# Patient Record
Sex: FEMALE | Race: WHITE | NOT HISPANIC OR LATINO | Employment: OTHER | URBAN - METROPOLITAN AREA
[De-identification: names, ages, dates, MRNs, and addresses within clinical notes are randomized per-mention and may not be internally consistent; named-entity substitution may affect disease eponyms.]

---

## 2017-01-13 ENCOUNTER — ALLSCRIPTS OFFICE VISIT (OUTPATIENT)
Dept: OTHER | Facility: OTHER | Age: 82
End: 2017-01-13

## 2017-02-02 ENCOUNTER — ALLSCRIPTS OFFICE VISIT (OUTPATIENT)
Dept: RADIOLOGY | Facility: CLINIC | Age: 82
End: 2017-02-02
Payer: COMMERCIAL

## 2017-02-08 ENCOUNTER — GENERIC CONVERSION - ENCOUNTER (OUTPATIENT)
Dept: OTHER | Facility: OTHER | Age: 82
End: 2017-02-08

## 2017-04-07 ENCOUNTER — ALLSCRIPTS OFFICE VISIT (OUTPATIENT)
Dept: OTHER | Facility: OTHER | Age: 82
End: 2017-04-07

## 2017-04-07 DIAGNOSIS — E55.9 VITAMIN D DEFICIENCY: ICD-10-CM

## 2017-04-07 DIAGNOSIS — E78.5 HYPERLIPIDEMIA: ICD-10-CM

## 2017-04-07 DIAGNOSIS — E03.9 HYPOTHYROIDISM: ICD-10-CM

## 2017-04-07 DIAGNOSIS — Z13.1 ENCOUNTER FOR SCREENING FOR DIABETES MELLITUS: ICD-10-CM

## 2017-04-10 ENCOUNTER — TRANSCRIBE ORDERS (OUTPATIENT)
Dept: LAB | Facility: CLINIC | Age: 82
End: 2017-04-10

## 2017-04-10 ENCOUNTER — APPOINTMENT (OUTPATIENT)
Dept: LAB | Facility: CLINIC | Age: 82
End: 2017-04-10
Payer: COMMERCIAL

## 2017-04-10 DIAGNOSIS — E55.9 VITAMIN D DEFICIENCY: ICD-10-CM

## 2017-04-10 DIAGNOSIS — Z13.1 ENCOUNTER FOR SCREENING FOR DIABETES MELLITUS: ICD-10-CM

## 2017-04-10 DIAGNOSIS — E03.9 HYPOTHYROIDISM: ICD-10-CM

## 2017-04-10 DIAGNOSIS — E78.5 HYPERLIPIDEMIA: ICD-10-CM

## 2017-04-10 LAB
25(OH)D3 SERPL-MCNC: 38.4 NG/ML (ref 30–100)
ALBUMIN SERPL BCP-MCNC: 3.7 G/DL (ref 3.5–5)
ALP SERPL-CCNC: 36 U/L (ref 46–116)
ALT SERPL W P-5'-P-CCNC: 37 U/L (ref 12–78)
ANION GAP SERPL CALCULATED.3IONS-SCNC: 8 MMOL/L (ref 4–13)
AST SERPL W P-5'-P-CCNC: 21 U/L (ref 5–45)
BILIRUB SERPL-MCNC: 0.5 MG/DL (ref 0.2–1)
BUN SERPL-MCNC: 22 MG/DL (ref 5–25)
CALCIUM SERPL-MCNC: 9.9 MG/DL (ref 8.3–10.1)
CHLORIDE SERPL-SCNC: 106 MMOL/L (ref 100–108)
CHOLEST SERPL-MCNC: 181 MG/DL (ref 50–200)
CO2 SERPL-SCNC: 26 MMOL/L (ref 21–32)
CREAT SERPL-MCNC: 0.91 MG/DL (ref 0.6–1.3)
GFR SERPL CREATININE-BSD FRML MDRD: 59.3 ML/MIN/1.73SQ M
GLUCOSE P FAST SERPL-MCNC: 86 MG/DL (ref 65–99)
HDLC SERPL-MCNC: 52 MG/DL (ref 40–60)
LDLC SERPL CALC-MCNC: 89 MG/DL (ref 0–100)
POTASSIUM SERPL-SCNC: 4.3 MMOL/L (ref 3.5–5.3)
PROT SERPL-MCNC: 6.9 G/DL (ref 6.4–8.2)
SODIUM SERPL-SCNC: 140 MMOL/L (ref 136–145)
TRIGL SERPL-MCNC: 200 MG/DL
TSH SERPL DL<=0.05 MIU/L-ACNC: 0.76 UIU/ML (ref 0.36–3.74)

## 2017-04-10 PROCEDURE — 36415 COLL VENOUS BLD VENIPUNCTURE: CPT

## 2017-04-10 PROCEDURE — 82306 VITAMIN D 25 HYDROXY: CPT

## 2017-04-10 PROCEDURE — 80053 COMPREHEN METABOLIC PANEL: CPT

## 2017-04-10 PROCEDURE — 84443 ASSAY THYROID STIM HORMONE: CPT

## 2017-04-10 PROCEDURE — 80061 LIPID PANEL: CPT

## 2017-05-24 ENCOUNTER — ALLSCRIPTS OFFICE VISIT (OUTPATIENT)
Dept: OTHER | Facility: OTHER | Age: 82
End: 2017-05-24

## 2017-05-31 ENCOUNTER — OFFICE VISIT (OUTPATIENT)
Dept: URGENT CARE | Facility: CLINIC | Age: 82
End: 2017-05-31
Payer: COMMERCIAL

## 2017-05-31 ENCOUNTER — HOSPITAL ENCOUNTER (OUTPATIENT)
Dept: RADIOLOGY | Facility: CLINIC | Age: 82
Discharge: HOME | End: 2017-05-31
Admitting: EMERGENCY MEDICINE
Payer: COMMERCIAL

## 2017-05-31 DIAGNOSIS — R52 PAIN: ICD-10-CM

## 2017-05-31 PROCEDURE — 73590 X-RAY EXAM OF LOWER LEG: CPT

## 2017-05-31 PROCEDURE — 99213 OFFICE O/P EST LOW 20 MIN: CPT

## 2017-05-31 PROCEDURE — S9088 SERVICES PROVIDED IN URGENT: HCPCS

## 2017-07-31 ENCOUNTER — ALLSCRIPTS OFFICE VISIT (OUTPATIENT)
Dept: OTHER | Facility: OTHER | Age: 82
End: 2017-07-31

## 2017-07-31 ENCOUNTER — APPOINTMENT (OUTPATIENT)
Dept: RADIOLOGY | Facility: CLINIC | Age: 82
End: 2017-07-31
Payer: COMMERCIAL

## 2017-07-31 ENCOUNTER — TRANSCRIBE ORDERS (OUTPATIENT)
Dept: URGENT CARE | Facility: CLINIC | Age: 82
End: 2017-07-31

## 2017-07-31 DIAGNOSIS — M15.9 GENERALIZED OSTEOARTHROSIS, UNSPECIFIED SITE: Primary | ICD-10-CM

## 2017-07-31 DIAGNOSIS — M15.9 GENERALIZED OSTEOARTHROSIS, UNSPECIFIED SITE: ICD-10-CM

## 2017-07-31 PROCEDURE — 73590 X-RAY EXAM OF LOWER LEG: CPT

## 2017-08-01 ENCOUNTER — TRANSCRIBE ORDERS (OUTPATIENT)
Dept: ADMINISTRATIVE | Facility: HOSPITAL | Age: 82
End: 2017-08-01

## 2017-08-01 DIAGNOSIS — Z13.820 SCREENING FOR OSTEOPOROSIS: Primary | ICD-10-CM

## 2017-08-16 ENCOUNTER — TRANSCRIBE ORDERS (OUTPATIENT)
Dept: ADMINISTRATIVE | Facility: HOSPITAL | Age: 82
End: 2017-08-16

## 2017-08-16 DIAGNOSIS — M79.662 PAIN IN LEFT SHIN: Primary | ICD-10-CM

## 2017-08-24 ENCOUNTER — HOSPITAL ENCOUNTER (OUTPATIENT)
Dept: NUCLEAR MEDICINE | Facility: HOSPITAL | Age: 82
Discharge: HOME | End: 2017-08-24
Payer: COMMERCIAL

## 2017-08-24 DIAGNOSIS — M79.662 PAIN IN LEFT SHIN: ICD-10-CM

## 2017-08-24 PROCEDURE — 78315 BONE IMAGING 3 PHASE: CPT

## 2017-08-24 PROCEDURE — A9503 TC99M MEDRONATE: HCPCS

## 2017-10-11 ENCOUNTER — ALLSCRIPTS OFFICE VISIT (OUTPATIENT)
Dept: OTHER | Facility: OTHER | Age: 82
End: 2017-10-11

## 2017-10-11 DIAGNOSIS — M79.605 PAIN OF LEFT LEG: ICD-10-CM

## 2017-10-11 DIAGNOSIS — E55.9 VITAMIN D DEFICIENCY: ICD-10-CM

## 2017-10-13 NOTE — PROGRESS NOTES
Assessment  1  Rheumatoid factor positive (795 79) (R76 8)   2  Right hand pain (729 5) (M79 641)   3  Leg pain, left (729 5) (M79 605)    Plan  Leg pain, left    · Gabapentin 100 MG Oral Capsule; take 1 capsule at bedtime   · US EXTREMITY SOFT TISSUE; Status:Hold For - Scheduling; Requested for:11Oct2017;   Pain in both hands, Rheumatoid factor positive    · 1 - Princess Reid DO ( Rheumatology) Co-Management  *  Status: Active   Requested for: 53GNB0846  Care Summary provided  : Yes    Discussion/Summary    Arthritis of right hand/Positive rheumatoid factor- I have explained to her that her symptoms seem suspicious for Rheumatoid arthritis altough she was not found to have Rheumatid aortitis by Rheumatologist she could have a false positive result  Given patine request I have send a referral to rheumatologist since she would like a second opinion  leg pain and nodule- send for Ultrasound of left leg and also will start trial of gabapentin at bedtime, she was counseled in detail in regards to side effects of medications  up in 2-3 weeks  Possible side effects of new medications were reviewed with the patient/guardian today  The treatment plan was reviewed with the patient/guardian  The patient/guardian understands and agrees with the treatment plan      Chief Complaint  Patient is here today with complaints of discomfort in fingers      History of Present Illness  Patient is here because she has trouble bending her hand, she says that her right hand tingles at night  She also has pain associated with her fingers and she has nodules on her fingers as well  She had a positive rheumatoid factor test and also saw Dr Yanet Mon early in the year and 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  says that she has been having pain on her left leg, she gets pain on her left and says that the pain is pounding on her left leg and that the pain does not let her sleep        Review of Systems    Constitutional: No fever, no chills, feels well, no tiredness, no recent weight gain or weight loss  Eyes: No complaints of eye pain, no red eyes, no eyesight problems, no discharge, no dry eyes, no itching of eyes  Cardiovascular: No complaints of slow heart rate, no fast heart rate, no chest pain, no palpitations, no leg claudication, no lower extremity edema  Respiratory: No complaints of shortness of breath, no wheezing, no cough, no SOB on exertion, no orthopnea, no PND  Musculoskeletal: as noted in HPI  Integumentary: No complaints of skin rash or lesions, no itching, no skin wounds, no breast pain or lump  Neurological: No complaints of headache, no confusion, no convulsions, no numbness, no dizziness or fainting, no tingling, no limb weakness, no difficulty walking  Endocrine: No complaints of proptosis, no hot flashes, no muscle weakness, no deepening of the voice, no feelings of weakness  ROS reviewed  Active Problems  1  Acute pain (338 19) (R52)   2  CMC arthritis (716 94) (M19 049)   3  Diabetes mellitus screening (V77 1) (Z13 1)   4  Elevated rheumatoid factor (795 79) (R76 8)   5  Generalized osteoarthritis (715 00) (M15 9)   6  Hyperlipidemia (272 4) (E78 5)   7  Hyperparathyroidism (252 00) (E21 3)   8  Hypertension (401 9) (I10)   9  Hypothyroidism, unspecified (244 9) (E03 9)   10  Left groin pain (789 09) (R10 32)   11  Left wrist pain (719 43) (M25 532)   12  Leg pain, left (729 5) (M79 605)   13  Need for pneumococcal vaccination (V03 82) (Z23)   14  Needs flu shot (V04 81) (Z23)   15  Overactive bladder (596 51) (N32 81)   16  Pain in both hands (729 5) (M79 641,M79 642)   17  Pain in both wrists (719 43) (M25 531,M25 532)   18  Post-menopausal (V49 81) (Z78 0)   19  Sacroiliitis (720 2) (M46 1)   20  Sciatica of left side (724 3) (M54 32)   21  Screening for depression (V79 0) (Z13 89)   22  Screening for osteoporosis (V82 81) (Z13 820)   23  Visit for screening (V82 9) (Z13 9)   24   Visit for screening mammogram (V76 12) (Z12 31)   25  Vitamin D deficiency (268 9) (E55 9)    Past Medical History  1  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)   2  History of Former smoker (V15 82) (K33 308)   3  History of basal cell carcinoma (V10 83) (O72 390)    Surgical History  1  History of Cataract Surgery   2  History of Excision Of Lesion Face Malignant   3  History of Knee Replacement   4  History of Parathyroid Surgery   5  History of Rotator Cuff Repair   6  History of Total Abdominal Hysterectomy With Removal Of Both Ovaries    Family History  Mother    1  Family history of cardiac disorder (V17 49) (Z82 49)   2  Family history of malignant neoplasm (V16 9) (Z80 9)  Father    3  Family history of lung cancer (V16 1) (Z80 1)   4  Family history of malignant neoplasm (V16 9) (Z80 9)  Child    5  Family history of spina bifida (V19 5) (Z82 79)    Social History   · Daily alcohol use   · Former smoker (V15 82) (R40 000)   · Lives with spouse   · Denied: History of Marijuana   ·    · Retired   · Three children    Current Meds   1  Aspirin 81 MG TABS; Therapy: (Recorded:07Apr2017) to Recorded   2  Atorvastatin Calcium 20 MG Oral Tablet; Take 1 tablet daily; Therapy: 84SHZ8413 to (Elkin Frank)  Requested for: 07Apr2017; Last   Rx:07Apr2017 Ordered   3  Levothyroxine Sodium 75 MCG Oral Tablet; TAKE 1 TABLET DAILY; Therapy: 94Msc0698 to ([id number])  Requested for: 92MQW6367; Last   Rx:95Sox3685 Ordered   4  Lisinopril 10 MG Oral Tablet; TAKE 1 TABLET DAILY; Therapy: 85IZS9076 to (Evaluate:52Mcm4078)  Requested for: 49RJM9804; Last   WM:80JUX5846 Ordered   5  Omega-3-acid Ethyl Esters 1 GM Oral Capsule; TAKE 2 CAPSULES TWICE A DAY; Therapy: 66JDL5363 to (Zach Brooks)  Requested for: 71PAU2074; Last   Rx:09Ixs2586 Ordered   6  Vitamin B12 1000 MCG Oral Tablet Extended Release; Therapy: (Recorded:07Apr2017) to Recorded    Allergies  1   Codeine Derivatives    Vitals  Vital Signs Recorded: 80KNV6556 04:04PM   Temperature 97 6 F   Heart Rate 67   Systolic 186   Diastolic 78   Height 5 ft 7 in   Weight 175 lb 4 00 oz   BMI Calculated 27 45   BSA Calculated 1 91   O2 Saturation 96     Physical Exam    Constitutional   General appearance: No acute distress, well appearing and well nourished  Eyes   Conjunctiva and lids: No swelling, erythema or discharge  Pulmonary   Respiratory effort: No increased work of breathing or signs of respiratory distress  Auscultation of lungs: Clear to auscultation  Cardiovascular   Auscultation of heart: Normal rate and rhythm, normal S1 and S2, without murmurs  Musculoskeletal   Gait and station: Normal  -nodules noted on right hand DIP, PIP and MCP joints  Skin a less than 1 cm nodule palpate over the anterior aspect of tibia, mobile and tender     Psychiatric   Orientation to person, place, and time: Normal          Signatures   Electronically signed by : Ernesto Schneider MD; Oct 11 2017  5:15PM EST                       (Author)

## 2017-10-17 ENCOUNTER — HOSPITAL ENCOUNTER (OUTPATIENT)
Dept: ULTRASOUND IMAGING | Facility: HOSPITAL | Age: 82
Discharge: HOME | End: 2017-10-17
Attending: FAMILY MEDICINE
Payer: COMMERCIAL

## 2017-10-17 DIAGNOSIS — M79.605 PAIN OF LEFT LEG: ICD-10-CM

## 2017-10-17 PROCEDURE — 76882 US LMTD JT/FCL EVL NVASC XTR: CPT

## 2017-11-21 ENCOUNTER — APPOINTMENT (OUTPATIENT)
Dept: LAB | Facility: HOSPITAL | Age: 82
End: 2017-11-21
Payer: COMMERCIAL

## 2017-11-21 ENCOUNTER — HOSPITAL ENCOUNTER (OUTPATIENT)
Dept: RADIOLOGY | Facility: HOSPITAL | Age: 82
Discharge: HOME | End: 2017-11-21
Payer: COMMERCIAL

## 2017-11-21 ENCOUNTER — TRANSCRIBE ORDERS (OUTPATIENT)
Dept: ADMINISTRATIVE | Facility: HOSPITAL | Age: 82
End: 2017-11-21

## 2017-11-21 DIAGNOSIS — G56.01 CARPAL TUNNEL SYNDROME ON RIGHT: ICD-10-CM

## 2017-11-21 DIAGNOSIS — E09.311 DRUG/CHEM DIABETES W UNSP DIABETIC RTNOP W MACULAR EDEMA (HCC): ICD-10-CM

## 2017-11-21 DIAGNOSIS — M65.311 TRIGGER FINGER OF RIGHT THUMB: ICD-10-CM

## 2017-11-21 DIAGNOSIS — E55.9 AVITAMINOSIS D: ICD-10-CM

## 2017-11-21 DIAGNOSIS — M15.0 PRIMARY GENERALIZED HYPERTROPHIC OSTEOARTHROSIS: ICD-10-CM

## 2017-11-21 DIAGNOSIS — M15.2 BOUCHARD'S NODES: ICD-10-CM

## 2017-11-21 DIAGNOSIS — M15.0 PRIMARY GENERALIZED HYPERTROPHIC OSTEOARTHROSIS: Primary | ICD-10-CM

## 2017-11-21 LAB
25(OH)D3 SERPL-MCNC: 36.8 NG/ML (ref 30–100)
ALBUMIN SERPL BCP-MCNC: 3.9 G/DL (ref 3.5–5)
ALP SERPL-CCNC: 45 U/L (ref 46–116)
ALT SERPL W P-5'-P-CCNC: 45 U/L (ref 12–78)
ANION GAP SERPL CALCULATED.3IONS-SCNC: 11 MMOL/L (ref 4–13)
AST SERPL W P-5'-P-CCNC: 21 U/L (ref 5–45)
BASOPHILS # BLD AUTO: 0.06 THOUSANDS/ΜL (ref 0–0.1)
BASOPHILS NFR BLD AUTO: 1 % (ref 0–1)
BILIRUB SERPL-MCNC: 0.6 MG/DL (ref 0.2–1)
BUN SERPL-MCNC: 23 MG/DL (ref 5–25)
CALCIUM SERPL-MCNC: 9.6 MG/DL (ref 8.3–10.1)
CHLORIDE SERPL-SCNC: 103 MMOL/L (ref 100–108)
CK SERPL-CCNC: 80 U/L (ref 26–192)
CO2 SERPL-SCNC: 25 MMOL/L (ref 21–32)
CREAT SERPL-MCNC: 1.01 MG/DL (ref 0.6–1.3)
CRP SERPL QL: <3 MG/L
EOSINOPHIL # BLD AUTO: 0.13 THOUSAND/ΜL (ref 0–0.61)
EOSINOPHIL NFR BLD AUTO: 1 % (ref 0–6)
ERYTHROCYTE [DISTWIDTH] IN BLOOD BY AUTOMATED COUNT: 12.8 % (ref 11.6–15.1)
ERYTHROCYTE [SEDIMENTATION RATE] IN BLOOD: 1 MM/HOUR (ref 0–20)
GFR SERPL CREATININE-BSD FRML MDRD: 52 ML/MIN/1.73SQ M
GLUCOSE SERPL-MCNC: 108 MG/DL (ref 65–140)
HCT VFR BLD AUTO: 48.1 % (ref 34.8–46.1)
HGB BLD-MCNC: 16.4 G/DL (ref 11.5–15.4)
LYMPHOCYTES # BLD AUTO: 4.09 THOUSANDS/ΜL (ref 0.6–4.47)
LYMPHOCYTES NFR BLD AUTO: 41 % (ref 14–44)
MCH RBC QN AUTO: 31.7 PG (ref 26.8–34.3)
MCHC RBC AUTO-ENTMCNC: 34.1 G/DL (ref 31.4–37.4)
MCV RBC AUTO: 93 FL (ref 82–98)
MONOCYTES # BLD AUTO: 0.9 THOUSAND/ΜL (ref 0.17–1.22)
MONOCYTES NFR BLD AUTO: 9 % (ref 4–12)
NEUTROPHILS # BLD AUTO: 4.77 THOUSANDS/ΜL (ref 1.85–7.62)
NEUTS SEG NFR BLD AUTO: 48 % (ref 43–75)
NRBC BLD AUTO-RTO: 0 /100 WBCS
PLATELET # BLD AUTO: 303 THOUSANDS/UL (ref 149–390)
PMV BLD AUTO: 9.3 FL (ref 8.9–12.7)
POTASSIUM SERPL-SCNC: 4.1 MMOL/L (ref 3.5–5.3)
PROT SERPL-MCNC: 7.7 G/DL (ref 6.4–8.2)
PTH-INTACT SERPL-MCNC: 11.6 PG/ML (ref 14–72)
RBC # BLD AUTO: 5.18 MILLION/UL (ref 3.81–5.12)
SODIUM SERPL-SCNC: 139 MMOL/L (ref 136–145)
TSH SERPL DL<=0.05 MIU/L-ACNC: 1.62 UIU/ML (ref 0.36–3.74)
URATE SERPL-MCNC: 6.8 MG/DL (ref 2–6.8)
VIT B12 SERPL-MCNC: 1365 PG/ML (ref 100–900)
WBC # BLD AUTO: 10 THOUSAND/UL (ref 4.31–10.16)

## 2017-11-21 PROCEDURE — 84550 ASSAY OF BLOOD/URIC ACID: CPT

## 2017-11-21 PROCEDURE — 82306 VITAMIN D 25 HYDROXY: CPT

## 2017-11-21 PROCEDURE — 86430 RHEUMATOID FACTOR TEST QUAL: CPT

## 2017-11-21 PROCEDURE — 86200 CCP ANTIBODY: CPT

## 2017-11-21 PROCEDURE — 86431 RHEUMATOID FACTOR QUANT: CPT

## 2017-11-21 PROCEDURE — 86618 LYME DISEASE ANTIBODY: CPT

## 2017-11-21 PROCEDURE — 83970 ASSAY OF PARATHORMONE: CPT

## 2017-11-21 PROCEDURE — 82607 VITAMIN B-12: CPT

## 2017-11-21 PROCEDURE — 36415 COLL VENOUS BLD VENIPUNCTURE: CPT

## 2017-11-21 PROCEDURE — 85652 RBC SED RATE AUTOMATED: CPT

## 2017-11-21 PROCEDURE — 85025 COMPLETE CBC W/AUTO DIFF WBC: CPT

## 2017-11-21 PROCEDURE — 82550 ASSAY OF CK (CPK): CPT

## 2017-11-21 PROCEDURE — 86038 ANTINUCLEAR ANTIBODIES: CPT

## 2017-11-21 PROCEDURE — 84443 ASSAY THYROID STIM HORMONE: CPT

## 2017-11-21 PROCEDURE — 86140 C-REACTIVE PROTEIN: CPT

## 2017-11-21 PROCEDURE — 73120 X-RAY EXAM OF HAND: CPT

## 2017-11-21 PROCEDURE — 80053 COMPREHEN METABOLIC PANEL: CPT

## 2017-11-22 LAB
B BURGDOR IGG SER IA-ACNC: 0.07
B BURGDOR IGM SER IA-ACNC: 0.09
CRYOGLOB RF SER-ACNC: ABNORMAL [IU]/ML
RHEUMATOID FACT SER QL LA: POSITIVE
RYE IGE QN: NEGATIVE

## 2017-11-25 LAB — CCP IGA+IGG SERPL IA-ACNC: 3 UNITS (ref 0–19)

## 2018-01-04 ENCOUNTER — HOSPITAL ENCOUNTER (OUTPATIENT)
Dept: BONE DENSITY | Facility: CLINIC | Age: 83
Discharge: HOME | End: 2018-01-04
Payer: COMMERCIAL

## 2018-01-04 ENCOUNTER — GENERIC CONVERSION - ENCOUNTER (OUTPATIENT)
Dept: FAMILY MEDICINE CLINIC | Facility: CLINIC | Age: 83
End: 2018-01-04

## 2018-01-04 DIAGNOSIS — Z13.820 SCREENING FOR OSTEOPOROSIS: ICD-10-CM

## 2018-01-04 PROCEDURE — 77080 DXA BONE DENSITY AXIAL: CPT

## 2018-01-10 NOTE — RESULT NOTES
Message   Recorded as Task   Date: 02/08/2017 09:00 AM, Created By: Lis Bee   Task Name: Follow Up   Assigned To: Lis Bee   Regarding Patient: Leah Nolan, Status: Active   Comment:    Svetlana Olivier - 08 Feb 2017 9:00 AM     TASK CREATED  F/u procedure-L SI Joint Injection on 2/2/17  Reports a 95% improvement in back pain since the injection  Has no f/u appoint, but will call if she develops any problems     Christy Castrejon - 08 Feb 2017 9:34 AM     TASK REPLIED TO: Previously Assigned To Yamileth Hinson MD AWARE        Signatures   Electronically signed by : Igor Kraft, ; Feb 8 2017 10:58AM EST                       (Author)

## 2018-01-11 NOTE — RESULT NOTES
Verified Results  * XR HAND 3+ VIEW LEFT 03Oct2016 10:04AM [facility] [employer] Order Number: [id number]     Test Name Result Flag Reference   XR HAND 3+ VW LEFT (Report)     LEFT HAND     INDICATION: Joint pain in fingers getting worse over years  COMPARISON: None     VIEWS: 3; 3 images     For the purposes of institution wide universal language the following terms will apply: (thumb=1st digit/finger, index finger=2nd digit/finger, long finger=3rd digit/finger, ring=4th digit/finger and small finger=5th digit/finger)     FINDINGS:     Degenerative narrowing at the DIP and PIP joints diffusely most significantly at the 2nd and 3rd DIP joints is compatible with osteoarthritis  No erosions or fractures seen  No degenerative changes  No lytic or blastic lesions are seen  Soft tissues are unremarkable  IMPRESSION:     Osteoarthritis primarily affecting the DIP and PIP joints as described  Workstation performed: [id number]     Signed by:   Kamille Ledezma DO   10/4/16     * XR HAND 3+ VIEW RIGHT 92PCP6040 10:04AM [facility] Order Number: [id number]     Test Name Result Flag Reference   XR HAND 3+ VW RIGHT (Report)     RIGHT HAND     INDICATION: Joint pain in fingers  COMPARISON: None     VIEWS: 3; 3 images     For the purposes of institution wide universal language the following terms will apply: (thumb=1st digit/finger, index finger=2nd digit/finger, long finger=3rd digit/finger, ring=4th digit/finger and small finger=5th digit/finger)     FINDINGS:     There is no acute fracture or dislocation  Severe degenerative narrowing with osteophyte formation the DIP joints diffusely with mild loss of height at L3 and L4 PIP joints compatible with osteoarthritis  No lytic or blastic lesions are seen  Soft tissues are unremarkable  IMPRESSION:     Osteoarthritis  No acute fracture         Workstation performed: [id number]     Signed by:   Kamille Ledezma    10/4/16     * XR SACROILIAC JOINTS < 3 VIEWS 03Oct2016 10:04AM [employer] Order Number: [id number]     Test Name Result Flag Reference   XR SACROILIAC JOINTS < 3 VIEWS (Report)     SACROILIAC JOINTS     INDICATION: Bilateral buttock pain worsening over the years left greater than right  COMPARISON: January 18, 2016     VIEWS: 2; 2 images     FINDINGS:     The SI joints appear symmetric without evidence of focal erosions or joint space widening  Sacral arcuate lines appear intact  No fracture or pathologic bone lesions seen  Included portions of the pelvis and lumbar spine are unremarkable  IMPRESSION:     Unremarkable SI joints         Workstation performed: [id number]     Signed by:   Davis Cruz DO   10/4/16

## 2018-01-12 VITALS
BODY MASS INDEX: 26.88 KG/M2 | TEMPERATURE: 97.1 F | WEIGHT: 171.25 LBS | HEIGHT: 67 IN | HEART RATE: 76 BPM | DIASTOLIC BLOOD PRESSURE: 78 MMHG | OXYGEN SATURATION: 95 % | SYSTOLIC BLOOD PRESSURE: 122 MMHG

## 2018-01-12 VITALS — WEIGHT: 172.38 LBS | SYSTOLIC BLOOD PRESSURE: 97 MMHG | DIASTOLIC BLOOD PRESSURE: 68 MMHG | HEART RATE: 78 BPM

## 2018-01-12 NOTE — RESULT NOTES
Verified Results  * XR HIP 2+ VIEW LEFT 01GJP4899 04:34PM [facility] Order Number: [id number]     Test Name Result Flag Reference   XR HIP 2+ VW LEFT (Report)     LEFT HIP     INDICATION: Left hip pain for 2 months  COMPARISON: None     VIEWS: AP pelvis and 2 coned down views; 3 images     FINDINGS:     There is no fracture or dislocation  There are symmetric degenerative changes in the hips  No lytic or blastic lesions are seen  Soft tissues are unremarkable  IMPRESSION:     Degenerative disease in both hips  Otherwise unremarkable study         Signed by:   Lucas Baird MD   1/18/16

## 2018-01-13 VITALS
WEIGHT: 175.5 LBS | DIASTOLIC BLOOD PRESSURE: 78 MMHG | OXYGEN SATURATION: 97 % | HEIGHT: 67 IN | BODY MASS INDEX: 27.55 KG/M2 | HEART RATE: 72 BPM | SYSTOLIC BLOOD PRESSURE: 124 MMHG | TEMPERATURE: 98.2 F

## 2018-01-13 VITALS
HEIGHT: 67 IN | WEIGHT: 175.38 LBS | BODY MASS INDEX: 27.53 KG/M2 | HEART RATE: 72 BPM | SYSTOLIC BLOOD PRESSURE: 138 MMHG | DIASTOLIC BLOOD PRESSURE: 86 MMHG | RESPIRATION RATE: 14 BRPM | TEMPERATURE: 97.4 F

## 2018-01-14 VITALS
WEIGHT: 175.25 LBS | SYSTOLIC BLOOD PRESSURE: 130 MMHG | HEART RATE: 67 BPM | DIASTOLIC BLOOD PRESSURE: 78 MMHG | TEMPERATURE: 97.6 F | OXYGEN SATURATION: 96 % | BODY MASS INDEX: 27.51 KG/M2 | HEIGHT: 67 IN

## 2018-01-14 NOTE — RESULT NOTES
Verified Results  (1) TSH 10TOI7427 09:02AM [facility] Order Number: [id number]  [facility] Order Number: [id number] Order Number: [id number] Order Number: [id number]     Test Name Result Flag Reference   TSH 1 708 uIU/mL  0 358-3 740   The recommended reference ranges for TSH during pregnancy are as follows:  First trimester 0 1 to 2 5 uIU/mL  Second trimester  0 2 to 3 0 uIU/mL  Third trimester 0 3 to 3 0 uIU/m     (1) COMPREHENSIVE METABOLIC PANEL 28KTK3720 19:57GR [facility] Order Number: [id number]  TW Order Number: [id number] Order Number: [id number] Order Number: [id number]     Test Name Result Flag Reference   GLUCOSE,RANDM 100 mg/dL     If the patient is fasting, the ADA then defines impaired fasting glucose as > 100 mg/dL and diabetes as > or equal to 123 mg/dL  SODIUM 140 mmol/L  136-145   POTASSIUM 4 6 mmol/L  3 5-5 3   CHLORIDE 103 mmol/L  100-108   CARBON DIOXIDE 26 mmol/L  21-32   ANION GAP (CALC) 11 mmol/L  4-13   BLOOD UREA NITROGEN 16 mg/dL  5-25   CREATININE 0 90 mg/dL  0 60-1 30   Standardized to IDMS reference method   CALCIUM 9 7 mg/dL  8 3-10 1   BILI, TOTAL 0 50 mg/dL  0 20-1 00   ALK PHOSPHATAS 41 U/L L    ALT (SGPT) 40 U/L  12-78   AST(SGOT) 22 U/L  5-45   ALBUMIN 4 0 g/dL  3 5-5 0   TOTAL PROTEIN 7 0 g/dL  6 4-8 2   eGFR Non-African American      >60 0 ml/min/1 73sq m   [facility] Disease Education Program recommendations are as follows:  GFR calculation is accurate only with a steady state creatinine  Chronic Kidney disease less than 60 ml/min/1 73 sq  meters  Kidney failure less than 15 ml/min/1 73 sq  meters       (1) MICROALBUMIN CREATININE RATIO, RANDOM URINE 02Jun2016 09:02AM [facility] [employer] Order Number: [id number]  TW Order Number: [id number]     Test Name Result Flag Reference   MICROALBUMIN/ CREAT R <22 mg/g creatinine  0-30 Deniz Bee <5 0 mg/L  0 0-20 0   CREATININE URINE 22 6 mg/dL       (1) LIPID PANEL FASTING W DIRECT LDL REFLEX 01DJD3996 09:02AM [place] [facility] Order Number: [id number]  TW Order Number: [id number] Order Number: [id number] Order Number: [id number]     Test Name Result Flag Reference   CHOLESTEROL 202 mg/dL H    LDL CHOLESTEROL CALCULATED 89 mg/dL  0-100   Triglyceride:         Normal              <150 mg/dl       Borderline High    150-199 mg/dl       High               200-499 mg/dl       Very High          >499 mg/dl  Cholesterol:         Desirable        <200 mg/dl      Borderline High  200-239 mg/dl      High             >239 mg/dl  HDL Cholesterol:        High    >59 mg/dL      Low     <41 mg/dL  LDL Cholesterol:        Optimal          <100 mg/dl        Near Optimal     100-129 mg/dl        Above Optimal          Borderline High   130-159 mg/dl          High              160-189 mg/dl          Very High        >189 mg/dl  LDL CALCULATED:    This screening LDL is a calculated result  It does not have the accuracy of the Direct Measured LDL in the monitoring of patients with hyperlipidemia and/or statin therapy  Direct Measure LDL (QSA285) must be ordered separately in these patients  TRIGLYCERIDES 257 mg/dL H <=150   Specimen collection should occur prior to N-Acetylcysteine or Metamizole administration due to the potential for falsely depressed results  HDL,DIRECT 62 mg/dL H 40-60   Specimen collection should occur prior to Metamizole administration due to the potential for falsely depressed results

## 2018-01-14 NOTE — RESULT NOTES
Verified Results  (1) PTH N-TERMINAL (INTACT) 20CSD2469 09:47PM [facility] Order Number: [id number]     Order Number: [id number]     Test Name Result Flag Reference   PARATHYROID HORMONE INTACT 22 9 pg/mL  14 0-72 0     (1) VITAMIN D 25-HYDROXY 78EVF9995 09:47PM [employer]     Test Name Result Flag Reference   VIT D 25-HYDROX 13 5 ng/mL L 30 0-100 0     (1) TSH 09JAV9223 06:06PM [employer]   Patients undergoing fluorescein dye angiography may retain small amounts of fluorescein in the body for 48-72 hours post procedure  Samples containing fluorescein can produce falsely depressed TSH values  If the patient had this procedure,a specimen should be resubmitted post fluorescein clearance  The recommended reference ranges for TSH during pregnancy are as follows:  First trimester 0 1 to 2 5 uIU/mL  Second trimester  0 2 to 3 0 uIU/mL  Third trimester 0 3 to 3 0 uIU/m     Test Name Result Flag Reference   TSH 0 532 uIU/mL  0 358-3 740     (1) COMPREHENSIVE METABOLIC PANEL 26ZKJ2039 49:55CQ [facility] Kidney Disease Education Program recommendations are as follows:  GFR calculation is accurate only with a steady state creatinine  Chronic Kidney disease less than 60 ml/min/1 73 sq  meters  Kidney failure less than 15 ml/min/1 73 sq  meters  Test Name Result Flag Reference   GLUCOSE,RANDM 82 mg/dL     If the patient is fasting, the ADA then defines impaired fasting glucose as > 100 mg/dL and diabetes as > or equal to 123 mg/dL     SODIUM 140 mmol/L  136-145   POTASSIUM 4 9 mmol/L  3 5-5 3   CHLORIDE 102 mmol/L  100-108   CARBON DIOXIDE 26 mmol/L  21-32   ANION GAP (CALC) 12 mmol/L  4-13   BLOOD UREA NITROGEN 19 mg/dL  5-25   CREATININE 0 86 mg/dL  0 60-1 30   Standardized to IDMS reference method   CALCIUM 9 9 mg/dL  8 3-10 1   BILI, TOTAL 0 70 mg/dL  0 20-1 00   ALK PHOSPHATAS 34 U/L L    ALT (SGPT) 36 U/L  12-78   AST(SGOT) 18 U/L  5-45   ALBUMIN 3 9 g/dL  3 5-5 0 TOTAL PROTEIN 7 4 g/dL  6 4-8 2   eGFR Non-African American      >60 0 ml/min/1 73sq m     (1) CBC/PLT/DIFF 63YHF6014 05:58PM Ryan Bullock     Test Name Result Flag Reference   WBC COUNT 13 73 Thousand/uL H 4 31-10 16   RBC COUNT 5 22 Million/uL H 3 81-5 12   HEMOGLOBIN 16 6 g/dL H 11 5-15 4   HEMATOCRIT 49 3 % H 34 8-46  1   MCV 94 4 fL  82 0-98 0   MCH 31 8 pg  26 8-34 3   MCHC 33 7 g/dL  31 4-37 4   RDW 13 1 %  11 6-15 1   MPV 10 4 fL  8 9-12 7   PLATELET COUNT 825 Thousands/uL  149-390   NEUTROPHILS RELATIVE PERCENT 52 %  43-75   LYMPHOCYTES RELATIVE PERCENT 36 %  14-44   MONOCYTES RELATIVE PERCENT 12 %  4-12   EOSINOPHILS RELATIVE PERCENT 0 %  0-6   BASOPHILS RELATIVE PERCENT 0 %  0-1   NEUTROPHILS ABSOLUTE COUNT 7 05 Thousands/µL  1 85-7 62   LYMPHOCYTES ABSOLUTE COUNT 4 93 Thousands/µL H 0 60-4 47   MONOCYTES ABSOLUTE COUNT 1 70 Thousand/µL H 0 17-1 22   EOSINOPHILS ABSOLUTE COUNT 0 01 Thousand/µL  0 00-0 61   BASOPHILS ABSOLUTE COUNT 0 04 Thousands/µL  0 00-0 10

## 2018-01-15 NOTE — MISCELLANEOUS
Signatures   Electronically signed by : Loretta Martinez DO; Dec 27 2016 11:53AM EST                       (Author)

## 2018-01-15 NOTE — PROGRESS NOTES
Assessment   1  Overactive bladder (596 51) (N32 81)  2  Generalized osteoarthritis (715 00) (M15 9)  3  Sciatica of left side (724 3) (M54 32)  4  Left groin pain (789 09) (R10 30)  5  Vitamin D deficiency (268 9) (E55 9)    Plan  Hypertension    · Renew: Atorvastatin Calcium 20 MG Oral Tablet; Take 1 tablet daily   · Renew: Lisinopril 10 MG Oral Tablet; TAKE 1 TABLET DAILY  Left groin pain, Sciatica of left side    · Stop: PredniSONE 10 MG Oral Tablet  Overactive bladder    · Start: VESIcare 5 MG Oral Tablet; Take 1 tablet daily  Post-menopausal    · Renew: Estradiol 0 5 MG Oral Tablet; take 1 tablet daily prn  Sciatica of left side    · Stop: Naproxen 500 MG Oral Tablet  Unlinked    · Stop: Synthroid TABS (Levothyroxine Sodium)    Discussion/Summary  Discussion Summary:   Declines PT eval  Finish meds  Discussed starting calcium and vit d q day  Discussed doing Kegel's exercises  Will start low dose vesicare  RTC four months  Chief Complaint  Chief Complaint Free Text Note Form: Pt is here for a f/u back pain  Pt stated the pain is getting a little better  History of Present Illness  HPI: WF RTC for f/u sciatica and labs after starting steroids  Doing a lot better  Pain just about gone  Labs ok except for low vit d  Doesn't take Calcium or vit d  New c/o several months of having urinary frequency and urgency during the day  Some incontinence and occurs with coughing/laughing  Nothing when sleeping  No burning  Active Problems   1  Generalized osteoarthritis (715 00) (M15 9)  2  Hyperparathyroidism (252 00) (E21 3)  3  Hypertension (401 9) (I10)  4  Hypothyroid (244 9) (E03 9)  5  Left groin pain (789 09) (R10 30)  6  Post-menopausal (V49 81) (Z78 0)  7  Sciatica of left side (724 3) (M54 32)  8  Screening for depression (V79 0) (Z13 89)  9  Visit for screening (V82 9) (Z13 9)    Past Medical History   1  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)  2   History of Former smoker (V15 82) (O71 975)  3  History of basal cell carcinoma (V10 83) (G68 234)  Active Problems And Past Medical History Reviewed: The active problems and past medical history were reviewed and updated today  Surgical History   1  History of Cataract Surgery  2  History of Excision Of Lesion Face Malignant  3  History of Knee Replacement  4  History of Parathyroid Surgery  5  History of Rotator Cuff Repair  6  History of Total Abdominal Hysterectomy With Removal Of Both Ovaries  Surgical History Reviewed: The surgical history was reviewed and updated today  Family History   1  Family history of cardiac disorder (V17 49) (Z82 49)  2  Family history of malignant neoplasm (V16 9) (Z80 9)   3  Family history of lung cancer (V16 1) (Z80 1)  4  Family history of malignant neoplasm (V16 9) (Z80 9)   5  Family history of spina bifida (V19 5) (Z82 79)  Family History Reviewed: The family history was reviewed and updated today  Social History    · Former smoker (H24 73) (K69 295)   ·    · No alcohol use   · Retired   · Three children  Social History Reviewed: The social history was reviewed and updated today  The social history was reviewed and is unchanged  Current Meds  1  Atorvastatin Calcium 20 MG Oral Tablet; Take 1 tablet daily; Therapy: 81MCI2506 to Recorded  2  Estradiol 0 5 MG Oral Tablet; take 1 tablet daily prn; Therapy: 17OXT0250 to (Evaluate:13Feb2016) Recorded  3  Lisinopril 10 MG Oral Tablet; TAKE 1 TABLET DAILY; Therapy: 42MCN9627 to (Evaluate:13Feb2016) Recorded  4  Naproxen 500 MG Oral Tablet; TAKE 1 TABLET EVERY 12 HOURS WITH FOOD; Therapy: 68LRJ5284 to (Evaluate:11Jan2016)  Requested for: 98SQV8771; Last Rx:04Jan2016   Ordered  5  PredniSONE 10 MG Oral Tablet; 40mg po q day x 5, 30mg po q day x5, 20mg po q day x5, 10mg   po q day x 5 then d/c;   Therapy: 10ZDX3350 to (Evaluate:03Feb2016)  Requested for: 47VXY7881; Last Rx:14Jan2016   Ordered  6  Synthroid TABS;    Therapy: (JVXHGPLR:41QTC7890) to Recorded  Medication List Reviewed: The medication list was reviewed and updated today  Allergies   1  Codeine Derivatives    Vitals  Vital Signs [Data Includes: Current Encounter]    Recorded: 98PXF8359 12:55PM   Temperature 96 9 F   Heart Rate 65   Respiration 16   Systolic 860   Diastolic 60   Height 5 ft 7 in   Weight 168 lb    BMI Calculated 26 31   BSA Calculated 1 88     Physical Exam    Constitutional   General appearance: No acute distress, well appearing and well nourished  Eyes   Conjunctiva and lids: No swelling, erythema or discharge  Pupils and irises: Equal, round and reactive to light  Ears, Nose, Mouth, and Throat   Oropharynx: Normal with no erythema, edema, exudate or lesions  Pulmonary   Respiratory effort: No increased work of breathing or signs of respiratory distress  Auscultation of lungs: Clear to auscultation  Cardiovascular   Auscultation of heart: Normal rate and rhythm, normal S1 and S2, without murmurs  Abdomen   Abdomen: Non-tender, no masses  Additional Exam:  LS spine w/o gross deformity, increase temp, or erythema  ROM wnl  NO tenderness  LE wnl          Signatures   Electronically signed by : SINDHU Henry ; Jan 28 2016  1:17PM EST                       (Author)    Electronically signed by : SINDHU Henry ; Jan 28 2016  1:18PM EST                       (Author)

## 2018-01-16 NOTE — RESULT NOTES
Verified Results  * XR SPINE LUMBAR MINIMUM 4 VIEWS NON INJURY 03Oct2016 10:04AM [facility] Order Number: [id number]     Test Name Result Flag Reference   XR SPINE LUMBAR MINIMUM 4 VIEWS (Report)     LUMBAR SPINE     INDICATION: BI LATERAL BUTTOCK PAIN GETTING WORSE OVER THE YEARS- LEFT WORSE THAN RT  - SOME LOW BACK PAIN     COMPARISON: None     VIEWS: AP, lateral, bilateral oblique and coned down projections; 5 images     FINDINGS:     Alignment is unremarkable  There is no radiographic evidence of acute fracture or destructive osseous lesion  Mild/moderate diffuse endplate degenerative changes with mild disc space height loss at L2-3  Mild-to-moderate diffuse facet arthropathy most prominent L5-S1  Nonspecific left upper quadrant calcifications  Diffuse atherosclerotic calcifications of the aorta  IMPRESSION:     Diffuse degenerative changes most prominent through the lower lumbar spine            Workstation performed: [id number]     Signed by:   Soco Claudio MD   10/4/16

## 2018-01-17 NOTE — RESULT NOTES
Verified Results  (1) COMPREHENSIVE METABOLIC PANEL [id number] 50:66VT Oswaldo Sessions Order Number: [id number]     Test Name Result Flag Reference   GLUCOSE,RANDM 94 mg/dL     If the patient is fasting, the ADA then defines impaired fasting glucose as > 100 mg/dL and diabetes as > or equal to 123 mg/dL  SODIUM 137 mmol/L  136-145   POTASSIUM 4 0 mmol/L  3 5-5 3   CHLORIDE 103 mmol/L  100-108   CARBON DIOXIDE 24 mmol/L  21-32   ANION GAP (CALC) 10 mmol/L  4-13   BLOOD UREA NITROGEN 17 mg/dL  5-25   CREATININE 0 93 mg/dL  0 60-1 30   Standardized to IDMS reference method   CALCIUM 9 4 mg/dL  8 3-10 1   BILI, TOTAL 0 65 mg/dL  0 20-1 00   ALK PHOSPHATAS 39 U/L L    ALT (SGPT) 49 U/L  12-78   AST(SGOT) 32 U/L  5-45   ALBUMIN 4 0 g/dL  3 5-5 0   TOTAL PROTEIN 7 6 g/dL  6 4-8 2   eGFR Non-African American 57 9 ml/min/1 73sq m     - Patient Instructions: This is a fasting blood test  Water,black tea or black  coffee only after 9:00pm the night before test Drink 2 glasses of water the morning of test - Patient Instructions: This bloodwork is non-fasting  Please drink two glasses of   water morning of bloodwork  National Kidney Disease Education Program recommendations are as follows:  GFR calculation is accurate only with a steady state creatinine  Chronic Kidney disease less than 60 ml/min/1 73 sq  meters  Kidney failure less than 15 ml/min/1 73 sq  meters  (1) TSH [id number] 10:24AM Oswaldo Sessions Order Number: [id number]     Test Name Result Flag Reference   TSH 1 440 uIU/mL  0 358-3 740   - Patient Instructions: This bloodwork is non-fasting  Please drink two glasses of water morning of bloodwork  - Patient Instructions: This is a fasting blood test  Water,black tea or black  coffee only after 9:00pm the night before test Drink 2 glasses of water the morning of test - Patient Instructions: This bloodwork is non-fasting  Please drink two glasses of   water morning of bloodwork  Patients undergoing fluorescein dye angiography may retain small amounts of fluorescein in the body for 48-72 hours post procedure  Samples containing fluorescein can produce falsely depressed TSH values  If the patient had this procedure,a specimen should be resubmitted post fluorescein clearance  The recommended reference ranges for TSH during pregnancy are as follows:  First trimester 0 1 to 2 5 uIU/mL  Second trimester  0 2 to 3 0 uIU/mL  Third trimester 0 3 to 3 0 uIU/m     (1) TRIGLYCERIDE 28Nov2016 10:24AM Lonny Rodríguez Order Number: [id number]     Test Name Result Flag Reference   TRIGLYCERIDES 267 mg/dL H <=150   Specimen collection should occur prior to N-Acetylcysteine or Metamizole administration due to the potential for falsely depressed results  - Patient Instructions: This is a fasting blood test  Water,black tea or black  coffee only after 9:00pm the night before test Drink 2 glasses of water the morning of test - Patient Instructions: This bloodwork is non-fasting  Please drink two glasses of   water morning of bloodwork  Triglyceride:         Normal              <150 mg/dl       Borderline High    150-199 mg/dl       High               200-499 mg/dl       Very High          >499 mg/dl     (1) LDL,DIRECT 34AWL2460 10:24AM Lonny Rodríguez Order Number: [id number]     Test Name Result Flag Reference   LDL, DIRECT 100 mg/dl  0-100   - Patient Instructions: This is a fasting blood test  Water,black tea or black  coffee only after 9:00pm the night before test   Drink 2 glasses of water the morning of test     - Patient Instructions: This is a fasting blood test  Water,black tea or black  coffee only after 9:00pm the night before test Drink 2 glasses of water the morning of test - Patient Instructions: This bloodwork is non-fasting  Please drink two glasses of   water morning of bloodwork    LDL Cholesterol:        Optimal          <100 mg/dl        Near Optimal     100-129 mg/dl        Above Optimal          Borderline High   130-159 mg/dl          High              160-189 mg/dl          Very High        >189 mg/dl

## 2018-01-17 NOTE — RESULT NOTES
Verified Results  (1) EULALIO SCREEN W/REFLEX TO TITER/PATTERN [id number] 10:42AM [facility]to[facility] [place] Order Number: [id number]     Test Name Result Flag Reference   EULALIO SCREEN   Negative  Negative

## 2018-01-17 NOTE — PROGRESS NOTES
Assessment    1  Leg pain, left (729 5) (M79 605)   2  Generalized osteoarthritis (715 00) (M15 9)    Plan  Generalized osteoarthritis    · * XR TIBIA FIBULA 2 VIEW LEFT; Status:Active; Requested for:06Atu3698; Health Maintenance    · *VB - Fall Risk Assessment  (Dx Z13 89 Screen for Neurologic Disorder);  Status:Complete;   Done: 48QLA9509 01:44PM   · *VB - Urinary Incontinence Screen (Dx Z13 89 Screen for UI); Status:Complete;   Done:  30LCD0334 01:44PM    Discussion/Summary    Pain of left leg- previous xray done in May 2017 reviewed left knee  Pain is in shin  Obtain xray of Tib/Fib  COntinue Aleve for pain  I will call you with result of xray  If xray does not explain etiology of pain I will send you to the orthopedic doctor  Obtain Dexa scan-as previously ordered  Chief Complaint  Patient seen in office today c/o having pain in her left shin  She stated that it has been going on for awhile now and have had Xray's in the past which showed nothing but she continues to have this pain and not able to sleep at night due to pain  Denies any recent trauma, and no urinary incontinence  History of Present Illness  HPI: Pt reports 6-7 weeks of pain in her left shin bone  Pain is getting worse  She had xrays done at end of May which did not show any etiology for the pain  She has been taking Aleve at night but this does not relieve her pain  She denies any swelling or tenderness or erythema  Pain gets worse when she is walking  Review of Systems    Constitutional: No fever, no chills, feels well, no tiredness, no recent weight gain or loss  Musculoskeletal: pain of left shin bone, but as noted in HPI  Integumentary: no complaints of skin rash or lesion, no itching or dry skin, no skin wounds  Neurological: no complaints of headache, no confusion, no numbness or tingling, no dizziness or fainting  Preventive Quality 65 and Older: Falls Risk: The patient fell 0 times in the past 12 months  Urinary Incontinence Symptoms includes: no urinary incontinence       Active Problems    1  Acute pain (338 19) (R52)   2  CMC arthritis (716 94) (M19 049)   3  Diabetes mellitus screening (V77 1) (Z13 1)   4  Elevated rheumatoid factor (795 79) (R76 8)   5  Generalized osteoarthritis (715 00) (M15 9)   6  Hyperlipidemia (272 4) (E78 5)   7  Hyperparathyroidism (252 00) (E21 3)   8  Hypertension (401 9) (I10)   9  Hypothyroidism, unspecified (244 9) (E03 9)   10  Left groin pain (789 09) (R10 32)   11  Left wrist pain (719 43) (M25 532)   12  Leg pain, left (729 5) (M79 605)   13  Need for pneumococcal vaccination (V03 82) (Z23)   14  Needs flu shot (V04 81) (Z23)   15  Overactive bladder (596 51) (N32 81)   16  Pain in both hands (729 5) (M79 641,M79 642)   17  Pain in both wrists (719 43) (M25 531,M25 532)   18  Post-menopausal (V49 81) (Z78 0)   19  Sacroiliitis (720 2) (M46 1)   20  Sciatica of left side (724 3) (M54 32)   21  Screening for depression (V79 0) (Z13 89)   22  Screening for osteoporosis (V82 81) (Z13 820)   23  Visit for screening (V82 9) (Z13 9)   24  Visit for screening mammogram (V76 12) (Z12 31)   25  Vitamin D deficiency (268 9) (E55 9)    Past Medical History    1  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)   2  History of Former smoker (V15 82) (I37 704)   3  History of basal cell carcinoma (V10 83) (O49 700)  Active Problems And Past Medical History Reviewed: The active problems and past medical history were reviewed and updated today  Family History  Mother    1  Family history of cardiac disorder (V17 49) (Z82 49)   2  Family history of malignant neoplasm (V16 9) (Z80 9)  Father    3  Family history of lung cancer (V16 1) (Z80 1)   4  Family history of malignant neoplasm (V16 9) (Z80 9)  Child    5  Family history of spina bifida (V19 5) (Z82 79)  Family History Reviewed: The family history was reviewed and updated today         Social History    · Daily alcohol use   · Former smoker (V15 82) (Q02 281)   · Lives with spouse   · Denied: History of Marijuana   ·    · Retired   · Three children  The social history was reviewed and updated today  The social history was reviewed and is unchanged  Surgical History    1  History of Cataract Surgery   2  History of Excision Of Lesion Face Malignant   3  History of Knee Replacement   4  History of Parathyroid Surgery   5  History of Rotator Cuff Repair   6  History of Total Abdominal Hysterectomy With Removal Of Both Ovaries  Surgical History Reviewed: The surgical history was reviewed and updated today  Current Meds   1  Aspirin 81 MG TABS; Therapy: (Recorded:07Apr2017) to Recorded   2  Atorvastatin Calcium 20 MG Oral Tablet; Take 1 tablet daily; Therapy: 50GPX7402 to (Diandra Craven)  Requested for: 07Apr2017; Last   Rx:07Apr2017 Ordered   3  Ibuprofen 600 MG Oral Tablet; TAKE 1 TABLET 4 TIMES DAILY WITH MEALS AS   NEEDED; Therapy: 07Apr2017 to (Heydi Sales)  Requested for: 39SKA6740; Last   KS:[id number] Ordered   4  Levothyroxine Sodium 75 MCG Oral Tablet; TAKE 1 TABLET DAILY; Therapy: 13Duc3454 to ([id number])  Requested for: 81LXA1266; Last   Rx:18Dkw3346 Ordered   5  Lisinopril 10 MG Oral Tablet; TAKE 1 TABLET DAILY; Therapy: 07NDS1127 to (Evaluate:87Tya8851)  Requested for: 65LTE3042; Last   PE:07WSN4430 Ordered   6  MethylPREDNISolone 4 MG Oral Tablet Therapy Pack; take as directed; Therapy: 60USP8469 to (Last Rx:36Cdz0109)  Requested for: 45TUF1042 Ordered   7  Omega-3-acid Ethyl Esters 1 GM Oral Capsule; TAKE 2 CAPSULES TWICE A DAY; Therapy: 86ZVX6452 to (Janell Montgomery)  Requested for: 66VBV2737; Last   Rx:54Jkd0422 Ordered   8  Vitamin B12 1000 MCG Oral Tablet Extended Release; Therapy: (Recorded:07Apr2017) to Recorded    The medication list was reviewed and updated today  Allergies    1   Codeine Derivatives    Vitals   Recorded: 38CNN3503 01:40PM   Temperature 98 2 F   Heart Rate 72   Systolic 847   Diastolic 78   Height 5 ft 7 in   Weight 175 lb 8 0 oz   BMI Calculated 27 49   BSA Calculated 1 91   O2 Saturation 97     Physical Exam    Constitutional   General appearance: No acute distress, well appearing and well nourished  Eyes   Conjunctiva and lids: No swelling, erythema or discharge  Pupils and irises: Equal, round and reactive to light  Pulmonary   Respiratory effort: No increased work of breathing or signs of respiratory distress  Auscultation of lungs: Clear to auscultation  Cardiovascular   Auscultation of heart: Normal rate and rhythm, normal S1 and S2, without murmurs  Musculoskeletal   Gait and station: Normal     Digits and nails: Normal without clubbing or cyanosis  Inspection/palpation of joints, bones, and muscles: Abnormal   (tenderness upon palpation of proximal shin  Appears unremarkable )   Skin   Skin and subcutaneous tissue: Normal without rashes or lesions      Psychiatric   Orientation to person, place, and time: Normal     Mood and affect: Normal          Results/Data  *VB - Fall Risk Assessment  (Dx Z13 89 Screen for Neurologic Disorder) 61SJC4286 01:44PM [facility]     Test Name Result Flag Reference   Falls Risk      No falls in the past year     *VB - Urinary Incontinence Screen (Dx Z13 89 Screen for UI) 12OGX8578 01:44PM [facility]     Test Name Result Flag Reference   Urinary Incontinence Assessment 28NXX9298         Signatures   Electronically signed by : Alissa Schaffer NP; Aug  1 2017  2:44PM EST                       (Author)    Electronically signed by : Dallas Whitley MD; Aug  1 2017  2:51PM EST                       (Co-author)

## 2018-03-19 ENCOUNTER — OFFICE VISIT (OUTPATIENT)
Dept: FAMILY MEDICINE CLINIC | Facility: CLINIC | Age: 83
End: 2018-03-19
Payer: COMMERCIAL

## 2018-03-19 ENCOUNTER — APPOINTMENT (OUTPATIENT)
Dept: LAB | Facility: CLINIC | Age: 83
End: 2018-03-19
Payer: COMMERCIAL

## 2018-03-19 VITALS
TEMPERATURE: 97.1 F | DIASTOLIC BLOOD PRESSURE: 68 MMHG | OXYGEN SATURATION: 97 % | SYSTOLIC BLOOD PRESSURE: 130 MMHG | HEIGHT: 67 IN | RESPIRATION RATE: 18 BRPM | HEART RATE: 78 BPM | BODY MASS INDEX: 27.72 KG/M2 | WEIGHT: 176.6 LBS

## 2018-03-19 DIAGNOSIS — Z13.220 LIPID SCREENING: ICD-10-CM

## 2018-03-19 DIAGNOSIS — I10 ESSENTIAL HYPERTENSION: Primary | ICD-10-CM

## 2018-03-19 DIAGNOSIS — Z13.1 DIABETES MELLITUS SCREENING: ICD-10-CM

## 2018-03-19 DIAGNOSIS — R32 URINARY INCONTINENCE, UNSPECIFIED TYPE: Primary | ICD-10-CM

## 2018-03-19 DIAGNOSIS — M25.552 LEFT HIP PAIN: ICD-10-CM

## 2018-03-19 DIAGNOSIS — G47.09 OTHER INSOMNIA: ICD-10-CM

## 2018-03-19 LAB
ALBUMIN SERPL BCP-MCNC: 3.9 G/DL (ref 3.5–5)
ALP SERPL-CCNC: 39 U/L (ref 46–116)
ALT SERPL W P-5'-P-CCNC: 55 U/L (ref 12–78)
ANION GAP SERPL CALCULATED.3IONS-SCNC: 7 MMOL/L (ref 4–13)
AST SERPL W P-5'-P-CCNC: 29 U/L (ref 5–45)
BILIRUB SERPL-MCNC: 0.61 MG/DL (ref 0.2–1)
BUN SERPL-MCNC: 18 MG/DL (ref 5–25)
CALCIUM SERPL-MCNC: 9.6 MG/DL (ref 8.3–10.1)
CHLORIDE SERPL-SCNC: 108 MMOL/L (ref 100–108)
CHOLEST SERPL-MCNC: 176 MG/DL (ref 50–200)
CO2 SERPL-SCNC: 26 MMOL/L (ref 21–32)
CREAT SERPL-MCNC: 0.95 MG/DL (ref 0.6–1.3)
GFR SERPL CREATININE-BSD FRML MDRD: 56 ML/MIN/1.73SQ M
GLUCOSE P FAST SERPL-MCNC: 91 MG/DL (ref 65–99)
HDLC SERPL-MCNC: 59 MG/DL (ref 40–60)
LDLC SERPL CALC-MCNC: 82 MG/DL (ref 0–100)
POTASSIUM SERPL-SCNC: 4.6 MMOL/L (ref 3.5–5.3)
PROT SERPL-MCNC: 7.5 G/DL (ref 6.4–8.2)
SODIUM SERPL-SCNC: 141 MMOL/L (ref 136–145)
TRIGL SERPL-MCNC: 174 MG/DL

## 2018-03-19 PROCEDURE — 1101F PT FALLS ASSESS-DOCD LE1/YR: CPT | Performed by: FAMILY MEDICINE

## 2018-03-19 PROCEDURE — 3725F SCREEN DEPRESSION PERFORMED: CPT | Performed by: FAMILY MEDICINE

## 2018-03-19 PROCEDURE — 80061 LIPID PANEL: CPT

## 2018-03-19 PROCEDURE — 36415 COLL VENOUS BLD VENIPUNCTURE: CPT

## 2018-03-19 PROCEDURE — 80053 COMPREHEN METABOLIC PANEL: CPT

## 2018-03-19 PROCEDURE — 99214 OFFICE O/P EST MOD 30 MIN: CPT | Performed by: FAMILY MEDICINE

## 2018-03-19 RX ORDER — LANOLIN ALCOHOL/MO/W.PET/CERES
CREAM (GRAM) TOPICAL
COMMUNITY

## 2018-03-19 RX ORDER — TRAZODONE HYDROCHLORIDE 100 MG/1
50 TABLET ORAL
Qty: 30 TABLET | Refills: 1 | Status: SHIPPED | OUTPATIENT
Start: 2018-03-19 | End: 2018-09-07

## 2018-03-19 RX ORDER — OMEGA-3-ACID ETHYL ESTERS 1 G/1
2 CAPSULE, LIQUID FILLED ORAL DAILY
COMMUNITY
Start: 2016-06-03

## 2018-03-19 RX ORDER — LEVOTHYROXINE SODIUM 0.07 MG/1
75 TABLET ORAL DAILY
Refills: 2 | COMMUNITY
Start: 2018-01-19 | End: 2018-08-16 | Stop reason: SDUPTHER

## 2018-03-19 RX ORDER — OXYBUTYNIN CHLORIDE 5 MG/1
5 TABLET ORAL 2 TIMES DAILY
Qty: 60 TABLET | Refills: 1 | Status: SHIPPED | OUTPATIENT
Start: 2018-03-19 | End: 2018-05-28 | Stop reason: SDUPTHER

## 2018-03-19 RX ORDER — ATORVASTATIN CALCIUM 20 MG/1
20 TABLET, FILM COATED ORAL DAILY
Refills: 3 | COMMUNITY
Start: 2018-01-19 | End: 2018-05-18 | Stop reason: SDUPTHER

## 2018-03-19 RX ORDER — LISINOPRIL 10 MG/1
TABLET ORAL
Qty: 90 TABLET | Refills: 3 | Status: SHIPPED | OUTPATIENT
Start: 2018-03-19 | End: 2019-05-17 | Stop reason: SDUPTHER

## 2018-03-19 RX ORDER — MULTIVITAMIN WITH IRON
100 TABLET ORAL DAILY
COMMUNITY

## 2018-03-19 RX ORDER — LISINOPRIL 10 MG/1
10 TABLET ORAL DAILY
Refills: 3 | COMMUNITY
Start: 2018-01-19 | End: 2018-03-19 | Stop reason: SDUPTHER

## 2018-03-19 NOTE — PROGRESS NOTES
Assessment/Plan:    No problem-specific Assessment & Plan notes found for this encounter  Diagnoses and all orders for this visit:    Urinary incontinence, unspecified type  -     Ambulatory referral to Urology; Future  after discussing risks and benefits of medication including side effects will start oxybutynin at this time  Also referred to urology  Left hip pain  -     Ambulatory referral to Orthopedic Surgery; Future  -     XR hip/pelv 2-3 vws left if performed; Future    Other insomnia  -     traZODone (DESYREL) 100 mg tablet; Take 0 5 tablets (50 mg total) by mouth daily at bedtime as needed for sleep  After disuscsssing risks and benefits of medication along with side effects will start trazodone at this time  Diabetes mellitus screening  -     Comprehensive metabolic panel; Future    Lipid screening  -     Lipid panel; Future    Other orders  -     aspirin 81 MG tablet; Take by mouth  -     atorvastatin (LIPITOR) 20 mg tablet; Take 20 mg by mouth daily  -     levothyroxine 75 mcg tablet; Take 75 mcg by mouth daily  -     lisinopril (ZESTRIL) 10 mg tablet; Take 10 mg by mouth daily  -     omega-3-acid ethyl esters (LOVAZA) 1 g capsule; Take 2 capsules by mouth 2 (two) times a day  -     cyanocobalamin (VITAMIN B-12) 1,000 mcg tablet; Take by mouth  -     pyridoxine (VITAMIN B6) 100 mg tablet; Take 100 mg by mouth daily      Follow up in 1 month    Subjective:      Patient ID: Fara Odell is a 80 y o  female  Hip Pain  Patient complains of left hip pain  Onset of the symptoms was several months ago  Inciting event: none  The patient reports the hip pain is worse with weight bearing  Associated symptoms: none  Aggravating symptoms include: any weight bearing and going up and down stairs  Patient has had no prior hip problems  Previous visits for this problem: yes, last seen a few months ago by pain specialist  Evaluation to date: plain films, which were abnormal  Showed arthritis   Treatment to date: OTC analgesics, which have been not very effective  Urinary Incontinence  Patient complains of urinary incontinence  This has been present for several weeks  She leaks urine with bending, coughing, standing, walking, laughing  Patient describes the symptoms as a blunted sense of urge to urinate  Factors associated with symptoms include none known  Evaluation to date includes none  Treatment to date includes none  Sandi Bang is a 80 y o  female who complains of insomnia  Onset was several weeks ago  Patient describes symptoms as frequent night time awakening  Patient has found no relief with over the counter diphenhydramine  Associated symptoms include: anxiety and frequent nighttime urination  Patient denies leg cramps  Symptoms have gradually worsened  The following portions of the patient's history were reviewed and updated as appropriate:   She  has no past medical history on file  She   Patient Active Problem List    Diagnosis Date Noted    Urinary incontinence 03/19/2018    Left hip pain 03/19/2018    Other insomnia 03/19/2018    Diabetes mellitus screening 03/19/2018    Lipid screening 03/19/2018     She  has no past surgical history on file  Her family history includes Cancer in her father; Heart attack in her mother; Mental illness in her father and mother; Substance Abuse in her father and mother  She  reports that she has quit smoking  She has never used smokeless tobacco  She reports that she does not drink alcohol or use drugs    Current Outpatient Prescriptions   Medication Sig Dispense Refill    aspirin 81 MG tablet Take by mouth      atorvastatin (LIPITOR) 20 mg tablet Take 20 mg by mouth daily  3    cyanocobalamin (VITAMIN B-12) 1,000 mcg tablet Take by mouth      levothyroxine 75 mcg tablet Take 75 mcg by mouth daily  2    lisinopril (ZESTRIL) 10 mg tablet Take 10 mg by mouth daily  3    omega-3-acid ethyl esters (LOVAZA) 1 g capsule Take 2 capsules by mouth 2 (two) times a day      pyridoxine (VITAMIN B6) 100 mg tablet Take 100 mg by mouth daily      traZODone (DESYREL) 100 mg tablet Take 0 5 tablets (50 mg total) by mouth daily at bedtime as needed for sleep 30 tablet 1     No current facility-administered medications for this visit  No current outpatient prescriptions on file prior to visit  No current facility-administered medications on file prior to visit  She is allergic to codeine sulfate       Review of Systems   Constitutional: Negative for activity change, appetite change, fatigue and fever  HENT: Negative for congestion and ear discharge  Respiratory: Negative for cough and shortness of breath  Cardiovascular: Negative for chest pain and palpitations  Gastrointestinal: Negative for diarrhea and nausea  Genitourinary: Positive for frequency  Musculoskeletal: Positive for arthralgias  Negative for back pain  Skin: Negative for color change and rash  Neurological: Negative for dizziness and headaches  Psychiatric/Behavioral: Negative for agitation and behavioral problems  Objective:      /68 (BP Location: Left arm, Patient Position: Sitting, Cuff Size: Standard)   Pulse 78   Temp (!) 97 1 °F (36 2 °C) (Tympanic)   Resp 18   Ht 5' 7" (1 702 m)   Wt 80 1 kg (176 lb 9 6 oz)   SpO2 97%   BMI 27 66 kg/m²          Physical Exam   Constitutional: She is oriented to person, place, and time  She appears well-developed and well-nourished  No distress  HENT:   Head: Normocephalic and atraumatic  Nose: Nose normal    Mouth/Throat: Oropharynx is clear and moist    Eyes: Conjunctivae are normal  Pupils are equal, round, and reactive to light  Cardiovascular: Normal rate, regular rhythm and normal heart sounds  No murmur heard  Pulmonary/Chest: Effort normal and breath sounds normal  No respiratory distress  She has no wheezes  Abdominal: Soft  Bowel sounds are normal  She exhibits no distension   There is no tenderness  Neurological: She is alert and oriented to person, place, and time  Skin: Skin is warm and dry  No rash noted  She is not diaphoretic  No erythema  Psychiatric: She has a normal mood and affect

## 2018-05-18 DIAGNOSIS — E78.00 PURE HYPERCHOLESTEROLEMIA: Primary | ICD-10-CM

## 2018-05-18 RX ORDER — ATORVASTATIN CALCIUM 20 MG/1
TABLET, FILM COATED ORAL
Qty: 90 TABLET | Refills: 3 | Status: SHIPPED | OUTPATIENT
Start: 2018-05-18 | End: 2019-05-17 | Stop reason: SDUPTHER

## 2018-05-28 DIAGNOSIS — R32 URINARY INCONTINENCE, UNSPECIFIED TYPE: ICD-10-CM

## 2018-05-29 RX ORDER — OXYBUTYNIN CHLORIDE 5 MG/1
5 TABLET ORAL 2 TIMES DAILY
Qty: 60 TABLET | Refills: 1 | Status: SHIPPED | OUTPATIENT
Start: 2018-05-29 | End: 2018-07-07 | Stop reason: SDUPTHER

## 2018-07-07 DIAGNOSIS — R32 URINARY INCONTINENCE, UNSPECIFIED TYPE: ICD-10-CM

## 2018-07-09 RX ORDER — OXYBUTYNIN CHLORIDE 5 MG/1
5 TABLET ORAL 2 TIMES DAILY
Qty: 60 TABLET | Refills: 1 | Status: SHIPPED | OUTPATIENT
Start: 2018-07-09 | End: 2018-07-30 | Stop reason: SDUPTHER

## 2018-07-12 ENCOUNTER — OFFICE VISIT (OUTPATIENT)
Dept: FAMILY MEDICINE CLINIC | Facility: CLINIC | Age: 83
End: 2018-07-12
Payer: COMMERCIAL

## 2018-07-12 VITALS
HEIGHT: 67 IN | BODY MASS INDEX: 27.33 KG/M2 | WEIGHT: 174.13 LBS | OXYGEN SATURATION: 98 % | DIASTOLIC BLOOD PRESSURE: 76 MMHG | HEART RATE: 88 BPM | RESPIRATION RATE: 18 BRPM | TEMPERATURE: 97 F | SYSTOLIC BLOOD PRESSURE: 118 MMHG

## 2018-07-12 DIAGNOSIS — R10.30 LOWER ABDOMINAL PAIN: Primary | ICD-10-CM

## 2018-07-12 PROCEDURE — 99214 OFFICE O/P EST MOD 30 MIN: CPT | Performed by: FAMILY MEDICINE

## 2018-07-12 RX ORDER — CEPHALEXIN 500 MG/1
500 CAPSULE ORAL EVERY 8 HOURS SCHEDULED
Qty: 15 CAPSULE | Refills: 0 | Status: SHIPPED | OUTPATIENT
Start: 2018-07-12 | End: 2018-07-17

## 2018-07-12 NOTE — PROGRESS NOTES
Assessment/Plan:    No problem-specific Assessment & Plan notes found for this encounter  Diagnoses and all orders for this visit:    Lower abdominal pain  Unclear etiology  US, CBC and Urine culture ordered  After discussing risks and benefits along with side effects will start a trial of cephalexin at this time  -     US abdomen complete; Future  -     CBC and differential; Future  -     Urine culture; Future  -     cephalexin (KEFLEX) 500 mg capsule; Take 1 capsule (500 mg total) by mouth every 8 (eight) hours for 5 days      Follow up in 1 wee or as needed    Subjective:      Patient ID: Varun Chanrda is a 80 y o  female  Abdominal Pain  Patient complains of abdominal pain  The pain is described as aching, cramping and dull, and is 2/10 in intensity  The patient is experiencing suprapubic pain without radiation  Onset was several weeks ago  Symptoms have been unchanged  Aggravating factors: none  Alleviating factors: none  Associated symptoms: none  The patient denies anorexia, arthralagias, constipation, diarrhea, frequency, hematochezia, hematuria and melena  The following portions of the patient's history were reviewed and updated as appropriate:   She  has a past medical history of BCC (basal cell carcinoma) and Benign uterine neoplasm  She   Patient Active Problem List    Diagnosis Date Noted    Lower abdominal pain 07/12/2018    Urinary incontinence 03/19/2018    Left hip pain 03/19/2018    Other insomnia 03/19/2018    Diabetes mellitus screening 03/19/2018    Lipid screening 03/19/2018     She  has a past surgical history that includes Cataract extraction; Malignant skin lesion excision; Replacement total knee; Parathyroidectomy; Rotator cuff repair; and Total abdominal hysterectomy  Her family history includes Cancer in her father and mother; Heart attack in her mother; Lung cancer in her father; Mental illness in her father and mother;  Other in her mother; Spina bifida in her child; Substance Abuse in her father and mother  She  reports that she has quit smoking  She has never used smokeless tobacco  She reports that she does not drink alcohol or use drugs  Current Outpatient Prescriptions   Medication Sig Dispense Refill    aspirin 81 MG tablet Take by mouth      atorvastatin (LIPITOR) 20 mg tablet TAKE 1 TABLET DAILY 90 tablet 3    cyanocobalamin (VITAMIN B-12) 1,000 mcg tablet Take by mouth      levothyroxine 75 mcg tablet Take 75 mcg by mouth daily  2    lisinopril (ZESTRIL) 10 mg tablet TAKE 1 TABLET DAILY  90 tablet 3    omega-3-acid ethyl esters (LOVAZA) 1 g capsule Take 2 capsules by mouth 2 (two) times a day      oxybutynin (DITROPAN) 5 mg tablet TAKE 1 TABLET (5 MG TOTAL) BY MOUTH 2 (TWO) TIMES A DAY 60 tablet 1    pyridoxine (VITAMIN B6) 100 mg tablet Take 100 mg by mouth daily      cephalexin (KEFLEX) 500 mg capsule Take 1 capsule (500 mg total) by mouth every 8 (eight) hours for 5 days 15 capsule 0    traZODone (DESYREL) 100 mg tablet Take 0 5 tablets (50 mg total) by mouth daily at bedtime as needed for sleep 30 tablet 1     No current facility-administered medications for this visit  Current Outpatient Prescriptions on File Prior to Visit   Medication Sig    aspirin 81 MG tablet Take by mouth    atorvastatin (LIPITOR) 20 mg tablet TAKE 1 TABLET DAILY    cyanocobalamin (VITAMIN B-12) 1,000 mcg tablet Take by mouth    levothyroxine 75 mcg tablet Take 75 mcg by mouth daily    lisinopril (ZESTRIL) 10 mg tablet TAKE 1 TABLET DAILY      omega-3-acid ethyl esters (LOVAZA) 1 g capsule Take 2 capsules by mouth 2 (two) times a day    oxybutynin (DITROPAN) 5 mg tablet TAKE 1 TABLET (5 MG TOTAL) BY MOUTH 2 (TWO) TIMES A DAY    pyridoxine (VITAMIN B6) 100 mg tablet Take 100 mg by mouth daily    traZODone (DESYREL) 100 mg tablet Take 0 5 tablets (50 mg total) by mouth daily at bedtime as needed for sleep     No current facility-administered medications on file prior to visit  She is allergic to codeine sulfate       Review of Systems   Constitutional: Negative for activity change, appetite change, fatigue and fever  HENT: Negative for congestion and ear discharge  Respiratory: Negative for cough and shortness of breath  Cardiovascular: Negative for chest pain and palpitations  Gastrointestinal: Positive for abdominal pain  Negative for diarrhea and nausea  Musculoskeletal: Negative for arthralgias and back pain  Skin: Negative for color change and rash  Neurological: Negative for dizziness and headaches  Psychiatric/Behavioral: Negative for agitation and behavioral problems  Objective:      /76 (BP Location: Left arm, Patient Position: Sitting)   Pulse 88   Temp (!) 97 °F (36 1 °C)   Resp 18   Ht 5' 7" (1 702 m)   Wt 79 kg (174 lb 2 oz)   SpO2 98%   BMI 27 27 kg/m²          Physical Exam   Constitutional: She is oriented to person, place, and time  She appears well-developed and well-nourished  No distress  HENT:   Head: Normocephalic and atraumatic  Nose: Nose normal    Mouth/Throat: Oropharynx is clear and moist    Eyes: Conjunctivae are normal  Pupils are equal, round, and reactive to light  Cardiovascular: Normal rate, regular rhythm and normal heart sounds  No murmur heard  Pulmonary/Chest: Effort normal and breath sounds normal  No respiratory distress  She has no wheezes  Abdominal: Soft  Bowel sounds are normal  She exhibits no distension  There is no tenderness  Neurological: She is alert and oriented to person, place, and time  Skin: Skin is warm and dry  No rash noted  She is not diaphoretic  No erythema  Psychiatric: She has a normal mood and affect

## 2018-07-13 ENCOUNTER — HOSPITAL ENCOUNTER (OUTPATIENT)
Dept: ULTRASOUND IMAGING | Facility: HOSPITAL | Age: 83
Discharge: HOME | End: 2018-07-13
Attending: FAMILY MEDICINE
Payer: COMMERCIAL

## 2018-07-13 ENCOUNTER — APPOINTMENT (OUTPATIENT)
Dept: LAB | Facility: HOSPITAL | Age: 83
End: 2018-07-13
Attending: FAMILY MEDICINE
Payer: COMMERCIAL

## 2018-07-13 DIAGNOSIS — R10.30 LOWER ABDOMINAL PAIN: ICD-10-CM

## 2018-07-13 LAB
BASOPHILS # BLD AUTO: 0.04 THOUSANDS/ΜL (ref 0–0.1)
BASOPHILS NFR BLD AUTO: 0 % (ref 0–1)
EOSINOPHIL # BLD AUTO: 0.2 THOUSAND/ΜL (ref 0–0.61)
EOSINOPHIL NFR BLD AUTO: 2 % (ref 0–6)
ERYTHROCYTE [DISTWIDTH] IN BLOOD BY AUTOMATED COUNT: 13.2 % (ref 11.6–15.1)
HCT VFR BLD AUTO: 45 % (ref 34.8–46.1)
HGB BLD-MCNC: 15 G/DL (ref 11.5–15.4)
IMM GRANULOCYTES # BLD AUTO: 0.04 THOUSAND/UL (ref 0–0.2)
IMM GRANULOCYTES NFR BLD AUTO: 0 % (ref 0–2)
LYMPHOCYTES # BLD AUTO: 3.69 THOUSANDS/ΜL (ref 0.6–4.47)
LYMPHOCYTES NFR BLD AUTO: 38 % (ref 14–44)
MCH RBC QN AUTO: 31.5 PG (ref 26.8–34.3)
MCHC RBC AUTO-ENTMCNC: 33.3 G/DL (ref 31.4–37.4)
MCV RBC AUTO: 95 FL (ref 82–98)
MONOCYTES # BLD AUTO: 0.86 THOUSAND/ΜL (ref 0.17–1.22)
MONOCYTES NFR BLD AUTO: 9 % (ref 4–12)
NEUTROPHILS # BLD AUTO: 4.88 THOUSANDS/ΜL (ref 1.85–7.62)
NEUTS SEG NFR BLD AUTO: 51 % (ref 43–75)
NRBC BLD AUTO-RTO: 0 /100 WBCS
PLATELET # BLD AUTO: 247 THOUSANDS/UL (ref 149–390)
PMV BLD AUTO: 8.9 FL (ref 8.9–12.7)
RBC # BLD AUTO: 4.76 MILLION/UL (ref 3.81–5.12)
WBC # BLD AUTO: 9.71 THOUSAND/UL (ref 4.31–10.16)

## 2018-07-13 PROCEDURE — 36415 COLL VENOUS BLD VENIPUNCTURE: CPT

## 2018-07-13 PROCEDURE — 85025 COMPLETE CBC W/AUTO DIFF WBC: CPT

## 2018-07-13 PROCEDURE — 87086 URINE CULTURE/COLONY COUNT: CPT

## 2018-07-13 PROCEDURE — 76700 US EXAM ABDOM COMPLETE: CPT

## 2018-07-14 LAB — BACTERIA UR CULT: NORMAL

## 2018-07-17 ENCOUNTER — TELEPHONE (OUTPATIENT)
Dept: FAMILY MEDICINE CLINIC | Facility: CLINIC | Age: 83
End: 2018-07-17

## 2018-07-17 NOTE — TELEPHONE ENCOUNTER
She has gallstones but there is no signs of inflamed gallbladder  Nothing needs to be done about this unless she is having right upper quadrant abdominal pain  She also has mild fatty liver  I would recommend that she come in to review everything if still having symptoms

## 2018-07-20 ENCOUNTER — OFFICE VISIT (OUTPATIENT)
Dept: FAMILY MEDICINE CLINIC | Facility: CLINIC | Age: 83
End: 2018-07-20
Payer: COMMERCIAL

## 2018-07-20 VITALS
OXYGEN SATURATION: 97 % | HEIGHT: 67 IN | SYSTOLIC BLOOD PRESSURE: 120 MMHG | WEIGHT: 173.6 LBS | TEMPERATURE: 98.1 F | DIASTOLIC BLOOD PRESSURE: 74 MMHG | HEART RATE: 72 BPM | BODY MASS INDEX: 27.25 KG/M2

## 2018-07-20 DIAGNOSIS — R10.30 LOWER ABDOMINAL PAIN: Primary | ICD-10-CM

## 2018-07-20 PROBLEM — Z13.220 LIPID SCREENING: Status: RESOLVED | Noted: 2018-03-19 | Resolved: 2018-07-20

## 2018-07-20 PROBLEM — Z13.1 DIABETES MELLITUS SCREENING: Status: RESOLVED | Noted: 2018-03-19 | Resolved: 2018-07-20

## 2018-07-20 PROCEDURE — 99213 OFFICE O/P EST LOW 20 MIN: CPT | Performed by: NURSE PRACTITIONER

## 2018-07-20 NOTE — PROGRESS NOTES
Assessment/Plan:    No problem-specific Assessment & Plan notes found for this encounter  Diagnoses and all orders for this visit:    Lower abdominal pain  Comments:  resolved symptoms reviewed US and labs with patient will call for any new or worsneing symptoms           Subjective:      Patient ID: Az Salguero is a 80 y o  female  Patient here to review her labs and her US of the abdomen she had done recently  Patient is feeling healthy and well and reports that she is feeling fine  Patient had a history of diarrhea x 2 days and then her stomach lower abdomen was bothering her and now is not bothering her  Patient reviewed her labs and US of the abdomen and was normal          The following portions of the patient's history were reviewed and updated as appropriate: She  has a past medical history of BCC (basal cell carcinoma) and Benign uterine neoplasm  She   Patient Active Problem List    Diagnosis Date Noted    Lower abdominal pain 07/12/2018    Urinary incontinence 03/19/2018    Left hip pain 03/19/2018    Other insomnia 03/19/2018     She  has a past surgical history that includes Cataract extraction; Malignant skin lesion excision; Replacement total knee; Parathyroidectomy; Rotator cuff repair; and Total abdominal hysterectomy  Her family history includes Cancer in her father and mother; Heart attack in her mother; Lung cancer in her father; Mental illness in her father and mother; Other in her mother; Spina bifida in her child; Substance Abuse in her father and mother  She  reports that she has quit smoking  She has never used smokeless tobacco  She reports that she does not drink alcohol or use drugs  She is allergic to codeine sulfate       Review of Systems   Constitutional: Negative for activity change, appetite change, chills, diaphoresis, fatigue, fever and unexpected weight change     HENT: Negative for congestion, ear pain, hearing loss, postnasal drip, sinus pain, sinus pressure, sneezing and sore throat  Eyes: Negative for pain, redness and visual disturbance  Respiratory: Negative for cough and shortness of breath  Cardiovascular: Negative for chest pain and leg swelling  Gastrointestinal: Negative for abdominal pain, diarrhea, nausea and vomiting  Musculoskeletal: Negative for arthralgias  Neurological: Negative for dizziness and light-headedness  Psychiatric/Behavioral: Negative for behavioral problems and dysphoric mood  Objective:      /74 (Cuff Size: Standard)   Pulse 72   Temp 98 1 °F (36 7 °C) (Tympanic)   Ht 5' 7" (1 702 m)   Wt 78 7 kg (173 lb 9 6 oz)   SpO2 97%   BMI 27 19 kg/m²          Physical Exam   Constitutional: She is oriented to person, place, and time  Vital signs are normal  She appears well-developed and well-nourished  No distress  HENT:   Head: Normocephalic and atraumatic  Eyes: Pupils are equal, round, and reactive to light  Neck: Normal range of motion  No thyromegaly present  Cardiovascular: Normal rate, regular rhythm, normal heart sounds and intact distal pulses  No murmur heard  Pulmonary/Chest: Effort normal and breath sounds normal  No respiratory distress  She has no wheezes  Abdominal: Soft  Bowel sounds are normal    Musculoskeletal: Normal range of motion  Neurological: She is alert and oriented to person, place, and time  Skin: Skin is warm and dry  Psychiatric: She has a normal mood and affect  Nursing note and vitals reviewed

## 2018-07-30 DIAGNOSIS — R32 URINARY INCONTINENCE, UNSPECIFIED TYPE: ICD-10-CM

## 2018-07-30 RX ORDER — OXYBUTYNIN CHLORIDE 5 MG/1
5 TABLET ORAL 2 TIMES DAILY
Qty: 180 TABLET | Refills: 2 | Status: SHIPPED | OUTPATIENT
Start: 2018-07-30 | End: 2019-08-23 | Stop reason: SDUPTHER

## 2018-08-16 DIAGNOSIS — E03.9 HYPOTHYROIDISM, UNSPECIFIED TYPE: Primary | ICD-10-CM

## 2018-08-16 RX ORDER — LEVOTHYROXINE SODIUM 0.07 MG/1
75 TABLET ORAL DAILY
Qty: 90 TABLET | Refills: 2 | Status: SHIPPED | OUTPATIENT
Start: 2018-08-16 | End: 2019-05-17 | Stop reason: SDUPTHER

## 2018-08-22 ENCOUNTER — HOSPITAL ENCOUNTER (INPATIENT)
Facility: HOSPITAL | Age: 83
LOS: 2 days | Discharge: HOME | DRG: 872 | End: 2018-08-25
Attending: EMERGENCY MEDICINE | Admitting: INTERNAL MEDICINE
Payer: COMMERCIAL

## 2018-08-22 ENCOUNTER — APPOINTMENT (EMERGENCY)
Dept: CT IMAGING | Facility: HOSPITAL | Age: 83
DRG: 872 | End: 2018-08-22
Payer: COMMERCIAL

## 2018-08-22 DIAGNOSIS — R11.10 VOMITING AND DIARRHEA: ICD-10-CM

## 2018-08-22 DIAGNOSIS — R10.9 ABDOMINAL PAIN: ICD-10-CM

## 2018-08-22 DIAGNOSIS — R10.30 LOWER ABDOMINAL PAIN: Primary | ICD-10-CM

## 2018-08-22 DIAGNOSIS — R19.7 VOMITING AND DIARRHEA: ICD-10-CM

## 2018-08-22 DIAGNOSIS — N17.9 AKI (ACUTE KIDNEY INJURY) (HCC): ICD-10-CM

## 2018-08-22 DIAGNOSIS — E87.2 LACTIC ACIDOSIS: ICD-10-CM

## 2018-08-22 LAB
ALBUMIN SERPL BCP-MCNC: 4.1 G/DL (ref 3.5–5)
ALP SERPL-CCNC: 43 U/L (ref 46–116)
ALT SERPL W P-5'-P-CCNC: 54 U/L (ref 12–78)
ANION GAP SERPL CALCULATED.3IONS-SCNC: 12 MMOL/L (ref 4–13)
AST SERPL W P-5'-P-CCNC: 32 U/L (ref 5–45)
BASOPHILS # BLD AUTO: 0.02 THOUSANDS/ΜL (ref 0–0.1)
BASOPHILS NFR BLD AUTO: 0 % (ref 0–1)
BILIRUB SERPL-MCNC: 0.5 MG/DL (ref 0.2–1)
BUN SERPL-MCNC: 23 MG/DL (ref 5–25)
CALCIUM SERPL-MCNC: 10.7 MG/DL (ref 8.3–10.1)
CHLORIDE SERPL-SCNC: 102 MMOL/L (ref 100–108)
CO2 SERPL-SCNC: 24 MMOL/L (ref 21–32)
CREAT SERPL-MCNC: 1.41 MG/DL (ref 0.6–1.3)
EOSINOPHIL # BLD AUTO: 0.02 THOUSAND/ΜL (ref 0–0.61)
EOSINOPHIL NFR BLD AUTO: 0 % (ref 0–6)
ERYTHROCYTE [DISTWIDTH] IN BLOOD BY AUTOMATED COUNT: 14 % (ref 11.6–15.1)
GFR SERPL CREATININE-BSD FRML MDRD: 35 ML/MIN/1.73SQ M
GLUCOSE SERPL-MCNC: 151 MG/DL (ref 65–140)
HCT VFR BLD AUTO: 45.5 % (ref 34.8–46.1)
HGB BLD-MCNC: 15.4 G/DL (ref 11.5–15.4)
LACTATE SERPL-SCNC: 2.8 MMOL/L (ref 0.5–2)
LIPASE SERPL-CCNC: 134 U/L (ref 73–393)
LYMPHOCYTES # BLD AUTO: 2.18 THOUSANDS/ΜL (ref 0.6–4.47)
LYMPHOCYTES NFR BLD AUTO: 12 % (ref 14–44)
MCH RBC QN AUTO: 31.8 PG (ref 26.8–34.3)
MCHC RBC AUTO-ENTMCNC: 33.8 G/DL (ref 31.4–37.4)
MCV RBC AUTO: 94 FL (ref 82–98)
MONOCYTES # BLD AUTO: 1.67 THOUSAND/ΜL (ref 0.17–1.22)
MONOCYTES NFR BLD AUTO: 9 % (ref 4–12)
NEUTROPHILS # BLD AUTO: 15.03 THOUSANDS/ΜL (ref 1.85–7.62)
NEUTS SEG NFR BLD AUTO: 79 % (ref 43–75)
PLATELET # BLD AUTO: 243 THOUSANDS/UL (ref 149–390)
PMV BLD AUTO: 9.7 FL (ref 8.9–12.7)
POTASSIUM SERPL-SCNC: 4 MMOL/L (ref 3.5–5.3)
PROT SERPL-MCNC: 7.2 G/DL (ref 6.4–8.2)
RBC # BLD AUTO: 4.85 MILLION/UL (ref 3.81–5.12)
SODIUM SERPL-SCNC: 138 MMOL/L (ref 136–145)
TROPONIN I SERPL-MCNC: <0.02 NG/ML
WBC # BLD AUTO: 18.92 THOUSAND/UL (ref 4.31–10.16)

## 2018-08-22 PROCEDURE — 36415 COLL VENOUS BLD VENIPUNCTURE: CPT | Performed by: EMERGENCY MEDICINE

## 2018-08-22 PROCEDURE — 93005 ELECTROCARDIOGRAM TRACING: CPT

## 2018-08-22 PROCEDURE — 74176 CT ABD & PELVIS W/O CONTRAST: CPT

## 2018-08-22 PROCEDURE — 84484 ASSAY OF TROPONIN QUANT: CPT | Performed by: EMERGENCY MEDICINE

## 2018-08-22 PROCEDURE — 85025 COMPLETE CBC W/AUTO DIFF WBC: CPT | Performed by: EMERGENCY MEDICINE

## 2018-08-22 PROCEDURE — 83605 ASSAY OF LACTIC ACID: CPT | Performed by: EMERGENCY MEDICINE

## 2018-08-22 PROCEDURE — 83690 ASSAY OF LIPASE: CPT | Performed by: EMERGENCY MEDICINE

## 2018-08-22 PROCEDURE — 80053 COMPREHEN METABOLIC PANEL: CPT | Performed by: EMERGENCY MEDICINE

## 2018-08-22 PROCEDURE — 96361 HYDRATE IV INFUSION ADD-ON: CPT

## 2018-08-22 PROCEDURE — 96375 TX/PRO/DX INJ NEW DRUG ADDON: CPT

## 2018-08-22 RX ORDER — SODIUM CHLORIDE, SODIUM GLUCONATE, SODIUM ACETATE, POTASSIUM CHLORIDE, MAGNESIUM CHLORIDE, SODIUM PHOSPHATE, DIBASIC, AND POTASSIUM PHOSPHATE .53; .5; .37; .037; .03; .012; .00082 G/100ML; G/100ML; G/100ML; G/100ML; G/100ML; G/100ML; G/100ML
1000 INJECTION, SOLUTION INTRAVENOUS ONCE
Status: COMPLETED | OUTPATIENT
Start: 2018-08-22 | End: 2018-08-23

## 2018-08-22 RX ORDER — FENTANYL CITRATE 50 UG/ML
25 INJECTION, SOLUTION INTRAMUSCULAR; INTRAVENOUS ONCE
Status: COMPLETED | OUTPATIENT
Start: 2018-08-22 | End: 2018-08-22

## 2018-08-22 RX ORDER — ONDANSETRON 2 MG/ML
4 INJECTION INTRAMUSCULAR; INTRAVENOUS ONCE
Status: COMPLETED | OUTPATIENT
Start: 2018-08-22 | End: 2018-08-22

## 2018-08-22 RX ORDER — DICYCLOMINE HCL 20 MG
20 TABLET ORAL ONCE
Status: COMPLETED | OUTPATIENT
Start: 2018-08-22 | End: 2018-08-22

## 2018-08-22 RX ADMIN — FENTANYL CITRATE 25 MCG: 50 INJECTION INTRAMUSCULAR; INTRAVENOUS at 22:41

## 2018-08-22 RX ADMIN — ONDANSETRON 4 MG: 2 INJECTION INTRAMUSCULAR; INTRAVENOUS at 22:41

## 2018-08-22 RX ADMIN — SODIUM CHLORIDE 1000 ML: 0.9 INJECTION, SOLUTION INTRAVENOUS at 22:40

## 2018-08-22 RX ADMIN — DICYCLOMINE HYDROCHLORIDE 20 MG: 20 TABLET ORAL at 22:47

## 2018-08-23 PROBLEM — D72.829 LEUKOCYTOSIS: Status: ACTIVE | Noted: 2018-08-23

## 2018-08-23 PROBLEM — A41.9 SEPSIS (HCC): Status: ACTIVE | Noted: 2018-08-23

## 2018-08-23 PROBLEM — N17.9 AKI (ACUTE KIDNEY INJURY) (HCC): Status: ACTIVE | Noted: 2018-08-23

## 2018-08-23 PROBLEM — R10.9 ABDOMINAL PAIN: Status: ACTIVE | Noted: 2018-08-23

## 2018-08-23 LAB
ALBUMIN SERPL BCP-MCNC: 3.2 G/DL (ref 3.5–5)
ALP SERPL-CCNC: 33 U/L (ref 46–116)
ALT SERPL W P-5'-P-CCNC: 44 U/L (ref 12–78)
ANION GAP SERPL CALCULATED.3IONS-SCNC: 10 MMOL/L (ref 4–13)
AST SERPL W P-5'-P-CCNC: 28 U/L (ref 5–45)
ATRIAL RATE: 62 BPM
BACTERIA UR QL AUTO: ABNORMAL /HPF
BASOPHILS # BLD AUTO: 0.04 THOUSANDS/ΜL (ref 0–0.1)
BASOPHILS NFR BLD AUTO: 0 % (ref 0–1)
BILIRUB DIRECT SERPL-MCNC: 0.09 MG/DL (ref 0–0.2)
BILIRUB SERPL-MCNC: 0.6 MG/DL (ref 0.2–1)
BILIRUB UR QL STRIP: NEGATIVE
BUN SERPL-MCNC: 20 MG/DL (ref 5–25)
C DIFF TOX GENS STL QL NAA+PROBE: ABNORMAL
CALCIUM SERPL-MCNC: 9.5 MG/DL (ref 8.3–10.1)
CAMPYLOBACTER DNA SPEC NAA+PROBE: NORMAL
CHLORIDE SERPL-SCNC: 108 MMOL/L (ref 100–108)
CLARITY UR: CLEAR
CO2 SERPL-SCNC: 23 MMOL/L (ref 21–32)
COLOR UR: ABNORMAL
CREAT SERPL-MCNC: 1.13 MG/DL (ref 0.6–1.3)
EOSINOPHIL # BLD AUTO: 0.02 THOUSAND/ΜL (ref 0–0.61)
EOSINOPHIL NFR BLD AUTO: 0 % (ref 0–6)
ERYTHROCYTE [DISTWIDTH] IN BLOOD BY AUTOMATED COUNT: 13.5 % (ref 11.6–15.1)
GFR SERPL CREATININE-BSD FRML MDRD: 45 ML/MIN/1.73SQ M
GLUCOSE SERPL-MCNC: 112 MG/DL (ref 65–140)
GLUCOSE UR STRIP-MCNC: NEGATIVE MG/DL
HCT VFR BLD AUTO: 39.8 % (ref 34.8–46.1)
HGB BLD-MCNC: 13.4 G/DL (ref 11.5–15.4)
HGB UR QL STRIP.AUTO: NEGATIVE
IMM GRANULOCYTES # BLD AUTO: 0.1 THOUSAND/UL (ref 0–0.2)
IMM GRANULOCYTES NFR BLD AUTO: 1 % (ref 0–2)
KETONES UR STRIP-MCNC: NEGATIVE MG/DL
LACTATE SERPL-SCNC: 2.4 MMOL/L (ref 0.5–2)
LEUKOCYTE ESTERASE UR QL STRIP: ABNORMAL
LIPASE SERPL-CCNC: 108 U/L (ref 73–393)
LYMPHOCYTES # BLD AUTO: 3.32 THOUSANDS/ΜL (ref 0.6–4.47)
LYMPHOCYTES NFR BLD AUTO: 21 % (ref 14–44)
MAGNESIUM SERPL-MCNC: 1.7 MG/DL (ref 1.6–2.6)
MCH RBC QN AUTO: 31.8 PG (ref 26.8–34.3)
MCHC RBC AUTO-ENTMCNC: 33.7 G/DL (ref 31.4–37.4)
MCV RBC AUTO: 94 FL (ref 82–98)
MONOCYTES # BLD AUTO: 1.25 THOUSAND/ΜL (ref 0.17–1.22)
MONOCYTES NFR BLD AUTO: 8 % (ref 4–12)
NEUTROPHILS # BLD AUTO: 11.14 THOUSANDS/ΜL (ref 1.85–7.62)
NEUTS SEG NFR BLD AUTO: 70 % (ref 43–75)
NITRITE UR QL STRIP: NEGATIVE
NON-SQ EPI CELLS URNS QL MICRO: ABNORMAL /HPF
NRBC BLD AUTO-RTO: 0 /100 WBCS
P AXIS: 68 DEGREES
PH UR STRIP.AUTO: 5.5 [PH] (ref 4.5–8)
PHOSPHATE SERPL-MCNC: 3.8 MG/DL (ref 2.3–4.1)
PLATELET # BLD AUTO: 214 THOUSANDS/UL (ref 149–390)
PMV BLD AUTO: 9.5 FL (ref 8.9–12.7)
POTASSIUM SERPL-SCNC: 4.2 MMOL/L (ref 3.5–5.3)
PR INTERVAL: 158 MS
PROT SERPL-MCNC: 5.9 G/DL (ref 6.4–8.2)
PROT UR STRIP-MCNC: NEGATIVE MG/DL
QRS AXIS: -19 DEGREES
QRSD INTERVAL: 72 MS
QT INTERVAL: 428 MS
QTC INTERVAL: 434 MS
RBC # BLD AUTO: 4.22 MILLION/UL (ref 3.81–5.12)
RBC #/AREA URNS AUTO: ABNORMAL /HPF
SALMONELLA DNA SPEC QL NAA+PROBE: NORMAL
SHIGA TOXIN STX GENE SPEC NAA+PROBE: NORMAL
SHIGELLA DNA SPEC QL NAA+PROBE: NORMAL
SODIUM SERPL-SCNC: 141 MMOL/L (ref 136–145)
SP GR UR STRIP.AUTO: <=1.005 (ref 1–1.03)
T WAVE AXIS: 68 DEGREES
UROBILINOGEN UR QL STRIP.AUTO: 0.2 E.U./DL
VENTRICULAR RATE: 62 BPM
WBC # BLD AUTO: 15.87 THOUSAND/UL (ref 4.31–10.16)
WBC #/AREA URNS AUTO: ABNORMAL /HPF

## 2018-08-23 PROCEDURE — 99222 1ST HOSP IP/OBS MODERATE 55: CPT | Performed by: INTERNAL MEDICINE

## 2018-08-23 PROCEDURE — 93010 ELECTROCARDIOGRAM REPORT: CPT | Performed by: INTERNAL MEDICINE

## 2018-08-23 PROCEDURE — 83690 ASSAY OF LIPASE: CPT | Performed by: NURSE PRACTITIONER

## 2018-08-23 PROCEDURE — G8979 MOBILITY GOAL STATUS: HCPCS

## 2018-08-23 PROCEDURE — 97163 PT EVAL HIGH COMPLEX 45 MIN: CPT

## 2018-08-23 PROCEDURE — 87493 C DIFF AMPLIFIED PROBE: CPT | Performed by: NURSE PRACTITIONER

## 2018-08-23 PROCEDURE — 36415 COLL VENOUS BLD VENIPUNCTURE: CPT | Performed by: NURSE PRACTITIONER

## 2018-08-23 PROCEDURE — 99285 EMERGENCY DEPT VISIT HI MDM: CPT

## 2018-08-23 PROCEDURE — 83605 ASSAY OF LACTIC ACID: CPT | Performed by: NURSE PRACTITIONER

## 2018-08-23 PROCEDURE — 81001 URINALYSIS AUTO W/SCOPE: CPT | Performed by: NURSE PRACTITIONER

## 2018-08-23 PROCEDURE — 80076 HEPATIC FUNCTION PANEL: CPT | Performed by: PHYSICIAN ASSISTANT

## 2018-08-23 PROCEDURE — 96365 THER/PROPH/DIAG IV INF INIT: CPT

## 2018-08-23 PROCEDURE — G8978 MOBILITY CURRENT STATUS: HCPCS

## 2018-08-23 PROCEDURE — 85025 COMPLETE CBC W/AUTO DIFF WBC: CPT | Performed by: NURSE PRACTITIONER

## 2018-08-23 PROCEDURE — 99223 1ST HOSP IP/OBS HIGH 75: CPT | Performed by: NURSE PRACTITIONER

## 2018-08-23 PROCEDURE — 80048 BASIC METABOLIC PNL TOTAL CA: CPT | Performed by: NURSE PRACTITIONER

## 2018-08-23 PROCEDURE — 99252 IP/OBS CONSLTJ NEW/EST SF 35: CPT | Performed by: INTERNAL MEDICINE

## 2018-08-23 PROCEDURE — 83735 ASSAY OF MAGNESIUM: CPT | Performed by: NURSE PRACTITIONER

## 2018-08-23 PROCEDURE — 87505 NFCT AGENT DETECTION GI: CPT | Performed by: EMERGENCY MEDICINE

## 2018-08-23 PROCEDURE — 84100 ASSAY OF PHOSPHORUS: CPT | Performed by: NURSE PRACTITIONER

## 2018-08-23 RX ORDER — ATORVASTATIN CALCIUM 20 MG/1
20 TABLET, FILM COATED ORAL DAILY
Status: DISCONTINUED | OUTPATIENT
Start: 2018-08-23 | End: 2018-08-25 | Stop reason: HOSPADM

## 2018-08-23 RX ORDER — SACCHAROMYCES BOULARDII 250 MG
250 CAPSULE ORAL 2 TIMES DAILY
Status: DISCONTINUED | OUTPATIENT
Start: 2018-08-23 | End: 2018-08-25 | Stop reason: HOSPADM

## 2018-08-23 RX ORDER — LEVOTHYROXINE SODIUM 0.07 MG/1
75 TABLET ORAL
Status: DISCONTINUED | OUTPATIENT
Start: 2018-08-23 | End: 2018-08-25 | Stop reason: HOSPADM

## 2018-08-23 RX ORDER — PYRIDOXINE HCL (VITAMIN B6) 50 MG
100 TABLET ORAL DAILY
Status: DISCONTINUED | OUTPATIENT
Start: 2018-08-23 | End: 2018-08-25 | Stop reason: HOSPADM

## 2018-08-23 RX ORDER — TRAZODONE HYDROCHLORIDE 50 MG/1
50 TABLET ORAL
Status: DISCONTINUED | OUTPATIENT
Start: 2018-08-23 | End: 2018-08-25 | Stop reason: HOSPADM

## 2018-08-23 RX ORDER — CHOLECALCIFEROL (VITAMIN D3) 125 MCG
1000 CAPSULE ORAL DAILY
Status: DISCONTINUED | OUTPATIENT
Start: 2018-08-23 | End: 2018-08-25 | Stop reason: HOSPADM

## 2018-08-23 RX ORDER — OXYBUTYNIN CHLORIDE 5 MG/1
5 TABLET ORAL 2 TIMES DAILY
Status: DISCONTINUED | OUTPATIENT
Start: 2018-08-23 | End: 2018-08-25 | Stop reason: HOSPADM

## 2018-08-23 RX ORDER — ASPIRIN 81 MG/1
81 TABLET, CHEWABLE ORAL DAILY
Status: DISCONTINUED | OUTPATIENT
Start: 2018-08-23 | End: 2018-08-25 | Stop reason: HOSPADM

## 2018-08-23 RX ORDER — CIPROFLOXACIN 2 MG/ML
400 INJECTION, SOLUTION INTRAVENOUS EVERY 12 HOURS
Status: DISCONTINUED | OUTPATIENT
Start: 2018-08-23 | End: 2018-08-23

## 2018-08-23 RX ORDER — HEPARIN SODIUM 5000 [USP'U]/ML
5000 INJECTION, SOLUTION INTRAVENOUS; SUBCUTANEOUS EVERY 8 HOURS SCHEDULED
Status: DISCONTINUED | OUTPATIENT
Start: 2018-08-23 | End: 2018-08-24

## 2018-08-23 RX ORDER — METRONIDAZOLE 500 MG/1
500 TABLET ORAL EVERY 8 HOURS SCHEDULED
Status: DISCONTINUED | OUTPATIENT
Start: 2018-08-23 | End: 2018-08-23

## 2018-08-23 RX ORDER — SODIUM CHLORIDE 9 MG/ML
75 INJECTION, SOLUTION INTRAVENOUS CONTINUOUS
Status: DISCONTINUED | OUTPATIENT
Start: 2018-08-23 | End: 2018-08-24

## 2018-08-23 RX ORDER — ONDANSETRON 2 MG/ML
4 INJECTION INTRAMUSCULAR; INTRAVENOUS EVERY 6 HOURS PRN
Status: DISCONTINUED | OUTPATIENT
Start: 2018-08-23 | End: 2018-08-25 | Stop reason: HOSPADM

## 2018-08-23 RX ORDER — CHLORAL HYDRATE 500 MG
1000 CAPSULE ORAL DAILY
Status: DISCONTINUED | OUTPATIENT
Start: 2018-08-23 | End: 2018-08-25 | Stop reason: HOSPADM

## 2018-08-23 RX ORDER — ACETAMINOPHEN 325 MG/1
650 TABLET ORAL EVERY 6 HOURS PRN
Status: DISCONTINUED | OUTPATIENT
Start: 2018-08-23 | End: 2018-08-25 | Stop reason: HOSPADM

## 2018-08-23 RX ADMIN — METRONIDAZOLE 500 MG: 500 INJECTION, SOLUTION INTRAVENOUS at 18:50

## 2018-08-23 RX ADMIN — HEPARIN SODIUM 5000 UNITS: 5000 INJECTION, SOLUTION INTRAVENOUS; SUBCUTANEOUS at 23:09

## 2018-08-23 RX ADMIN — VANCOMYCIN HYDROCHLORIDE 125 MG: 5 INJECTION, POWDER, LYOPHILIZED, FOR SOLUTION INTRAVENOUS at 12:58

## 2018-08-23 RX ADMIN — OXYBUTYNIN CHLORIDE 5 MG: 5 TABLET ORAL at 09:55

## 2018-08-23 RX ADMIN — VANCOMYCIN HYDROCHLORIDE 125 MG: 5 INJECTION, POWDER, LYOPHILIZED, FOR SOLUTION INTRAVENOUS at 19:03

## 2018-08-23 RX ADMIN — OXYBUTYNIN CHLORIDE 5 MG: 5 TABLET ORAL at 18:50

## 2018-08-23 RX ADMIN — SODIUM CHLORIDE, SODIUM GLUCONATE, SODIUM ACETATE, POTASSIUM CHLORIDE, MAGNESIUM CHLORIDE, SODIUM PHOSPHATE, DIBASIC, AND POTASSIUM PHOSPHATE 1000 ML: .53; .5; .37; .037; .03; .012; .00082 INJECTION, SOLUTION INTRAVENOUS at 00:15

## 2018-08-23 RX ADMIN — HEPARIN SODIUM 5000 UNITS: 5000 INJECTION, SOLUTION INTRAVENOUS; SUBCUTANEOUS at 13:00

## 2018-08-23 RX ADMIN — Medication 100 MG: at 09:55

## 2018-08-23 RX ADMIN — LEVOTHYROXINE SODIUM 75 MCG: 75 TABLET ORAL at 06:00

## 2018-08-23 RX ADMIN — METRONIDAZOLE 500 MG: 500 INJECTION, SOLUTION INTRAVENOUS at 09:55

## 2018-08-23 RX ADMIN — CIPROFLOXACIN 400 MG: 2 INJECTION, SOLUTION INTRAVENOUS at 10:28

## 2018-08-23 RX ADMIN — SODIUM CHLORIDE 75 ML/HR: 0.9 INJECTION, SOLUTION INTRAVENOUS at 18:50

## 2018-08-23 RX ADMIN — Medication 1000 MG: at 09:55

## 2018-08-23 RX ADMIN — CYANOCOBALAMIN TAB 500 MCG 1000 MCG: 500 TAB at 09:55

## 2018-08-23 RX ADMIN — ATORVASTATIN CALCIUM 20 MG: 20 TABLET, FILM COATED ORAL at 09:55

## 2018-08-23 RX ADMIN — SODIUM CHLORIDE 75 ML/HR: 0.9 INJECTION, SOLUTION INTRAVENOUS at 03:09

## 2018-08-23 RX ADMIN — VANCOMYCIN HYDROCHLORIDE 125 MG: 5 INJECTION, POWDER, LYOPHILIZED, FOR SOLUTION INTRAVENOUS at 23:09

## 2018-08-23 RX ADMIN — ASPIRIN 81 MG CHEWABLE TABLET 81 MG: 81 TABLET CHEWABLE at 09:55

## 2018-08-23 RX ADMIN — Medication 250 MG: at 18:50

## 2018-08-23 NOTE — ED PROVIDER NOTES
History  Chief Complaint   Patient presents with    Abdominal Pain     pt states that this afternoon she started with sharp pains in her lower abdoman, also states she has nausea and vomiting and cortez , states she may have seen blood in her stool     80year-old female presents with 6 hours of diffuse mid abdominal pain  Patient describes severe cramping that came on gradually, worsening and continues in the ER  Patient states nothing aggravates the pain and nothing alleviates it  ROS: Patient associates nausea with 6 episodes of nonbloody, nonbilious emesis with numerous diarrhea; denies fever/chills, dyspnea, anorexia, constipation, diaphoresis, chest pain, groin pain, dysuria, hematuria, melena, or back/neck pain  All other systems reviewed and negative  Patient denies any recent illnesses  Patient denies any recent use of antibiotics, international travel, or trauma  Patient notes history of hysterectomy  Patient notes history of no PMH  Objective:   Vital signs reviewed  Constitutional: mild acute distress  Eyes: No scleral icterus  HENT: Head normocephalic  Pharynx moist    CV: Regular rate and rhythm  Respiratory: Lungs clear to auscultation bilaterally without adventitious sounds  Abdomen: Inspection of an obese abdomen with previous abdominal surgical incisions noted without erythema, rashes or ecchymosis noted  No abdominal pulsations noted  Normal bowel sounds with no bruit auscultated  Soft abdomen  Palpitation noted tenderness periumbilical and suprapubic with no tenderness in the remainder; tenderness not over McBurney's point  No masses or pulsatile aorta noted on examination  No rebound or guarding noted on examination, non-peritoneal exam  Negative psoas, obturator, and Rovsing's signs  Negative Paiz's sign  Back: Normal inspection with no rash or signs of herpes zoster  Costovertebral tenderness not present     Skin: No ecchymosis of the umbilicus (negative [place]'s sign) or flank (negative [place]'s sign)  Warm and dry  Extremities: Non-tender lower extremities without asymmetry  Neuro: Alert  Answers questions appropriately  Psych: Normal mood and affect  Medical Decision Making   Abdominal pain with a broad differential  Will establish IV access and make patient NPO considering possibility of surgical intervention required  Will administer fentanyl for pain control  Will initiate fluids at this point in the assessment  Differential includes significant likelihood of intra-abdominal pathology and based on patient's exam findings and history  Will obtain EKG and troponin to evaluate for potential referred pain related to ACS  Will obtain CBC to evaluate for anemia and leukocytosis  Will obtain CMP to evaluate electrolytes, renal and hepatic function  Will obtain lipase to evaluate for potential pancreatitis  Will obtain lactate to evaluate for mesenteric ischemia and sepsis  Will obtain urinalysis to evaluate for possible urinary infectious sources and ketones to evaluate potential hydration state  Based on patient's history, physical exam and presenting complaint, will obtain contrast enhanced CT imaging of patient's abdomen and pelvis to further evaluate for possible intraabdominal pathology including appendicitis, diverticulitis, or obstruction  Reassessment:  Patient's symptoms did improve with fluids and antiemetics  Based on information obtained thus far, most consistent with likely enteritis however considering patient's age, significant leukocytosis, acute kidney injury, and lactic acidosis, will admit patient for continued fluid management and monitoring  Patient has repeat benign abdominal exam, less likely ischemic though this cannot be ruled out on patient's CT imaging completed today and considering acute kidney injury I do not wish to use contrast for further evaluation of this    As such, continued observation and monitoring as appropriate in this setting  I do not believe patient's lactic acidosis secondary to sepsis, more likely due to severe dehydration secondary to patient's vomiting and diarrhea  Withholding antibiotics and monitoring patient with continued fluids and antiemetics  Abdominal Pain       Prior to Admission Medications   Prescriptions Last Dose Informant Patient Reported? Taking?   aspirin 81 MG tablet   Yes Yes   Sig: Take by mouth   atorvastatin (LIPITOR) 20 mg tablet   No Yes   Sig: TAKE 1 TABLET DAILY   cyanocobalamin (VITAMIN B-12) 1,000 mcg tablet   Yes Yes   Sig: Take by mouth   levothyroxine 75 mcg tablet   No Yes   Sig: Take 1 tablet (75 mcg total) by mouth daily   lisinopril (ZESTRIL) 10 mg tablet   No Yes   Sig: TAKE 1 TABLET DAILY     omega-3-acid ethyl esters (LOVAZA) 1 g capsule   Yes Yes   Sig: Take 2 capsules by mouth 2 (two) times a day   oxybutynin (DITROPAN) 5 mg tablet   No Yes   Sig: Take 1 tablet (5 mg total) by mouth 2 (two) times a day   pyridoxine (VITAMIN B6) 100 mg tablet   Yes Yes   Sig: Take 100 mg by mouth daily   traZODone (DESYREL) 100 mg tablet   No Yes   Sig: Take 0 5 tablets (50 mg total) by mouth daily at bedtime as needed for sleep      Facility-Administered Medications: None       Past Medical History:   Diagnosis Date    BCC (basal cell carcinoma)     Benign uterine neoplasm        Past Surgical History:   Procedure Laterality Date    CATARACT EXTRACTION      Last assessed - 1/14/16    MALIGNANT SKIN LESION EXCISION      Face    PARATHYROIDECTOMY      REPLACEMENT TOTAL KNEE      ROTATOR CUFF REPAIR      Last assessed - 1/14/16    TOTAL ABDOMINAL HYSTERECTOMY      with derrell oopherectomy, Last assessed - 1/14/16       Family History   Problem Relation Age of Onset    Heart attack Mother     Substance Abuse Mother         denied    Mental illness Mother         denied    Other Mother         Cardiac Disorder     Cancer Mother         Malignant Neoplasm     Cancer Father         Malignant Neoplasm     Substance Abuse Father         denied    Mental illness Father         denied    Lung cancer Father     Spina bifida Child      I have reviewed and agree with the history as documented  Social History   Substance Use Topics    Smoking status: Former Smoker    Smokeless tobacco: Never Used    Alcohol use No      Comment: Daily alcohol use per [employer]         Review of Systems   Gastrointestinal: Positive for abdominal pain  All other systems reviewed and are negative        Physical Exam  Physical Exam    Vital Signs  ED Triage Vitals [08/22/18 2135]   Temperature Pulse Respirations Blood Pressure SpO2   97 7 °F (36 5 °C) 74 18 120/58 95 %      Temp Source Heart Rate Source Patient Position - Orthostatic VS BP Location FiO2 (%)   Oral Monitor Sitting Left arm --      Pain Score       8           Vitals:    08/23/18 0015 08/23/18 0128 08/23/18 0205 08/23/18 0234   BP: 143/95 128/61 114/55 136/66   Pulse: 95 60 68 62   Patient Position - Orthostatic VS: Lying Lying Lying        Visual Acuity      ED Medications  Medications   aspirin chewable tablet 81 mg (not administered)   atorvastatin (LIPITOR) tablet 20 mg (not administered)   cyanocobalamin (VITAMIN B-12) tablet 1,000 mcg (not administered)   levothyroxine tablet 75 mcg (not administered)   fish oil capsule 1,000 mg (not administered)   oxybutynin (DITROPAN) tablet 5 mg (not administered)   pyridoxine (VITAMIN B6) tablet 100 mg (not administered)   traZODone (DESYREL) tablet 50 mg (not administered)   sodium chloride 0 9 % infusion (75 mL/hr Intravenous New Bag 8/23/18 0309)   ondansetron (ZOFRAN) injection 4 mg (not administered)   heparin (porcine) subcutaneous injection 5,000 Units (not administered)   sodium chloride 0 9 % bolus 1,000 mL (0 mL Intravenous Stopped 8/23/18 0015)   ondansetron (ZOFRAN) injection 4 mg (4 mg Intravenous Given 8/22/18 2241)   fentanyl citrate (PF) 100 MCG/2ML 25 mcg (25 mcg Intravenous Given 8/22/18 2241)   dicyclomine (BENTYL) tablet 20 mg (20 mg Oral Given 8/22/18 2247)   multi-electrolyte (ISOLYTE-S PH 7 4) bolus 1,000 mL (1,000 mL Intravenous New Bag 8/23/18 0015)       Diagnostic Studies  Results Reviewed     Procedure Component Value Units Date/Time    Clostridium difficile toxin by PCR [[id number]] Collected:  08/23/18 0342    Lab Status: In process Specimen:  Stool from Per Rectum Updated:  08/23/18 0346    Lactic acid, plasma [[id number]]  (Abnormal) Collected:  08/23/18 0128    Lab Status:  Final result Specimen:  Blood from Arm, Right Updated:  08/23/18 0238     LACTIC ACID 2 4 (HH) mmol/L     Narrative:         Result may be elevated if tourniquet was used during collection  Urine Microscopic [[id number]]  (Abnormal) Collected:  08/23/18 0128    Lab Status:  Final result Specimen:  Urine from Urine, Clean Catch Updated:  08/23/18 0143     RBC, UA None Seen /hpf      WBC, UA 2-4 (A) /hpf      Epithelial Cells None Seen /hpf      Bacteria, UA Occasional /hpf     UA w Reflex to Microscopic w Reflex to Culture [[id number]]  (Abnormal) Collected:  08/23/18 0128    Lab Status:  Final result Specimen:  Urine from Urine, Clean Catch Updated:  08/23/18 0136     Color, UA Nellie     Clarity, UA Clear     Specific Gravity, UA <=1 005     pH, UA 5 5     Leukocytes, UA Small (A)     Nitrite, UA Negative     Protein, UA Negative mg/dl      Glucose, UA Negative mg/dl      Ketones, UA Negative mg/dl      Urobilinogen, UA 0 2 E U /dl      Bilirubin, UA Negative     Blood, UA Negative    Lactic acid, plasma [[id number]]  (Abnormal) Collected:  08/22/18 2244    Lab Status:  Final result Specimen:  Blood from Arm, Right Updated:  08/22/18 2342     LACTIC ACID 2 8 (HH) mmol/L     Narrative:         Result may be elevated if tourniquet was used during collection      Troponin I [[id number]]  (Normal) Collected:  08/22/18 2244    Lab Status:  Final result Specimen:  Blood from Arm, Right Updated:  08/22/18 2312     Troponin I <0 02 ng/mL     CMP [[id number]]  (Abnormal) Collected:  08/22/18 2231    Lab Status:  Final result Specimen:  Blood from Arm, Right Updated:  08/22/18 2307     Sodium 138 mmol/L      Potassium 4 0 mmol/L      Chloride 102 mmol/L      CO2 24 mmol/L      Anion Gap 12 mmol/L      BUN 23 mg/dL      Creatinine 1 41 (H) mg/dL      Glucose 151 (H) mg/dL      Calcium 10 7 (H) mg/dL      AST 32 U/L      ALT 54 U/L      Alkaline Phosphatase 43 (L) U/L      Total Protein 7 2 g/dL      Albumin 4 1 g/dL      Total Bilirubin 0 50 mg/dL      eGFR 35 ml/min/1 73sq m     Narrative:         National Kidney Disease Education Program recommendations are as follows:  GFR calculation is accurate only with a steady state creatinine  Chronic Kidney disease less than 60 ml/min/1 73 sq  meters  Kidney failure less than 15 ml/min/1 73 sq  meters      Lipase [[id number]]  (Normal) Collected:  08/22/18 2231    Lab Status:  Final result Specimen:  Blood from Arm, Right Updated:  08/22/18 2307     Lipase 134 u/L     CBC and differential [[id number]]  (Abnormal) Collected:  08/22/18 2244    Lab Status:  Final result Specimen:  Blood from Arm, Right Updated:  08/22/18 2250     WBC 18 92 (H) Thousand/uL      RBC 4 85 Million/uL      Hemoglobin 15 4 g/dL      Hematocrit 45 5 %      MCV 94 fL      MCH 31 8 pg      MCHC 33 8 g/dL      RDW 14 0 %      MPV 9 7 fL      Platelets 664 Thousands/uL      Neutrophils Relative 79 (H) %      Lymphocytes Relative 12 (L) %      Monocytes Relative 9 %      Eosinophils Relative 0 %      Basophils Relative 0 %      Neutrophils Absolute 15 03 (H) Thousands/µL      Lymphocytes Absolute 2 18 Thousands/µL      Monocytes Absolute 1 67 (H) Thousand/µL      Eosinophils Absolute 0 02 Thousand/µL      Basophils Absolute 0 02 Thousands/µL     Stool Enteric Bacterial Panel by PCR [[id number]]     Lab Status:  No result Specimen:  Stool                  CT abdomen pelvis wo contrast   Final Result by Lucy Valdes MD Mode (08/23 0010)   1  No evidence of bowel obstruction, colitis or diverticulitis  2   No nephrolithiasis or obstructive uropathy  3   Tubular nodular 1 cm density in the right middle lobe, partially visualized, suggestive of mucus plugging or pulmonary nodule  Recommend nonemergent chest CT for further evaluation  Workstation performed: [id number]                    Procedures  Procedures       Phone Contacts  ED Phone Contact    ED Course  ED Course as of Aug 23 0518   Wed Aug 22, 2018   2259 EKG demonstrates normal sinus rhythm with no acute ST segment changes  Identification of Seniors at Risk      Most Recent Value   (ISAR) Identification of Seniors at Risk   Before the illness or injury that brought you to the Emergency, did you need someone to help you on a regular basis? 0 Filed at: 08/22/2018 2138   In the last 24 hours, have you needed more help than usual?  0 Filed at: 08/22/2018 2138   Have you been hospitalized for one or more nights during the past 6 months? 0 Filed at: 08/22/2018 2138   In general, do you see well? 1 Filed at: 08/22/2018 2138   In general, do you have serious problems with your memory? 0 Filed at: 08/22/2018 2138   Do you take more than three different medications every day?   1 Filed at: 08/22/2018 2138   ISAR Score  2 Filed at: 08/22/2018 2138                          MDM  CritCare Time    Disposition  Final diagnoses:   Abdominal pain   Lactic acidosis   Vomiting and diarrhea   RANJAN (acute kidney injury) ([facility] [place] [address] )     Time reflects when diagnosis was documented in both MDM as applicable and the Disposition within this note     Time User Action Codes Description Comment    8/23/2018  1:18 AM Luz Arriaga Add [R10 30] Lower abdominal pain     8/23/2018  1:18 AM Luz Chanel Modify [R10 30] Lower abdominal pain     8/23/2018  1:18 AM Luz Chanel Modify [R10 30] Lower abdominal pain     8/23/2018  5:17 AM Pinky Morrison Add [R10 9] Abdominal pain     8/23/2018  5:17 AM Maria Del Carmen Bill Add [E87 2] Lactic acidosis     8/23/2018  5:18 AM Maria Del Carmen Bill Add [R11 10,  R19 7] Vomiting and diarrhea     8/23/2018  5:18 AM Maria Del Carmen Bill Add [N17 9] RANJAN (acute kidney injury) [facility])       ED Disposition     ED Disposition Condition Comment    Admit  Case was discussed with SAURABH and the patient's admission status was agreed to be Admission Status: observation status to the service of Dr Orly Romero          Follow-up Information    None         Current Discharge Medication List      CONTINUE these medications which have NOT CHANGED    Details   aspirin 81 MG tablet Take by mouth      atorvastatin (LIPITOR) 20 mg tablet TAKE 1 TABLET DAILY  Qty: 90 tablet, Refills: 3    Associated Diagnoses: Pure hypercholesterolemia      cyanocobalamin (VITAMIN B-12) 1,000 mcg tablet Take by mouth      levothyroxine 75 mcg tablet Take 1 tablet (75 mcg total) by mouth daily  Qty: 90 tablet, Refills: 2    Associated Diagnoses: Hypothyroidism, unspecified type      lisinopril (ZESTRIL) 10 mg tablet TAKE 1 TABLET DAILY  Qty: 90 tablet, Refills: 3    Associated Diagnoses: Essential hypertension      omega-3-acid ethyl esters (LOVAZA) 1 g capsule Take 2 capsules by mouth 2 (two) times a day      oxybutynin (DITROPAN) 5 mg tablet Take 1 tablet (5 mg total) by mouth 2 (two) times a day  Qty: 180 tablet, Refills: 2    Comments: PATIENT WANTS 3 MONTH SUPPLY  Associated Diagnoses: Urinary incontinence, unspecified type      pyridoxine (VITAMIN B6) 100 mg tablet Take 100 mg by mouth daily      traZODone (DESYREL) 100 mg tablet Take 0 5 tablets (50 mg total) by mouth daily at bedtime as needed for sleep  Qty: 30 tablet, Refills: 1    Associated Diagnoses: Other insomnia           No discharge procedures on file      ED Provider  Electronically Signed by           Thania Ha MD  08/23/18 8703

## 2018-08-23 NOTE — CASE MANAGEMENT
Initial Clinical Review    Admission: Date/Time/Statement: 8/23/18 @ 0115     Orders Placed This Encounter   Procedures    Inpatient Admission     Standing Status:   Standing     Number of Occurrences:   1     Order Specific Question:   Admitting Physician     Answer:   Rahat Meredith [[id number]]     Order Specific Question:   Level of Care     Answer:   Med Surg [16]     Order Specific Question:   Estimated length of stay     Answer:   More than 2 Midnights     Order Specific Question:   Certification     Answer:   I certify that inpatient services are medically necessary for this patient for a duration of greater than two midnights  See H&P and MD Progress Notes for additional information about the patient's course of treatment  ED: Date/Time/Mode of Arrival:   ED Arrival Information     Expected Arrival Acuity Means of Arrival Escorted By Service Admission Type    - 8/22/2018 21:16 Urgent Walk-In Spouse General Medicine Urgent    Arrival Complaint    ABDOMINAL PAIN           Chief Complaint:   Chief Complaint   Patient presents with    Abdominal Pain     pt states that this afternoon she started with sharp pains in her lower abdoman, also states she has nausea and vomiting and cortez , states she may have seen blood in her stool       History of Illness: Agusto Guthrie is a 80 y o  female who presents with chief complaint generalized abdominal pain associated with nausea vomiting diarrhea  Onset this afternoon with streaks of blood in her stool    Patient state has not been able to hold anything down        PE :   Generalized abd tenderness, pale, dry mouth , pallor skin     ED Vital Signs:   ED Triage Vitals [08/22/18 2135]   Temperature Pulse Respirations Blood Pressure SpO2   97 7 °F (36 5 °C) 74 18 120/58 95 %      Temp Source Heart Rate Source Patient Position - Orthostatic VS BP Location FiO2 (%)   Oral Monitor Sitting Left arm --      Pain Score       8        Wt Readings from Last 1 Encounters: 08/22/18 79 4 kg (175 lb)       Vital Signs (abnormal): wnl     Abnormal Labs/Diagnostic Test Results: lactic acid  2 8, BUN creat   23  1 41, gluc  151, ness  10 7, alk phos  43, wbc  18 92  CT abd-    1   No evidence of bowel obstruction, colitis or diverticulitis  2   No nephrolithiasis or obstructive uropathy  3   Tubular nodular 1 cm density in the right middle lobe, partially visualized, suggestive of mucus plugging or pulmonary nodule             ED Treatment:   Medication Administration from 08/22/2018 2116 to 08/23/2018 7109       Date/Time Order Dose Route Action Action by Comments     08/23/2018 0015 sodium chloride 0 9 % bolus 1,000 mL 0 mL Intravenous Stopped Lee Arenas RN      08/22/2018 2240 sodium chloride 0 9 % bolus 1,000 mL 1,000 mL Intravenous [address] Selwyn Ornelas, [address] [place] [address]      08/22/2018 2241 ondansetron [facility] injection 4 mg 4 mg Intravenous Given Selwyn Ornelas RN      08/22/2018 2241 fentanyl citrate (PF) 100 MCG/2ML 25 mcg 25 mcg Intravenous Given Selwyn Ornelas RN      08/22/2018 2247 dicyclomine (BENTYL) tablet 20 mg 20 mg Oral Given Selwyn Ornelas RN      08/23/2018 0015 multi-electrolyte (ISOLYTE-S PH 7 4) bolus 1,000 mL 1,000 mL Intravenous New Bag Lee Arenas RN           Past Medical/Surgical History: Active Ambulatory Problems     Diagnosis Date Noted    Urinary incontinence 03/19/2018    Left hip pain 03/19/2018    Other insomnia 03/19/2018    Lower abdominal pain 07/12/2018     Resolved Ambulatory Problems     Diagnosis Date Noted    Diabetes mellitus screening 03/19/2018    Lipid screening 03/19/2018     Past Medical History:   Diagnosis Date    BCC (basal cell carcinoma)     Benign uterine neoplasm        Admitting Diagnosis: Abdominal pain [R10 9]  Lower abdominal pain [R10 30]    Age/Sex: 80 y o  female    Assessment/Plan:   * Abdominal pain   Assessment & Plan     Associated with nausea vomiting diarrhea    Likely secondary to acute gastroenteritis  Monitor UA  That is pending  CT of the bed been reviewed no acute findings  Comfort measures, pain management, IV fluids, antiemetic  Stool for cultures, C diff  GI consult for further management  NPO clear liquids              Leukocytosis   Assessment & Plan     WBC on admission greater than 15 87  In the setting of sepsis and acute gastroenteritis  Will monitor WBC, temp  Blood cultures, UA pending  This is likely viral in nature, will hold antibiotics monitor at this time              RANJAN (acute kidney injury) [facility])   Assessment & Plan     Present on admission, creatinine 1 41 likely secondary to acute dehydration secondary to nausea, vomiting diarrhea  Slow hydration  Monitor creatinine in the a m  [facility] Comfort measures           Sepsis ([facility] [address] )   Assessment & Plan     Present on admission, evident by elevated lactate 2 8, WBC greater than 18 95  Likely secondary to acute gastroenteritis  Patient afebrile  Blood cultures, UA pending  Hold antibiotic at this time continue to monitor              VTE Prophylaxis: Heparin  / sequential compression device   Code Status:  Full code  POLST: POLST form is not discussed and not completed at this time  Discussion with family:  No family at bedside   Anticipated Length of Stay:  Patient will be admitted on an Inpatient basis with an anticipated length of stay of  greater than 2 midnights     Justification for Hospital Stay:  Abdominal pain, nausea vomiting diarrhea, acute gastroenteritis  GI consult     Admission Orders:  Scheduled Meds:   Current Facility-Administered Medications:  acetaminophen 650 mg Oral Q6H PRN Ramez Craig PA-C    aspirin 81 mg Oral Daily PRIYA Buenrostro    atorvastatin 20 mg Oral Daily PRIYA Buenrostro    cyanocobalamin 1,000 mcg Oral Daily PRIYA Buenrostro    fish oil 1,000 mg Oral Daily PRIYA Buenrostro    heparin (porcine) 5,000 Units Subcutaneous Q8H [address] PRIYA Cisneros levothyroxine 75 mcg Oral Early Morning PRIYA Lane    metroNIDAZOLE 500 mg Intravenous Q8H Paloma Marks PA-C    ondansetron 4 mg Intravenous Q6H PRN PRIYA Buenrostro    oxybutynin 5 mg Oral BID PRIYA Lane    pyridoxine 100 mg Oral Daily PRIYA Lane    sodium chloride 75 mL/hr Intravenous Continuous PRIYA Buenrostro Last Rate: 75 mL/hr (08/23/18 0309)   traZODone 50 mg Oral HS PRN PRIYA Buenrostro    vancomycin 125 mg Oral Q6H [facility] Paloma Marks PA-C      Continuous Infusions:   sodium chloride 75 mL/hr Last Rate: 75 mL/hr (08/23/18 0309)     PRN Meds:   acetaminophen    ondansetron    traZODone    Clear liq diet   SCD  OT PT eval   Up w/ assist   GI consult   8/23 stool enteric bacterial panel , hepatic function panel , cbc, phos, mg , bmp, lipase ,c-diff , plt ct , lactic acid     c-diff + , wbc  15 87, alk phos   33, total prot  5 9, alb  3 2    GI consult  8/23  Abdominal Pain  N/V/D  - CT A/P on admission did not show any colitis or diverticulitis; suspect she has ischemic or infectious colitis due to her symptoms and this was not seen on CT due to being a noncontrasted study  - Due to her age, significant leukocytosis of 18 92 on admission, and symptoms, will start IV cipro/flagyl  - Agree with checking Cdiff and stool culture though she doesn't have any obvious risk factors for Cdiff  - Serial abdominal exams  - Supportive care with IVF, antiemetics, tylenol PRN  - Lactic acid mildly elevated on admission at 2 8 and then 2 4 early this AM, repeat today to make sure this has cleared  - Trial clear liquids   Hematochezia  - This may be 2/2 ischemic colitis v infectious colitis v hemorrhoids  - Hb has stable, 15 4 on admission and down to 13 4 today which may be partly dilutional  - Recommend serial Hbs q8-q12 hours   - If she continues to have rectal bleeding or has a downtrend in Hb, we will plan for colonoscopy     NOte 8/23  Cdiff PCR positive- Due to age, leukocytosis on admission, and severe bloody diarrhea yesterday, will start vanco 125 mg q6h PO in addition to flagyl 500 mg IV q8h  Serial abdominal exams

## 2018-08-23 NOTE — H&P
H&P- Ella Santana 1935, 80 y o  female MRN: [id number]    Unit/Bed#: -01 Encounter: [id number]    Primary Care Provider: Maria D Cervantes MD   Date and time admitted to hospital: 8/22/2018  9:52 PM        * Abdominal pain   Assessment & Plan    Associated with nausea vomiting diarrhea  Likely secondary to acute gastroenteritis  Monitor UA  That is pending  CT of the bed been reviewed no acute findings  Comfort measures, pain management, IV fluids, antiemetic  Stool for cultures, C diff  GI consult for further management  NPO clear liquids  Leukocytosis   Assessment & Plan    WBC on admission greater than 15 87  In the setting of sepsis and acute gastroenteritis  Will monitor WBC, temp  Blood cultures, UA pending  This is likely viral in nature, will hold antibiotics monitor at this time  RANJAN (acute kidney injury) [facility])   Assessment & Plan    Present on admission, creatinine 1 41 likely secondary to acute dehydration secondary to nausea, vomiting diarrhea  Slow hydration  Monitor creatinine in the a m  [facility]osal[facility] Lightning Comfort measures  Sepsis [facility])   Assessment & Plan    Present on admission, evident by elevated lactate 2 8, WBC greater than 18 95  Likely secondary to acute gastroenteritis  Patient afebrile  Blood cultures, UA pending  Hold antibiotic at this time continue to monitor  VTE Prophylaxis: Heparin  / sequential compression device   Code Status:  Full code  POLST: POLST form is not discussed and not completed at this time  Discussion with family:  No family at bedside    Anticipated Length of Stay:  Patient will be admitted on an Inpatient basis with an anticipated length of stay of  greater than 2 midnights  Justification for Hospital Stay:  Abdominal pain, nausea vomiting diarrhea, acute gastroenteritis  GI consult    Total Time for Visit, including Counseling / Coordination of Care: 1 hour    Greater than 50% of this total time spent on direct patient counseling and coordination of care  Chief Complaint:   Abdominal pain    History of Present Illness:    Ekaterina Roberst is a 80 y o  female who presents with chief complaint generalized abdominal pain associated with nausea vomiting diarrhea  Onset this afternoon with streaks of blood in her stool  Patient state has not been able to hold anything down  Denies fever her reports chills  Review of Systems:    Review of Systems   Constitutional: Positive for chills  Gastrointestinal: Positive for abdominal pain, diarrhea, nausea and vomiting  Skin: Positive for pallor  All other systems reviewed and are negative  Past Medical and Surgical History:     Past Medical History:   Diagnosis Date    BCC (basal cell carcinoma)     Benign uterine neoplasm        Past Surgical History:   Procedure Laterality Date    CATARACT EXTRACTION      Last assessed - 1/14/16    MALIGNANT SKIN LESION EXCISION      Face    PARATHYROIDECTOMY      REPLACEMENT TOTAL KNEE      ROTATOR CUFF REPAIR      Last assessed - 1/14/16    TOTAL ABDOMINAL HYSTERECTOMY      with derrell oopherectomy, Last assessed - 1/14/16       Meds/Allergies:    Prior to Admission medications    Medication Sig Start Date End Date Taking? Authorizing Provider   aspirin 81 MG tablet Take by mouth   Yes Historical Provider, MD   atorvastatin (LIPITOR) 20 mg tablet TAKE 1 TABLET DAILY 5/18/18  Yes Dianne Espinoza MD   cyanocobalamin (VITAMIN B-12) 1,000 mcg tablet Take by mouth   Yes Historical Provider, MD   levothyroxine 75 mcg tablet Take 1 tablet (75 mcg total) by mouth daily 8/16/18  Yes Dianne Espinoza MD   lisinopril (ZESTRIL) 10 mg tablet TAKE 1 TABLET DAILY   3/19/18  Yes Dianne Espinoza MD   swkbw-1-dedw ethyl esters (LOVAZA) 1 g capsule Take 2 capsules by mouth 2 (two) times a day 6/3/16  Yes Historical Provider, MD   oxybutynin (DITROPAN) 5 mg tablet Take 1 tablet (5 mg total) by mouth 2 (two) times a day 7/30/18  Yes PRIYA Medina   pyridoxine (VITAMIN B6) 100 mg tablet Take 100 mg by mouth daily   Yes Historical Provider, MD   traZODone (DESYREL) 100 mg tablet Take 0 5 tablets (50 mg total) by mouth daily at bedtime as needed for sleep 3/19/18  Yes Renold Goodpasture, MD     I have reviewed home medications with patient personally  Allergies: Allergies   Allergen Reactions    Codeine Sulfate        Social History:     Marital Status: /Civil Union   Occupation:  Retired  Patient Pre-hospital Living Situation:  Home  Patient Pre-hospital Level of Mobility:  Walker  Patient Pre-hospital Diet Restrictions:  None  Substance Use History:   History   Alcohol Use No     Comment: Daily alcohol use per Allscripts      History   Smoking Status    Former Smoker   Smokeless Tobacco    Never Used     History   Drug Use No       Family History:    non-contributory    Physical Exam:     Vitals:   Blood Pressure: 136/66 (08/23/18 0234)  Pulse: 62 (08/23/18 0234)  Temperature: 97 9 °F (36 6 °C) (08/23/18 0234)  Temp Source: Oral (08/23/18 0234)  Respirations: 18 (08/23/18 0234)  Height: 5' 7" (170 2 cm) (08/23/18 0234)  Weight - Scale: 79 4 kg (175 lb) (08/22/18 2135)  SpO2: 94 % (08/23/18 0234)    Physical Exam   Constitutional: She is oriented to person, place, and time  She appears well-developed and well-nourished  HENT:   Head: Normocephalic and atraumatic  Mouth/Throat: Mucous membranes are pale and dry  Neck: Normal range of motion  Neck supple  Cardiovascular: Normal rate, regular rhythm and normal heart sounds  Pulmonary/Chest: Effort normal and breath sounds normal  No accessory muscle usage  No respiratory distress  Abdominal: Soft  Bowel sounds are normal  She exhibits no distension  There is generalized tenderness (With nausea)  Musculoskeletal: Normal range of motion  She exhibits no tenderness  Neurological: She is alert and oriented to person, place, and time  Skin: Skin is warm and dry  There is pallor     Psychiatric: She has a normal mood and affect  Her behavior is normal    Nursing note and vitals reviewed  Additional Data:     Lab Results: I have personally reviewed pertinent reports  Results from last 7 days  Lab Units 08/23/18  0510   WBC Thousand/uL 15 87*   HEMOGLOBIN g/dL 13 4   HEMATOCRIT % 39 8   PLATELETS Thousands/uL 214   NEUTROS PCT % 70   LYMPHS PCT % 21   MONOS PCT % 8   EOS PCT % 0       Results from last 7 days  Lab Units 08/23/18  0510 08/22/18  2231   SODIUM mmol/L 141 138   POTASSIUM mmol/L 4 2 4 0   CHLORIDE mmol/L 108 102   CO2 mmol/L 23 24   BUN mg/dL 20 23   CREATININE mg/dL 1 13 1 41*   CALCIUM mg/dL 9 5 10 7*   TOTAL PROTEIN g/dL  --  7 2   BILIRUBIN TOTAL mg/dL  --  0 50   ALK PHOS U/L  --  43*   ALT U/L  --  54   AST U/L  --  32   GLUCOSE RANDOM mg/dL 112 151*                   Imaging: I have personally reviewed pertinent reports  CT abdomen pelvis wo contrast   Final Result by Tommy De Leon MD (08/23 0010)   1  No evidence of bowel obstruction, colitis or diverticulitis  2   No nephrolithiasis or obstructive uropathy  3   Tubular nodular 1 cm density in the right middle lobe, partially visualized, suggestive of mucus plugging or pulmonary nodule  Recommend nonemergent chest CT for further evaluation  Workstation performed: [id number]             EKG, Pathology, and Other Studies Reviewed on Admission:   · EKG:     Allscripts / Epic Records Reviewed: Yes     ** Please Note: This note has been constructed using a voice recognition system   **

## 2018-08-23 NOTE — ASSESSMENT & PLAN NOTE
Present on admission, evident by elevated lactate 2 8, WBC greater than 18 95  Likely secondary to acute gastroenteritis  Patient afebrile  Blood cultures, UA pending  Hold antibiotic at this time continue to monitor

## 2018-08-23 NOTE — PHYSICAL THERAPY NOTE
Physical Therapy Evaluation     Patient's Name: Giacomo Garcia    Admitting Diagnosis  Abdominal pain [R10 9]  Lower abdominal pain [R10 30]    Problem List  Patient Active Problem List   Diagnosis    Urinary incontinence    Left hip pain    Other insomnia    Lower abdominal pain    Abdominal pain    [facility]eps[facility] [address] )    RANJAN (acute kidney injury) ([facility] [address] )    Leukocytosis       Past Medical History  Past Medical History:   Diagnosis Date    BCC (basal cell carcinoma)     Benign uterine neoplasm        Past Surgical History  Past Surgical History:   Procedure Laterality Date    CATARACT EXTRACTION      Last assessed - 1/14/16    MALIGNANT SKIN LESION EXCISION      Face    PARATHYROIDECTOMY      REPLACEMENT TOTAL KNEE      ROTATOR CUFF REPAIR      Last assessed - 1/14/16    TOTAL ABDOMINAL HYSTERECTOMY      with derrell oopherectomy, Last assessed - 1/14/16 08/23/18 1322   Note Type   Note type Eval only   Pain Assessment   Pain Assessment No/denies pain   Pain Score No Pain   Home Living   Type of [address] [place] [address] One level;Stairs to enter with rails; Able to live on main level with bedroom/bathroom; Performs ADLs on one level  (2 ALYCE)   Bathroom Shower/Tub Tub/shower unit   [employer] Raised   Bathroom Equipment Grab bars in shower; Shower chair   [address] (no AD needed at baseline)   Prior Function   Level of [place] Independent with ADLs and functional mobility   Lives With Spouse   Receives Help From Family   ADL Assistance Independent   IADLs Independent   Falls in the last 6 months 0   Vocational Retired   Comments takes care of  at baseline, (+) driving   Restrictions/Precautions   Weight Bearing Precautions Per Order No   Braces or Orthoses (none per pt)   Other Precautions Contact/isolation;Multiple lines   General   Family/Caregiver Present Yes   Cognition   Overall Cognitive Status [facility]   Arousal/Participation Alert   Orientation Level Oriented X4   Memory Within functional limits   Following Commands Follows all commands and directions without difficulty   Comments pt agreeable to PT IE   RUE Assessment   RUE Assessment WFL   LUE Assessment   LUE Assessment WFL   RLE Assessment   RLE Assessment WFL   LLE Assessment   LLE Assessment WFL   Coordination   Movements are Fluid and Coordinated 1   Sensation WFL   Light Touch   RLE Light Touch Grossly intact   LLE Light Touch Grossly intact   Bed Mobility   Supine to Sit 5  Supervision   Additional items HOB elevated; Increased time required   Sit to Supine 5  Supervision   Additional items HOB elevated; Increased time required   Transfers   Sit to Stand 5  Supervision   Additional items Verbal cues   Stand to Sit 5  Supervision   Additional items Verbal cues   Toilet transfer 5  Supervision   Additional items Verbal cues;Standard toilet   Ambulation/Elevation   Gait pattern Inconsistent presley   Gait Assistance 5  Supervision   Additional items Assist x 1;Verbal cues   Assistive Device None   Distance 20' x2   Balance   Static Sitting Normal   Dynamic Sitting Good   Static Standing Good   Dynamic Standing Fair +   Ambulatory Fair +   Endurance Deficit   Endurance Deficit Yes   Activity Tolerance   Activity Tolerance Patient tolerated treatment well   Medical Staff Made Aware yes CM Elizabeth   Nurse Made Aware DIANNE Nolan verbalized pt appropriate to see, made aware of session outcome/recs   Assessment   Prognosis Excellent   Problem List Decreased strength;Decreased endurance; Impaired balance;Decreased mobility   Assessment Pt is 80 y o  female seen for PT evaluation on 8/23/2018 s/p admit to [facility] on 8/22/2018 w/ Abdominal pain  Pt presents with c/c generalized abdominal pain associated with N/V/D  PT consulted to assess pt's functional mobility and d/c needs  Order placed for PT eval and tx, w/ up w/ A order  Performed at least 2 patient identifiers during session: Name and wristband  Comorbidities affecting pt's physical performance at time of assessment include: BCC, benign uterine neoplasm, cataract extraction, parathyroidectomy, TKR, rotator cuff repair  PTA, pt was independent w/ all functional mobility w/ no AD/DME, ambulates unrestricted distances and all terrain, has 2 ALYCE, lives w/  in 1 level home and retired  Personal factors affecting pt at time of IE include: stairs to enter home  Please find objective findings from PT assessment regarding body systems outlined above with impairments and limitations including weakness, impaired balance and decreased endurance, as well as mobility assessment (need for SBA assist w/ all phases of mobility when usually ambulating independently)  The following objective measures performed on IE also reveal limitations: Barthel Index: 70/100 and Modified Lane: 2 (slight disability)  Pt's clinical presentation is currently unstable/unpredictable seen in pt's presentation of need for input for task focus and mobility technique, ongoing medical assessment and r/o C diff  Pt to benefit from continued PT tx to address deficits as defined above and maximize level of functional independent mobility and consistency  From PT/mobility standpoint, recommendation at time of d/c would be home with family support pending progress in order to facilitate return to PLOF     Barriers to Discharge None   Goals   Patient Goals to return home   STG Expiration Date 09/02/18   Short Term Goal #1 In 7-10 days: Increase bilateral LE strength 1/2 grade to facilitate independent mobility, Perform all bed mobility tasks independently to decrease caregiver burden, Perform all transfers independently to improve independence, Ambulate > 300 ft  with least restrictive assistive device independently w/o LOB and w/ normalized gait pattern 100% of the time, Navigate 2 stairs independently with unilateral handrail to facilitate return to previous living environment, Increase all balance 1/2 grade to decrease risk for falls, Tolerate 4 hr OOB to faciliate upright tolerance and Improve Barthel Index score to 85 or greater to facilitate independence   Treatment Day 0   Plan   Treatment/Interventions Functional transfer training;LE strengthening/ROM; Elevations; Therapeutic exercise; Endurance training;Patient/family training;Gait training;Spoke to nursing;Spoke to case management; Family   PT Frequency 2-3x/wk   Recommendation   Recommendation Home with family support   Equipment Recommended (anticipate none)   PT - OK to Discharge No   Additional Comments pt to clear stairs prior to safe d/c disposition home   Modified Sumit Scale   Modified Caruthers Scale 2   Barthel Index   Feeding 10   Bathing 5   Grooming Score 5   Dressing Score 10   Bladder Score 10   Bowels Score 10   Toilet Use Score 10   Transfers (Bed/Chair) Score 10   Mobility (Level Surface) Score 0   Stairs Score 0   Barthel Index Score 70           Marti Patiño, PT, DPT

## 2018-08-23 NOTE — ASSESSMENT & PLAN NOTE
Present on admission, creatinine 1 41 likely secondary to acute dehydration secondary to nausea, vomiting diarrhea  Slow hydration  Monitor creatinine in the a ernesto Gonsalves Goldmann Comfort measures

## 2018-08-23 NOTE — PLAN OF CARE
Problem: PHYSICAL THERAPY ADULT  Goal: Performs mobility at highest level of function for planned discharge setting  See evaluation for individualized goals  Treatment/Interventions: Functional transfer training, LE strengthening/ROM, Elevations, Therapeutic exercise, Endurance training, Patient/family training, Gait training, Spoke to nursing, Spoke to case management, Family  Equipment Recommended:  (anticipate none)       See flowsheet documentation for full assessment, interventions and recommendations  Prognosis: Excellent  Problem List: Decreased strength, Decreased endurance, Impaired balance, Decreased mobility  Assessment: Pt is 80 y o  female seen for PT evaluation on 8/23/2018 s/p admit to [facility] on 8/22/2018 w/ Abdominal pain  Pt presents with c/c generalized abdominal pain associated with N/V/D  PT consulted to assess pt's functional mobility and d/c needs  Order placed for PT eval and tx, w/ up w/ A order  Performed at least 2 patient identifiers during session: Name and wristband  Comorbidities affecting pt's physical performance at time of assessment include: BCC, benign uterine neoplasm, cataract extraction, parathyroidectomy, TKR, rotator cuff repair  PTA, pt was independent w/ all functional mobility w/ no AD/DME, ambulates unrestricted distances and all terrain, has 2 ALYCE, lives w/  in 1 level home and retired  Personal factors affecting pt at time of IE include: stairs to enter home  Please find objective findings from PT assessment regarding body systems outlined above with impairments and limitations including weakness, impaired balance and decreased endurance, as well as mobility assessment (need for SBA assist w/ all phases of mobility when usually ambulating independently)  The following objective measures performed on IE also reveal limitations: Barthel Index: 70/100 and Modified Sumit: 2 (slight disability)   Pt's clinical presentation is currently unstable/unpredictable seen in pt's presentation of need for input for task focus and mobility technique, ongoing medical assessment and r/o C diff  Pt to benefit from continued PT tx to address deficits as defined above and maximize level of functional independent mobility and consistency  From PT/mobility standpoint, recommendation at time of d/c would be home with family support pending progress in order to facilitate return to PLOF  Barriers to Discharge: None     Recommendation: Home with family support     PT - OK to Discharge: No    See flowsheet documentation for full assessment

## 2018-08-23 NOTE — CONSULTS
Consultation - [facility]  Sammi John 80 y o  female MRN: [id number]  Unit/Bed#: -01 Encounter: [id number]        Consults    Reason for Consult / Principal Problem: Abdominal Pain, N/V/D    HPI: Ms Jordan Herman is a 81 yo F with a PMH of HLD, hypothyroidism, HTN, presenting with acute onset of lower abdominal pain, nausea, vomiting, and bloody/watery diarrhea at 3PM yesterday  She reports she was at a local fair and had french fries and funnel cake prior to the onset of her symptoms  She denies any other recent sick contacts, hospitalizations, antibiotic use, or travel within the past 6 months  She denies feeling lightheaded or dizzy prior to the onset of her symptoms and did not have a fever  She has never experienced rectal bleeding before but believes she has hemorrhoids  She had a colonoscopy about 10-12 years ago which she reports was normal  She denies any unintentional weight loss over the past 6 months  She does take aspirin 81 mg but otherwise no other NSAIDs, antiplatelets, or anticoagulation  Currently her symptoms have mildly improved  Her abdomen is sore  She had another bloody bowel movement this morning  Her nausea and vomiting have resolved since yesterday      REVIEW OF SYSTEMS: Negative except for as stated above    Historical Information   Past Medical History:   Diagnosis Date    BCC (basal cell carcinoma)     Benign uterine neoplasm      Past Surgical History:   Procedure Laterality Date    CATARACT EXTRACTION      Last assessed - 1/14/16    MALIGNANT SKIN LESION EXCISION      Face    PARATHYROIDECTOMY      REPLACEMENT TOTAL KNEE      ROTATOR CUFF REPAIR      Last assessed - 1/14/16    TOTAL ABDOMINAL HYSTERECTOMY      with derrell oopherectomy, Last assessed - 1/14/16     Social History   History   Alcohol Use No     Comment: Daily alcohol use per [employer]      History   Drug Use No     History   Smoking Status    Former Smoker   Smokeless Tobacco    Never Used Family History   Problem Relation Age of Onset    Heart attack Mother     Substance Abuse Mother         denied    Mental illness Mother         denied    Other Mother         Cardiac Disorder     Cancer Mother         Malignant Neoplasm     Cancer Father         Malignant Neoplasm     Substance Abuse Father         denied    Mental illness Father         denied    Lung cancer Father     Spina bifida Child        Meds/Allergies     Prescriptions Prior to Admission   Medication    aspirin 81 MG tablet    atorvastatin (LIPITOR) 20 mg tablet    cyanocobalamin (VITAMIN B-12) 1,000 mcg tablet    levothyroxine 75 mcg tablet    lisinopril (ZESTRIL) 10 mg tablet    omega-3-acid ethyl esters (LOVAZA) 1 g capsule    oxybutynin (DITROPAN) 5 mg tablet    pyridoxine (VITAMIN B6) 100 mg tablet    traZODone (DESYREL) 100 mg tablet     Current Facility-Administered Medications   Medication Dose Route Frequency    acetaminophen (TYLENOL) tablet 650 mg  650 mg Oral Q6H PRN    aspirin chewable tablet 81 mg  81 mg Oral Daily    atorvastatin (LIPITOR) tablet 20 mg  20 mg Oral Daily    ciprofloxacin (CIPRO) IVPB (premix) 400 mg  400 mg Intravenous Q12H    cyanocobalamin (VITAMIN B-12) tablet 1,000 mcg  1,000 mcg Oral Daily    fish oil capsule 1,000 mg  1,000 mg Oral Daily    heparin (porcine) subcutaneous injection 5,000 Units  5,000 Units Subcutaneous Q8H [address]    levothyroxine tablet 75 mcg  75 mcg Oral Early Morning    metroNIDAZOLE (FLAGYL) IVPB (premix) 500 mg  500 mg Intravenous Q8H    ondansetron (ZOFRAN) injection 4 mg  4 mg Intravenous Q6H PRN    oxybutynin (DITROPAN) tablet 5 mg  5 mg Oral BID    pyridoxine (VITAMIN B6) tablet 100 mg  100 mg Oral Daily    sodium chloride 0 9 % infusion  75 mL/hr Intravenous Continuous    traZODone (DESYREL) tablet 50 mg  50 mg Oral HS PRN       Allergies   Allergen Reactions    Codeine Sulfate            Objective     Blood pressure 121/69, pulse 68, temperature 98 °F (36 7 °C), temperature source Oral, resp  rate 18, height 5' 7" (1 702 m), weight 79 4 kg (175 lb), SpO2 94 %  Intake/Output Summary (Last 24 hours) at 08/23/18 0946  Last data filed at 08/23/18 0015   Gross per 24 hour   Intake             1000 ml   Output                0 ml   Net             1000 ml         PHYSICAL EXAM:      General Appearance:   Alert, oriented x3, cooperative, no distress   HENT[de-identified]  Eyes:   Normocephalic, atraumatic  Anicteric   Neck:  Supple, symmetrical, trachea midline   Lungs:   Clear to auscultation bilaterally, no respiratory distress   Heart[de-identified]   RRR, no murmur   Abdomen:   Soft, non-tender, non-distended; normal bowel sounds; no masses, no organomegaly    Rectal:   Deferred    Extremities:  No cyanosis or LE edema    Pulses:  2+ and symmetric all extremities    Skin:  No jaundice or pallor     Lab Results:     Results from last 7 days  Lab Units 08/23/18  0510   WBC Thousand/uL 15 87*   HEMOGLOBIN g/dL 13 4   HEMATOCRIT % 39 8   PLATELETS Thousands/uL 214   NEUTROS PCT % 70   LYMPHS PCT % 21   MONOS PCT % 8   EOS PCT % 0       Results from last 7 days  Lab Units 08/23/18  0510 08/22/18  2231   SODIUM mmol/L 141 138   POTASSIUM mmol/L 4 2 4 0   CHLORIDE mmol/L 108 102   CO2 mmol/L 23 24   BUN mg/dL 20 23   CREATININE mg/dL 1 13 1 41*   CALCIUM mg/dL 9 5 10 7*   TOTAL PROTEIN g/dL  --  7 2   BILIRUBIN TOTAL mg/dL  --  0 50   ALK PHOS U/L  --  43*   ALT U/L  --  54   AST U/L  --  32   GLUCOSE RANDOM mg/dL 112 151*           Results from last 7 days  Lab Units 08/23/18  0510   LIPASE u/L 108       Imaging Studies: I have personally reviewed pertinent imaging studies  Ct Abdomen Pelvis Wo Contrast  Result Date: 8/23/2018  Impression: 1  No evidence of bowel obstruction, colitis or diverticulitis  2   No nephrolithiasis or obstructive uropathy   3   Tubular nodular 1 cm density in the right middle lobe, partially visualized, suggestive of mucus plugging or pulmonary nodule  Recommend nonemergent chest CT for further evaluation  ASSESSMENT and PLAN:      Abdominal Pain  N/V/D  - CT A/P on admission did not show any colitis or diverticulitis; suspect she has ischemic or infectious colitis due to her symptoms and this was not seen on CT due to being a noncontrasted study  - Due to her age, significant leukocytosis of 18 92 on admission, and symptoms, will start IV cipro/flagyl  - Agree with checking Cdiff and stool culture though she doesn't have any obvious risk factors for Cdiff  - Serial abdominal exams  - Supportive care with IVF, antiemetics, tylenol PRN  - Lactic acid mildly elevated on admission at 2 8 and then 2 4 early this AM, repeat today to make sure this has cleared  - Trial clear liquids      Hematochezia  - This may be 2/2 ischemic colitis v infectious colitis v hemorrhoids  - Hb has stable, 15 4 on admission and down to 13 4 today which may be partly dilutional  - Recommend serial Hbs q8-q12 hours   - If she continues to have rectal bleeding or has a downtrend in Hb, we will plan for colonoscopy        Patient will be seen and examined by Dr Gypsy Lacey  All ibanez medical decisions were made by Dr Gypsy Lacey  Thank you for allowing us to participate in the care of this patient  We will follow with you closely

## 2018-08-23 NOTE — ASSESSMENT & PLAN NOTE
Associated with nausea vomiting diarrhea  Likely secondary to acute gastroenteritis  Monitor UA  That is pending  CT of the bed been reviewed no acute findings  Comfort measures, pain management, IV fluids, antiemetic  Stool for cultures, C diff  GI consult for further management  NPO clear liquids

## 2018-08-23 NOTE — ASSESSMENT & PLAN NOTE
WBC on admission greater than 15 87  In the setting of sepsis and acute gastroenteritis  Will monitor WBC, temp  Blood cultures, UA pending  This is likely viral in nature, will hold antibiotics monitor at this time

## 2018-08-23 NOTE — PROGRESS NOTES
Cdiff PCR positive- Due to age, leukocytosis on admission, and severe bloody diarrhea yesterday, will start vanco 125 mg q6h PO in addition to flagyl 500 mg IV q8h  Serial abdominal exams

## 2018-08-24 LAB
ALBUMIN SERPL BCP-MCNC: 2.8 G/DL (ref 3.5–5)
ALP SERPL-CCNC: 32 U/L (ref 46–116)
ALT SERPL W P-5'-P-CCNC: 33 U/L (ref 12–78)
ANION GAP SERPL CALCULATED.3IONS-SCNC: 6 MMOL/L (ref 4–13)
AST SERPL W P-5'-P-CCNC: 20 U/L (ref 5–45)
BILIRUB SERPL-MCNC: 0.9 MG/DL (ref 0.2–1)
BUN SERPL-MCNC: 9 MG/DL (ref 5–25)
CALCIUM SERPL-MCNC: 9.1 MG/DL (ref 8.3–10.1)
CHLORIDE SERPL-SCNC: 107 MMOL/L (ref 100–108)
CO2 SERPL-SCNC: 25 MMOL/L (ref 21–32)
CREAT SERPL-MCNC: 0.95 MG/DL (ref 0.6–1.3)
ERYTHROCYTE [DISTWIDTH] IN BLOOD BY AUTOMATED COUNT: 13.7 % (ref 11.6–15.1)
GFR SERPL CREATININE-BSD FRML MDRD: 56 ML/MIN/1.73SQ M
GLUCOSE SERPL-MCNC: 102 MG/DL (ref 65–140)
HCT VFR BLD AUTO: 40.3 % (ref 34.8–46.1)
HGB BLD-MCNC: 13.5 G/DL (ref 11.5–15.4)
LACTATE SERPL-SCNC: 0.8 MMOL/L (ref 0.5–2)
MCH RBC QN AUTO: 32.1 PG (ref 26.8–34.3)
MCHC RBC AUTO-ENTMCNC: 33.5 G/DL (ref 31.4–37.4)
MCV RBC AUTO: 96 FL (ref 82–98)
PLATELET # BLD AUTO: 205 THOUSANDS/UL (ref 149–390)
PMV BLD AUTO: 9.9 FL (ref 8.9–12.7)
POTASSIUM SERPL-SCNC: 3.7 MMOL/L (ref 3.5–5.3)
PROT SERPL-MCNC: 5.7 G/DL (ref 6.4–8.2)
RBC # BLD AUTO: 4.21 MILLION/UL (ref 3.81–5.12)
SODIUM SERPL-SCNC: 138 MMOL/L (ref 136–145)
WBC # BLD AUTO: 13.94 THOUSAND/UL (ref 4.31–10.16)

## 2018-08-24 PROCEDURE — 83605 ASSAY OF LACTIC ACID: CPT | Performed by: FAMILY MEDICINE

## 2018-08-24 PROCEDURE — 85027 COMPLETE CBC AUTOMATED: CPT | Performed by: FAMILY MEDICINE

## 2018-08-24 PROCEDURE — 99233 SBSQ HOSP IP/OBS HIGH 50: CPT | Performed by: INTERNAL MEDICINE

## 2018-08-24 PROCEDURE — 80053 COMPREHEN METABOLIC PANEL: CPT | Performed by: FAMILY MEDICINE

## 2018-08-24 PROCEDURE — 99232 SBSQ HOSP IP/OBS MODERATE 35: CPT | Performed by: FAMILY MEDICINE

## 2018-08-24 RX ORDER — SIMETHICONE 80 MG
80 TABLET,CHEWABLE ORAL EVERY 6 HOURS PRN
Status: DISCONTINUED | OUTPATIENT
Start: 2018-08-24 | End: 2018-08-25 | Stop reason: HOSPADM

## 2018-08-24 RX ADMIN — Medication 100 MG: at 09:35

## 2018-08-24 RX ADMIN — VANCOMYCIN HYDROCHLORIDE 125 MG: 5 INJECTION, POWDER, LYOPHILIZED, FOR SOLUTION INTRAVENOUS at 17:42

## 2018-08-24 RX ADMIN — CYANOCOBALAMIN TAB 500 MCG 1000 MCG: 500 TAB at 09:35

## 2018-08-24 RX ADMIN — ACETAMINOPHEN 650 MG: 325 TABLET, FILM COATED ORAL at 09:35

## 2018-08-24 RX ADMIN — ENOXAPARIN SODIUM 40 MG: 40 INJECTION SUBCUTANEOUS at 13:23

## 2018-08-24 RX ADMIN — LEVOTHYROXINE SODIUM 75 MCG: 75 TABLET ORAL at 05:47

## 2018-08-24 RX ADMIN — OXYBUTYNIN CHLORIDE 5 MG: 5 TABLET ORAL at 09:36

## 2018-08-24 RX ADMIN — OXYBUTYNIN CHLORIDE 5 MG: 5 TABLET ORAL at 17:42

## 2018-08-24 RX ADMIN — SODIUM CHLORIDE 75 ML/HR: 0.9 INJECTION, SOLUTION INTRAVENOUS at 09:37

## 2018-08-24 RX ADMIN — HEPARIN SODIUM 5000 UNITS: 5000 INJECTION, SOLUTION INTRAVENOUS; SUBCUTANEOUS at 05:47

## 2018-08-24 RX ADMIN — VANCOMYCIN HYDROCHLORIDE 125 MG: 5 INJECTION, POWDER, LYOPHILIZED, FOR SOLUTION INTRAVENOUS at 05:47

## 2018-08-24 RX ADMIN — METRONIDAZOLE 500 MG: 500 INJECTION, SOLUTION INTRAVENOUS at 17:42

## 2018-08-24 RX ADMIN — Medication 250 MG: at 17:42

## 2018-08-24 RX ADMIN — VANCOMYCIN HYDROCHLORIDE 125 MG: 5 INJECTION, POWDER, LYOPHILIZED, FOR SOLUTION INTRAVENOUS at 23:47

## 2018-08-24 RX ADMIN — VANCOMYCIN HYDROCHLORIDE 125 MG: 5 INJECTION, POWDER, LYOPHILIZED, FOR SOLUTION INTRAVENOUS at 11:12

## 2018-08-24 RX ADMIN — METRONIDAZOLE 500 MG: 500 INJECTION, SOLUTION INTRAVENOUS at 01:26

## 2018-08-24 RX ADMIN — Medication 250 MG: at 09:36

## 2018-08-24 RX ADMIN — Medication 1000 MG: at 09:36

## 2018-08-24 RX ADMIN — SIMETHICONE CHEW TAB 80 MG 80 MG: 80 TABLET ORAL at 09:36

## 2018-08-24 RX ADMIN — ATORVASTATIN CALCIUM 20 MG: 20 TABLET, FILM COATED ORAL at 09:35

## 2018-08-24 RX ADMIN — METRONIDAZOLE 500 MG: 500 INJECTION, SOLUTION INTRAVENOUS at 09:53

## 2018-08-24 RX ADMIN — ASPIRIN 81 MG CHEWABLE TABLET 81 MG: 81 TABLET CHEWABLE at 09:36

## 2018-08-24 NOTE — ASSESSMENT & PLAN NOTE
WBC on admission greater than 15 87  In the setting of sepsis and acute gastroenteritis  Will monitor WBC, temp  Blood cultures, UA pending  Leukocytosis is improving  This secondary to the C diff colitis    Will need 14 days of p o  vancomycin

## 2018-08-24 NOTE — PLAN OF CARE
Problem: DISCHARGE PLANNING - CARE MANAGEMENT  Goal: Discharge to post-acute care or home with appropriate resources  INTERVENTIONS:  - Conduct assessment to determine patient/family and health care team treatment goals, and need for post-acute services based on payer coverage, community resources, and patient preferences, and barriers to discharge  - Address psychosocial, clinical, and financial barriers to discharge as identified in assessment in conjunction with the patient/family and health care team  - Arrange appropriate level of post-acute services according to patients   needs and preference and payer coverage in collaboration with the physician and health care team  - Communicate with and update the patient/family, physician, and health care team regarding progress on the discharge plan  - Arrange appropriate transportation to post-acute venues  Outcome: Progressing  LOS 1  LACE 54  Patient is not a 30 day readmisison  CM met with pt at bedside to discuss DCP  Pt lives in [facility] with her  in a 1 story home with 3 ALYCE  She uses no DME and has no hx of STR or VNA  She has a LW/AD, copy requested  Her preferred pharmacy is [facility] in [place] and denies difficulty affording meds  She reports she is retired and drives herself  CM discussed d/c needs - pt denies any needs at this time including VNA  CM cost checked Vancomycin script with pt's preferred pharmacy - [facility] [place] reports cost will be $20 copay  Discussed with pt and family who are agreeable  Deny further needs at this time  CM reviewed discharge planning process including the following: identifying caregivers at home, preference for d/c planning needs, availability of treatment team to discuss questions or concerns patient and/or family may have regarding diagnosis, plan of care, old or new medications and discharge planning  CM will continue to follow for care coordination and update assessment as necessary

## 2018-08-24 NOTE — ASSESSMENT & PLAN NOTE
Present on admission, evident by elevated lactate 2 8, WBC greater than 18 95  Likely secondary to acute gastroenteritis  Patient afebrile  Blood cultures, UA pending  Improved    Continue with Vanco mycin

## 2018-08-24 NOTE — PROGRESS NOTES
Progress Note - Latricia Parham 1935, 80 y o  female MRN: [id number]    Unit/Bed#: -01 Encounter: [id number]    Primary Care Provider: Evi Rosenberg MD   Date and time admitted to hospital: 8/22/2018  9:52 PM        Leukocytosis   Assessment & Plan    WBC on admission greater than 15 87  In the setting of sepsis and acute gastroenteritis  Will monitor WBC, temp  Blood cultures, UA pending  Leukocytosis is improving  This secondary to the C diff colitis  Will need 14 days of p o  vancomycin          RANJAN (acute kidney injury) [facility])   Assessment & Plan    Present on admission, creatinine 1 41 likely secondary to acute dehydration secondary to nausea, vomiting diarrhea  Patient's creatinine improved  No reason for IV fluids at this time  Sepsis [facility])   Assessment & Plan    Present on admission, evident by elevated lactate 2 8, WBC greater than 18 95  Likely secondary to acute gastroenteritis  Patient afebrile  Blood cultures, UA pending  Improved  Continue with Vanco mycin        * Abdominal pain   Assessment & Plan    Associated with nausea vomiting diarrhea  Likely secondary to acute gastroenteritis  Monitor UA  That is pending  CT of the bed been reviewed no acute findings  Comfort measures, pain management, IV fluids, antiemetic  This is secondary to C diff colitis  Hemoglobin has remained stable  Patient was started on vancomycin  Will advance diet today see how she tolerates and likely discharge tomorrow  VTE Pharmacologic Prophylaxis:   Pharmacologic: Enoxaparin (Lovenox)  Mechanical VTE Prophylaxis in Place: Yes    Patient Centered Rounds: I have performed bedside rounds with nursing staff today  Discussions with Specialists or Other Care Team Provider:   Brian Sneed Team Provider:    Education and Discussions with Family / Patient:  Family at the bedside    Time Spent for Care: 20 minutes    More than 50% of total time spent on counseling and coordination of care as described above  Current Length of Stay: 1 day(s)    Current Patient Status: Inpatient   Certification Statement: The patient will continue to require additional inpatient hospital stay due to Needing to see how she tolerates a diet and monitor her white count    Discharge Plan:  Anticipate discharge tomorrow    Code Status: Level 1 - Full Code      Subjective:   Patient seen examined  States she is feeling better  Feels very hungry this morning  No more episodes of diarrhea  Objective:     Vitals:   Temp (24hrs), Av 2 °F (36 8 °C), Min:97 6 °F (36 4 °C), Max:98 6 °F (37 °C)    HR:  [69-77] 69  Resp:  [17-18] 18  BP: (110-128)/(51-58) 116/56  SpO2:  [92 %-95 %] 94 %  Body mass index is 27 41 kg/m²  Input and Output Summary (last 24 hours):        Intake/Output Summary (Last 24 hours) at 18 1154  Last data filed at 18 0847   Gross per 24 hour   Intake             3333 ml   Output             1300 ml   Net             2033 ml       Physical Exam:     Physical Exam  (   General Appearance:    Alert, cooperative, no distress, appears stated age                               Lungs:     Clear to auscultation bilaterally, respirations unlabored       Heart:    Regular rate and rhythm, S1 and S2 normal, no murmur, rub    or gallop   Abdomen:     Soft, non-tender, bowel sounds active all four quadrants,     no masses, no organomegaly           Extremities:   Extremities normal, atraumatic, no cyanosis or edema       Additional Data:     Labs:      Results from last 7 days  Lab Units 18  0517 18  0510   WBC Thousand/uL 13 94* 15 87*   HEMOGLOBIN g/dL 13 5 13 4   HEMATOCRIT % 40 3 39 8   PLATELETS Thousands/uL 205 214   NEUTROS PCT %  --  70   LYMPHS PCT %  --  21   MONOS PCT %  --  8   EOS PCT %  --  0       Results from last 7 days  Lab Units 18  0518   SODIUM mmol/L 138   POTASSIUM mmol/L 3 7   CHLORIDE mmol/L 107   CO2 mmol/L 25   BUN mg/dL 9 CREATININE mg/dL 0 95   CALCIUM mg/dL 9 1   TOTAL PROTEIN g/dL 5 7*   BILIRUBIN TOTAL mg/dL 0 90   ALK PHOS U/L 32*   ALT U/L 33   AST U/L 20   GLUCOSE RANDOM mg/dL 102                     * I Have Reviewed All Lab Data Listed Above  * Additional Pertinent Lab Tests Reviewed: [facility]        Recent Cultures (last 7 days):       Results from last 7 days  Lab Units 08/23/18  0342   C DIFF TOXIN B  POSITIVE for C difficle toxin by PCR  *       Last 24 Hours Medication List:     Current Facility-Administered Medications:  acetaminophen 650 mg Oral Q6H PRN Colleen Tillman PA-C    aspirin 81 mg Oral Daily bentley Chanel, WILIANNP    atorvastatin 20 mg Oral Daily Herbentley Chanel, WILIANNP    cyanocobalamin 1,000 mcg Oral Daily bentley Chanel, CRNP    fish oil 1,000 mg Oral Daily Herbentley Chanel, PRIYA    heparin (porcine) 5,000 Units Subcutaneous Q8H [facility] PRIYA Buenrostro    levothyroxine 75 mcg Oral Early Morning PRIYA Buenrostro    metroNIDAZOLE 500 mg Intravenous Q8H Colleen Tillman PA-C Last Rate: 500 mg (08/24/18 0953)   ondansetron 4 mg Intravenous Q6H PRN HerPRIYA Lane    oxybutynin 5 mg Oral BID PRIYA Lane    pyridoxine 100 mg Oral Daily [facility], PRI[facility]    saccharomyces boulardii 250 mg Oral BID Gail Mckeon III, MD    simethicone 80 mg Oral Q6H PRN Rhonda Grande PA-C    sodium chloride 75 mL/hr Intravenous Continuous PRIYA Buenrostro Last Rate: 75 mL/hr (08/24/18 0937)   traZODone 50 mg Oral HS PRN PRIYA Buenrostro    vancomycin 125 mg Oral Q6H [facility] Denzel Mendez MD         Today, Patient Was Seen By: Jonnathan Garcia MD    ** Please Note: Dictation voice to text software may have been used in the creation of this document   **

## 2018-08-24 NOTE — PLAN OF CARE
Problem: PAIN - ADULT  Goal: Verbalizes/displays adequate comfort level or baseline comfort level  Interventions:  - Encourage patient to monitor pain and request assistance  - Assess pain using appropriate pain scale  - Administer analgesics based on type and severity of pain and evaluate response  - Implement non-pharmacological measures as appropriate and evaluate response  - Consider cultural and social influences on pain and pain management  - Notify physician/advanced practitioner if interventions unsuccessful or patient reports new pain   Outcome: Progressing      Problem: INFECTION - ADULT  Goal: Absence or prevention of progression during hospitalization  INTERVENTIONS:  - Assess and monitor for signs and symptoms of infection  - Monitor lab/diagnostic results  - Monitor all insertion sites, i e  indwelling lines, tubes, and drains  - Monitor endotracheal (as able) and nasal secretions for changes in amount and color  - [place] appropriate cooling/warming therapies per order  - Administer medications as ordered  - Instruct and encourage patient and family to use good hand hygiene technique  - Identify and instruct in appropriate isolation precautions for identified infection/condition   Outcome: Progressing    Goal: Absence of fever/infection during neutropenic period  INTERVENTIONS:  - Monitor WBC  - Implement neutropenic guidelines   Outcome: Progressing      Problem: SAFETY ADULT  Goal: Patient will remain free of falls  INTERVENTIONS:  - Assess patient frequently for physical needs  -  Identify cognitive and physical deficits and behaviors that affect risk of falls    -  [place] fall precautions as indicated by assessment   - Educate patient/family on patient safety including physical limitations  - Instruct patient to call for assistance with activity based on assessment  - Modify environment to reduce risk of injury  - Consider OT/PT consult to assist with strengthening/mobility   Outcome: Progressing    Goal: Maintain or return to baseline ADL function  INTERVENTIONS:  -  Assess patient's ability to carry out ADLs; assess patient's baseline for ADL function and identify physical deficits which impact ability to perform ADLs (bathing, care of mouth/teeth, toileting, grooming, dressing, etc )  - Assess/evaluate cause of self-care deficits   - Assess range of motion  - Assess patient's mobility; develop plan if impaired  - Assess patient's need for assistive devices and provide as appropriate  - Encourage maximum independence but intervene and supervise when necessary  ¯ Involve family in performance of ADLs  ¯ Assess for home care needs following discharge   ¯ Request OT consult to assist with ADL evaluation and planning for discharge  ¯ Provide patient education as appropriate   Outcome: Progressing    Goal: Maintain or return mobility status to optimal level  INTERVENTIONS:  - Assess patient's baseline mobility status (ambulation, transfers, stairs, etc )    - Identify cognitive and physical deficits and behaviors that affect mobility  - Identify mobility aids required to assist with transfers and/or ambulation (gait belt, sit-to-stand, lift, walker, cane, etc )  - [place] fall precautions as indicated by assessment  - Record patient progress and toleration of activity level on Mobility SBAR; progress patient to next Phase/Stage  - Instruct patient to call for assistance with activity based on assessment  - Request Rehabilitation consult to assist with strengthening/weightbearing, etc    Outcome: Progressing      Problem: DISCHARGE PLANNING  Goal: Discharge to home or other facility with appropriate resources  INTERVENTIONS:  - Identify barriers to discharge w/patient and caregiver  - Arrange for needed discharge resources and transportation as appropriate  - Identify discharge learning needs (meds, wound care, etc )  - Arrange for interpretive services to assist at discharge as needed  - Refer to Case Management Department for coordinating discharge planning if the patient needs post-hospital services based on physician/advanced practitioner order or complex needs related to functional status, cognitive ability, or social support system   Outcome: Progressing      Problem: Knowledge Deficit  Goal: Patient/family/caregiver demonstrates understanding of disease process, treatment plan, medications, and discharge instructions  Complete learning assessment and assess knowledge base    Interventions:  - Provide teaching at level of understanding  - Provide teaching via preferred learning methods   Outcome: Progressing

## 2018-08-24 NOTE — PROGRESS NOTES
Progress Note - Marissa Subramanian 80 y o  female MRN: [id number]    Unit/Bed#: -01 Encounter: [id number]        Subjective:     Per nursing, patient passed 20 mL of bright red blood per rectum early this morning  Patient does not recall this  She reports that she has not had any bowel movement since midnight  She reports abdominal bloating, but no abdominal pain  She denies nausea or vomiting  Vitals are stable  Patient is eager to advance diet  ROS: As noted in the HPI, otherwise all others negative  Objective:     Vitals: Blood pressure 116/56, pulse 69, temperature 97 6 °F (36 4 °C), temperature source Oral, resp  rate 18, height 5' 7" (1 702 m), weight 79 4 kg (175 lb), SpO2 94 %  ,Body mass index is 27 41 kg/m²  Intake/Output Summary (Last 24 hours) at 08/24/18 0930  Last data filed at 08/24/18 0847   Gross per 24 hour   Intake             3333 ml   Output             1300 ml   Net             2033 ml       Physical Exam:     General Appearance: Alert and oriented x 3   In no respiratory distress  Lungs: Clear to auscultation bilaterally, no rales or rhonchi  Heart: Regular rate and rhythm  Abdomen: Soft, non-tender, mildly distended; bowel sounds normal; no masses or no organomegaly  Extremities: No cyanosis, edema    Invasive Devices     Peripheral Intravenous Line            Peripheral IV 08/22/18 Right Antecubital 1 day                Lab Results:    Results from last 7 days  Lab Units 08/24/18  0517 08/23/18  0510   WBC Thousand/uL 13 94* 15 87*   HEMOGLOBIN g/dL 13 5 13 4   HEMATOCRIT % 40 3 39 8   PLATELETS Thousands/uL 205 214   NEUTROS PCT %  --  70   LYMPHS PCT %  --  21   MONOS PCT %  --  8   EOS PCT %  --  0       Results from last 7 days  Lab Units 08/24/18  0518   SODIUM mmol/L 138   POTASSIUM mmol/L 3 7   CHLORIDE mmol/L 107   CO2 mmol/L 25   BUN mg/dL 9   CREATININE mg/dL 0 95   CALCIUM mg/dL 9 1   TOTAL PROTEIN g/dL 5 7*   BILIRUBIN TOTAL mg/dL 0 90   ALK PHOS U/L 32*   ALT U/L 33 AST U/L 20   GLUCOSE RANDOM mg/dL 102           Results from last 7 days  Lab Units 08/23/18  0510   LIPASE u/L 108       Imaging Studies: I have personally reviewed pertinent imaging studies  Ct Abdomen Pelvis Wo Contrast    Result Date: 8/23/2018  Impression: 1  No evidence of bowel obstruction, colitis or diverticulitis  2   No nephrolithiasis or obstructive uropathy  3   Tubular nodular 1 cm density in the right middle lobe, partially visualized, suggestive of mucus plugging or pulmonary nodule  Recommend nonemergent chest CT for further evaluation  Workstation performed: [id number]         Assessment and Plan:     1) C diff colitis - Patient initially presented with symptoms of abdominal pain, nausea, vomiting and watery diarrhea with occasional bright red blood per rectum  PCR positive as an inpatient  She was started on IV Flagyl every 8 hours and PO vancomycin every 6 hours  Fortunately, since admission her leukocytosis continues to trend downward  Today at 13 94k    - Continue IV Flagyl every 8 hours while inpatient   - Continue PO vancomycin every 6 hours, to be discharged with this for a total treatment course of 14 days given bleeding and leukocytosis   - Continue probiotic   - Simethicone as needed for abdominal bloating and gas   - Continue to monitor for abdominal pain, leukocytosis and fever     2) Bright red bleeding per rectum - Patient reports that she has had episodes of passing bright red blood without a bowel movement  CT on admission did not show any thickening of the colon  Her bleeding may be secondary to mucosal irritation from C diff vs hemorrhoids  Her hemoglobin continues to be stable at 13 5 today    - Continue to monitor hemoglobin closely and transfuse as necessary with a goal hemoglobin above 7  - As patient's hemoglobin appears to be stable, advance to low fiber/ low residue diet   - Hold off on inpatient colonoscopy, can be addressed as an outpatient in 6-8 weeks

## 2018-08-24 NOTE — ASSESSMENT & PLAN NOTE
Associated with nausea vomiting diarrhea  Likely secondary to acute gastroenteritis  Monitor UA  That is pending  CT of the bed been reviewed no acute findings  Comfort measures, pain management, IV fluids, antiemetic  This is secondary to C diff colitis  Hemoglobin has remained stable  Patient was started on vancomycin  Will advance diet today see how she tolerates and likely discharge tomorrow

## 2018-08-24 NOTE — ASSESSMENT & PLAN NOTE
Present on admission, creatinine 1 41 likely secondary to acute dehydration secondary to nausea, vomiting diarrhea  Patient's creatinine improved  No reason for IV fluids at this time

## 2018-08-24 NOTE — SOCIAL WORK
LOS 1  LACE 54  Patient is not a 30 day readmisison  CM met with pt at bedside to discuss DCP  Pt lives in [facility] [place] her  in a 1 story home with 3 ALYCE  She uses no DME and has no hx of STR or VNA  She has a LW/AD, copy requested  Her preferred pharmacy is [facility] in [place] and denies difficulty affording meds  She reports she is retired and drives herself  CM discussed d/c needs - pt denies any needs at this time including VNA  CM cost checked Vancomycin script with pt's preferred pharmacy - [facility] in [place] reports cost will be $20 copay  Discussed with pt and family who are agreeable  Deny further needs at this time  CM reviewed discharge planning process including the following: identifying caregivers at home, preference for d/c planning needs, availability of treatment team to discuss questions or concerns patient and/or family may have regarding diagnosis, plan of care, old or new medications and discharge planning  CM will continue to follow for care coordination and update assessment as necessary

## 2018-08-25 VITALS
BODY MASS INDEX: 27.47 KG/M2 | TEMPERATURE: 98.2 F | HEART RATE: 71 BPM | DIASTOLIC BLOOD PRESSURE: 66 MMHG | RESPIRATION RATE: 18 BRPM | SYSTOLIC BLOOD PRESSURE: 135 MMHG | HEIGHT: 67 IN | OXYGEN SATURATION: 93 % | WEIGHT: 175 LBS

## 2018-08-25 LAB
ANION GAP SERPL CALCULATED.3IONS-SCNC: 9 MMOL/L (ref 4–13)
BUN SERPL-MCNC: 10 MG/DL (ref 5–25)
CALCIUM SERPL-MCNC: 9.1 MG/DL (ref 8.3–10.1)
CHLORIDE SERPL-SCNC: 107 MMOL/L (ref 100–108)
CO2 SERPL-SCNC: 24 MMOL/L (ref 21–32)
CREAT SERPL-MCNC: 0.92 MG/DL (ref 0.6–1.3)
ERYTHROCYTE [DISTWIDTH] IN BLOOD BY AUTOMATED COUNT: 13.3 % (ref 11.6–15.1)
GFR SERPL CREATININE-BSD FRML MDRD: 58 ML/MIN/1.73SQ M
GLUCOSE SERPL-MCNC: 108 MG/DL (ref 65–140)
HCT VFR BLD AUTO: 38.5 % (ref 34.8–46.1)
HGB BLD-MCNC: 12.9 G/DL (ref 11.5–15.4)
MCH RBC QN AUTO: 31.9 PG (ref 26.8–34.3)
MCHC RBC AUTO-ENTMCNC: 33.5 G/DL (ref 31.4–37.4)
MCV RBC AUTO: 95 FL (ref 82–98)
PLATELET # BLD AUTO: 191 THOUSANDS/UL (ref 149–390)
PMV BLD AUTO: 9.5 FL (ref 8.9–12.7)
POTASSIUM SERPL-SCNC: 3.8 MMOL/L (ref 3.5–5.3)
RBC # BLD AUTO: 4.05 MILLION/UL (ref 3.81–5.12)
SODIUM SERPL-SCNC: 140 MMOL/L (ref 136–145)
WBC # BLD AUTO: 11.9 THOUSAND/UL (ref 4.31–10.16)

## 2018-08-25 PROCEDURE — 85027 COMPLETE CBC AUTOMATED: CPT | Performed by: FAMILY MEDICINE

## 2018-08-25 PROCEDURE — 99239 HOSP IP/OBS DSCHRG MGMT >30: CPT | Performed by: FAMILY MEDICINE

## 2018-08-25 PROCEDURE — 80048 BASIC METABOLIC PNL TOTAL CA: CPT | Performed by: FAMILY MEDICINE

## 2018-08-25 RX ORDER — SACCHAROMYCES BOULARDII 250 MG
250 CAPSULE ORAL 2 TIMES DAILY
Refills: 0 | COMMUNITY
Start: 2018-08-25 | End: 2019-03-18 | Stop reason: ALTCHOICE

## 2018-08-25 RX ADMIN — Medication 100 MG: at 08:33

## 2018-08-25 RX ADMIN — ATORVASTATIN CALCIUM 20 MG: 20 TABLET, FILM COATED ORAL at 08:33

## 2018-08-25 RX ADMIN — OXYBUTYNIN CHLORIDE 5 MG: 5 TABLET ORAL at 08:33

## 2018-08-25 RX ADMIN — METRONIDAZOLE 500 MG: 500 INJECTION, SOLUTION INTRAVENOUS at 10:14

## 2018-08-25 RX ADMIN — CYANOCOBALAMIN TAB 500 MCG 1000 MCG: 500 TAB at 08:33

## 2018-08-25 RX ADMIN — VANCOMYCIN HYDROCHLORIDE 125 MG: 5 INJECTION, POWDER, LYOPHILIZED, FOR SOLUTION INTRAVENOUS at 07:29

## 2018-08-25 RX ADMIN — Medication 1000 MG: at 08:33

## 2018-08-25 RX ADMIN — METRONIDAZOLE 500 MG: 500 INJECTION, SOLUTION INTRAVENOUS at 02:25

## 2018-08-25 RX ADMIN — ENOXAPARIN SODIUM 40 MG: 40 INJECTION SUBCUTANEOUS at 08:33

## 2018-08-25 RX ADMIN — LEVOTHYROXINE SODIUM 75 MCG: 75 TABLET ORAL at 07:29

## 2018-08-25 RX ADMIN — ASPIRIN 81 MG CHEWABLE TABLET 81 MG: 81 TABLET CHEWABLE at 08:33

## 2018-08-25 RX ADMIN — Medication 250 MG: at 08:33

## 2018-08-25 NOTE — ASSESSMENT & PLAN NOTE
Associated with nausea vomiting diarrhea  Likely secondary to acute gastroenteritis  Monitor UA  That is pending  CT of the bed been reviewed no acute findings  Comfort measures, pain management, IV fluids, antiemetic  This is secondary to C diff colitis  Hemoglobin has remained stable  Patient was started on vancomycin  Patient is tolerating a diet right now  Continue monitor

## 2018-08-25 NOTE — DISCHARGE SUMMARY
Discharge- Latricia Parham 1935, 80 y o  female MRN: [id number]    Unit/Bed#: -01 Encounter: [id number]    Primary Care Provider: Evi Rosenberg MD   Date and time admitted to hospital: 8/22/2018  9:52 PM        Leukocytosis   Assessment & Plan    WBC on admission greater than 15 87  In the setting of sepsis and acute gastroenteritis  Will monitor WBC, temp  Blood cultures, UA pending  Leukocytosis is improving  This secondary to the C diff colitis  Will need 14 days of p o  vancomycin          RANJAN (acute kidney injury) [facility])   Assessment & Plan    Present on admission, creatinine 1 41 likely secondary to acute dehydration secondary to nausea, vomiting diarrhea  Patient's creatinine improved  No reason for IV fluids at this time  Sepsis [facility])   Assessment & Plan    Present on admission, evident by elevated lactate 2 8, WBC greater than 18 95  Likely secondary to acute gastroenteritis  Patient afebrile  Improved  Continue with Vanco mycin        * Abdominal pain   Assessment & Plan    Associated with nausea vomiting diarrhea  Likely secondary to acute gastroenteritis  Monitor UA  That is pending  CT of the bed been reviewed no acute findings  Comfort measures, pain management, IV fluids, antiemetic  This is secondary to C diff colitis  Hemoglobin has remained stable  Patient was started on vancomycin  Patient is tolerating a diet right now  Continue monitor  Discharging Physician / Practitioner: Keyanna Moser MD  PCP: Evi Rosenberg MD  Admission Date:   Admission Orders     Ordered        08/23/18 0117  Inpatient Admission  Once             Discharge Date: 08/25/18    Resolved Problems  Date Reviewed: 8/25/2018    None          Consultations During Hospital Stay:  · Gastroenterology    Procedures Performed:     · CT scan:  1  No evidence of bowel obstruction, colitis or diverticulitis  2   No nephrolithiasis or obstructive uropathy    3   Tubular nodular 1 cm density in the right middle lobe, partially visualized, suggestive of mucus plugging or pulmonary nodule  Recommend nonemergent chest CT for further evaluation  Significant Findings / Test Results:     · C diff colitis    Incidental Findings:   · See above     Test Results Pending at Discharge (will require follow up): · None     Outpatient Tests Requested:  · Outpatient colonoscopy in 6-8 weeks  Patient will also need a CT scan of the chest in 1 month    Complications:  None    Reason for Admission:  Abdominal pain and diarrhea    Hospital Course:     Lamberto Louie is a 80 y o  female patient who originally presented to the hospital on 8/22/2018 due to abdominal pain and diarrhea  History of presenting Martha Jolley is a 80 y o  female who presents with chief complaint generalized abdominal pain associated with nausea vomiting diarrhea  Onset this afternoon with streaks of blood in her stool  Patient state has not been able to hold anything down  Denies fever her reports chills    Hospital course: The patient was admitted for the above reasons  Patient was thought to have a viral illness  She did after the 1st day of seeing her admit to taking antibiotics a few weeks ago  She did come back positive for C diff  She was seen in consultation by Gastroenterology  Patient will need 14 days of vancomycin which has been set up for the patient as an outpatient  She is otherwise doing okay today and able to tolerate a diet  She will need a colonoscopy in 6-8 weeks  The rest of vital signs remained stable  The patient did have acute renal insufficiency which resolved with some IV fluids  Her leukocytosis is resolving as well  Patient should just get a CT scan of the chest for the above findings which I do not feel is concerning at this point will need non urgent CT scan  Patient to follow up with family doctor in 1 week      Please see above list of diagnoses and related plan for additional information  Condition at Discharge: good     Discharge Day Visit / Exam:     Subjective:  Patient seen examined  She feels okay today  Had 1 episode of diarrhea this morning after eating  Vitals: Blood Pressure: 135/66 (08/25/18 0734)  Pulse: 71 (08/25/18 0734)  Temperature: 98 2 °F (36 8 °C) (08/25/18 0734)  Temp Source: Oral (08/25/18 0734)  Respirations: 18 (08/25/18 0734)  Height: 5' 7" (170 2 cm) (08/23/18 0234)  Weight - Scale: 79 4 kg (175 lb) (08/22/18 2135)  SpO2: 93 % (08/25/18 0734)  Exam:   Physical Exam  (   General Appearance:    Alert, cooperative, no distress, appears stated age                               Lungs:     Clear to auscultation bilaterally, respirations unlabored       Heart:    Regular rate and rhythm, S1 and S2 normal, no murmur, rub    or gallop   Abdomen:     Soft, non-tender, bowel sounds active all four quadrants,     no masses, no organomegaly           Extremities:   Extremities normal, atraumatic, no cyanosis or edema     Discussion with Family:   Patien with family at the bedside t    Discharge instructions/Information to patient and family:   See after visit summary for information provided to patient and family  Provisions for Follow-Up Care:  See after visit summary for information related to follow-up care and any pertinent home health orders  Disposition:     Home    For Discharges to [facility] SNF:   · Not Applicable to this Patient - Not Applicable to this Patient    Planned Readmission:   None anticipatedPlanned Readmission:     Discharge Statement:  I spent 35 minutes discharging the patient  This time was spent on the day of discharge  I had direct contact with the patient on the day of discharge  Greater than 50% of the total time was spent examining patient, answering all patient questions, arranging and discussing plan of care with patient as well as directly providing post-discharge instructions    Additional time then spent on discharge activities  Time spent in discharging the patient, doing the discharge paperwork as well as discharge process and coordinating the care    Discharge Medications:  See after visit summary for reconciled discharge medications provided to patient and family        ** Please Note: This note has been constructed using a voice recognition system **

## 2018-08-25 NOTE — ASSESSMENT & PLAN NOTE
Present on admission, evident by elevated lactate 2 8, WBC greater than 18 95  Likely secondary to acute gastroenteritis  Patient afebrile  Improved    Continue with Vanco mycin

## 2018-08-27 ENCOUNTER — TRANSITIONAL CARE MANAGEMENT (OUTPATIENT)
Dept: FAMILY MEDICINE CLINIC | Facility: CLINIC | Age: 83
End: 2018-08-27

## 2018-09-04 ENCOUNTER — TELEPHONE (OUTPATIENT)
Dept: GASTROENTEROLOGY | Facility: CLINIC | Age: 83
End: 2018-09-04

## 2018-09-04 PROBLEM — Z12.11 SCREENING FOR COLON CANCER: Status: ACTIVE | Noted: 2018-09-04

## 2018-09-04 NOTE — TELEPHONE ENCOUNTER
Do you need any further stool tests done prior to colon in 6-8 weeks, I dont have a message from you on her   thanks

## 2018-09-04 NOTE — TELEPHONE ENCOUNTER
Dr Coni Rowley advised patient to call and schedule appt for colonoscopy for 6-8 weeks after release for C-diff on 08/25/2018  Please call  Thank you

## 2018-09-07 ENCOUNTER — OFFICE VISIT (OUTPATIENT)
Dept: FAMILY MEDICINE CLINIC | Facility: CLINIC | Age: 83
End: 2018-09-07
Payer: COMMERCIAL

## 2018-09-07 VITALS
OXYGEN SATURATION: 96 % | BODY MASS INDEX: 26.34 KG/M2 | WEIGHT: 167.8 LBS | HEIGHT: 67 IN | SYSTOLIC BLOOD PRESSURE: 124 MMHG | HEART RATE: 94 BPM | TEMPERATURE: 97.4 F | DIASTOLIC BLOOD PRESSURE: 68 MMHG

## 2018-09-07 DIAGNOSIS — A04.72 CLOSTRIDIUM DIFFICILE COLITIS: Primary | ICD-10-CM

## 2018-09-07 DIAGNOSIS — Z23 FLU VACCINE NEED: ICD-10-CM

## 2018-09-07 DIAGNOSIS — R91.1 LUNG NODULE < 6CM ON CT: ICD-10-CM

## 2018-09-07 PROBLEM — R10.30 LOWER ABDOMINAL PAIN: Status: RESOLVED | Noted: 2018-07-12 | Resolved: 2018-09-07

## 2018-09-07 PROBLEM — D72.829 LEUKOCYTOSIS: Status: RESOLVED | Noted: 2018-08-23 | Resolved: 2018-09-07

## 2018-09-07 PROBLEM — N17.9 AKI (ACUTE KIDNEY INJURY) (HCC): Status: RESOLVED | Noted: 2018-08-23 | Resolved: 2018-09-07

## 2018-09-07 PROBLEM — IMO0001 LUNG NODULE < 6CM ON CT: Status: ACTIVE | Noted: 2018-09-07

## 2018-09-07 PROBLEM — A41.9 SEPSIS (HCC): Status: RESOLVED | Noted: 2018-08-23 | Resolved: 2018-09-07

## 2018-09-07 PROCEDURE — 99495 TRANSJ CARE MGMT MOD F2F 14D: CPT | Performed by: NURSE PRACTITIONER

## 2018-09-07 PROCEDURE — 90662 IIV NO PRSV INCREASED AG IM: CPT | Performed by: NURSE PRACTITIONER

## 2018-09-07 PROCEDURE — 90471 IMMUNIZATION ADMIN: CPT | Performed by: NURSE PRACTITIONER

## 2018-09-07 NOTE — PROGRESS NOTES
Assessment/Plan:     No problem-specific Assessment & Plan notes found for this encounter  Diagnoses and all orders for this visit:    Clostridium difficile colitis  Comments:  asymptomatic at this point     Flu vaccine need  -     influenza vaccine, [id number], high-dose, PF 0 5 mL, for patients 65 yr+ (FLUZONE HIGH-DOSE)    Lung nodule < 6cm on CT  Comments:  ordered CT of the lung to follow up imaging in the hospital of right lung nodule  Orders:  -     CT lung nodule follow-up; Future         Subjective:     Patient ID: Az Salguero is a 80 y o  female  Az Salguero is a 80 y o  female patient who originally presented to the hospital on 8/22/2018 due to abdominal pain and diarrhea      History of presenting Thresea Brothers a 80 y  o  female who presents with chief complaint generalized abdominal pain associated with nausea vomiting diarrhea   Onset this afternoon with streaks of blood in her stool   Patient state has not been able to hold anything down   Denies fever her reports chills     Hospital course: The patient was admitted for the above reasons  Patient was thought to have a viral illness  She did after the 1st day of seeing her admit to taking antibiotics a few weeks ago  She did come back positive for C diff  She was seen in consultation by Gastroenterology  Patient will need 14 days of vancomycin which has been set up for the patient as an outpatient  She is otherwise doing okay today and able to tolerate a diet  She will need a colonoscopy in 6-8 weeks  The rest of vital signs remained stable  The patient did have acute renal insufficiency which resolved with some IV fluids  Her leukocytosis is resolving as well  Patient should just get a CT scan of the chest for the above findings which I do not feel is concerning at this point will need non urgent CT scan  Patient to follow up with family doctor in 1 week   Patient here for follow up and reports that she is not having diarrhea soft and not having any blood or pain in the abdomen  Patient is taking the Vancomycin and has been taking for the past 10 days patient is also taking the Florastor morning and night and has about a 10 day supply left  Review of Systems   Constitutional: Negative for activity change, appetite change, chills, diaphoresis, fatigue, fever and unexpected weight change  HENT: Negative for congestion, ear pain, hearing loss, postnasal drip, sinus pain, sinus pressure, sneezing and sore throat  Eyes: Negative for pain, redness and visual disturbance  Respiratory: Negative for cough and shortness of breath  Cardiovascular: Negative for chest pain and leg swelling  Gastrointestinal: Negative for abdominal pain, diarrhea, nausea and vomiting  Musculoskeletal: Negative for arthralgias  Neurological: Negative for dizziness and light-headedness  Psychiatric/Behavioral: Negative for behavioral problems and dysphoric mood  Objective:     Physical Exam   Constitutional: She is oriented to person, place, and time  Vital signs are normal  She appears well-developed and well-nourished  No distress  HENT:   Head: Normocephalic and atraumatic  Eyes: Pupils are equal, round, and reactive to light  Neck: Normal range of motion  No thyromegaly present  Cardiovascular: Normal rate, regular rhythm, normal heart sounds and intact distal pulses  No murmur heard  Pulmonary/Chest: [place] normal and breath sounds normal  No respiratory distress  She has no wheezes  Abdominal: Soft  Bowel sounds are normal    Musculoskeletal: Normal range of motion  Neurological: She is alert and oriented to person, place, and time  Skin: Skin is warm and dry  Psychiatric: She has a normal mood and affect  Nursing note and vitals reviewed          Vitals:    09/07/18 1133   BP: 124/68   Cuff Size: Standard   Pulse: 94   Temp: (!) 97 4 °F (36 3 °C)   TempSrc: Tympanic   SpO2: 96%   Weight: 76 1 kg (167 lb 12 8 oz)   Height: 5' 7" (1 702 m)       Transitional Care Management Review:  Kristina Hidalgo is a 80 y o  female here for TCM follow up  During the TCM phone call patient stated:    Date and time hospital follow up call was made:  8/27/2018 11:47 AM  Hospital care reviewed:  Records reviewed  Patient was hopsitalized at:  [facility]  Date of admission:  8/22/18  Date of discharge:  8/25/18  Diagnosis:  ABDOMINAL PAIN;SEPSIS  Disposition:  Home  Were the patients medicaitons reviewed and updated:  Yes  Current symptoms:  None  Post hospital issues:  None  Should patient be enrolled in anticoag monitoring?:  No  Scheduled for follow up?:  Yes  Did you obtain your prescribed medications:  Yes  Do you need help managing your perscriptions or medications:  No  Is transportation to your appointments needed:  No  I have advised the patient to call PCP with any new or worsening symptoms (please type in name along with any credentials):   Arlin W Jelani Diaz, PRIYA

## 2018-09-07 NOTE — PATIENT INSTRUCTIONS
Clostridium Difficile Infection   WHAT YOU NEED TO KNOW:   Clostridium difficile infection, or C  difficile, is an infection in your colon caused by bacteria  Different types of bacteria live inside the colon, creating a healthy balance between good and bad bacteria  If the C  difficile bacteria grow rapidly, this can disrupt the healthy balance of the colon  This can cause the lining of the colon to swell, which leads to an infection  DISCHARGE INSTRUCTIONS:   Medicines:   · Antibiotics: This medicine is given to keep the C  difficile bacteria from growing and allow the normal bacteria in your intestines to grow  Always take your antibiotics exactly as ordered by your healthcare provider  Do not stop taking your medicine unless directed by your healthcare provider  Never save antibiotics or take leftover antibiotics that were given to you for another illness  · Take your medicine as directed  Contact your healthcare provider if you think your medicine is not helping or if you have side effects  Tell him or her if you are allergic to any medicine  Keep a list of the medicines, vitamins, and herbs you take  Include the amounts, and when and why you take them  Bring the list or the pill bottles to follow-up visits  Carry your medicine list with you in case of an emergency  Follow up with your healthcare provider within 1 to 2 days:  Write down your questions so you remember to ask them during your visits  Self care:   · Drink liquids to prevent dehydration:  Ask how much liquid you should drink to prevent dehydration caused by diarrhea  Most adults should drink between 9 and 13 eight-ounce cups of liquid every day  For most people, good liquids to drink are water, juice, and broth  · Wash your hands:  Wash your hands often with germ-killing soap and warm, running water  Alcohol-based hand rubs do not kill C  difficile bacteria   Always wash your hands well after you use the toilet, diaper a child, and before you prepare or serve food  Tell anyone who touches you to wear gloves and wash their hands  · Clean surfaces with bleach:  Clean tabletops, desks, and other surfaces before anyone else touches or uses them  Clean with chlorine-based disinfectants, such as household bleach  · Avoid the spread of C  difficile:  Do not share any items with other people  Use as many disposable items, such as paper plates, as you can  Do this until your diarrhea has gone away  Contact your healthcare provider if:   · You have a fever  · Your signs and symptoms do not go away, or they come back, even after treatment  · You have questions or concerns about your condition or care  Return to the emergency department if:   · Your diarrhea and stomach cramps get worse  · Your abdomen is hard or feels swollen  · You have black or bright red stools  · You vomit blood  · You cannot eat or drink  · You are short of breath, or feel like you are going to faint  · You have 1 or more of the following signs of dehydration:     ¨ Dizziness or weakness, or extreme sleepiness    ¨ Dry mouth, cracked lips, or you feel very thirsty    ¨ Fast heartbeat or rapid breathing    ¨ More sleepy than usual    ¨ Very little urine or no urine    ¨ Sunken eyes     ¨ A child may be more irritable or fussy than usual  The soft spot on a baby's head may look sunken in   © 2017 [address] is for End User's use only and may not be sold, redistributed or otherwise used for commercial purposes  All illustrations and images included in CareNotes® are the copyrighted property of [employer]  or [employer]  The above information is an  only  It is not intended as medical advice for individual conditions or treatments  Talk to your doctor, nurse or pharmacist before following any medical regimen to see if it is safe and effective for you    Clostridium Difficile Infection   WHAT YOU NEED TO KNOW:   Clostridium difficile infection, or C  difficile, is an infection in your colon caused by bacteria  Different types of bacteria live inside the colon, creating a healthy balance between good and bad bacteria  If the C  difficile bacteria grow rapidly, this can disrupt the healthy balance of the colon  This can cause the lining of the colon to swell, which leads to an infection  DISCHARGE INSTRUCTIONS:   Medicines:   · Antibiotics: This medicine is given to keep the C  difficile bacteria from growing and allow the normal bacteria in your intestines to grow  Always take your antibiotics exactly as ordered by your healthcare provider  Do not stop taking your medicine unless directed by your healthcare provider  Never save antibiotics or take leftover antibiotics that were given to you for another illness  · Take your medicine as directed  Contact your healthcare provider if you think your medicine is not helping or if you have side effects  Tell him or her if you are allergic to any medicine  Keep a list of the medicines, vitamins, and herbs you take  Include the amounts, and when and why you take them  Bring the list or the pill bottles to follow-up visits  Carry your medicine list with you in case of an emergency  Follow up with your healthcare provider within 1 to 2 days:  Write down your questions so you remember to ask them during your visits  Self care:   · Drink liquids to prevent dehydration:  Ask how much liquid you should drink to prevent dehydration caused by diarrhea  Most adults should drink between 9 and 13 eight-ounce cups of liquid every day  For most people, good liquids to drink are water, juice, and broth  · Wash your hands:  Wash your hands often with germ-killing soap and warm, running water  Alcohol-based hand rubs do not kill C  difficile bacteria  Always wash your hands well after you use the toilet, diaper a child, and before you prepare or serve food   Tell anyone who touches you to wear gloves and wash their hands  · Clean surfaces with bleach:  Clean tabletops, desks, and other surfaces before anyone else touches or uses them  Clean with chlorine-based disinfectants, such as household bleach  · Avoid the spread of C  difficile:  Do not share any items with other people  Use as many disposable items, such as paper plates, as you can  Do this until your diarrhea has gone away  Contact your healthcare provider if:   · You have a fever  · Your signs and symptoms do not go away, or they come back, even after treatment  · You have questions or concerns about your condition or care  Seek care immediately or call 911 if:   · Your diarrhea and stomach cramps get worse  · Your abdomen is hard or feels swollen  · You have black or bright red stools  · You vomit blood  · You cannot eat or drink  · You are short of breath, or feel like you are going to faint  · You have 1 or more of the following signs of dehydration:     ¨ Dizziness or weakness, or extreme sleepiness    ¨ Dry mouth, cracked lips, or you feel very thirsty    ¨ Fast heartbeat or rapid breathing    ¨ More sleepy than usual    ¨ Very little urine or no urine    ¨ Sunken eyes     ¨ A child may be more irritable or fussy than usual  The soft spot on a baby's head may look sunken in   © 2017 [address] is for End User's use only and may not be sold, redistributed or otherwise used for commercial purposes  All illustrations and images included in CareNotes® are the copyrighted property of A [employer] M [employer]  or [employer] N[employer]  The above information is an  only  It is not intended as medical advice for individual conditions or treatments  Talk to your doctor, nurse or pharmacist before following any medical regimen to see if it is safe and effective for you

## 2018-09-12 ENCOUNTER — PATIENT OUTREACH (OUTPATIENT)
Dept: FAMILY MEDICINE CLINIC | Facility: CLINIC | Age: 83
End: 2018-09-12

## 2018-09-14 ENCOUNTER — HOSPITAL ENCOUNTER (OUTPATIENT)
Dept: CT IMAGING | Facility: HOSPITAL | Age: 83
Discharge: HOME | End: 2018-09-14
Payer: COMMERCIAL

## 2018-09-14 DIAGNOSIS — R91.1 LUNG NODULE < 6CM ON CT: ICD-10-CM

## 2018-09-14 PROCEDURE — 71250 CT THORAX DX C-: CPT

## 2018-09-18 ENCOUNTER — OFFICE VISIT (OUTPATIENT)
Dept: GASTROENTEROLOGY | Facility: CLINIC | Age: 83
End: 2018-09-18
Payer: COMMERCIAL

## 2018-09-18 VITALS
DIASTOLIC BLOOD PRESSURE: 72 MMHG | HEART RATE: 73 BPM | WEIGHT: 168 LBS | BODY MASS INDEX: 26.31 KG/M2 | SYSTOLIC BLOOD PRESSURE: 118 MMHG

## 2018-09-18 DIAGNOSIS — Z12.11 SCREENING FOR COLON CANCER: Primary | ICD-10-CM

## 2018-09-18 PROCEDURE — 99214 OFFICE O/P EST MOD 30 MIN: CPT | Performed by: INTERNAL MEDICINE

## 2018-09-18 PROCEDURE — 4040F PNEUMOC VAC/ADMIN/RCVD: CPT | Performed by: INTERNAL MEDICINE

## 2018-09-18 NOTE — LETTER
September 18, 2018     Maria D Cervantes, [address]  [address] [place] [address]  [address]    Patient: Ella Santana   YOB: 1935   Date of Visit: 9/18/2018       Dear Dr Lucas Odonnell Recipients: Thank you for referring Ella Santana to me for evaluation  Below are my notes for this consultation  If you have questions, please do not hesitate to call me  I look forward to following your patient along with you  Sincerely,        Trixie Ayala MD        CC: No Recipients  Trixie Ayala MD  9/18/2018  1:55 PM  Sign at close encounter  Follow-up Note - [facility]  Ella Santana 1935 80 y o  female     ASSESSMENT @ PLAN:   She is an 26-year-old female with Clostridium difficile colitis who is better after vancomycin treatment  Present she does not want to do the follow-up colonoscopy  1 I told her to stay on the probiotic for another month    2 I told her to do a fiber supplement    3 I told her to do a colonoscopy and she will think about    Reason:   C diff    HPI:   She is an 26-year-old female with community-acquired C diff likely secondary to a antibiotics a month prior who is much better  She was in the hospital about 3 weeks ago and was discharged on a vancomycin course  She reports no abdominal pain no diarrhea  She reports soft stools that are not well formed they are fragmented  There is no blood there is no bleeding there brown stools  The months prior to being in the hospital she had antibiotics from primary care doctor  She had a colonoscopy 12 years ago and really does not want to do this at present  She has no fevers chills nausea vomiting she is eating very well she is taking the probiotic    REVIEW OF SYSTEMS:     CONSTITUTIONAL: Denies any fever, chills, or rigors  Good appetite, and no recent weight loss  HEENT: No earache or tinnitus  Denies hearing loss or visual disturbances  CARDIOVASCULAR: No chest pain or palpitations     RESPIRATORY: Denies any cough, hemoptysis, shortness of breath or dyspnea on exertion  GASTROINTESTINAL: As noted in the History of Present Illness  GENITOURINARY: No problems with urination  Denies any hematuria or dysuria  NEUROLOGIC: No dizziness or vertigo, denies headaches  MUSCULOSKELETAL: Denies any muscle or joint pain  SKIN: Denies skin rashes or itching  ENDOCRINE: Denies excessive thirst  Denies intolerance to heat or cold  PSYCHOSOCIAL: Denies depression or anxiety  Denies any recent memory loss       Past Medical History:   Diagnosis Date    BCC (basal cell carcinoma)     Benign uterine neoplasm     C  difficile colitis       Past Surgical History:   Procedure Laterality Date    CATARACT EXTRACTION      Last assessed - 1/14/16    MALIGNANT SKIN LESION EXCISION      Face    PARATHYROIDECTOMY      REPLACEMENT TOTAL KNEE      ROTATOR CUFF REPAIR      Last assessed - 1/14/16    TOTAL ABDOMINAL HYSTERECTOMY      with derrell oopherectomy, Last assessed - 1/14/16     Social History     Social History    Marital status: /Civil Union     Spouse name: N/A    Number of children: 3    Years of education: N/A     Occupational History          Retired      Social History Main Topics    Smoking status: Former Smoker    Smokeless tobacco: Never Used    Alcohol use No    Drug use: No    Sexual activity: Not on file     Other Topics Concern    Not on file     Social History Narrative    Lives with spouse         Family History   Problem Relation Age of Onset    Heart attack Mother     Substance Abuse Mother         denied    Mental illness Mother         denied    Other Mother         Cardiac Disorder     Cancer Mother         Malignant Neoplasm     Cancer Father         Malignant Neoplasm     Substance Abuse Father         denied    Mental illness Father         denied    Lung cancer Father     Spina bifida Child      Codeine sulfate  Current Outpatient Prescriptions   Medication Sig Dispense Refill    aspirin 81 MG tablet Take by mouth      atorvastatin (LIPITOR) 20 mg tablet TAKE 1 TABLET DAILY 90 tablet 3    cyanocobalamin (VITAMIN B-12) 1,000 mcg tablet Take by mouth      levothyroxine 75 mcg tablet Take 1 tablet (75 mcg total) by mouth daily 90 tablet 2    lisinopril (ZESTRIL) 10 mg tablet TAKE 1 TABLET DAILY  90 tablet 3    omega-3-acid ethyl esters (LOVAZA) 1 g capsule Take 2 capsules by mouth 2 (two) times a day      oxybutynin (DITROPAN) 5 mg tablet Take 1 tablet (5 mg total) by mouth 2 (two) times a day 180 tablet 2    pyridoxine (VITAMIN B6) 100 mg tablet Take 100 mg by mouth daily      saccharomyces boulardii (FLORASTOR) 250 mg capsule Take 1 capsule (250 mg total) by mouth 2 (two) times a day  0     No current facility-administered medications for this visit  Blood pressure 118/72, pulse 73, weight 76 2 kg (168 lb)  PHYSICAL EXAM:     General Appearance:   Alert, cooperative, no distress, appears stated age    HEENT:   Normocephalic, atraumatic, anicteric      Neck:  Supple, symmetrical, trachea midline, no adenopathy;    thyroid: no enlargement/tenderness/nodules; no carotid  bruit or JVD    Lungs:   Clear to auscultation bilaterally; no rales, rhonchi or wheezing; respirations unlabored    Heart[de-identified]   S1 and S2 normal; regular rate and rhythm; no murmur, rub, or gallop     Abdomen:   Soft, non-tender, non-distended; normal bowel sounds; no masses, no organomegaly    Genitalia:   Deferred    Rectal:   Deferred    Extremities:  No cyanosis, clubbing or edema    Pulses:  2+ and symmetric all extremities    Skin:  Skin color, texture, turgor normal, no rashes or lesions    Lymph nodes:  No palpable cervical, axillary or inguinal lymphadenopathy        Lab Results   Component Value Date    WBC 11 90 (H) 08/25/2018    HGB 12 9 08/25/2018    HCT 38 5 08/25/2018    MCV 95 08/25/2018     08/25/2018     Lab Results   Component Value Date    CALCIUM 9 1 08/25/2018  08/25/2018    K 3 8 08/25/2018    CO2 24 08/25/2018     08/25/2018    BUN 10 08/25/2018    CREATININE 0 92 08/25/2018     Lab Results   Component Value Date    ALT 33 08/24/2018    AST 20 08/24/2018    ALKPHOS 32 (L) 08/24/2018     No results found for: INR, PROTIME

## 2018-09-18 NOTE — PROGRESS NOTES
Follow-up Note -  [facility]  Ekaterina Roberts 1935 80 y o  female     ASSESSMENT @ PLAN:   She is an 80-year-old female with Clostridium difficile colitis who is better after vancomycin treatment  Present she does not want to do the follow-up colonoscopy  1 I told her to stay on the probiotic for another month    2 I told her to do a fiber supplement    3 I told her to do a colonoscopy and she will think about    Reason:   C diff    HPI:   She is an 80-year-old female with community-acquired C diff likely secondary to a antibiotics a month prior who is much better  She was in the hospital about 3 weeks ago and was discharged on a vancomycin course  She reports no abdominal pain no diarrhea  She reports soft stools that are not well formed they are fragmented  There is no blood there is no bleeding there brown stools  The months prior to being in the hospital she had antibiotics from primary care doctor  She had a colonoscopy 12 years ago and really does not want to do this at present  She has no fevers chills nausea vomiting she is eating very well she is taking the probiotic    REVIEW OF SYSTEMS:     CONSTITUTIONAL: Denies any fever, chills, or rigors  Good appetite, and no recent weight loss  HEENT: No earache or tinnitus  Denies hearing loss or visual disturbances  CARDIOVASCULAR: No chest pain or palpitations  RESPIRATORY: Denies any cough, hemoptysis, shortness of breath or dyspnea on exertion  GASTROINTESTINAL: As noted in the History of Present Illness  GENITOURINARY: No problems with urination  Denies any hematuria or dysuria  NEUROLOGIC: No dizziness or vertigo, denies headaches  MUSCULOSKELETAL: Denies any muscle or joint pain  SKIN: Denies skin rashes or itching  ENDOCRINE: Denies excessive thirst  Denies intolerance to heat or cold  PSYCHOSOCIAL: Denies depression or anxiety  Denies any recent memory loss       Past Medical History:   Diagnosis Date    [facility] (basal cell carcinoma)     Benign uterine neoplasm     C  difficile colitis       Past Surgical History:   Procedure Laterality Date    CATARACT EXTRACTION      Last assessed - 1/14/16    MALIGNANT SKIN LESION EXCISION      Face    PARATHYROIDECTOMY      REPLACEMENT TOTAL KNEE      ROTATOR CUFF REPAIR      Last assessed - 1/14/16    TOTAL ABDOMINAL HYSTERECTOMY      with derrell oopherectomy, Last assessed - 1/14/16     Social History     Social History    Marital status: /Civil Union     Spouse name: N/A    Number of children: 3    Years of education: N/A     Occupational History          Retired      Social History Main Topics    Smoking status: Former Smoker    Smokeless tobacco: Never Used    Alcohol use No    Drug use: No    Sexual activity: Not on file     Other Topics Concern    Not on file     Social History Narrative    Lives with spouse         Family History   Problem Relation Age of Onset    Heart attack Mother     Substance Abuse Mother         denied    Mental illness Mother         denied    Other Mother         Cardiac Disorder     Cancer Mother         Malignant Neoplasm     Cancer Father         Malignant Neoplasm     Substance Abuse Father         denied    Mental illness Father         denied    Lung cancer Father     Spina bifida Child      Codeine sulfate  Current Outpatient Prescriptions   Medication Sig Dispense Refill    aspirin 81 MG tablet Take by mouth      atorvastatin (LIPITOR) 20 mg tablet TAKE 1 TABLET DAILY 90 tablet 3    cyanocobalamin (VITAMIN B-12) 1,000 mcg tablet Take by mouth      levothyroxine 75 mcg tablet Take 1 tablet (75 mcg total) by mouth daily 90 tablet 2    lisinopril (ZESTRIL) 10 mg tablet TAKE 1 TABLET DAILY   90 tablet 3    omega-3-acid ethyl esters (LOVAZA) 1 g capsule Take 2 capsules by mouth 2 (two) times a day      oxybutynin (DITROPAN) 5 mg tablet Take 1 tablet (5 mg total) by mouth 2 (two) times a day 180 tablet 2    pyridoxine (VITAMIN B6) 100 mg tablet Take 100 mg by mouth daily      saccharomyces boulardii (FLORASTOR) 250 mg capsule Take 1 capsule (250 mg total) by mouth 2 (two) times a day  0     No current facility-administered medications for this visit  Blood pressure 118/72, pulse 73, weight 76 2 kg (168 lb)  PHYSICAL EXAM:     General Appearance:   Alert, cooperative, no distress, appears stated age    HEENT:   Normocephalic, atraumatic, anicteric      Neck:  Supple, symmetrical, trachea midline, no adenopathy;    thyroid: no enlargement/tenderness/nodules; no carotid  bruit or JVD    Lungs:   Clear to auscultation bilaterally; no rales, rhonchi or wheezing; respirations unlabored    Heart[de-identified]   S1 and S2 normal; regular rate and rhythm; no murmur, rub, or gallop     Abdomen:   Soft, non-tender, non-distended; normal bowel sounds; no masses, no organomegaly    Genitalia:   Deferred    Rectal:   Deferred    Extremities:  No cyanosis, clubbing or edema    Pulses:  2+ and symmetric all extremities    Skin:  Skin color, texture, turgor normal, no rashes or lesions    Lymph nodes:  No palpable cervical, axillary or inguinal lymphadenopathy        Lab Results   Component Value Date    WBC 11 90 (H) 08/25/2018    HGB 12 9 08/25/2018    HCT 38 5 08/25/2018    MCV 95 08/25/2018     08/25/2018     Lab Results   Component Value Date    CALCIUM 9 1 08/25/2018     08/25/2018    K 3 8 08/25/2018    CO2 24 08/25/2018     08/25/2018    BUN 10 08/25/2018    CREATININE 0 92 08/25/2018     Lab Results   Component Value Date    ALT 33 08/24/2018    AST 20 08/24/2018    ALKPHOS 32 (L) 08/24/2018     No results found for: INR, PROTIME

## 2018-09-19 ENCOUNTER — TELEPHONE (OUTPATIENT)
Dept: FAMILY MEDICINE CLINIC | Facility: CLINIC | Age: 83
End: 2018-09-19

## 2018-09-19 NOTE — TELEPHONE ENCOUNTER
Contacted Chanelle Bowie to see if she can have the radiologist read patients CT scan from 9/14/2018  She stated that she will get the report read        FYI - Patient has been calling for results

## 2018-09-21 ENCOUNTER — PATIENT OUTREACH (OUTPATIENT)
Dept: FAMILY MEDICINE CLINIC | Facility: CLINIC | Age: 83
End: 2018-09-21

## 2018-09-25 ENCOUNTER — TELEPHONE (OUTPATIENT)
Dept: GASTROENTEROLOGY | Facility: CLINIC | Age: 83
End: 2018-09-25

## 2018-10-02 ENCOUNTER — PATIENT OUTREACH (OUTPATIENT)
Dept: FAMILY MEDICINE CLINIC | Facility: CLINIC | Age: 83
End: 2018-10-02

## 2018-10-02 NOTE — PROGRESS NOTES
Patient part of [facility] program  Multiple attempts made to contact patient as well as emergency contact  Voicemail left x3 with no return call  Care coordination letter mailed to patient address  Will close from care coordination at this time

## 2019-03-18 ENCOUNTER — APPOINTMENT (OUTPATIENT)
Dept: LAB | Facility: CLINIC | Age: 84
End: 2019-03-18
Payer: COMMERCIAL

## 2019-03-18 ENCOUNTER — OFFICE VISIT (OUTPATIENT)
Dept: FAMILY MEDICINE CLINIC | Facility: CLINIC | Age: 84
End: 2019-03-18
Payer: COMMERCIAL

## 2019-03-18 VITALS
OXYGEN SATURATION: 97 % | BODY MASS INDEX: 25.15 KG/M2 | TEMPERATURE: 97.2 F | WEIGHT: 160.2 LBS | HEIGHT: 67 IN | RESPIRATION RATE: 18 BRPM | DIASTOLIC BLOOD PRESSURE: 72 MMHG | SYSTOLIC BLOOD PRESSURE: 128 MMHG | HEART RATE: 80 BPM

## 2019-03-18 DIAGNOSIS — E03.9 HYPOTHYROIDISM, UNSPECIFIED TYPE: ICD-10-CM

## 2019-03-18 DIAGNOSIS — S39.012A STRAIN OF LUMBAR REGION, INITIAL ENCOUNTER: ICD-10-CM

## 2019-03-18 DIAGNOSIS — G47.09 OTHER INSOMNIA: ICD-10-CM

## 2019-03-18 DIAGNOSIS — M15.9 GENERALIZED OSTEOARTHRITIS: ICD-10-CM

## 2019-03-18 DIAGNOSIS — R91.1 LUNG NODULE < 6CM ON CT: ICD-10-CM

## 2019-03-18 DIAGNOSIS — I10 ESSENTIAL HYPERTENSION: ICD-10-CM

## 2019-03-18 DIAGNOSIS — E03.9 HYPOTHYROIDISM, UNSPECIFIED TYPE: Primary | ICD-10-CM

## 2019-03-18 DIAGNOSIS — E78.01 FAMILIAL HYPERCHOLESTEROLEMIA: ICD-10-CM

## 2019-03-18 DIAGNOSIS — N32.81 OVERACTIVE BLADDER: ICD-10-CM

## 2019-03-18 DIAGNOSIS — E21.3 HYPERPARATHYROIDISM (HCC): ICD-10-CM

## 2019-03-18 PROBLEM — A04.72 CLOSTRIDIUM DIFFICILE COLITIS: Status: RESOLVED | Noted: 2018-09-07 | Resolved: 2019-03-18

## 2019-03-18 PROBLEM — M25.552 LEFT HIP PAIN: Status: RESOLVED | Noted: 2018-03-19 | Resolved: 2019-03-18

## 2019-03-18 PROBLEM — Z23 FLU VACCINE NEED: Status: RESOLVED | Noted: 2018-09-07 | Resolved: 2019-03-18

## 2019-03-18 PROBLEM — Z12.11 SCREENING FOR COLON CANCER: Status: RESOLVED | Noted: 2018-09-04 | Resolved: 2019-03-18

## 2019-03-18 PROBLEM — R10.9 ABDOMINAL PAIN: Status: RESOLVED | Noted: 2018-08-23 | Resolved: 2019-03-18

## 2019-03-18 LAB
25(OH)D3 SERPL-MCNC: 68.7 NG/ML (ref 30–100)
ALBUMIN SERPL BCP-MCNC: 4 G/DL (ref 3.5–5)
ALP SERPL-CCNC: 44 U/L (ref 46–116)
ALT SERPL W P-5'-P-CCNC: 29 U/L (ref 12–78)
ANION GAP SERPL CALCULATED.3IONS-SCNC: 6 MMOL/L (ref 4–13)
AST SERPL W P-5'-P-CCNC: 20 U/L (ref 5–45)
BASOPHILS # BLD AUTO: 0.06 THOUSANDS/ΜL (ref 0–0.1)
BASOPHILS NFR BLD AUTO: 1 % (ref 0–1)
BILIRUB SERPL-MCNC: 0.46 MG/DL (ref 0.2–1)
BUN SERPL-MCNC: 16 MG/DL (ref 5–25)
CALCIUM SERPL-MCNC: 10 MG/DL (ref 8.3–10.1)
CHLORIDE SERPL-SCNC: 107 MMOL/L (ref 100–108)
CHOLEST SERPL-MCNC: 194 MG/DL (ref 50–200)
CO2 SERPL-SCNC: 26 MMOL/L (ref 21–32)
CREAT SERPL-MCNC: 0.86 MG/DL (ref 0.6–1.3)
EOSINOPHIL # BLD AUTO: 0.16 THOUSAND/ΜL (ref 0–0.61)
EOSINOPHIL NFR BLD AUTO: 2 % (ref 0–6)
ERYTHROCYTE [DISTWIDTH] IN BLOOD BY AUTOMATED COUNT: 13.6 % (ref 11.6–15.1)
GFR SERPL CREATININE-BSD FRML MDRD: 63 ML/MIN/1.73SQ M
GLUCOSE P FAST SERPL-MCNC: 90 MG/DL (ref 65–99)
HCT VFR BLD AUTO: 47.5 % (ref 34.8–46.1)
HDLC SERPL-MCNC: 46 MG/DL (ref 40–60)
HGB BLD-MCNC: 15.5 G/DL (ref 11.5–15.4)
IMM GRANULOCYTES # BLD AUTO: 0.02 THOUSAND/UL (ref 0–0.2)
IMM GRANULOCYTES NFR BLD AUTO: 0 % (ref 0–2)
LDLC SERPL CALC-MCNC: 98 MG/DL (ref 0–100)
LYMPHOCYTES # BLD AUTO: 4.2 THOUSANDS/ΜL (ref 0.6–4.47)
LYMPHOCYTES NFR BLD AUTO: 46 % (ref 14–44)
MCH RBC QN AUTO: 30.9 PG (ref 26.8–34.3)
MCHC RBC AUTO-ENTMCNC: 32.6 G/DL (ref 31.4–37.4)
MCV RBC AUTO: 95 FL (ref 82–98)
MONOCYTES # BLD AUTO: 0.75 THOUSAND/ΜL (ref 0.17–1.22)
MONOCYTES NFR BLD AUTO: 8 % (ref 4–12)
NEUTROPHILS # BLD AUTO: 3.93 THOUSANDS/ΜL (ref 1.85–7.62)
NEUTS SEG NFR BLD AUTO: 43 % (ref 43–75)
NRBC BLD AUTO-RTO: 0 /100 WBCS
PLATELET # BLD AUTO: 272 THOUSANDS/UL (ref 149–390)
PMV BLD AUTO: 10.3 FL (ref 8.9–12.7)
POTASSIUM SERPL-SCNC: 3.9 MMOL/L (ref 3.5–5.3)
PROT SERPL-MCNC: 7.3 G/DL (ref 6.4–8.2)
PTH-INTACT SERPL-MCNC: 16.4 PG/ML (ref 18.4–80.1)
RBC # BLD AUTO: 5.02 MILLION/UL (ref 3.81–5.12)
SODIUM SERPL-SCNC: 139 MMOL/L (ref 136–145)
TRIGL SERPL-MCNC: 252 MG/DL
TSH SERPL DL<=0.05 MIU/L-ACNC: 0.52 UIU/ML (ref 0.36–3.74)
WBC # BLD AUTO: 9.12 THOUSAND/UL (ref 4.31–10.16)

## 2019-03-18 PROCEDURE — 85025 COMPLETE CBC W/AUTO DIFF WBC: CPT

## 2019-03-18 PROCEDURE — 80053 COMPREHEN METABOLIC PANEL: CPT

## 2019-03-18 PROCEDURE — 80061 LIPID PANEL: CPT

## 2019-03-18 PROCEDURE — 36415 COLL VENOUS BLD VENIPUNCTURE: CPT

## 2019-03-18 PROCEDURE — 3074F SYST BP LT 130 MM HG: CPT | Performed by: NURSE PRACTITIONER

## 2019-03-18 PROCEDURE — 1036F TOBACCO NON-USER: CPT | Performed by: NURSE PRACTITIONER

## 2019-03-18 PROCEDURE — 83970 ASSAY OF PARATHORMONE: CPT

## 2019-03-18 PROCEDURE — 82306 VITAMIN D 25 HYDROXY: CPT

## 2019-03-18 PROCEDURE — 84443 ASSAY THYROID STIM HORMONE: CPT

## 2019-03-18 PROCEDURE — 3078F DIAST BP <80 MM HG: CPT | Performed by: NURSE PRACTITIONER

## 2019-03-18 PROCEDURE — 99214 OFFICE O/P EST MOD 30 MIN: CPT | Performed by: NURSE PRACTITIONER

## 2019-03-18 PROCEDURE — 1160F RVW MEDS BY RX/DR IN RCRD: CPT | Performed by: NURSE PRACTITIONER

## 2019-03-18 NOTE — PROGRESS NOTES
Assessment/Plan:    No problem-specific Assessment & Plan notes found for this encounter  Diagnoses and all orders for this visit:    Hypothyroidism, unspecified type  Comments:  will update the labs   Orders:  -     TSH, 3rd generation with Free T4 reflex; Future    Lung nodule < 6cm on CT  Comments:  will update CT scan of the lung   Orders:  -     CT lung nodule follow-up; Future    Hyperparathyroidism ([facility] [address] )  -     Vitamin D 25 hydroxy; Future  -     PTH, intact; Future    Essential hypertension  Comments:  well controlled   Orders:  -     Comprehensive metabolic panel; Future  -     Lipid Panel with Direct LDL reflex; Future  -     CBC and differential; Future    Generalized osteoarthritis    Overactive bladder  Comments:  helping with her symptoms     Other insomnia    Familial hypercholesterolemia  Comments:  Lipid panel ordered   Orders:  -     Comprehensive metabolic panel; Future  -     Lipid Panel with Direct LDL reflex; Future  -     CBC and differential; Future    Strain of lumbar region, initial encounter    Other orders  -     Probiotic Product (PROBIOTIC-10 PO); Take by mouth daily          Subjective:      Patient ID: Preeti Giles is a 80 y o  female  Patient here today and reports that she was supposed to have a CT scan done of her lungs was told that when she was in the hospital with C-diff that she was supposed to have a check on her CT of the lung  Patient is due for recheck on the CT scan of the lung nodule right lung middle lobe  Patient has been taking care of her  and has to help him with everything as he is a partial amputation of his leg and has been helping with his care  She has strained the back pulling muscles in her back taking tylenol for her back  Patient having troubles with falling asleep and her  is getting up and down constantly having to go to the bathroom         The following portions of the patient's history were reviewed and updated as appropriate:   She has a past medical history of BCC (basal cell carcinoma), Benign uterine neoplasm, and C  difficile colitis  She   Patient Active Problem List    Diagnosis Date Noted    Strain of lumbar region 03/18/2019    Lung nodule < 6cm on CT 09/07/2018    Urinary incontinence 03/19/2018    Other insomnia 03/19/2018    Elevated rheumatoid factor 10/10/2016    Hyperlipidemia 06/01/2016    Overactive bladder 01/28/2016    Generalized osteoarthritis 01/14/2016    Hyperparathyroidism ([address] ) 01/14/2016    Hypothyroidism 01/14/2016    Hypertension 01/04/2016     She  has a past surgical history that includes Cataract extraction; Malignant skin lesion excision; Replacement total knee; Parathyroidectomy; Rotator cuff repair; and Total abdominal hysterectomy  Her family history includes Cancer in her father and mother; Heart attack in her mother; Lung cancer in her father; Mental illness in her father and mother; Other in her mother; Spina bifida in her child; Substance Abuse in her father and mother  She  reports that she has quit smoking  She has never used smokeless tobacco  She reports that she does not drink alcohol or use drugs  She is allergic to codeine sulfate       Review of Systems   Constitutional: Negative for activity change, appetite change, chills, diaphoresis, fatigue, fever and unexpected weight change  HENT: Negative for congestion, ear pain, hearing loss, postnasal drip, sinus pressure, sinus pain, sneezing and sore throat  Eyes: Negative for pain, redness and visual disturbance  Respiratory: Negative for cough and shortness of breath  Cardiovascular: Negative for chest pain and leg swelling  Gastrointestinal: Negative for abdominal pain, diarrhea, nausea and vomiting  Musculoskeletal: Positive for back pain  Negative for arthralgias  Neurological: Negative for dizziness and light-headedness  Psychiatric/Behavioral: Positive for sleep disturbance   Negative for behavioral problems and dysphoric mood  Objective:      /72 (BP Location: Left arm, Patient Position: Sitting, Cuff Size: Adult)   Pulse 80   Temp (!) 97 2 °F (36 2 °C) (Tympanic)   Resp 18   Ht 5' 7" (1 702 m)   Wt 72 7 kg (160 lb 3 2 oz)   SpO2 97%   BMI 25 09 kg/m²          Physical Exam   Constitutional: She is oriented to person, place, and time  Vital signs are normal  She appears well-developed and well-nourished  No distress  HENT:   Head: Normocephalic and atraumatic  Eyes: Pupils are equal, round, and reactive to light  Neck: Normal range of motion  No thyromegaly present  Cardiovascular: Normal rate, regular rhythm, normal heart sounds and intact distal pulses  No murmur heard  Pulmonary/Chest: Effort normal and breath sounds normal  No respiratory distress  She has no wheezes  Abdominal: Soft  Bowel sounds are normal    Musculoskeletal: Normal range of motion  Neurological: She is alert and oriented to person, place, and time  Skin: Skin is warm and dry  Psychiatric: She has a normal mood and affect  Nursing note and vitals reviewed

## 2019-05-17 DIAGNOSIS — E78.00 PURE HYPERCHOLESTEROLEMIA: ICD-10-CM

## 2019-05-17 DIAGNOSIS — E03.9 HYPOTHYROIDISM, UNSPECIFIED TYPE: ICD-10-CM

## 2019-05-17 DIAGNOSIS — I10 ESSENTIAL HYPERTENSION: ICD-10-CM

## 2019-05-17 RX ORDER — ATORVASTATIN CALCIUM 20 MG/1
TABLET, FILM COATED ORAL
Qty: 90 TABLET | Refills: 0 | Status: SHIPPED | OUTPATIENT
Start: 2019-05-17 | End: 2019-08-23 | Stop reason: SDUPTHER

## 2019-05-17 RX ORDER — LISINOPRIL 10 MG/1
TABLET ORAL
Qty: 90 TABLET | Refills: 0 | Status: SHIPPED | OUTPATIENT
Start: 2019-05-17 | End: 2019-08-23 | Stop reason: SDUPTHER

## 2019-05-17 RX ORDER — LEVOTHYROXINE SODIUM 0.07 MG/1
TABLET ORAL
Qty: 90 TABLET | Refills: 0 | Status: SHIPPED | OUTPATIENT
Start: 2019-05-17 | End: 2019-08-23 | Stop reason: SDUPTHER

## 2019-08-23 DIAGNOSIS — E03.9 HYPOTHYROIDISM, UNSPECIFIED TYPE: ICD-10-CM

## 2019-08-23 DIAGNOSIS — I10 ESSENTIAL HYPERTENSION: ICD-10-CM

## 2019-08-23 DIAGNOSIS — R32 URINARY INCONTINENCE, UNSPECIFIED TYPE: ICD-10-CM

## 2019-08-23 DIAGNOSIS — E78.00 PURE HYPERCHOLESTEROLEMIA: ICD-10-CM

## 2019-08-23 RX ORDER — LISINOPRIL 10 MG/1
10 TABLET ORAL DAILY
Qty: 90 TABLET | Refills: 1 | Status: SHIPPED | OUTPATIENT
Start: 2019-08-23 | End: 2020-02-21

## 2019-08-23 RX ORDER — ATORVASTATIN CALCIUM 20 MG/1
20 TABLET, FILM COATED ORAL DAILY
Qty: 90 TABLET | Refills: 1 | Status: SHIPPED | OUTPATIENT
Start: 2019-08-23 | End: 2020-02-21

## 2019-08-23 RX ORDER — OXYBUTYNIN CHLORIDE 5 MG/1
TABLET ORAL
Qty: 180 TABLET | Refills: 1 | Status: SHIPPED | OUTPATIENT
Start: 2019-08-23 | End: 2019-12-04

## 2019-08-23 RX ORDER — LEVOTHYROXINE SODIUM 0.07 MG/1
75 TABLET ORAL DAILY
Qty: 90 TABLET | Refills: 1 | Status: SHIPPED | OUTPATIENT
Start: 2019-08-23 | End: 2020-02-21

## 2019-09-07 ENCOUNTER — APPOINTMENT (OUTPATIENT)
Dept: RADIOLOGY | Facility: CLINIC | Age: 84
End: 2019-09-07
Payer: COMMERCIAL

## 2019-09-07 ENCOUNTER — OFFICE VISIT (OUTPATIENT)
Dept: URGENT CARE | Facility: CLINIC | Age: 84
End: 2019-09-07
Payer: COMMERCIAL

## 2019-09-07 VITALS
OXYGEN SATURATION: 95 % | WEIGHT: 160 LBS | SYSTOLIC BLOOD PRESSURE: 108 MMHG | TEMPERATURE: 97.8 F | RESPIRATION RATE: 18 BRPM | DIASTOLIC BLOOD PRESSURE: 74 MMHG | BODY MASS INDEX: 25.11 KG/M2 | HEIGHT: 67 IN | HEART RATE: 69 BPM

## 2019-09-07 DIAGNOSIS — M70.61 GREATER TROCHANTERIC BURSITIS OF RIGHT HIP: ICD-10-CM

## 2019-09-07 DIAGNOSIS — M70.61 GREATER TROCHANTERIC BURSITIS OF RIGHT HIP: Primary | ICD-10-CM

## 2019-09-07 PROCEDURE — 99203 OFFICE O/P NEW LOW 30 MIN: CPT | Performed by: PHYSICIAN ASSISTANT

## 2019-09-07 PROCEDURE — 73502 X-RAY EXAM HIP UNI 2-3 VIEWS: CPT

## 2019-09-07 PROCEDURE — S9088 SERVICES PROVIDED IN URGENT: HCPCS | Performed by: PHYSICIAN ASSISTANT

## 2019-09-07 RX ORDER — MELOXICAM 7.5 MG/1
7.5 TABLET ORAL DAILY
Qty: 30 TABLET | Refills: 0 | Status: SHIPPED | OUTPATIENT
Start: 2019-09-07 | End: 2019-09-19 | Stop reason: SDUPTHER

## 2019-09-07 RX ORDER — MULTIVITAMIN
1 TABLET ORAL DAILY
COMMUNITY

## 2019-09-07 NOTE — PROGRESS NOTES
[facility]        NAME: Mirtha Mota is a 80 y o  female  : 1935    MRN: [id number]  DATE: 2019  TIME: 10:38 AM    Assessment and Plan   Greater trochanteric bursitis of right hip [M70 61]  1  Greater trochanteric bursitis of right hip  XR hip/pelv 2-3 vws right if performed    Ambulatory referral to Physical Therapy    meloxicam (MOBIC) 7 5 mg tablet         Patient Instructions     Patient Instructions     X-ray shows some enthesophytes on the right greater trochanter  Will start with anti-inflammatories, physical therapy  May benefit from injection  Follow up with Orthopedics as needed  Follow up with PCP in 3-5 days  Proceed to  ER if symptoms worsen  Chief Complaint     Chief Complaint   Patient presents with    Hip Pain     r hip pain  unknown injury  woke up yesterday morning with the pain  History of Present Illness         12-year-old female presents to the clinic complaining of right hip pain for 3 days  No fall or trauma  She has pain when she walks sits and stands  Some pain radiating down her right leg  No numbness or tingling  No back pain  She has a history of right trauma several years ago but no surgery on the hip  Review of Systems   Review of Systems   Constitutional: Negative  Respiratory: Negative  Cardiovascular: Negative            Current Medications       Current Outpatient Medications:     aspirin 81 MG tablet, Take by mouth, Disp: , Rfl:     atorvastatin (LIPITOR) 20 mg tablet, Take 1 tablet (20 mg total) by mouth daily, Disp: 90 tablet, Rfl: 1    cyanocobalamin (VITAMIN B-12) 1,000 mcg tablet, Take by mouth, Disp: , Rfl:     levothyroxine 75 mcg tablet, Take 1 tablet (75 mcg total) by mouth daily, Disp: 90 tablet, Rfl: 1    lisinopril (ZESTRIL) 10 mg tablet, Take 1 tablet (10 mg total) by mouth daily, Disp: 90 tablet, Rfl: 1    Multiple Vitamin (MULTIVITAMIN) tablet, Take 1 tablet by mouth daily, Disp: , Rfl:   omega-3-acid ethyl esters (LOVAZA) 1 g capsule, Take 2 capsules by mouth 2 (two) times a day, Disp: , Rfl:     oxybutynin (DITROPAN) 5 mg tablet, take 1 tablet by mouth twice a day, Disp: 180 tablet, Rfl: 1    Probiotic Product (PROBIOTIC-10 PO), Take by mouth daily, Disp: , Rfl:     pyridoxine (VITAMIN B6) 100 mg tablet, Take 100 mg by mouth daily, Disp: , Rfl:     meloxicam (MOBIC) 7 5 mg tablet, Take 1 tablet (7 5 mg total) by mouth daily, Disp: 30 tablet, Rfl: 0    Current Allergies     Allergies as of 09/07/2019 - Reviewed 09/07/2019   Allergen Reaction Noted    Codeine sulfate  01/04/2016            The following portions of the patient's history were reviewed and updated as appropriate: allergies, current medications, past family history, past medical history, past social history, past surgical history and problem list      Past Medical History:   Diagnosis Date    BCC (basal cell carcinoma)     Benign uterine neoplasm     C  difficile colitis        Past Surgical History:   Procedure Laterality Date    CATARACT EXTRACTION      Last assessed - 1/14/16    MALIGNANT SKIN LESION EXCISION      Face    PARATHYROIDECTOMY      REPLACEMENT TOTAL KNEE      ROTATOR CUFF REPAIR      Last assessed - 1/14/16    TOTAL ABDOMINAL HYSTERECTOMY      with derrell oopherectomy, Last assessed - 1/14/16       Family History   Problem Relation Age of Onset    Heart attack Mother     Substance Abuse Mother         denied    Mental illness Mother         denied    Other Mother         Cardiac Disorder     Cancer Mother         Malignant Neoplasm     Cancer Father         Malignant Neoplasm     Substance Abuse Father         denied    Mental illness Father         denied    Lung cancer Father     Spina bifida Child          Medications have been verified          Objective   /74 (BP Location: Left arm, Patient Position: Sitting)   Pulse 69   Temp 97 8 °F (36 6 °C) (Temporal)   Resp 18   Ht 5' 7" (1 702 m)   Wt 72 6 kg (160 lb)   SpO2 95%   BMI 25 06 kg/m²        Physical Exam     Physical Exam   Constitutional: She is oriented to person, place, and time  She appears well-developed and well-nourished  No distress  Pulmonary/Chest: Effort normal    Musculoskeletal:     Right hip tender over the greater trochanter and posterior pelvis  She is nontender over the spine  Full hip range of motion with painful external rotation and flexion  She has some pain with cross leg adduction  No pain in the groin with rotation   Neurological: She is alert and oriented to person, place, and time  No cranial nerve deficit

## 2019-09-07 NOTE — PATIENT INSTRUCTIONS
X-ray shows some enthesophytes on the right greater trochanter  Will start with anti-inflammatories, physical therapy  May benefit from injection  Follow up with Orthopedics as needed

## 2019-09-09 ENCOUNTER — OFFICE VISIT (OUTPATIENT)
Dept: FAMILY MEDICINE CLINIC | Facility: CLINIC | Age: 84
End: 2019-09-09
Payer: COMMERCIAL

## 2019-09-09 VITALS
DIASTOLIC BLOOD PRESSURE: 62 MMHG | TEMPERATURE: 97.2 F | BODY MASS INDEX: 25.83 KG/M2 | RESPIRATION RATE: 16 BRPM | HEART RATE: 66 BPM | OXYGEN SATURATION: 96 % | HEIGHT: 67 IN | SYSTOLIC BLOOD PRESSURE: 120 MMHG | WEIGHT: 164.6 LBS

## 2019-09-09 DIAGNOSIS — M54.31 BILATERAL SCIATICA: Primary | ICD-10-CM

## 2019-09-09 DIAGNOSIS — M54.32 BILATERAL SCIATICA: Primary | ICD-10-CM

## 2019-09-09 PROCEDURE — 1101F PT FALLS ASSESS-DOCD LE1/YR: CPT | Performed by: FAMILY MEDICINE

## 2019-09-09 PROCEDURE — 96372 THER/PROPH/DIAG INJ SC/IM: CPT | Performed by: FAMILY MEDICINE

## 2019-09-09 PROCEDURE — 99214 OFFICE O/P EST MOD 30 MIN: CPT | Performed by: FAMILY MEDICINE

## 2019-09-09 RX ORDER — KETOROLAC TROMETHAMINE 30 MG/ML
30 INJECTION, SOLUTION INTRAMUSCULAR; INTRAVENOUS ONCE
Status: COMPLETED | OUTPATIENT
Start: 2019-09-09 | End: 2019-09-09

## 2019-09-09 RX ORDER — KETOROLAC TROMETHAMINE 30 MG/ML
30 INJECTION, SOLUTION INTRAMUSCULAR; INTRAVENOUS ONCE
Status: DISCONTINUED | OUTPATIENT
Start: 2019-09-09 | End: 2019-09-09

## 2019-09-09 RX ORDER — METHYLPREDNISOLONE 4 MG/1
TABLET ORAL
Qty: 21 EACH | Refills: 0 | Status: SHIPPED | OUTPATIENT
Start: 2019-09-09 | End: 2019-10-04

## 2019-09-09 RX ORDER — CYCLOBENZAPRINE HCL 5 MG
5 TABLET ORAL
Qty: 20 TABLET | Refills: 0 | Status: SHIPPED | OUTPATIENT
Start: 2019-09-09 | End: 2019-09-19 | Stop reason: ALTCHOICE

## 2019-09-09 RX ADMIN — KETOROLAC TROMETHAMINE 30 MG: 30 INJECTION, SOLUTION INTRAMUSCULAR; INTRAVENOUS at 10:17

## 2019-09-09 NOTE — PROGRESS NOTES
Latricia Parham 1935 female MRN: [id number]    Acute Visit        ASSESSMENT/PLAN  Diagnoses and all orders for this visit:    Bilateral sciatica  -     methylPREDNISolone 4 MG tablet therapy pack; Use as directed on package  -     Discontinue: ketorolac (TORADOL) injection 30 mg  -     cyclobenzaprine (FLEXERIL) 5 mg tablet; Take 1 tablet (5 mg total) by mouth daily at bedtime as needed for muscle spasms  -     ketorolac (TORADOL) 60 mg/2 mL IM injection 30 mg              No future appointments  SUBJECTIVE  CC: Pain (Sciatic Pain shoots down her leg seen in urgent care saturday )       She is here for pain starting in her buttocks traveling down her leg  R > L side  She states she was the urgent care and was prescribed Mobic  She had Xrays which showed mild arthritis  She tried Mobic which didn't help  She says the pain started 3-4 days ago  She is having trouble sleeping  Could not get comfortable last night  Pain was traveling down her leg to her calf  Has been lifting her  more  He recent had surgery, amputation of the leg  Latricia Parham is a 80 y o  female who presented for an acute visit complaining of  Review of Systems   Constitutional: Negative for activity change  Respiratory: Negative for chest tightness and shortness of breath  Cardiovascular: Negative for chest pain and leg swelling  Musculoskeletal: Positive for back pain  Neurological: Negative for numbness and headaches  Psychiatric/Behavioral: Negative for dysphoric mood  The patient is not nervous/anxious          Historical Information   The patient history was reviewed as follows:  Past Medical History:   Diagnosis Date    BCC (basal cell carcinoma)     Benign uterine neoplasm     C  difficile colitis      Past Surgical History:   Procedure Laterality Date    CATARACT EXTRACTION      Last assessed - 1/14/16    MALIGNANT SKIN LESION EXCISION      Face    PARATHYROIDECTOMY      REPLACEMENT TOTAL KNEE      ROTATOR CUFF REPAIR      Last assessed - 1/14/16    TOTAL ABDOMINAL HYSTERECTOMY      with derrell oopherectomy, Last assessed - 1/14/16     Family History   Problem Relation Age of Onset    Heart attack Mother     Substance Abuse Mother         denied    Mental illness Mother         denied    Other Mother         Cardiac Disorder     Cancer Mother         Malignant Neoplasm     Cancer Father         Malignant Neoplasm     Substance Abuse Father         denied    Mental illness Father         denied    Lung cancer Father     Spina bifida Child       Social History   Social History     Substance and Sexual Activity   Alcohol Use No     Social History     Substance and Sexual Activity   Drug Use No     Social History     Tobacco Use   Smoking Status Former Smoker   Smokeless Tobacco Never Used       Medications:   Meds/Allergies   Current Outpatient Medications   Medication Sig Dispense Refill    aspirin 81 MG tablet Take by mouth      atorvastatin (LIPITOR) 20 mg tablet Take 1 tablet (20 mg total) by mouth daily 90 tablet 1    cyanocobalamin (VITAMIN B-12) 1,000 mcg tablet Take by mouth      levothyroxine 75 mcg tablet Take 1 tablet (75 mcg total) by mouth daily 90 tablet 1    lisinopril (ZESTRIL) 10 mg tablet Take 1 tablet (10 mg total) by mouth daily 90 tablet 1    meloxicam (MOBIC) 7 5 mg tablet Take 1 tablet (7 5 mg total) by mouth daily 30 tablet 0    Multiple Vitamin (MULTIVITAMIN) tablet Take 1 tablet by mouth daily      omega-3-acid ethyl esters (LOVAZA) 1 g capsule Take 2 capsules by mouth 2 (two) times a day      oxybutynin (DITROPAN) 5 mg tablet take 1 tablet by mouth twice a day 180 tablet 1    Probiotic Product (PROBIOTIC-10 PO) Take by mouth daily      pyridoxine (VITAMIN B6) 100 mg tablet Take 100 mg by mouth daily      cyclobenzaprine (FLEXERIL) 5 mg tablet Take 1 tablet (5 mg total) by mouth daily at bedtime as needed for muscle spasms 20 tablet 0    methylPREDNISolone 4 MG tablet therapy pack Use as directed on package 21 each 0     No current facility-administered medications for this visit  Allergies   Allergen Reactions    Codeine Sulfate        OBJECTIVE  Vitals:   Vitals:    09/09/19 0954   BP: 120/62   Pulse: 66   Resp: 16   Temp: (!) 97 2 °F (36 2 °C)   SpO2: 96%   Weight: 74 7 kg (164 lb 9 6 oz)   Height: 5' 7" (1 702 m)       Invasive Devices     None                 Physical Exam   Constitutional: She is oriented to person, place, and time  She appears well-developed and well-nourished  No distress  HENT:   Head: Normocephalic and atraumatic  Right Ear: External ear normal    Left Ear: External ear normal    Pulmonary/Chest: Effort normal  No stridor  No respiratory distress  She has no wheezes  Musculoskeletal: She exhibits no edema  Right hip: She exhibits no tenderness  Lumbar back: She exhibits decreased range of motion and pain  She exhibits no tenderness, no bony tenderness, no swelling and no deformity  Neurological: She is alert and oriented to person, place, and time  Skin: She is not diaphoretic  Psychiatric: She has a normal mood and affect  Her behavior is normal  Judgment and thought content normal    Nursing note and vitals reviewed  Lab:  I have personally reviewed all pertinent results

## 2019-09-09 NOTE — PATIENT INSTRUCTIONS
Lower Back Exercises   AMBULATORY CARE:   Lower back exercises  help heal and strengthen your back muscles to prevent another injury  Ask your healthcare provider if you need to see a physical therapist for more advanced exercises  Seek care immediately if:   · You have severe pain that prevents you from moving  Contact your healthcare provider if:   · Your pain becomes worse  · You have new pain  · You have questions or concerns about your condition or care  Do lower back exercises safely:   · Do the exercises on a mat or firm surface  (not on a bed) to support your spine and prevent low back pain  · Move slowly and smoothly  Avoid fast or jerky motions  · Breathe normally  Do not hold your breath  · Stop if you feel pain  It is normal to feel some discomfort at first  Regular exercise will help decrease your discomfort over time  Lower back exercises: Your healthcare provider may recommend that you do back exercises 10 to 30 minutes each day  He may also recommend that you do exercises 1 to 3 times each day  Ask your healthcare provider which exercises are best for you and how often to do them  · Ankle pumps:  Lie on your back  Move your foot up (with your toes pointing toward your head)  Then, move your foot down (with your toes pointing away from you)  Repeat this exercise 10 times on each side  · Heel slides:  Lie on your back  Slowly bend one leg and then straighten it  Next, bend the other leg and then straighten it  Repeat 10 times on each side  · Pelvic tilt:  Lie on your back with your knees bent and feet flat on the floor  Place your arms in a relaxed position beside your body  Tighten the muscles of your abdomen and flatten your back against the floor  Hold for 5 seconds  Repeat 5 times  · Back stretch:  Lie on your back with your hands behind your head  Bend your knees and turn the lower half of your body to one side   Hold this position for 10 seconds  Repeat 3 times on each side  · Straight leg raises:  Lie on your back with one leg straight  Bend the other knee  Tighten your abdomen and then slowly lift the straight leg up about 6 to 12 inches off the floor  Hold for 1 to 5 seconds  Lower your leg slowly  Repeat 10 times on each leg  · Knee-to-chest:  Lie on your back with your knees bent and feet flat on the floor  Pull one of your knees toward your chest and hold it there for 5 seconds  Return your leg to the starting position  Lift the other knee toward your chest and hold for 5 seconds  Do this 5 times on each side  · Cat and camel:  Place your hands and knees on the floor  Arch your back upward toward the ceiling and lower your head  Round out your spine as much as you can  Hold for 5 seconds  Lift your head upward and push your chest downward toward the floor  Hold for 5 seconds  Do 3 sets or as directed  · Wall squats:  Stand with your back against a wall  Tighten the muscles of your abdomen  Slowly lower your body until your knees are bent at a 45 degree angle  Hold this position for 5 seconds  Slowly move back up to a standing position  Repeat 10 times  · Curl up:  Lie on your back with your knees bent and feet flat on the floor  Place your hands, palms down, underneath the curve in your lower back  Next, with your elbows on the floor, lift your shoulders and chest 2 to 3 inches  Keep your head in line with your shoulders  Hold this position for 5 seconds  When you can do this exercise without pain for 10 to 15 seconds, you may add a rotation  While your shoulders and chest are lifted off the ground, turn slightly to the left and hold  Repeat on the other side  · Bird dog:  Place your hands and knees on the floor  Keep your wrists directly below your shoulders and your knees directly below your hips  Pull your belly button in toward your spine  Do not flatten or arch your back   Tighten your abdominal muscles  Raise one arm straight out so that it is aligned with your head  Next, raise the leg opposite your arm  Hold this position for 15 seconds  Lower your arm and leg slowly and change sides  Do 5 sets  © 2017 [facility] Information is for End User's use only and may not be sold, redistributed or otherwise used for commercial purposes  All illustrations and images included in CareNotes® are the copyrighted property of [employer] M [employer]  or [employer] Neri  The above information is an  only  It is not intended as medical advice fSciatica   WHAT YOU NEED TO KNOW:   What is sciatica? Sciatica is a condition that causes pain along your sciatic nerve  The sciatic nerve runs from your spine through both sides of your buttocks  It then runs down the back of your thigh, into your lower leg and foot  Any place along your sciatic nerve may be compressed, inflamed, irritated, or stretched and cause symptoms  What causes sciatica? Sciatica may be related to certain activities, poor posture, and physical or psychological stress  Any of the following may cause or increase your risk of sciatica:  · Disc problems:  A slipped disc (soft cushion in between the bones of the spine) is the most common cause of sciatica  The disc may press on the sciatic nerve  One bone in your spine may slip over another, or you may have narrowing of the spinal column  · Muscle injury: This may happen after you twist or lift a heavy object  Swelling from sprained or irritated muscles in the buttocks, thighs, or legs press on the sciatic nerve  · Obesity or pregnancy:  Extra weight increases pressure on your back and legs  · Trauma:  Direct blows on the buttocks, thighs, or legs, car accidents, or falls may injure the sciatic nerve  · Diseases of the spine:  Arthritis, osteoporosis, cancer, or infection of the spine may also affect the sciatic nerve    What are the signs and symptoms of sciatica? The symptoms of sciatic may be short-term or long-term:  · Pain that goes from the lower back into your buttocks and down the back of your thigh    · Numbness or tingling in your buttocks and legs    · Muscle weakness, difficulty moving or controlling your leg or foot    · Leg pain that increases with standing, sitting, or squatting  How is sciatica diagnosed? Your healthcare provider will ask about other health conditions you may have  He may ask you about your job, history of back pain, diseases, or surgeries you have had  He will examine you and move your legs to see what increases pain  You may also need any of the following:  · X-rays: This is a picture of the bones and tissues in your back, hip, thigh, or leg  This test may show other problems, such as fractures (broken bones)  · CT scan: This test is also called a CAT scan  An x-ray machine uses a computer to take pictures of your hips, thighs, and legs  The pictures may show your sciatic nerve, muscles, and blood vessels  You may be given a dye before the pictures are taken to help healthcare providers see the pictures better  Tell the healthcare provider if you have ever had an allergic reaction to contrast dye  · MRI:  This scan uses powerful magnets and a computer to take pictures of your hips, thighs, and legs  An MRI may show damaged nerves, muscles, bones, and blood vessels  You may be given dye to help the pictures show up better  Tell the healthcare provider if you have ever had an allergic reaction to contrast dye  Do not enter the MRI room with anything metal  Metal can cause serious injury  Tell the healthcare provider if you have any metal in or on your body  · An electromyography (EMG)  test measures the electrical activity of your muscles at rest and with movement  · Nerve conduction tests: These tests check how surface nerves and related muscles respond to stimulation   Electrodes with wires or tiny needles are placed on certain areas, such as the buttocks and legs  How is sciatica treated? · NSAIDs:  These medicines decrease swelling and pain  NSAIDs are available without a doctor's order  Ask your healthcare provider which medicine is right for you  Ask how much to take and when to take it  Take as directed  NSAIDs can cause stomach bleeding or kidney problems if not taken correctly  · Acetaminophen: This medicine decreases pain  Acetaminophen is available without a doctor's order  Ask how much to take and how often to take it  Follow directions  Acetaminophen can cause liver damage if not taken correctly  · Muscle relaxers  help decrease pain and muscle spasms  · Epidural steroid medicine: This may include both an anesthetic (numbing medicine) and a steroid, which may decrease swelling and relieve pain  It is given as a shot close to the spine in the area where you have pain  · Chemonucleolysis: This is an injection given into the damaged disc to soften or shrink the disc  · Surgery: This may be done to correct problems such as a damaged disc, or a tumor in your spine  It may be done to decrease the pressure on the sciatic nerve  Healthcare providers may also release the muscle that may be pressing into your sciatic nerve  How can I help manage sciatica? · Ultrasound therapy: This is a machine that uses sound waves to decrease pain  Topical medicines may be added to help decrease pain and inflammation  · Physical therapy:  A physical therapist teaches you exercises to help improve movement and strength, and to decrease pain  An occupational therapist teaches you skills to help with your daily activities  · Assistive devices: You may need to wear back support, such as a back brace  You may need crutches, a cane, or a walker to decrease stress on your lower back and leg muscles   Ask your healthcare provider for more information about assistive devices and how to use them correctly  How can sciatica be prevented? · Avoid pressure on your back and legs:  Do not  lift heavy objects, or stand or sit for long periods of time  · Lift objects safely:  Keep your back straight and bend your knees when you  an object  Do not bend or twist your back when you lift  · Maintain a healthy weight:  Ask your healthcare provider how much you should weigh  Ask him to help you create a weight loss plan if you are overweight  · Exercise:  Ask your healthcare provider about the best stretching, warmup, and exercise plan for you  What are the risks of sciatica? An epidural steroid injection can lead to pain disorders or paralysis if it is placed incorrectly  It may also cause headaches, leg pain, and blockage of blood flow to the spinal cord  Surgery may cause you to bleed or get an infection  If not treated, your muscles and nerves may become damaged permanently  You may have decreased strength  You may not be able to move your leg or control when you urinate or have bowel movements  When should I contact my healthcare provider? · You have pain in your lower back at night or when resting  · You have pain in your lower back with numbness below the knee  · You have weakness in one leg only  · You have questions or concerns about your condition or care  When should I seek immediate care or call 911? · You have trouble holding back your urine or bowel movements  · You have weakness in both legs  · You have numbness in your groin or buttocks  CARE AGREEMENT:   You have the right to help plan your care  Learn about your health condition and how it may be treated  Discuss treatment options with your caregivers to decide what care you want to receive  You always have the right to refuse treatment  The above information is an  only  It is not intended as medical advice for individual conditions or treatments   Talk to your doctor, nurse or pharmacist before following any medical regimen to see if it is safe and effective for you  © 2017 [address] [employer] Information is for End User's use only and may not be sold, redistributed or otherwise used for commercial purposes  All illustrations and images included in CareNotes® are the copyrighted property of A [employer] A M [employer] Inc  or Edouard Neri  or individual conditions or treatments  Talk to your doctor, nurse or pharmacist before following any medical regimen to see if it is safe and effective for you

## 2019-09-12 ENCOUNTER — TELEPHONE (OUTPATIENT)
Dept: FAMILY MEDICINE CLINIC | Facility: CLINIC | Age: 84
End: 2019-09-12

## 2019-09-12 ENCOUNTER — TELEPHONE (OUTPATIENT)
Dept: PHYSICAL THERAPY | Facility: OTHER | Age: 84
End: 2019-09-12

## 2019-09-12 ENCOUNTER — HOSPITAL ENCOUNTER (EMERGENCY)
Facility: HOSPITAL | Age: 84
Discharge: HOME | End: 2019-09-12
Attending: EMERGENCY MEDICINE | Admitting: EMERGENCY MEDICINE
Payer: COMMERCIAL

## 2019-09-12 VITALS
TEMPERATURE: 98.5 F | DIASTOLIC BLOOD PRESSURE: 81 MMHG | OXYGEN SATURATION: 98 % | SYSTOLIC BLOOD PRESSURE: 140 MMHG | RESPIRATION RATE: 16 BRPM | HEIGHT: 67 IN | HEART RATE: 77 BPM | WEIGHT: 164.68 LBS | BODY MASS INDEX: 25.85 KG/M2

## 2019-09-12 DIAGNOSIS — S39.012A STRAIN OF LUMBAR REGION, INITIAL ENCOUNTER: Primary | ICD-10-CM

## 2019-09-12 DIAGNOSIS — M54.31 SCIATICA OF RIGHT SIDE: Primary | ICD-10-CM

## 2019-09-12 DIAGNOSIS — M54.50 ACUTE LOW BACK PAIN: ICD-10-CM

## 2019-09-12 PROCEDURE — 99283 EMERGENCY DEPT VISIT LOW MDM: CPT

## 2019-09-12 PROCEDURE — 99284 EMERGENCY DEPT VISIT MOD MDM: CPT | Performed by: EMERGENCY MEDICINE

## 2019-09-12 RX ORDER — OXYCODONE HYDROCHLORIDE AND ACETAMINOPHEN 5; 325 MG/1; MG/1
1 TABLET ORAL EVERY 4 HOURS PRN
Qty: 20 TABLET | Refills: 0 | Status: SHIPPED | OUTPATIENT
Start: 2019-09-12 | End: 2020-01-20

## 2019-09-12 RX ORDER — OXYCODONE HYDROCHLORIDE AND ACETAMINOPHEN 5; 325 MG/1; MG/1
1 TABLET ORAL ONCE
Status: COMPLETED | OUTPATIENT
Start: 2019-09-12 | End: 2019-09-12

## 2019-09-12 RX ADMIN — OXYCODONE HYDROCHLORIDE AND ACETAMINOPHEN 1 TABLET: 5; 325 TABLET ORAL at 10:54

## 2019-09-12 NOTE — TELEPHONE ENCOUNTER
FYI:    Patient is very concerned about MRI she is getting it done on sat   914/19 and is scheduled for apt  9/24/19 to review MRI with Dr    If we get is back sooner please call patient asap to let her know the results   She is very concerned

## 2019-09-12 NOTE — TELEPHONE ENCOUNTER
This nurse did review in detail the comp spine program and what we can provide for the pt for their back pain  Pt is refusing at this time ans would like to speak with her PCP and have an MRI  Pt was given our ph # and our hours of business  Referral closed

## 2019-09-12 NOTE — ED PROVIDER NOTES
History  Chief Complaint   Patient presents with    Leg Pain     pt diagnosed with bone spurs on her right side, pt also has sciatic pain but states the pain in right leg has been worse      Pt comes to ED c/o several days R low back pain  Gradual onset, aching, mild to moderate, radiates down R posterior leg, no associated weakness, still present  No relief from steroids as initiated during earlier visit this week, unable to sleep at night due to pain  Denies abdominal pain  Denies incontinence  Denies leg weakness and numbness  No h/o similar sxs  No trauma  No fever  No incontinence  Additional history from friend and from review of UC visit this week including review of R hip xray report indicating no acute injury  Prior to Admission Medications   Prescriptions Last Dose Informant Patient Reported? Taking?    Multiple Vitamin (MULTIVITAMIN) tablet   Yes No   Sig: Take 1 tablet by mouth daily   Probiotic Product (PROBIOTIC-10 PO)   Yes No   Sig: Take by mouth daily   aspirin 81 MG tablet  Self Yes No   Sig: Take by mouth   atorvastatin (LIPITOR) 20 mg tablet   No No   Sig: Take 1 tablet (20 mg total) by mouth daily   cyanocobalamin (VITAMIN B-12) 1,000 mcg tablet  Self Yes No   Sig: Take by mouth   cyclobenzaprine (FLEXERIL) 5 mg tablet   No No   Sig: Take 1 tablet (5 mg total) by mouth daily at bedtime as needed for muscle spasms   levothyroxine 75 mcg tablet   No No   Sig: Take 1 tablet (75 mcg total) by mouth daily   lisinopril (ZESTRIL) 10 mg tablet   No No   Sig: Take 1 tablet (10 mg total) by mouth daily   meloxicam (MOBIC) 7 5 mg tablet   No No   Sig: Take 1 tablet (7 5 mg total) by mouth daily   methylPREDNISolone 4 MG tablet therapy pack   No No   Sig: Use as directed on package   omega-3-acid ethyl esters (LOVAZA) 1 g capsule  Self Yes No   Sig: Take 2 capsules by mouth 2 (two) times a day   oxybutynin (DITROPAN) 5 mg tablet   No No   Sig: take 1 tablet by mouth twice a day   pyridoxine (VITAMIN B6) 100 mg tablet  Self Yes No   Sig: Take 100 mg by mouth daily      Facility-Administered Medications: None       Past Medical History:   Diagnosis Date    BCC (basal cell carcinoma)     Benign uterine neoplasm     C  difficile colitis        Past Surgical History:   Procedure Laterality Date    CATARACT EXTRACTION      Last assessed - 1/14/16    MALIGNANT SKIN LESION EXCISION      Face    PARATHYROIDECTOMY      REPLACEMENT TOTAL KNEE      ROTATOR CUFF REPAIR      Last assessed - 1/14/16    TOTAL ABDOMINAL HYSTERECTOMY      with derrell oopherectomy, Last assessed - 1/14/16       Family History   Problem Relation Age of Onset    Heart attack Mother     Substance Abuse Mother         denied    Mental illness Mother         denied    Other Mother         Cardiac Disorder     Cancer Mother         Malignant Neoplasm     Cancer Father         Malignant Neoplasm     Substance Abuse Father         denied    Mental illness Father         denied    Lung cancer Father     Spina bifida Child      I have reviewed and agree with the history as documented  Social History     Tobacco Use    Smoking status: Former Smoker    Smokeless tobacco: Never Used   Substance Use Topics    Alcohol use: No    Drug use: No        Review of Systems   Musculoskeletal: Positive for back pain  All other systems reviewed and are negative  Physical Exam  Physical Exam   Constitutional: She is oriented to person, place, and time  She appears well-developed and well-nourished  No distress  HENT:   Head: Normocephalic and atraumatic  Eyes: Pupils are equal, round, and reactive to light  Conjunctivae and EOM are normal    Neck: Normal range of motion  Neck supple  No JVD present  Cardiovascular: Normal rate, regular rhythm, normal heart sounds and intact distal pulses  Pulmonary/Chest: Effort normal and breath sounds normal  No stridor  Abdominal: Soft  She exhibits no distension  There is no tenderness  There is no rebound and no guarding  Bedside u/s performed by me, no free intraabdominal fluid, no AAA  Musculoskeletal: Normal range of motion  She exhibits no edema, tenderness or deformity  Neurological: She is alert and oriented to person, place, and time  No cranial nerve deficit or sensory deficit  She exhibits normal muscle tone  Coordination normal    Skin: Skin is warm and dry  Capillary refill takes less than 2 seconds  No rash noted  She is not diaphoretic  No erythema  No pallor  Nursing note and vitals reviewed  Vital Signs  ED Triage Vitals [09/12/19 0943]   Temperature Pulse Respirations Blood Pressure SpO2   98 5 °F (36 9 °C) 77 16 140/81 98 %      Temp Source Heart Rate Source Patient Position - Orthostatic VS BP Location FiO2 (%)   Oral Monitor Sitting Right arm --      Pain Score       Worst Possible Pain           Vitals:    09/12/19 0943   BP: 140/81   Pulse: 77   Patient Position - Orthostatic VS: Sitting         Visual Acuity      ED Medications  Medications   oxyCODONE-acetaminophen (PERCOCET) 5-325 mg per tablet 1 tablet (1 tablet Oral Given 9/12/19 1054)       Diagnostic Studies  Results Reviewed     None                 No orders to display              Procedures  Procedures       ED Course                               MDM  Number of Diagnoses or Management Options  Acute low back pain:   Sciatica of right side:   Diagnosis management comments: Acute afebrile atraumatic R low back pain radiating down R leg without red flags  Xray R hip w/i last week without fx  Bedside u/s by me, normal abdominal aorta without aneurysm  Plan - percocet prn for pain, f/u [facility] or pcp, rted if new or worsening sxs  Discussed need for MRI w pt         Disposition  Final diagnoses:   Sciatica of right side   Acute low back pain     Time reflects when diagnosis was documented in both MDM as applicable and the Disposition within this note     Time User Action Codes Description Comment    9/12/2019 10:22 AM Andry Marion Add [M54 31] Sciatica of right side     9/12/2019 10:22 AM Orlando Martinez Add [M54 5] Acute low back pain       ED Disposition     ED Disposition Condition Date/Time Comment    Discharge Stable Thu Sep 12, 2019 10:22 AM Verla Dark discharge to home/self care              Follow-up Information     Follow up With Specialties Details Why Contact Info    [facility] Physical Therapy In 1 day  [phone]          Discharge Medication List as of 9/12/2019 11:15 AM      START taking these medications    Details   oxyCODONE-acetaminophen (PERCOCET) 5-325 mg per tablet Take 1 tablet by mouth every 4 (four) hours as needed for moderate pain for up to 20 dosesMax Daily Amount: 6 tablets, Starting u 9/12/2019, Print         CONTINUE these medications which have NOT CHANGED    Details   aspirin 81 MG tablet Take by mouth, Historical Med      atorvastatin (LIPITOR) 20 mg tablet Take 1 tablet (20 mg total) by mouth daily, Starting Fri 8/23/2019, Normal      cyanocobalamin (VITAMIN B-12) 1,000 mcg tablet Take by mouth, Historical Med      cyclobenzaprine (FLEXERIL) 5 mg tablet Take 1 tablet (5 mg total) by mouth daily at bedtime as needed for muscle spasms, Starting Mon 9/9/2019, Normal      levothyroxine 75 mcg tablet Take 1 tablet (75 mcg total) by mouth daily, Starting Fri 8/23/2019, Normal      lisinopril (ZESTRIL) 10 mg tablet Take 1 tablet (10 mg total) by mouth daily, Starting Fri 8/23/2019, Normal      meloxicam (MOBIC) 7 5 mg tablet Take 1 tablet (7 5 mg total) by mouth daily, Starting Sat 9/7/2019, Normal      methylPREDNISolone 4 MG tablet therapy pack Use as directed on package, Normal      Multiple Vitamin (MULTIVITAMIN) tablet Take 1 tablet by mouth daily, Historical Med      omega-3-acid ethyl esters (LOVAZA) 1 g capsule Take 2 capsules by mouth 2 (two) times a day, Starting Fri 6/3/2016, Historical Med      oxybutynin (DITROPAN) 5 mg tablet take 1 tablet by mouth twice a day, Normal      Probiotic Product (PROBIOTIC-10 PO) Take by mouth daily, Historical Med      pyridoxine (VITAMIN B6) 100 mg tablet Take 100 mg by mouth daily, Historical Med           No discharge procedures on file      ED Provider  Electronically Signed by           Lucy Cervantes MD  09/12/19 5307

## 2019-09-12 NOTE — TELEPHONE ENCOUNTER
Christen Roche ) daughter-in) called in on behalf of  patient was in on 9/9/19 and after the shot and steroids she was not getting any better  They went to the ER today and Dr Valerie Graham (from ER) asked them to call us to have an MRI put in for the patient       She is with the patient and asked if we could call her back at [phone]

## 2019-09-14 ENCOUNTER — HOSPITAL ENCOUNTER (OUTPATIENT)
Dept: MRI IMAGING | Facility: HOSPITAL | Age: 84
Discharge: HOME | End: 2019-09-14
Payer: COMMERCIAL

## 2019-09-14 DIAGNOSIS — S39.012A STRAIN OF LUMBAR REGION, INITIAL ENCOUNTER: ICD-10-CM

## 2019-09-14 PROCEDURE — 72148 MRI LUMBAR SPINE W/O DYE: CPT

## 2019-09-16 ENCOUNTER — IMMUNIZATIONS (OUTPATIENT)
Dept: FAMILY MEDICINE CLINIC | Facility: CLINIC | Age: 84
End: 2019-09-16
Payer: COMMERCIAL

## 2019-09-16 DIAGNOSIS — Z23 ENCOUNTER FOR IMMUNIZATION: Primary | ICD-10-CM

## 2019-09-16 PROCEDURE — 90662 IIV NO PRSV INCREASED AG IM: CPT | Performed by: FAMILY MEDICINE

## 2019-09-16 PROCEDURE — G0008 ADMIN INFLUENZA VIRUS VAC: HCPCS | Performed by: FAMILY MEDICINE

## 2019-09-17 ENCOUNTER — TELEPHONE (OUTPATIENT)
Dept: PAIN MEDICINE | Facility: CLINIC | Age: 84
End: 2019-09-17

## 2019-09-17 ENCOUNTER — TELEPHONE (OUTPATIENT)
Dept: FAMILY MEDICINE CLINIC | Facility: CLINIC | Age: 84
End: 2019-09-17

## 2019-09-17 DIAGNOSIS — M47.27 OSTEOARTHRITIS OF SPINE WITH RADICULOPATHY, LUMBOSACRAL REGION: Primary | ICD-10-CM

## 2019-09-17 NOTE — TELEPHONE ENCOUNTER
Patient can not get into Dr Roland Flores until 10/16, she can not wait until then as she is in a lot of pain and can not walk  What to do?

## 2019-09-17 NOTE — TELEPHONE ENCOUNTER
S/w pt, referred back to Dr Hussain Osman, last seen 1/13/19 states she can not make it to 10/16/19 "I'll die"  Please review w/Dr Sharmila Canales and discuss if she can be seen by a NP      F/u w/pt  [phone]

## 2019-09-17 NOTE — TELEPHONE ENCOUNTER
See below, please advise  Thank you       Task back to [facility]  TY

## 2019-09-19 ENCOUNTER — TELEPHONE (OUTPATIENT)
Dept: FAMILY MEDICINE CLINIC | Facility: CLINIC | Age: 84
End: 2019-09-19

## 2019-09-19 DIAGNOSIS — M70.61 GREATER TROCHANTERIC BURSITIS OF RIGHT HIP: Primary | ICD-10-CM

## 2019-09-19 DIAGNOSIS — M70.61 GREATER TROCHANTERIC BURSITIS OF RIGHT HIP: ICD-10-CM

## 2019-09-19 RX ORDER — MELOXICAM 15 MG/1
15 TABLET ORAL DAILY
Qty: 30 TABLET | Refills: 1 | Status: SHIPPED | OUTPATIENT
Start: 2019-09-19 | End: 2019-12-04

## 2019-09-19 RX ORDER — METHOCARBAMOL 500 MG/1
500 TABLET, FILM COATED ORAL 3 TIMES DAILY
Qty: 30 TABLET | Refills: 1 | Status: SHIPPED | OUTPATIENT
Start: 2019-09-19 | End: 2019-12-04

## 2019-09-19 NOTE — TELEPHONE ENCOUNTER
Patient left a message and would like  a nurse to call her back    She wanted to know if she could get pain medicine

## 2019-09-19 NOTE — TELEPHONE ENCOUNTER
I sent meloxicam 15 mg to her pharmacy along with robaxin muscle relaxer recommend for her to stop taking cyclobenzaprine  If she wants we can refer her to orthopedic for hip injection?

## 2019-09-19 NOTE — TELEPHONE ENCOUNTER
Pt calls and has terrible pain, can't walk, can't sllep  Pain is in right hip and goes down leg   Has appt 10/4/19 for pain mangt but too much pain till then

## 2019-09-20 ENCOUNTER — TELEPHONE (OUTPATIENT)
Dept: FAMILY MEDICINE CLINIC | Facility: CLINIC | Age: 84
End: 2019-09-20

## 2019-09-20 DIAGNOSIS — M70.61 GREATER TROCHANTERIC BURSITIS OF RIGHT HIP: Primary | ICD-10-CM

## 2019-09-20 RX ORDER — TRAMADOL HYDROCHLORIDE 50 MG/1
50 TABLET ORAL EVERY 6 HOURS PRN
Qty: 30 TABLET | Refills: 0 | Status: SHIPPED | OUTPATIENT
Start: 2019-09-20 | End: 2020-01-20

## 2019-09-20 NOTE — TELEPHONE ENCOUNTER
Pt needs to get into ortho surgeon and still hasnt heard from them  She needs help  She cant handle the pain anymore, is there anything she can do the meds arent even helping  if she goes to the er would they help her? She was crying and frustrated on phone      erx meds to yosef/melody

## 2019-10-04 ENCOUNTER — OFFICE VISIT (OUTPATIENT)
Dept: PAIN MEDICINE | Facility: CLINIC | Age: 84
End: 2019-10-04
Payer: COMMERCIAL

## 2019-10-04 VITALS
SYSTOLIC BLOOD PRESSURE: 138 MMHG | WEIGHT: 164 LBS | HEART RATE: 83 BPM | HEIGHT: 67 IN | BODY MASS INDEX: 25.74 KG/M2 | DIASTOLIC BLOOD PRESSURE: 80 MMHG

## 2019-10-04 DIAGNOSIS — M51.16 LUMBAR DISC DISEASE WITH RADICULOPATHY: Primary | ICD-10-CM

## 2019-10-04 DIAGNOSIS — M47.27 OSTEOARTHRITIS OF SPINE WITH RADICULOPATHY, LUMBOSACRAL REGION: ICD-10-CM

## 2019-10-04 PROCEDURE — 99214 OFFICE O/P EST MOD 30 MIN: CPT | Performed by: PHYSICIAN ASSISTANT

## 2019-10-04 RX ORDER — GABAPENTIN 100 MG/1
100 CAPSULE ORAL
Qty: 30 CAPSULE | Refills: 0 | Status: SHIPPED | OUTPATIENT
Start: 2019-10-04 | End: 2019-10-14 | Stop reason: SDUPTHER

## 2019-10-04 NOTE — PATIENT INSTRUCTIONS
Gabapentin (By mouth)   Gabapentin ([id number]-PEN-tin)  Treats seizures and pain caused by shingles  Brand Name(s): ACTIVE-PAC with Gabapentin, Convenience Herb, Cyclo/Olivia 10/300 Pack, FusePaq Fanatrex, Olivia-V, Gralise, [address] Pack, Neurontin, SmartRx Olivia Kit   There may be other brand names for this medicine  When This Medicine Should Not Be Used: This medicine is not right for everyone  Do not use it if you had an allergic reaction to gabapentin  How to Use This Medicine:   Capsule, Liquid, Tablet  · Take your medicine as directed  Your dose may need to be changed several times to find what works best for you  If you have epilepsy, do not allow more than 12 hours to pass between doses  · Capsule: Swallow the capsule whole with plenty of water  Do not open, crush, or chew it  · Gralise® tablet: Swallow the tablet whole   Do not crush, break, or chew it  · Neurontin® tablet: If you break a tablet into 2 pieces, use the second half as your next dose  If you don't use it within 28 days, throw it away  · Measure the oral liquid medicine with a marked measuring spoon, oral syringe, or medicine cup  · This medicine should come with a Medication Guide  Ask your pharmacist for a copy if you do not have one  · Missed dose: Take a dose as soon as you remember  If it is almost time for your next dose, wait until then and take a regular dose  Do not take extra medicine to make up for a missed dose  · Store the medicine in a closed container at room temperature, away from heat, moisture, and direct light  Store the Neurontin® oral liquid in the refrigerator  Do not freeze  Drugs and Foods to Avoid:   Ask your doctor or pharmacist before using any other medicine, including over-the-counter medicines, vitamins, and herbal products  · Some medicines can affect how gabapentin works   Tell your doctor if you also use any of the following:   ¨ Hydrocodone  ¨ Morphine  · If you take an antacid, wait at least 2 hours before you take gabapentin  · Tell your doctor if you use anything else that makes you sleepy  Some examples are allergy medicine, narcotic pain medicine, and alcohol  Warnings While Using This Medicine:   · Tell your doctor if you are pregnant or breastfeeding, or if you have kidney problems or are receiving dialysis  Tell your doctor if you have a history of depression or mental health problems  · This medicine may increase depression or thoughts of suicide  Tell your doctor right away if you start to feel more depressed or think about hurting yourself  · This medicine may cause a serious allergic reaction called multiorgan hypersensitivity, which can damage organs and be life-threatening  · Do not stop using this medicine suddenly  Your doctor will need to slowly decrease your dose before you stop it completely  If you take this medicine to prevent seizures, your seizures may return or occur more often if you stop this medicine suddenly  · This medicine may make you dizzy or drowsy  Do not drive or do anything else that could be dangerous until you know how this medicine affects you  · Tell any doctor or dentist who treats you that you are using this medicine  This medicine may affect certain medical test results  · Your doctor will check your progress and the effects of this medicine at regular visits  Keep all appointments  · Keep all medicine out of the reach of children  Never share your medicine with anyone    Possible Side Effects While Using This Medicine:   Call your doctor right away if you notice any of these side effects:  · Allergic reaction: Itching or hives, swelling in your face or hands, swelling or tingling in your mouth or throat, chest tightness, trouble breathing  · Behavior problems, aggression, restlessness, trouble concentrating, moodiness (especially in children)  · Blistering, peeling, red skin rash  · Change in how much or how often you urinate, bloody or cloudy urine,  · Chest pain, fast heartbeat, trouble breathing  · Dark urine or pale stools, nausea, vomiting, loss of appetite, stomach pain, yellow skin or eyes  · Fever, rash, swollen or tender glands in the neck, armpit, or groin  · Problems with coordination, shakiness, unsteadiness  · Rapid weight gain, swelling in your hands, ankles, or feet  · Unusual moods or behaviors, thoughts of hurting yourself, feeling depressed  If you notice these less serious side effects, talk with your doctor:   · Dizziness, drowsiness, sleepiness, tiredness  If you notice other side effects that you think are caused by this medicine, tell your doctor  Call your doctor for medical advice about side effects  You may report side effects to FDA at 6-304-FDA-5289  © 2017 [address] [employer] Information is for End User's use only and may not be sold, redistributed or otherwise used for commercial purposes  The above information is an  only  It is not intended as medical advice for individual conditions or treatments  Talk to your doctor, nurse or pharmacist before following any medical regimen to see if it is safe and effective for you

## 2019-10-04 NOTE — H&P (VIEW-ONLY)
Assessment:  1  Lumbar disc disease with radiculopathy    2  Osteoarthritis of spine with radiculopathy, lumbosacral region        Plan: This is an 80-year-old female presenting for worsening chronic low back pain with right-sided radiculopathy  MRI was reviewed in office with patient again revealing L5 and S1 radiculopathy secondary to spondylosis  Given right lower extremity symptoms are in an L5-S1 dermatomal pattern and that she has tried numerous pain medications and home stretching exercises without improvement, I do believe patient would benefit from right-sided L5-S1 transforaminal epidural steroid injection  As patient notes that tramadol and Norco did not provide any significant relief of her or back or leg pain I will initiate temporary nighttime gabapentin use to help with symptom relief until she can complete injection  I discussed with the patient the type of medication it is, how it works, and that it requires a titration process that is specific to each individual  Possible side effects include but are not limited to; vertigo, lethargy, nausea, and edema of the extremities  I advised the patient to call our office if they experience any side effects  The patient verbalized an understanding  Complete risks and benefits including bleeding, infection, tissue reaction, nerve injury and allergic reaction were discussed  The approach was demonstrated using models and literature was provided  Verbal and written consent was obtained  The patient will follow-up in 8 weeks for medication prescription refill and reevaluation  The patient was advised to contact the office should their symptoms worsen in the interim  The patient was agreeable and verbalized an understanding  History of Present Illness: The patient is a 80 y o  female last seen on 2/2/17 who presents for a follow up office visit in regards to chronic back and leg pain secondary to L5-S1 radiculitis    The patient currently reports significantly worsening symptoms over the past 4 weeks without trauma or mechanism of injury  Pain is primarily right-sided in low back buttocks and into the foot  She admits to numbness and tingling as well as perceived weakness in the right lower extremity  She denies any bowel or bladder changes or saddle anesthesia  She has tried prescription tramadol, Norco, ibuprofen from PCP with limited pain relief  She additionally received in office Toradol injection from PCP without benefit  She states home stretching and exercise program from previous therapy did not provide any significant benefit  She rates her pain as a 10+ out of 10 on pain scale today and describes as a constant shooting sharp, pins and needles pain  She feels pain is most severe in the nighttime and admits to positive sleep disturbance  She finds her activities of daily living are significantly limited at this time due to pain  She does admit to previous left sacroiliac joint injection in 2017 with Dr Mi mclean with significant relief of her sacroiliac joint pain  She denies any left-sided back or leg pain today  Current pain medications includes: None   I have personally reviewed and/or updated the patient's past medical history, past surgical history, family history, social history, current medications, allergies, and vital signs today  Review of Systems:    Review of Systems   Respiratory: Negative for shortness of breath  Cardiovascular: Negative for chest pain  Gastrointestinal: Negative for constipation, diarrhea, nausea and vomiting  Musculoskeletal: Positive for gait problem  Negative for arthralgias, joint swelling and myalgias  Skin: Negative for rash  Neurological: Positive for weakness  Negative for dizziness and seizures  All other systems reviewed and are negative  Past Medical History:   Diagnosis Date    BCC (basal cell carcinoma)     Benign uterine neoplasm     C  difficile colitis        Past Surgical History:   Procedure Laterality Date    CATARACT EXTRACTION      Last assessed - 1/14/16    MALIGNANT SKIN LESION EXCISION      Face    PARATHYROIDECTOMY      REPLACEMENT TOTAL KNEE      ROTATOR CUFF REPAIR      Last assessed - 1/14/16    TOTAL ABDOMINAL HYSTERECTOMY      with derrell oopherectomy, Last assessed - 1/14/16       Family History   Problem Relation Age of Onset    Heart attack Mother     Substance Abuse Mother         denied    Mental illness Mother         denied    Other Mother         Cardiac Disorder     Cancer Mother         Malignant Neoplasm     Cancer Father         Malignant Neoplasm     Substance Abuse Father         denied    Mental illness Father         denied    Lung cancer Father     Spina bifida Child        Social History     Occupational History    Occupation:      Comment: Retired    Tobacco Use    Smoking status: Former Smoker    Smokeless tobacco: Never Used   Substance and Sexual Activity    Alcohol use: No    Drug use: No    Sexual activity: Not on file         Current Outpatient Medications:     aspirin 81 MG tablet, Take by mouth, Disp: , Rfl:     atorvastatin (LIPITOR) 20 mg tablet, Take 1 tablet (20 mg total) by mouth daily, Disp: 90 tablet, Rfl: 1    cyanocobalamin (VITAMIN B-12) 1,000 mcg tablet, Take by mouth, Disp: , Rfl:     levothyroxine 75 mcg tablet, Take 1 tablet (75 mcg total) by mouth daily, Disp: 90 tablet, Rfl: 1    lisinopril (ZESTRIL) 10 mg tablet, Take 1 tablet (10 mg total) by mouth daily, Disp: 90 tablet, Rfl: 1    methocarbamol (ROBAXIN) 500 mg tablet, Take 1 tablet (500 mg total) by mouth 3 (three) times a day, Disp: 30 tablet, Rfl: 1    Multiple Vitamin (MULTIVITAMIN) tablet, Take 1 tablet by mouth daily, Disp: , Rfl:     omega-3-acid ethyl esters (LOVAZA) 1 g capsule, Take 2 capsules by mouth 2 (two) times a day, Disp: , Rfl:     oxybutynin (DITROPAN) 5 mg tablet, take 1 tablet by mouth twice a day, Disp: 180 tablet, Rfl: 1    Probiotic Product (PROBIOTIC-10 PO), Take by mouth daily, Disp: , Rfl:     pyridoxine (VITAMIN B6) 100 mg tablet, Take 100 mg by mouth daily, Disp: , Rfl:     gabapentin (NEURONTIN) 100 mg capsule, Take 1 capsule (100 mg total) by mouth daily at bedtime, Disp: 30 capsule, Rfl: 0    meloxicam (MOBIC) 15 mg tablet, Take 1 tablet (15 mg total) by mouth daily (Patient not taking: Reported on 10/4/2019), Disp: 30 tablet, Rfl: 1    oxyCODONE-acetaminophen (PERCOCET) 5-325 mg per tablet, Take 1 tablet by mouth every 4 (four) hours as needed for moderate pain for up to 20 dosesMax Daily Amount: 6 tablets (Patient not taking: Reported on 10/4/2019), Disp: 20 tablet, Rfl: 0    traMADol (ULTRAM) 50 mg tablet, Take 1 tablet (50 mg total) by mouth every 6 (six) hours as needed for moderate pain (Patient not taking: Reported on 10/4/2019), Disp: 30 tablet, Rfl: 0    Allergies   Allergen Reactions    Codeine Sulfate        Physical Exam:    /80 (BP Location: Left arm, Patient Position: Sitting, Cuff Size: Large)   Pulse 83   Ht 5' 7" (1 702 m)   Wt 74 4 kg (164 lb)   BMI 25 69 kg/m²     Constitutional:normal, well developed, well nourished, alert, in no distress and non-toxic and no overt pain behavior    Eyes:anicteric  HEENT:grossly intact  Neck:supple, symmetric, trachea midline and no masses   Pulmonary:even and unlabored  Cardiovascular:No edema or pitting edema present  Skin:Normal without rashes or lesions and well hydrated  Psychiatric:Mood and affect appropriate  Neurologic:Cranial Nerves II-XII grossly intact  Musculoskeletal:antalgic     Lumbar Spine Exam    Appearance:  Normal lordosis  Palpation/Tenderness:  right lumbar paraspinal tenderness  right sacroiliac joint tenderness  Sensory:  diminished pin prick sensation, location: L5-S1 right   Motor Strength:  Right knee flexion:  4/5  Right knee extension:  4/5  Right foot dorsiflexion:  4/5  Right foot plantar flexion:  4/5  Reflexes:  Left Patellar:  2+   Right Patellar:  2+   Left Achilles:  2+   Right Achilles:  2+   Special Tests:  Right Straight Leg Test:  positive      Imaging  FL spine and pain procedure    (Results Pending)         Orders Placed This Encounter   Procedures    [facility] spine and pain procedure

## 2019-10-04 NOTE — PROGRESS NOTES
Assessment:  1  Lumbar disc disease with radiculopathy    2  Osteoarthritis of spine with radiculopathy, lumbosacral region        Plan: This is an 40-year-old female presenting for worsening chronic low back pain with right-sided radiculopathy  MRI was reviewed in office with patient again revealing L5 and S1 radiculopathy secondary to spondylosis  Given right lower extremity symptoms are in an L5-S1 dermatomal pattern and that she has tried numerous pain medications and home stretching exercises without improvement, I do believe patient would benefit from right-sided L5-S1 transforaminal epidural steroid injection  As patient notes that tramadol and Norco did not provide any significant relief of her or back or leg pain I will initiate temporary nighttime gabapentin use to help with symptom relief until she can complete injection  I discussed with the patient the type of medication it is, how it works, and that it requires a titration process that is specific to each individual  Possible side effects include but are not limited to; vertigo, lethargy, nausea, and edema of the extremities  I advised the patient to call our office if they experience any side effects  The patient verbalized an understanding  Complete risks and benefits including bleeding, infection, tissue reaction, nerve injury and allergic reaction were discussed  The approach was demonstrated using models and literature was provided  Verbal and written consent was obtained  The patient will follow-up in 8 weeks for medication prescription refill and reevaluation  The patient was advised to contact the office should their symptoms worsen in the interim  The patient was agreeable and verbalized an understanding  History of Present Illness: The patient is a 80 y o  female last seen on 2/2/17 who presents for a follow up office visit in regards to chronic back and leg pain secondary to L5-S1 radiculitis    The patient currently reports significantly worsening symptoms over the past 4 weeks without trauma or mechanism of injury  Pain is primarily right-sided in low back buttocks and into the foot  She admits to numbness and tingling as well as perceived weakness in the right lower extremity  She denies any bowel or bladder changes or saddle anesthesia  She has tried prescription tramadol, Norco, ibuprofen from PCP with limited pain relief  She additionally received in office Toradol injection from PCP without benefit  She states home stretching and exercise program from previous therapy did not provide any significant benefit  She rates her pain as a 10+ out of 10 on pain scale today and describes as a constant shooting sharp, pins and needles pain  She feels pain is most severe in the nighttime and admits to positive sleep disturbance  She finds her activities of daily living are significantly limited at this time due to pain  She does admit to previous left sacroiliac joint injection in 2017 with Dr Paula mclean with significant relief of her sacroiliac joint pain  She denies any left-sided back or leg pain today  Current pain medications includes: None   I have personally reviewed and/or updated the patient's past medical history, past surgical history, family history, social history, current medications, allergies, and vital signs today  Review of Systems:    Review of Systems   Respiratory: Negative for shortness of breath  Cardiovascular: Negative for chest pain  Gastrointestinal: Negative for constipation, diarrhea, nausea and vomiting  Musculoskeletal: Positive for gait problem  Negative for arthralgias, joint swelling and myalgias  Skin: Negative for rash  Neurological: Positive for weakness  Negative for dizziness and seizures  All other systems reviewed and are negative  Past Medical History:   Diagnosis Date    BCC (basal cell carcinoma)     Benign uterine neoplasm     C  difficile colitis        Past Surgical History:   Procedure Laterality Date    CATARACT EXTRACTION      Last assessed - 1/14/16    MALIGNANT SKIN LESION EXCISION      Face    PARATHYROIDECTOMY      REPLACEMENT TOTAL KNEE      ROTATOR CUFF REPAIR      Last assessed - 1/14/16    TOTAL ABDOMINAL HYSTERECTOMY      with derrell oopherectomy, Last assessed - 1/14/16       Family History   Problem Relation Age of Onset    Heart attack Mother     Substance Abuse Mother         denied    Mental illness Mother         denied    Other Mother         Cardiac Disorder     Cancer Mother         Malignant Neoplasm     Cancer Father         Malignant Neoplasm     Substance Abuse Father         denied    Mental illness Father         denied    Lung cancer Father     Spina bifida Child        Social History     Occupational History    Occupation:      Comment: Retired    Tobacco Use    Smoking status: Former Smoker    Smokeless tobacco: Never Used   Substance and Sexual Activity    Alcohol use: No    Drug use: No    Sexual activity: Not on file         Current Outpatient Medications:     aspirin 81 MG tablet, Take by mouth, Disp: , Rfl:     atorvastatin (LIPITOR) 20 mg tablet, Take 1 tablet (20 mg total) by mouth daily, Disp: 90 tablet, Rfl: 1    cyanocobalamin (VITAMIN B-12) 1,000 mcg tablet, Take by mouth, Disp: , Rfl:     levothyroxine 75 mcg tablet, Take 1 tablet (75 mcg total) by mouth daily, Disp: 90 tablet, Rfl: 1    lisinopril (ZESTRIL) 10 mg tablet, Take 1 tablet (10 mg total) by mouth daily, Disp: 90 tablet, Rfl: 1    methocarbamol (ROBAXIN) 500 mg tablet, Take 1 tablet (500 mg total) by mouth 3 (three) times a day, Disp: 30 tablet, Rfl: 1    Multiple Vitamin (MULTIVITAMIN) tablet, Take 1 tablet by mouth daily, Disp: , Rfl:     omega-3-acid ethyl esters (LOVAZA) 1 g capsule, Take 2 capsules by mouth 2 (two) times a day, Disp: , Rfl:     oxybutynin (DITROPAN) 5 mg tablet, take 1 tablet by mouth twice a day, Disp: 180 tablet, Rfl: 1    Probiotic Product (PROBIOTIC-10 PO), Take by mouth daily, Disp: , Rfl:     pyridoxine (VITAMIN B6) 100 mg tablet, Take 100 mg by mouth daily, Disp: , Rfl:     gabapentin (NEURONTIN) 100 mg capsule, Take 1 capsule (100 mg total) by mouth daily at bedtime, Disp: 30 capsule, Rfl: 0    meloxicam (MOBIC) 15 mg tablet, Take 1 tablet (15 mg total) by mouth daily (Patient not taking: Reported on 10/4/2019), Disp: 30 tablet, Rfl: 1    oxyCODONE-acetaminophen (PERCOCET) 5-325 mg per tablet, Take 1 tablet by mouth every 4 (four) hours as needed for moderate pain for up to 20 dosesMax Daily Amount: 6 tablets (Patient not taking: Reported on 10/4/2019), Disp: 20 tablet, Rfl: 0    traMADol (ULTRAM) 50 mg tablet, Take 1 tablet (50 mg total) by mouth every 6 (six) hours as needed for moderate pain (Patient not taking: Reported on 10/4/2019), Disp: 30 tablet, Rfl: 0    Allergies   Allergen Reactions    Codeine Sulfate        Physical Exam:    /80 (BP Location: Left arm, Patient Position: Sitting, Cuff Size: Large)   Pulse 83   Ht 5' 7" (1 702 m)   Wt 74 4 kg (164 lb)   BMI 25 69 kg/m²     Constitutional:normal, well developed, well nourished, alert, in no distress and non-toxic and no overt pain behavior    Eyes:anicteric  HEENT:grossly intact  Neck:supple, symmetric, trachea midline and no masses   Pulmonary:even and unlabored  Cardiovascular:No edema or pitting edema present  Skin:Normal without rashes or lesions and well hydrated  Psychiatric:Mood and affect appropriate  Neurologic:Cranial Nerves II-XII grossly intact  Musculoskeletal:antalgic     Lumbar Spine Exam    Appearance:  Normal lordosis  Palpation/Tenderness:  right lumbar paraspinal tenderness  right sacroiliac joint tenderness  Sensory:  diminished pin prick sensation, location: L5-S1 right   Motor Strength:  Right knee flexion:  4/5  Right knee extension:  4/5  Right foot dorsiflexion:  4/5  Right foot plantar flexion:  4/5  Reflexes:  Left Patellar:  2+   Right Patellar:  2+   Left Achilles:  2+   Right Achilles:  2+   Special Tests:  Right Straight Leg Test:  positive      Imaging  FL spine and pain procedure    (Results Pending)         Orders Placed This Encounter   Procedures    [facility] spine and pain procedure

## 2019-10-08 ENCOUNTER — HOSPITAL ENCOUNTER (OUTPATIENT)
Dept: RADIOLOGY | Facility: CLINIC | Age: 84
Discharge: HOME | End: 2019-10-08
Attending: ANESTHESIOLOGY | Admitting: ANESTHESIOLOGY
Payer: COMMERCIAL

## 2019-10-08 VITALS
HEART RATE: 66 BPM | DIASTOLIC BLOOD PRESSURE: 67 MMHG | SYSTOLIC BLOOD PRESSURE: 110 MMHG | RESPIRATION RATE: 20 BRPM | OXYGEN SATURATION: 99 % | TEMPERATURE: 97.3 F

## 2019-10-08 DIAGNOSIS — M51.16 LUMBAR DISC DISEASE WITH RADICULOPATHY: ICD-10-CM

## 2019-10-08 PROCEDURE — 64484 NJX AA&/STRD TFRM EPI L/S EA: CPT | Performed by: ANESTHESIOLOGY

## 2019-10-08 PROCEDURE — 64483 NJX AA&/STRD TFRM EPI L/S 1: CPT | Performed by: ANESTHESIOLOGY

## 2019-10-08 RX ORDER — BUPIVACAINE HCL/PF 2.5 MG/ML
5 VIAL (ML) INJECTION ONCE
Status: COMPLETED | OUTPATIENT
Start: 2019-10-08 | End: 2019-10-08

## 2019-10-08 RX ORDER — PAPAVERINE HCL 150 MG
20 CAPSULE, EXTENDED RELEASE ORAL ONCE
Status: COMPLETED | OUTPATIENT
Start: 2019-10-08 | End: 2019-10-08

## 2019-10-08 RX ORDER — LIDOCAINE HYDROCHLORIDE 10 MG/ML
5 INJECTION, SOLUTION EPIDURAL; INFILTRATION; INTRACAUDAL; PERINEURAL ONCE
Status: COMPLETED | OUTPATIENT
Start: 2019-10-08 | End: 2019-10-08

## 2019-10-08 RX ADMIN — IOHEXOL 2 ML: 300 INJECTION, SOLUTION INTRAVENOUS at 08:50

## 2019-10-08 RX ADMIN — LIDOCAINE HYDROCHLORIDE 5 ML: 10 INJECTION, SOLUTION EPIDURAL; INFILTRATION; INTRACAUDAL; PERINEURAL at 08:50

## 2019-10-08 RX ADMIN — Medication 5 ML: at 08:50

## 2019-10-08 RX ADMIN — DEXAMETHASONE SODIUM PHOSPHATE 20 MG: 10 INJECTION, SOLUTION INTRAMUSCULAR; INTRAVENOUS at 08:50

## 2019-10-08 NOTE — DISCHARGE INSTR - LAB
Epidural Steroid Injection   WHAT YOU NEED TO KNOW:   An epidural steroid injection (MICHAEL) is a procedure to inject steroid medicine into the epidural space  The epidural space is between your spinal cord and vertebrae  Steroids reduce inflammation and fluid buildup in your spine that may be causing pain  You may be given pain medicine along with the steroids  ACTIVITY  · Do not drive or operate machinery today  · No strenuous activity today - bending, lifting, etc   · You may resume normal activites starting tomorrow - start slowly and as tolerated  · You may shower today, but no tub baths or hot tubs  · You may have numbness for several hours from the local anesthetic  Please use caution and common sense, especially with weight-bearing activities  CARE OF THE INJECTION SITE  · If you have soreness or pain, apply ice to the area today (20 minutes on/20 minutes off)  · Starting tomorrow, you may use warm, moist heat or ice if needed  · You may have an increase or change in your discomfort for 36-48 hours after your treatment  · Apply ice and continue with any pain medication you have been prescribed  · [facility]ify [facility] if you have any of the following: redness, drainage, swelling, headache, stiff neck or fever above 100°F     SPECIAL INSTRUCTIONS  · Our office will contact you in approximately 7 days for a progress report  MEDICATIONS  · Continue to take all routine medications  · Our office may have instructed you to hold some medications  If you have a problem specifically related to your procedure, please call our office at [phone]  Problems not related to your procedure should be directed to your primary care physician

## 2019-10-08 NOTE — INTERVAL H&P NOTE
Update: (This section must be completed if the H&P was completed greater than 24 hrs to procedure or admission)    H&P reviewed  After examining the patient, I find no changed to the H&P since it had been written  Patient re-evaluated   Accept as history and physical     Martin Lerma MD/October 8, 2019/8:35 AM

## 2019-10-10 ENCOUNTER — TELEPHONE (OUTPATIENT)
Dept: PAIN MEDICINE | Facility: MEDICAL CENTER | Age: 84
End: 2019-10-10

## 2019-10-10 NOTE — TELEPHONE ENCOUNTER
Pt called stating that she has pain going down her right thigh all the way down her leg   Pt states she also has had a headache for the past two days and would like to speak to a nurse regarding this     Pt can be reached at [phone]

## 2019-10-14 DIAGNOSIS — M51.16 LUMBAR DISC DISEASE WITH RADICULOPATHY: ICD-10-CM

## 2019-10-14 DIAGNOSIS — M47.27 OSTEOARTHRITIS OF SPINE WITH RADICULOPATHY, LUMBOSACRAL REGION: ICD-10-CM

## 2019-10-14 RX ORDER — GABAPENTIN 100 MG/1
100 CAPSULE ORAL
Qty: 30 CAPSULE | Refills: 0 | Status: SHIPPED | OUTPATIENT
Start: 2019-10-14 | End: 2019-12-04

## 2019-10-14 NOTE — TELEPHONE ENCOUNTER
I see that she was prescribed gabapentin 100 mg p o  Q h s  By my PA  She may increase the dose to 2 capsules at bedtime

## 2019-10-14 NOTE — TELEPHONE ENCOUNTER
Pt called stating she called [facility] and they told her they never received the prescription for gabapentin    Pt can be reached at [phone]

## 2019-10-14 NOTE — TELEPHONE ENCOUNTER
Can you resend the gabapentin? [facility] states they do not have it  Advised pt to cb with any side effects once initiating gabapentin

## 2019-10-14 NOTE — TELEPHONE ENCOUNTER
S/w pt  S/p TFESI 10/8  Advised may take up to 2 weeks for benefit and to give injection more time  Pt states 0% improvement so far  Taking advil and using aspercreme without relief  Pt states up all night with pain  Asking if there is anything [facility] can prescribe for buttock, leg and foot pain

## 2019-10-14 NOTE — TELEPHONE ENCOUNTER
Pt was unaware of script  Pt will call [facility] at this time  Advised to cb should there be any issues filling script

## 2019-10-15 ENCOUNTER — TELEPHONE (OUTPATIENT)
Dept: PAIN MEDICINE | Facility: CLINIC | Age: 84
End: 2019-10-15

## 2019-10-16 NOTE — TELEPHONE ENCOUNTER
10/8/ procedure was Right L5-S1 TFESI  Do you want LESI or TFESI scheduled? Pt is on ASA  Will need to obtain hold if LESI

## 2019-10-17 ENCOUNTER — TELEPHONE (OUTPATIENT)
Dept: PAIN MEDICINE | Facility: MEDICAL CENTER | Age: 84
End: 2019-10-17

## 2019-10-17 NOTE — TELEPHONE ENCOUNTER
Pt called an is requesting her medical records from Dr Cipriano Sandifer    Pt   Can be reached at [phone]

## 2019-10-18 NOTE — TELEPHONE ENCOUNTER
Spoke with patient who needs records sent to another dr Faith Cabrera her to have that office call [facility] to facilitate  Also mailed release for signature for future requests

## 2019-10-29 ENCOUNTER — HOSPITAL ENCOUNTER (OUTPATIENT)
Dept: RADIOLOGY | Facility: CLINIC | Age: 84
Discharge: HOME | End: 2019-10-29
Attending: ANESTHESIOLOGY | Admitting: ANESTHESIOLOGY
Payer: COMMERCIAL

## 2019-10-29 VITALS
HEART RATE: 70 BPM | OXYGEN SATURATION: 96 % | SYSTOLIC BLOOD PRESSURE: 122 MMHG | DIASTOLIC BLOOD PRESSURE: 62 MMHG | RESPIRATION RATE: 20 BRPM | TEMPERATURE: 97.7 F

## 2019-10-29 DIAGNOSIS — M51.16 LUMBAR DISC DISEASE WITH RADICULOPATHY: ICD-10-CM

## 2019-10-29 DIAGNOSIS — S39.012D STRAIN OF LUMBAR REGION, SUBSEQUENT ENCOUNTER: Primary | ICD-10-CM

## 2019-10-29 PROCEDURE — 64484 NJX AA&/STRD TFRM EPI L/S EA: CPT | Performed by: ANESTHESIOLOGY

## 2019-10-29 PROCEDURE — 64483 NJX AA&/STRD TFRM EPI L/S 1: CPT | Performed by: ANESTHESIOLOGY

## 2019-10-29 RX ORDER — METHOCARBAMOL 500 MG/1
500 TABLET, FILM COATED ORAL 2 TIMES DAILY
Qty: 30 TABLET | Refills: 1 | Status: SHIPPED | OUTPATIENT
Start: 2019-10-29 | End: 2019-12-04

## 2019-10-29 RX ORDER — BUPIVACAINE HCL/PF 2.5 MG/ML
5 VIAL (ML) INJECTION ONCE
Status: COMPLETED | OUTPATIENT
Start: 2019-10-29 | End: 2019-10-29

## 2019-10-29 RX ORDER — PAPAVERINE HCL 150 MG
20 CAPSULE, EXTENDED RELEASE ORAL ONCE
Status: COMPLETED | OUTPATIENT
Start: 2019-10-29 | End: 2019-10-29

## 2019-10-29 RX ORDER — LIDOCAINE HYDROCHLORIDE 10 MG/ML
5 INJECTION, SOLUTION EPIDURAL; INFILTRATION; INTRACAUDAL; PERINEURAL ONCE
Status: COMPLETED | OUTPATIENT
Start: 2019-10-29 | End: 2019-10-29

## 2019-10-29 RX ADMIN — Medication 5 ML: at 08:55

## 2019-10-29 RX ADMIN — LIDOCAINE HYDROCHLORIDE 5 ML: 10 INJECTION, SOLUTION EPIDURAL; INFILTRATION; INTRACAUDAL; PERINEURAL at 08:54

## 2019-10-29 RX ADMIN — DEXAMETHASONE SODIUM PHOSPHATE 20 MG: 10 INJECTION, SOLUTION INTRAMUSCULAR; INTRAVENOUS at 08:55

## 2019-10-29 RX ADMIN — IOHEXOL 2 ML: 300 INJECTION, SOLUTION INTRAVENOUS at 08:55

## 2019-10-29 NOTE — DISCHARGE INSTR - LAB
Epidural Steroid Injection   WHAT YOU NEED TO KNOW:   An epidural steroid injection (MICHAEL) is a procedure to inject steroid medicine into the epidural space  The epidural space is between your spinal cord and vertebrae  Steroids reduce inflammation and fluid buildup in your spine that may be causing pain  You may be given pain medicine along with the steroids  ACTIVITY  · Do not drive or operate machinery today  · No strenuous activity today - bending, lifting, etc   · You may resume normal activites starting tomorrow - start slowly and as tolerated  · You may shower today, but no tub baths or hot tubs  · You may have numbness for several hours from the local anesthetic  Please use caution and common sense, especially with weight-bearing activities  CARE OF THE INJECTION SITE  · If you have soreness or pain, apply ice to the area today (20 minutes on/20 minutes off)  · Starting tomorrow, you may use warm, moist heat or ice if needed  · You may have an increase or change in your discomfort for 36-48 hours after your treatment  · Apply ice and continue with any pain medication you have been prescribed  · [facility]ify [facility] if you have any of the following: redness, drainage, swelling, headache, stiff neck or fever above 100°F     SPECIAL INSTRUCTIONS  · Our office will contact you in approximately 7 days for a progress report  MEDICATIONS  · Continue to take all routine medications  · Our office may have instructed you to hold some medications  If you have a problem specifically related to your procedure, please call our office at [phone]  Problems not related to your procedure should be directed to your primary care physician

## 2019-11-05 ENCOUNTER — TELEPHONE (OUTPATIENT)
Dept: PAIN MEDICINE | Facility: CLINIC | Age: 84
End: 2019-11-05

## 2019-11-07 NOTE — TELEPHONE ENCOUNTER
Spoke with patient, returning call to office, reports some improvement not much and pain   level of  6/10    In the morning it is worse, sometimes can't walk   She states her butt still hurts alot

## 2019-11-11 NOTE — TELEPHONE ENCOUNTER
Please clarify if LESI or TFESI  Last 2 injections were Right L5-S1 TFESI  Pt is on ASA, if LESI will need to get hold approval from PCP Dr Kathy Ferguson

## 2019-11-21 ENCOUNTER — HOSPITAL ENCOUNTER (OUTPATIENT)
Dept: RADIOLOGY | Facility: CLINIC | Age: 84
Discharge: HOME | End: 2019-11-21
Attending: ANESTHESIOLOGY
Payer: COMMERCIAL

## 2019-11-21 VITALS
SYSTOLIC BLOOD PRESSURE: 127 MMHG | DIASTOLIC BLOOD PRESSURE: 64 MMHG | OXYGEN SATURATION: 96 % | TEMPERATURE: 97.8 F | HEART RATE: 72 BPM | RESPIRATION RATE: 18 BRPM

## 2019-11-21 DIAGNOSIS — M51.16 LUMBAR DISC DISEASE WITH RADICULOPATHY: ICD-10-CM

## 2019-11-21 PROCEDURE — 64483 NJX AA&/STRD TFRM EPI L/S 1: CPT | Performed by: ANESTHESIOLOGY

## 2019-11-21 PROCEDURE — 64484 NJX AA&/STRD TFRM EPI L/S EA: CPT | Performed by: ANESTHESIOLOGY

## 2019-11-21 RX ORDER — LIDOCAINE HYDROCHLORIDE 10 MG/ML
5 INJECTION, SOLUTION EPIDURAL; INFILTRATION; INTRACAUDAL; PERINEURAL ONCE
Status: COMPLETED | OUTPATIENT
Start: 2019-11-21 | End: 2019-11-21

## 2019-11-21 RX ORDER — PAPAVERINE HCL 150 MG
20 CAPSULE, EXTENDED RELEASE ORAL ONCE
Status: COMPLETED | OUTPATIENT
Start: 2019-11-21 | End: 2019-11-21

## 2019-11-21 RX ORDER — BUPIVACAINE HCL/PF 2.5 MG/ML
5 VIAL (ML) INJECTION ONCE
Status: COMPLETED | OUTPATIENT
Start: 2019-11-21 | End: 2019-11-21

## 2019-11-21 RX ADMIN — LIDOCAINE HYDROCHLORIDE 5 ML: 10 INJECTION, SOLUTION EPIDURAL; INFILTRATION; INTRACAUDAL; PERINEURAL at 15:00

## 2019-11-21 RX ADMIN — IOHEXOL 2 ML: 300 INJECTION, SOLUTION INTRAVENOUS at 15:00

## 2019-11-21 RX ADMIN — DEXAMETHASONE SODIUM PHOSPHATE 20 MG: 10 INJECTION, SOLUTION INTRAMUSCULAR; INTRAVENOUS at 15:00

## 2019-11-21 RX ADMIN — Medication 5 ML: at 15:00

## 2019-11-21 NOTE — H&P
History of Present Illness: The patient is a 80 y o  female who presents with complaints of low back pain      Patient Active Problem List   Diagnosis    Urinary incontinence    Other insomnia    Lung nodule < 6cm on CT    Elevated rheumatoid factor    Generalized osteoarthritis    Hyperlipidemia    Hyperparathyroidism ([address] )    Hypertension    Hypothyroidism    Overactive bladder    Strain of lumbar region    Lumbar disc disease with radiculopathy       Past Medical History:   Diagnosis Date    BCC (basal cell carcinoma)     Benign uterine neoplasm     C  difficile colitis        Past Surgical History:   Procedure Laterality Date    CATARACT EXTRACTION      Last assessed - 1/14/16    MALIGNANT SKIN LESION EXCISION      Face    PARATHYROIDECTOMY      REPLACEMENT TOTAL KNEE      ROTATOR CUFF REPAIR      Last assessed - 1/14/16    TOTAL ABDOMINAL HYSTERECTOMY      with derrell oopherectomy, Last assessed - 1/14/16         Current Outpatient Medications:     aspirin 81 MG tablet, Take by mouth, Disp: , Rfl:     atorvastatin (LIPITOR) 20 mg tablet, Take 1 tablet (20 mg total) by mouth daily, Disp: 90 tablet, Rfl: 1    cyanocobalamin (VITAMIN B-12) 1,000 mcg tablet, Take by mouth, Disp: , Rfl:     gabapentin (NEURONTIN) 100 mg capsule, Take 1 capsule (100 mg total) by mouth daily at bedtime, Disp: 30 capsule, Rfl: 0    levothyroxine 75 mcg tablet, Take 1 tablet (75 mcg total) by mouth daily, Disp: 90 tablet, Rfl: 1    lisinopril (ZESTRIL) 10 mg tablet, Take 1 tablet (10 mg total) by mouth daily, Disp: 90 tablet, Rfl: 1    meloxicam (MOBIC) 15 mg tablet, Take 1 tablet (15 mg total) by mouth daily (Patient not taking: Reported on 10/4/2019), Disp: 30 tablet, Rfl: 1    methocarbamol (ROBAXIN) 500 mg tablet, Take 1 tablet (500 mg total) by mouth 3 (three) times a day, Disp: 30 tablet, Rfl: 1    methocarbamol (ROBAXIN) 500 mg tablet, Take 1 tablet (500 mg total) by mouth 2 (two) times a day for 15 days, Disp: 30 tablet, Rfl: 1    Multiple Vitamin (MULTIVITAMIN) tablet, Take 1 tablet by mouth daily, Disp: , Rfl:     omega-3-acid ethyl esters (LOVAZA) 1 g capsule, Take 2 capsules by mouth 2 (two) times a day, Disp: , Rfl:     oxybutynin (DITROPAN) 5 mg tablet, take 1 tablet by mouth twice a day, Disp: 180 tablet, Rfl: 1    oxyCODONE-acetaminophen (PERCOCET) 5-325 mg per tablet, Take 1 tablet by mouth every 4 (four) hours as needed for moderate pain for up to 20 dosesMax Daily Amount: 6 tablets (Patient not taking: Reported on 10/4/2019), Disp: 20 tablet, Rfl: 0    Probiotic Product (PROBIOTIC-10 PO), Take by mouth daily, Disp: , Rfl:     pyridoxine (VITAMIN B6) 100 mg tablet, Take 100 mg by mouth daily, Disp: , Rfl:     traMADol (ULTRAM) 50 mg tablet, Take 1 tablet (50 mg total) by mouth every 6 (six) hours as needed for moderate pain (Patient not taking: Reported on 10/4/2019), Disp: 30 tablet, Rfl: 0    Current Facility-Administered Medications:     bupivacaine (PF) (MARCAINE) 0 25 % injection 5 mL, 5 mL, Perineural, Once, Martin Lerma MD    dexamethasone (PF) (DECADRON) injection 20 mg, 20 mg, Perineural, Once, Martin Lerma MD    iohexol (OMNIPAQUE) 300 mg/mL injection 50 mL, 50 mL, Perineural, Once, Martin Lerma MD    lidocaine (PF) (XYLOCAINE-MPF) 1 % injection 5 mL, 5 mL, Perineural, Once, Martin Lerma MD    Allergies   Allergen Reactions    Codeine Sulfate        Physical Exam: There were no vitals filed for this visit  General: Awake, Alert, Oriented x 3, Mood and affect appropriate  Respiratory: Respirations even and unlabored  Cardiovascular: Peripheral pulses intact; no edema  Musculoskeletal Exam:  Within normal limits    ASA Score:  2  Assessment:   1   Lumbar disc disease with radiculopathy        Plan: RIGHT L5, S1 TFESI #3

## 2019-11-21 NOTE — DISCHARGE INSTR - LAB
Epidural Steroid Injection   WHAT YOU NEED TO KNOW:   An epidural steroid injection (MICHAEL) is a procedure to inject steroid medicine into the epidural space  The epidural space is between your spinal cord and vertebrae  Steroids reduce inflammation and fluid buildup in your spine that may be causing pain  You may be given pain medicine along with the steroids  ACTIVITY  · Do not drive or operate machinery today  · No strenuous activity today - bending, lifting, etc   · You may resume normal activites starting tomorrow - start slowly and as tolerated  · You may shower today, but no tub baths or hot tubs  · You may have numbness for several hours from the local anesthetic  Please use caution and common sense, especially with weight-bearing activities  CARE OF THE INJECTION SITE  · If you have soreness or pain, apply ice to the area today (20 minutes on/20 minutes off)  · Starting tomorrow, you may use warm, moist heat or ice if needed  · You may have an increase or change in your discomfort for 36-48 hours after your treatment  · Apply ice and continue with any pain medication you have been prescribed  · [facility] if you have any of the following: redness, drainage, swelling, headache, stiff neck or fever above 100°F     SPECIAL INSTRUCTIONS  · Our office will contact you in approximately 7 days for a progress report  MEDICATIONS  · Continue to take all routine medications  · Our office may have instructed you to hold some medications  If you have a problem specifically related to your procedure, please call our office at [phone]  Problems not related to your procedure should be directed to your primary care physician

## 2019-11-29 ENCOUNTER — TELEPHONE (OUTPATIENT)
Dept: PAIN MEDICINE | Facility: CLINIC | Age: 84
End: 2019-11-29

## 2019-12-02 NOTE — TELEPHONE ENCOUNTER
Patient says there is no change  When she bends over to pick something up she gets a very bad stock in her leg  She wants to know what can be done about this  Her pain level is 7-10/10 at times  The injection helped about 0%

## 2019-12-04 ENCOUNTER — OFFICE VISIT (OUTPATIENT)
Dept: PAIN MEDICINE | Facility: CLINIC | Age: 84
End: 2019-12-04
Payer: COMMERCIAL

## 2019-12-04 VITALS
DIASTOLIC BLOOD PRESSURE: 60 MMHG | BODY MASS INDEX: 25.9 KG/M2 | RESPIRATION RATE: 20 BRPM | HEIGHT: 67 IN | SYSTOLIC BLOOD PRESSURE: 106 MMHG | HEART RATE: 72 BPM | WEIGHT: 165 LBS

## 2019-12-04 DIAGNOSIS — M51.16 LUMBAR DISC DISEASE WITH RADICULOPATHY: Primary | ICD-10-CM

## 2019-12-04 DIAGNOSIS — M70.71 ISCHIAL BURSITIS OF RIGHT SIDE: ICD-10-CM

## 2019-12-04 PROCEDURE — 99214 OFFICE O/P EST MOD 30 MIN: CPT | Performed by: ANESTHESIOLOGY

## 2019-12-04 NOTE — H&P (VIEW-ONLY)
Assessment:  1  Lumbar disc disease with radiculopathy - Right     2  Ischial bursitis of right side  FL spine and pain procedure       Plan: This is an 42-year-old female who presents with low back pain and right leg pain which is neuropathic in nature  Patient has had lumbar epidural steroid injections x3  She continues to complain of pain in the shooting buttock and back and right-sided leg pain  There is tenderness along the right ischial bursae  Today, I will schedule patient for right ischial bursa injection    Complete risks and benefits including bleeding, infection, tissue reaction, nerve injury and allergic reaction were discussed  The approach was demonstrated using models and literature was provided  Verbal and written consent was obtained  My impressions and treatment recommendations were discussed in detail with the patient who verbalized understanding and had no further questions  Discharge instructions were provided  I personally saw and examined the patient and I agree with the above discussed plan of care  History of Present Illness:  Mejia Giles is a 80 y o  female who presents for a follow up office visit in regards to right sided buttock pain  The patients current symptoms include intermittent low back pain radiating down the right leg  Her pain is sharp, rated 10/10 on the pain scale  Patient had a 3rd lumbar epidural steroid injection  She reported no relief of pain  She reports pain in the right ischial bursae  She states that it is tender to touch and shoots down the right leg  I have personally reviewed and/or updated the patient's past medical history, past surgical history, family history, social history, current medications, allergies, and vital signs today  Review of Systems   Respiratory: Negative for shortness of breath  Cardiovascular: Negative for chest pain  Gastrointestinal: Negative for constipation, diarrhea, nausea and vomiting  Musculoskeletal: Positive for gait problem  Negative for arthralgias, joint swelling and myalgias  Skin: Negative for rash  Neurological: Negative for dizziness, seizures and weakness  All other systems reviewed and are negative        Patient Active Problem List   Diagnosis    Urinary incontinence    Other insomnia    Lung nodule < 6cm on CT    Elevated rheumatoid factor    Generalized osteoarthritis    Hyperlipidemia    Hyperparathyroidism ([address] )    Hypertension    Hypothyroidism    Overactive bladder    Strain of lumbar region    Lumbar disc disease with radiculopathy       Past Medical History:   Diagnosis Date    BCC (basal cell carcinoma)     Benign uterine neoplasm     C  difficile colitis        Past Surgical History:   Procedure Laterality Date    CATARACT EXTRACTION      Last assessed - 1/14/16    MALIGNANT SKIN LESION EXCISION      Face    PARATHYROIDECTOMY      REPLACEMENT TOTAL KNEE      ROTATOR CUFF REPAIR      Last assessed - 1/14/16    TOTAL ABDOMINAL HYSTERECTOMY      with derrell oopherectomy, Last assessed - 1/14/16       Family History   Problem Relation Age of Onset    Heart attack Mother     Substance Abuse Mother         denied    Mental illness Mother         denied    Other Mother         Cardiac Disorder     Cancer Mother         Malignant Neoplasm     Cancer Father         Malignant Neoplasm     Substance Abuse Father         denied    Mental illness Father         denied    Lung cancer Father    [de-identified] Spina bifida Child        Social History     Occupational History    Occupation:      Comment: Retired    Tobacco Use    Smoking status: Former Smoker    Smokeless tobacco: Never Used   Substance and Sexual Activity    Alcohol use: No    Drug use: No    Sexual activity: Not on file       Current Outpatient Medications on File Prior to Visit   Medication Sig    aspirin 81 MG tablet Take by mouth    atorvastatin (LIPITOR) 20 mg tablet Take 1 tablet (20 mg total) by mouth daily    cyanocobalamin (VITAMIN B-12) 1,000 mcg tablet Take by mouth    gabapentin (NEURONTIN) 100 mg capsule Take 1 capsule (100 mg total) by mouth daily at bedtime    levothyroxine 75 mcg tablet Take 1 tablet (75 mcg total) by mouth daily    lisinopril (ZESTRIL) 10 mg tablet Take 1 tablet (10 mg total) by mouth daily    meloxicam (MOBIC) 15 mg tablet Take 1 tablet (15 mg total) by mouth daily (Patient not taking: Reported on 10/4/2019)    methocarbamol (ROBAXIN) 500 mg tablet Take 1 tablet (500 mg total) by mouth 3 (three) times a day    methocarbamol (ROBAXIN) 500 mg tablet Take 1 tablet (500 mg total) by mouth 2 (two) times a day for 15 days    Multiple Vitamin (MULTIVITAMIN) tablet Take 1 tablet by mouth daily    omega-3-acid ethyl esters (LOVAZA) 1 g capsule Take 2 capsules by mouth 2 (two) times a day    oxybutynin (DITROPAN) 5 mg tablet take 1 tablet by mouth twice a day    oxyCODONE-acetaminophen (PERCOCET) 5-325 mg per tablet Take 1 tablet by mouth every 4 (four) hours as needed for moderate pain for up to 20 dosesMax Daily Amount: 6 tablets (Patient not taking: Reported on 10/4/2019)    Probiotic Product (PROBIOTIC-10 PO) Take by mouth daily    pyridoxine (VITAMIN B6) 100 mg tablet Take 100 mg by mouth daily    traMADol (ULTRAM) 50 mg tablet Take 1 tablet (50 mg total) by mouth every 6 (six) hours as needed for moderate pain (Patient not taking: Reported on 10/4/2019)     No current facility-administered medications on file prior to visit  Allergies   Allergen Reactions    Codeine Sulfate        Physical Exam:    There were no vitals taken for this visit  Constitutional:normal, well developed, well nourished, alert, in no distress and non-toxic and no overt pain behavior    Eyes:anicteric  HEENT:grossly intact  Neck:supple, symmetric, trachea midline and no masses   Pulmonary:even and unlabored  Cardiovascular:No edema or pitting edema present  Skin:Normal without rashes or lesions and well hydrated  Psychiatric:Mood and affect appropriate  Neurologic:Cranial Nerves II-XII grossly intact  Musculoskeletal:normal    Imaging

## 2019-12-04 NOTE — PROGRESS NOTES
Assessment:  1  Lumbar disc disease with radiculopathy - Right     2  Ischial bursitis of right side  FL spine and pain procedure       Plan: This is an 80-year-old female who presents with low back pain and right leg pain which is neuropathic in nature  Patient has had lumbar epidural steroid injections x3  She continues to complain of pain in the shooting buttock and back and right-sided leg pain  There is tenderness along the right ischial bursae  Today, I will schedule patient for right ischial bursa injection    Complete risks and benefits including bleeding, infection, tissue reaction, nerve injury and allergic reaction were discussed  The approach was demonstrated using models and literature was provided  Verbal and written consent was obtained  My impressions and treatment recommendations were discussed in detail with the patient who verbalized understanding and had no further questions  Discharge instructions were provided  I personally saw and examined the patient and I agree with the above discussed plan of care  History of Present Illness:  Jairo Elkins is a 80 y o  female who presents for a follow up office visit in regards to right sided buttock pain  The patients current symptoms include intermittent low back pain radiating down the right leg  Her pain is sharp, rated 10/10 on the pain scale  Patient had a 3rd lumbar epidural steroid injection  She reported no relief of pain  She reports pain in the right ischial bursae  She states that it is tender to touch and shoots down the right leg  I have personally reviewed and/or updated the patient's past medical history, past surgical history, family history, social history, current medications, allergies, and vital signs today  Review of Systems   Respiratory: Negative for shortness of breath  Cardiovascular: Negative for chest pain  Gastrointestinal: Negative for constipation, diarrhea, nausea and vomiting  Musculoskeletal: Positive for gait problem  Negative for arthralgias, joint swelling and myalgias  Skin: Negative for rash  Neurological: Negative for dizziness, seizures and weakness  All other systems reviewed and are negative        Patient Active Problem List   Diagnosis    Urinary incontinence    Other insomnia    Lung nodule < 6cm on CT    Elevated rheumatoid factor    Generalized osteoarthritis    Hyperlipidemia    Hyperparathyroidism ([address] )    Hypertension    Hypothyroidism    Overactive bladder    Strain of lumbar region    Lumbar disc disease with radiculopathy       Past Medical History:   Diagnosis Date    BCC (basal cell carcinoma)     Benign uterine neoplasm     C  difficile colitis        Past Surgical History:   Procedure Laterality Date    CATARACT EXTRACTION      Last assessed - 1/14/16    MALIGNANT SKIN LESION EXCISION      Face    PARATHYROIDECTOMY      REPLACEMENT TOTAL KNEE      ROTATOR CUFF REPAIR      Last assessed - 1/14/16    TOTAL ABDOMINAL HYSTERECTOMY      with derrell oopherectomy, Last assessed - 1/14/16       Family History   Problem Relation Age of Onset    Heart attack Mother     Substance Abuse Mother         denied    Mental illness Mother         denied    Other Mother         Cardiac Disorder     Cancer Mother         Malignant Neoplasm     Cancer Father         Malignant Neoplasm     Substance Abuse Father         denied    Mental illness Father         denied    Lung cancer Father    [facility] Spina bifida Child        Social History     Occupational History    Occupation:      Comment: Retired    Tobacco Use    Smoking status: Former Smoker    Smokeless tobacco: Never Used   Substance and Sexual Activity    Alcohol use: No    Drug use: No    Sexual activity: Not on file       Current Outpatient Medications on File Prior to Visit   Medication Sig    aspirin 81 MG tablet Take by mouth    atorvastatin (LIPITOR) 20 mg tablet Take 1 tablet (20 mg total) by mouth daily    cyanocobalamin (VITAMIN B-12) 1,000 mcg tablet Take by mouth    gabapentin (NEURONTIN) 100 mg capsule Take 1 capsule (100 mg total) by mouth daily at bedtime    levothyroxine 75 mcg tablet Take 1 tablet (75 mcg total) by mouth daily    lisinopril (ZESTRIL) 10 mg tablet Take 1 tablet (10 mg total) by mouth daily    meloxicam (MOBIC) 15 mg tablet Take 1 tablet (15 mg total) by mouth daily (Patient not taking: Reported on 10/4/2019)    methocarbamol (ROBAXIN) 500 mg tablet Take 1 tablet (500 mg total) by mouth 3 (three) times a day    methocarbamol (ROBAXIN) 500 mg tablet Take 1 tablet (500 mg total) by mouth 2 (two) times a day for 15 days    Multiple Vitamin (MULTIVITAMIN) tablet Take 1 tablet by mouth daily    omega-3-acid ethyl esters (LOVAZA) 1 g capsule Take 2 capsules by mouth 2 (two) times a day    oxybutynin (DITROPAN) 5 mg tablet take 1 tablet by mouth twice a day    oxyCODONE-acetaminophen (PERCOCET) 5-325 mg per tablet Take 1 tablet by mouth every 4 (four) hours as needed for moderate pain for up to 20 dosesMax Daily Amount: 6 tablets (Patient not taking: Reported on 10/4/2019)    Probiotic Product (PROBIOTIC-10 PO) Take by mouth daily    pyridoxine (VITAMIN B6) 100 mg tablet Take 100 mg by mouth daily    traMADol (ULTRAM) 50 mg tablet Take 1 tablet (50 mg total) by mouth every 6 (six) hours as needed for moderate pain (Patient not taking: Reported on 10/4/2019)     No current facility-administered medications on file prior to visit  Allergies   Allergen Reactions    Codeine Sulfate        Physical Exam:    There were no vitals taken for this visit  Constitutional:normal, well developed, well nourished, alert, in no distress and non-toxic and no overt pain behavior    Eyes:anicteric  HEENT:grossly intact  Neck:supple, symmetric, trachea midline and no masses   Pulmonary:even and unlabored  Cardiovascular:No edema or pitting edema present  Skin:Normal without rashes or lesions and well hydrated  Psychiatric:Mood and affect appropriate  Neurologic:Cranial Nerves II-XII grossly intact  Musculoskeletal:normal    Imaging

## 2019-12-06 NOTE — TELEPHONE ENCOUNTER
Pt called stating she has an procedure with SI on 12/19 and she states she can't wait that long she can't even get up to walk  If an appt get's canceled or there is a appt open earlier please call pt      Pt can be reached at [phone]

## 2019-12-19 ENCOUNTER — HOSPITAL ENCOUNTER (OUTPATIENT)
Dept: RADIOLOGY | Facility: CLINIC | Age: 84
Discharge: HOME | End: 2019-12-19
Attending: ANESTHESIOLOGY | Admitting: ANESTHESIOLOGY
Payer: COMMERCIAL

## 2019-12-19 VITALS
TEMPERATURE: 97.8 F | HEART RATE: 65 BPM | RESPIRATION RATE: 20 BRPM | SYSTOLIC BLOOD PRESSURE: 133 MMHG | OXYGEN SATURATION: 95 % | DIASTOLIC BLOOD PRESSURE: 64 MMHG

## 2019-12-19 DIAGNOSIS — M70.71 ISCHIAL BURSITIS OF RIGHT SIDE: ICD-10-CM

## 2019-12-19 PROCEDURE — 20610 DRAIN/INJ JOINT/BURSA W/O US: CPT | Performed by: ANESTHESIOLOGY

## 2019-12-19 PROCEDURE — 77002 NEEDLE LOCALIZATION BY XRAY: CPT

## 2019-12-19 RX ORDER — BUPIVACAINE HCL/PF 2.5 MG/ML
5 VIAL (ML) INJECTION ONCE
Status: COMPLETED | OUTPATIENT
Start: 2019-12-19 | End: 2019-12-19

## 2019-12-19 RX ORDER — LIDOCAINE HYDROCHLORIDE 10 MG/ML
5 INJECTION, SOLUTION EPIDURAL; INFILTRATION; INTRACAUDAL; PERINEURAL ONCE
Status: COMPLETED | OUTPATIENT
Start: 2019-12-19 | End: 2019-12-19

## 2019-12-19 RX ORDER — METHYLPREDNISOLONE ACETATE 80 MG/ML
80 INJECTION, SUSPENSION INTRA-ARTICULAR; INTRALESIONAL; INTRAMUSCULAR; PARENTERAL; SOFT TISSUE ONCE
Status: COMPLETED | OUTPATIENT
Start: 2019-12-19 | End: 2019-12-19

## 2019-12-19 RX ADMIN — LIDOCAINE HYDROCHLORIDE 5 ML: 10 INJECTION, SOLUTION EPIDURAL; INFILTRATION; INTRACAUDAL; PERINEURAL at 13:41

## 2019-12-19 RX ADMIN — METHYLPREDNISOLONE ACETATE 80 MG: 80 INJECTION, SUSPENSION INTRA-ARTICULAR; INTRALESIONAL; INTRAMUSCULAR; PARENTERAL; SOFT TISSUE at 13:41

## 2019-12-19 RX ADMIN — Medication 5 ML: at 13:41

## 2019-12-19 RX ADMIN — IOHEXOL 1 ML: 300 INJECTION, SOLUTION INTRAVENOUS at 13:41

## 2019-12-19 NOTE — INTERVAL H&P NOTE
Update: (This section must be completed if the H&P was completed greater than 24 hrs to procedure or admission)    H&P reviewed  After examining the patient, I find no changed to the H&P since it had been written  Patient re-evaluated   Accept as history and physical     Bradly Awad MD/December 19, 2019/1:34 PM

## 2019-12-19 NOTE — DISCHARGE INSTR - LAB
Steroid Joint Injection   WHAT YOU NEED TO KNOW:   A steroid joint injection is a procedure to inject steroid medicine into a joint  Steroid medicine decreases pain and inflammation  The injection may also contain an anesthetic (numbing medicine) to decrease pain  It may be done to treat conditions such as arthritis, gout, or carpal tunnel syndrome  The injections may be given in your knee, ankle, shoulder, elbow, wrist, ankle or sacroiliac joint  1  Do not apply heat to any area that is numb  If you have discomfort or soreness at the injection site, you may apply ice today, 20 minutes on and 20 minutes off  Tomorrow you may use ice or warm, moist heat  Do not apply ice or heat directly to the skin  2  You may have an increase or change in the discomfort for 36-48 hours after your treatment  Apply ice and continue with any pain medicine you have been prescribed  3  Do not do anything strenuous today  You may shower, but no tub baths or hot tubs today  You may resume your normal activities tomorrow, but do not overdo it  Resume normal activities slowly when you are feeling better  4  If you experience redness, drainage or swelling at the injection site, or if you develop a fever above 100 degrees, please call [facility] at [phone] or go to the Emergency Room  5  Continue to take all routine medicines prescribed by your primary care physician unless otherwise instructed by our staff  Most blood thinners should be started again according to your regularly scheduled dosing  If you have any questions, please give our office a call  If you have a problem specifically related to your procedure, please call our office at [phone]  Problems not related to your procedure should be directed to your primary care physician

## 2019-12-26 ENCOUNTER — TELEPHONE (OUTPATIENT)
Dept: PAIN MEDICINE | Facility: CLINIC | Age: 84
End: 2019-12-26

## 2019-12-26 NOTE — TELEPHONE ENCOUNTER
Pt reports no improvement post inj   Pain level still the same  She said this morning she could barely walk

## 2020-01-07 ENCOUNTER — TELEPHONE (OUTPATIENT)
Dept: PAIN MEDICINE | Facility: CLINIC | Age: 85
End: 2020-01-07

## 2020-01-07 DIAGNOSIS — M51.16 LUMBAR DISC DISEASE WITH RADICULOPATHY: Primary | ICD-10-CM

## 2020-01-07 RX ORDER — GABAPENTIN 100 MG/1
100 CAPSULE ORAL
Qty: 30 CAPSULE | Refills: 0 | Status: SHIPPED | OUTPATIENT
Start: 2020-01-07 | End: 2021-01-22

## 2020-01-07 NOTE — TELEPHONE ENCOUNTER
Had a long discussion with patient  Patient reports that she has significant leg pain  Patient has had LESI x3  I advised that we should try Neurontin 100 mg p o  Q h s   Prescription sent to her pharmacy  I advised her of the most common side effect which is dizziness  Patient verbalized understanding    Thank you

## 2020-01-07 NOTE — TELEPHONE ENCOUNTER
Patient is calling in stating that the injections did not help  She does not want to come in to speak with Andry Hunter and then have to come back in for whatever   She would like to speak with the       Patient is requesting a call back from Dr Sheila Onofre

## 2020-01-07 NOTE — TELEPHONE ENCOUNTER
Patient   [phone]  Dr Taylor Torres     Patient is requesting a call back  She is having pain in her sciatica and leg she say's   Her pain level is a 8/10 please follow up

## 2020-01-07 NOTE — TELEPHONE ENCOUNTER
See below, pt is S/P right ischial bursae injection on 12/19/19  Pt states the injection was not effective for her pain  States having pain right under right buttock and down into calf of leg  States she gets an "electric shock" at times if she sits a certain way  Pt is getting frustrated that nothing is working  Has f/u in February  Please advise, repeat injection?

## 2020-01-15 NOTE — TELEPHONE ENCOUNTER
Pt called stating pain is severe during the day and nothing is helping  Pt frustrated she can not perform simple activities during the day due to pain  Pt canceled her 1/14 ov with AR  Pt states AR was not going to be able to help her anyway  Pt did start on gabapentin 100mg HS as ordered 1/7  Pt states it makes her go to sleep but does nothing for her pain during the day  Advised SI can maybe increase frequency or dosage  Pt states "what is that going to do for me " Advised pt SPA would update SI and cb

## 2020-01-15 NOTE — TELEPHONE ENCOUNTER
Pt called and stated she was in extreme pain and wanted to speak to a nurse-  Transferred to the nurse

## 2020-01-20 ENCOUNTER — TELEPHONE (OUTPATIENT)
Dept: RADIOLOGY | Facility: CLINIC | Age: 85
End: 2020-01-20

## 2020-01-20 ENCOUNTER — OFFICE VISIT (OUTPATIENT)
Dept: PAIN MEDICINE | Facility: CLINIC | Age: 85
End: 2020-01-20
Payer: COMMERCIAL

## 2020-01-20 VITALS
DIASTOLIC BLOOD PRESSURE: 54 MMHG | HEART RATE: 62 BPM | HEIGHT: 67 IN | RESPIRATION RATE: 18 BRPM | BODY MASS INDEX: 25.9 KG/M2 | SYSTOLIC BLOOD PRESSURE: 108 MMHG | WEIGHT: 165 LBS

## 2020-01-20 DIAGNOSIS — M47.816 LUMBAR SPONDYLOSIS: Primary | ICD-10-CM

## 2020-01-20 PROCEDURE — 1160F RVW MEDS BY RX/DR IN RCRD: CPT | Performed by: ANESTHESIOLOGY

## 2020-01-20 PROCEDURE — 99214 OFFICE O/P EST MOD 30 MIN: CPT | Performed by: ANESTHESIOLOGY

## 2020-01-20 NOTE — TELEPHONE ENCOUNTER
Pt called and would like to have the 1/21 appt    Please advise as to a time     Pt can be reached at [phone]

## 2020-01-20 NOTE — H&P (VIEW-ONLY)
Assessment:  1  Lumbar spondylosis  FL spine and pain procedure       Plan:    The patient's pain persists despite time, relative rest, activity modification and therapy  Based on the patient's symptoms and examination, I suspect that a component of pain is being generated by the facet joints  The facet joints are only one of several possible axial pain generators  This was reviewed with the patient today  We will move forward with medial branch blockade at levels [b/l L3-L5] utilizing a double block paradigm  If the patient receives significant relief of appropriate duration with 2% lidocaine, we will confirm with   25% bupivacaine  If the patient demonstrates appropriate response to medial branch blockade we will schedule radiofrequency ablation of the lumbar medial branches to essentially denervate the facet joints  In the office today, we reviewed the nature of the patient's pathology in depth using diagrams and models  We discussed the approach we would take for the medial branch block and provided literature for home review  The patient understands the risks associated with the procedure including bleeding, infection, tissue action, allergic reaction, nerve injury and verbal and written consent was obtained today  My impressions and treatment recommendations were discussed in detail with the patient who verbalized understanding and had no further questions  Discharge instructions were provided  I personally saw and examined the patient and I agree with the above discussed plan of care  History of Present Illness:  Oleksandr Siu is a 80 y o  female who presents for a follow up office visit in regards to Buttocks Pain (Left); Back Pain (Left side); and Leg Pain (Left side)  The patients current symptoms include constant shooting leg pain s/p LESI x3  Patient reports no pain relief      I have personally reviewed and/or updated the patient's past medical history, past surgical history, family history, social history, current medications, allergies, and vital signs today  Review of Systems   Respiratory: Negative for shortness of breath  Cardiovascular: Negative for chest pain  Gastrointestinal: Negative for constipation, diarrhea, nausea and vomiting  Musculoskeletal: Positive for gait problem  Negative for arthralgias, joint swelling and myalgias  Skin: Negative for rash  Neurological: Negative for dizziness, seizures and weakness  All other systems reviewed and are negative        Patient Active Problem List   Diagnosis    Urinary incontinence    Other insomnia    Lung nodule < 6cm on CT    Elevated rheumatoid factor    Generalized osteoarthritis    Hyperlipidemia    Hyperparathyroidism ([address] )    Hypertension    Hypothyroidism    Overactive bladder    Strain of lumbar region    Lumbar disc disease with radiculopathy    Ischial bursitis of right side       Past Medical History:   Diagnosis Date    BCC (basal cell carcinoma)     Benign uterine neoplasm     C  difficile colitis        Past Surgical History:   Procedure Laterality Date    CATARACT EXTRACTION      Last assessed - 1/14/16    MALIGNANT SKIN LESION EXCISION      Face    PARATHYROIDECTOMY      REPLACEMENT TOTAL KNEE      ROTATOR CUFF REPAIR      Last assessed - 1/14/16    TOTAL ABDOMINAL HYSTERECTOMY      with derrell oopherectomy, Last assessed - 1/14/16       Family History   Problem Relation Age of Onset    Heart attack Mother     Substance Abuse Mother         denied    Mental illness Mother         denied    Other Mother         Cardiac Disorder     Cancer Mother         Malignant Neoplasm     Cancer Father         Malignant Neoplasm     Substance Abuse Father         denied    Mental illness Father         denied    Lung cancer Father     Spina bifida Child        Social History     Occupational History    Occupation: Bank Teller     Comment: Retired    Tobacco Use    Smoking status: Former Smoker    Smokeless tobacco: Never Used   Substance and Sexual Activity    Alcohol use: No    Drug use: No    Sexual activity: Not on file       Current Outpatient Medications on File Prior to Visit   Medication Sig    aspirin 81 MG tablet Take by mouth    atorvastatin (LIPITOR) 20 mg tablet Take 1 tablet (20 mg total) by mouth daily    cyanocobalamin (VITAMIN B-12) 1,000 mcg tablet Take by mouth    gabapentin (NEURONTIN) 100 mg capsule Take 1 capsule (100 mg total) by mouth daily at bedtime    levothyroxine 75 mcg tablet Take 1 tablet (75 mcg total) by mouth daily    lisinopril (ZESTRIL) 10 mg tablet Take 1 tablet (10 mg total) by mouth daily    Multiple Vitamin (MULTIVITAMIN) tablet Take 1 tablet by mouth daily    omega-3-acid ethyl esters (LOVAZA) 1 g capsule Take 2 capsules by mouth 2 (two) times a day    Probiotic Product (PROBIOTIC-10 PO) Take by mouth daily    pyridoxine (VITAMIN B6) 100 mg tablet Take 100 mg by mouth daily    [DISCONTINUED] oxyCODONE-acetaminophen (PERCOCET) 5-325 mg per tablet Take 1 tablet by mouth every 4 (four) hours as needed for moderate pain for up to 20 dosesMax Daily Amount: 6 tablets    [DISCONTINUED] traMADol (ULTRAM) 50 mg tablet Take 1 tablet (50 mg total) by mouth every 6 (six) hours as needed for moderate pain     No current facility-administered medications on file prior to visit  Allergies   Allergen Reactions    Codeine Sulfate        Physical Exam:    Resp 18   Ht 5' 7" (1 702 m)   Wt 74 8 kg (165 lb)   BMI 25 84 kg/m²     Constitutional:normal, well developed, well nourished, alert, in no distress and non-toxic and no overt pain behavior    Eyes:anicteric  HEENT:grossly intact  Neck:supple, symmetric, trachea midline and no masses   Pulmonary:even and unlabored  Cardiovascular:No edema or pitting edema present  Skin:Normal without rashes or lesions and well hydrated  Psychiatric:Mood and affect appropriate  Neurologic:Cranial Nerves II-XII grossly intact  Musculoskeletal:normal    Imaging

## 2020-01-20 NOTE — PROGRESS NOTES
Assessment:  1  Lumbar spondylosis  FL spine and pain procedure       Plan:    The patient's pain persists despite time, relative rest, activity modification and therapy  Based on the patient's symptoms and examination, I suspect that a component of pain is being generated by the facet joints  The facet joints are only one of several possible axial pain generators  This was reviewed with the patient today  We will move forward with medial branch blockade at levels [b/l L3-L5] utilizing a double block paradigm  If the patient receives significant relief of appropriate duration with 2% lidocaine, we will confirm with   25% bupivacaine  If the patient demonstrates appropriate response to medial branch blockade we will schedule radiofrequency ablation of the lumbar medial branches to essentially denervate the facet joints  In the office today, we reviewed the nature of the patient's pathology in depth using diagrams and models  We discussed the approach we would take for the medial branch block and provided literature for home review  The patient understands the risks associated with the procedure including bleeding, infection, tissue action, allergic reaction, nerve injury and verbal and written consent was obtained today  My impressions and treatment recommendations were discussed in detail with the patient who verbalized understanding and had no further questions  Discharge instructions were provided  I personally saw and examined the patient and I agree with the above discussed plan of care  History of Present Illness:  Mejia Giles is a 80 y o  female who presents for a follow up office visit in regards to Buttocks Pain (Left); Back Pain (Left side); and Leg Pain (Left side)  The patients current symptoms include constant shooting leg pain s/p LESI x3  Patient reports no pain relief      I have personally reviewed and/or updated the patient's past medical history, past surgical history, family history, social history, current medications, allergies, and vital signs today  Review of Systems   Respiratory: Negative for shortness of breath  Cardiovascular: Negative for chest pain  Gastrointestinal: Negative for constipation, diarrhea, nausea and vomiting  Musculoskeletal: Positive for gait problem  Negative for arthralgias, joint swelling and myalgias  Skin: Negative for rash  Neurological: Negative for dizziness, seizures and weakness  All other systems reviewed and are negative        Patient Active Problem List   Diagnosis    Urinary incontinence    Other insomnia    Lung nodule < 6cm on CT    Elevated rheumatoid factor    Generalized osteoarthritis    Hyperlipidemia    Hyperparathyroidism ([address]    Hypertension    Hypothyroidism    Overactive bladder    Strain of lumbar region    Lumbar disc disease with radiculopathy    Ischial bursitis of right side       Past Medical History:   Diagnosis Date    BCC (basal cell carcinoma)     Benign uterine neoplasm     C  difficile colitis        Past Surgical History:   Procedure Laterality Date    CATARACT EXTRACTION      Last assessed - 1/14/16    MALIGNANT SKIN LESION EXCISION      Face    PARATHYROIDECTOMY      REPLACEMENT TOTAL KNEE      ROTATOR CUFF REPAIR      Last assessed - 1/14/16    TOTAL ABDOMINAL HYSTERECTOMY      with derrell oopherectomy, Last assessed - 1/14/16       Family History   Problem Relation Age of Onset    Heart attack Mother     Substance Abuse Mother         denied    Mental illness Mother         denied    Other Mother         Cardiac Disorder     Cancer Mother         Malignant Neoplasm     Cancer Father         Malignant Neoplasm     Substance Abuse Father         denied    Mental illness Father         denied    Lung cancer Father     Spina bifida Child        Social History     Occupational History    Occupation: Bank Teller     Comment: Retired    Tobacco Use    Smoking status: Former Smoker    Smokeless tobacco: Never Used   Substance and Sexual Activity    Alcohol use: No    Drug use: No    Sexual activity: Not on file       Current Outpatient Medications on File Prior to Visit   Medication Sig    aspirin 81 MG tablet Take by mouth    atorvastatin (LIPITOR) 20 mg tablet Take 1 tablet (20 mg total) by mouth daily    cyanocobalamin (VITAMIN B-12) 1,000 mcg tablet Take by mouth    gabapentin (NEURONTIN) 100 mg capsule Take 1 capsule (100 mg total) by mouth daily at bedtime    levothyroxine 75 mcg tablet Take 1 tablet (75 mcg total) by mouth daily    lisinopril (ZESTRIL) 10 mg tablet Take 1 tablet (10 mg total) by mouth daily    Multiple Vitamin (MULTIVITAMIN) tablet Take 1 tablet by mouth daily    omega-3-acid ethyl esters (LOVAZA) 1 g capsule Take 2 capsules by mouth 2 (two) times a day    Probiotic Product (PROBIOTIC-10 PO) Take by mouth daily    pyridoxine (VITAMIN B6) 100 mg tablet Take 100 mg by mouth daily    [DISCONTINUED] oxyCODONE-acetaminophen (PERCOCET) 5-325 mg per tablet Take 1 tablet by mouth every 4 (four) hours as needed for moderate pain for up to 20 dosesMax Daily Amount: 6 tablets    [DISCONTINUED] traMADol (ULTRAM) 50 mg tablet Take 1 tablet (50 mg total) by mouth every 6 (six) hours as needed for moderate pain     No current facility-administered medications on file prior to visit  Allergies   Allergen Reactions    Codeine Sulfate        Physical Exam:    Resp 18   Ht 5' 7" (1 702 m)   Wt 74 8 kg (165 lb)   BMI 25 84 kg/m²     Constitutional:normal, well developed, well nourished, alert, in no distress and non-toxic and no overt pain behavior    Eyes:anicteric  HEENT:grossly intact  Neck:supple, symmetric, trachea midline and no masses   Pulmonary:even and unlabored  Cardiovascular:No edema or pitting edema present  Skin:Normal without rashes or lesions and well hydrated  Psychiatric:Mood and affect appropriate  Neurologic:Cranial Nerves II-XII grossly intact  Musculoskeletal:normal    Imaging

## 2020-01-21 ENCOUNTER — HOSPITAL ENCOUNTER (OUTPATIENT)
Dept: RADIOLOGY | Facility: CLINIC | Age: 85
Discharge: HOME | End: 2020-01-21
Attending: ANESTHESIOLOGY | Admitting: ANESTHESIOLOGY
Payer: COMMERCIAL

## 2020-01-21 VITALS
OXYGEN SATURATION: 96 % | RESPIRATION RATE: 18 BRPM | HEART RATE: 59 BPM | SYSTOLIC BLOOD PRESSURE: 105 MMHG | DIASTOLIC BLOOD PRESSURE: 60 MMHG

## 2020-01-21 DIAGNOSIS — M47.816 LUMBAR SPONDYLOSIS: ICD-10-CM

## 2020-01-21 PROCEDURE — 64494 INJ PARAVERT F JNT L/S 2 LEV: CPT | Performed by: ANESTHESIOLOGY

## 2020-01-21 PROCEDURE — 64493 INJ PARAVERT F JNT L/S 1 LEV: CPT | Performed by: ANESTHESIOLOGY

## 2020-01-21 RX ADMIN — Medication 5 ML: at 10:57

## 2020-01-21 NOTE — DISCHARGE INSTR - LAB

## 2020-01-21 NOTE — INTERVAL H&P NOTE
Update: (This section must be completed if the H&P was completed greater than 24 hrs to procedure or admission)    H&P reviewed  After examining the patient, I find no changed to the H&P since it had been written  Patient re-evaluated   Accept as history and physical     Marquis Emery MD/January 21, 2020/10:44 AM

## 2020-01-23 ENCOUNTER — TELEPHONE (OUTPATIENT)
Dept: RADIOLOGY | Facility: CLINIC | Age: 85
End: 2020-01-23

## 2020-01-23 NOTE — TELEPHONE ENCOUNTER
S/P Eduardo L3-L5 MBB # 1 on 1/21/2020  Less than 50% relief for 1st 30 min  50-80% relief 30 min through hour 3  Less than 50% relief hour 4 through next am  Schedule MBB # 2?

## 2020-02-13 ENCOUNTER — HOSPITAL ENCOUNTER (OUTPATIENT)
Dept: RADIOLOGY | Facility: CLINIC | Age: 85
Discharge: HOME | End: 2020-02-13
Attending: ANESTHESIOLOGY
Payer: COMMERCIAL

## 2020-02-13 VITALS
RESPIRATION RATE: 18 BRPM | TEMPERATURE: 97 F | OXYGEN SATURATION: 94 % | DIASTOLIC BLOOD PRESSURE: 64 MMHG | HEART RATE: 75 BPM | SYSTOLIC BLOOD PRESSURE: 116 MMHG

## 2020-02-13 DIAGNOSIS — M47.816 LUMBAR SPONDYLOSIS: ICD-10-CM

## 2020-02-13 PROCEDURE — 64493 INJ PARAVERT F JNT L/S 1 LEV: CPT | Performed by: ANESTHESIOLOGY

## 2020-02-13 PROCEDURE — 64494 INJ PARAVERT F JNT L/S 2 LEV: CPT | Performed by: ANESTHESIOLOGY

## 2020-02-13 RX ORDER — BUPIVACAINE HCL/PF 2.5 MG/ML
5 VIAL (ML) INJECTION ONCE
Status: DISCONTINUED | OUTPATIENT
Start: 2020-02-13 | End: 2020-02-17 | Stop reason: HOSPADM

## 2020-02-14 ENCOUNTER — TELEPHONE (OUTPATIENT)
Dept: RADIOLOGY | Facility: CLINIC | Age: 85
End: 2020-02-14

## 2020-02-14 NOTE — TELEPHONE ENCOUNTER
Pt is s/p BL L3-L5 MBB #2 2/13  Pt reports 50-80% relief from 15 minutes post procedure and lasting 2 hours  Then pt reports less than 50% relief

## 2020-02-21 DIAGNOSIS — E03.9 HYPOTHYROIDISM, UNSPECIFIED TYPE: ICD-10-CM

## 2020-02-21 DIAGNOSIS — E78.00 PURE HYPERCHOLESTEROLEMIA: ICD-10-CM

## 2020-02-21 DIAGNOSIS — I10 ESSENTIAL HYPERTENSION: ICD-10-CM

## 2020-02-21 RX ORDER — LEVOTHYROXINE SODIUM 0.07 MG/1
TABLET ORAL
Qty: 90 TABLET | Refills: 1 | Status: SHIPPED | OUTPATIENT
Start: 2020-02-21 | End: 2020-08-21

## 2020-02-21 RX ORDER — LISINOPRIL 10 MG/1
TABLET ORAL
Qty: 90 TABLET | Refills: 1 | Status: SHIPPED | OUTPATIENT
Start: 2020-02-21 | End: 2020-08-21

## 2020-02-21 RX ORDER — ATORVASTATIN CALCIUM 20 MG/1
TABLET, FILM COATED ORAL
Qty: 90 TABLET | Refills: 1 | Status: SHIPPED | OUTPATIENT
Start: 2020-02-21 | End: 2020-08-21

## 2020-02-28 NOTE — TELEPHONE ENCOUNTER
Pt said she rec'd a call from a machine that her procedure is at 10am but she has written down 10:45a  Please advise   [phone]

## 2020-03-03 ENCOUNTER — HOSPITAL ENCOUNTER (OUTPATIENT)
Dept: RADIOLOGY | Facility: CLINIC | Age: 85
Discharge: HOME | End: 2020-03-03
Attending: ANESTHESIOLOGY | Admitting: ANESTHESIOLOGY
Payer: COMMERCIAL

## 2020-03-03 ENCOUNTER — TELEPHONE (OUTPATIENT)
Dept: RADIOLOGY | Facility: CLINIC | Age: 85
End: 2020-03-03

## 2020-03-03 VITALS
HEART RATE: 66 BPM | RESPIRATION RATE: 18 BRPM | DIASTOLIC BLOOD PRESSURE: 67 MMHG | SYSTOLIC BLOOD PRESSURE: 123 MMHG | TEMPERATURE: 96.7 F | OXYGEN SATURATION: 95 %

## 2020-03-03 DIAGNOSIS — S39.012D STRAIN OF LUMBAR REGION, SUBSEQUENT ENCOUNTER: Primary | ICD-10-CM

## 2020-03-03 DIAGNOSIS — M47.816 LUMBAR SPONDYLOSIS: ICD-10-CM

## 2020-03-03 PROCEDURE — 64635 DESTROY LUMB/SAC FACET JNT: CPT | Performed by: ANESTHESIOLOGY

## 2020-03-03 PROCEDURE — 64636 DESTROY L/S FACET JNT ADDL: CPT | Performed by: ANESTHESIOLOGY

## 2020-03-03 RX ORDER — LIDOCAINE HYDROCHLORIDE 10 MG/ML
5 INJECTION, SOLUTION EPIDURAL; INFILTRATION; INTRACAUDAL; PERINEURAL ONCE
Status: COMPLETED | OUTPATIENT
Start: 2020-03-03 | End: 2020-03-03

## 2020-03-03 RX ORDER — METHOCARBAMOL 750 MG/1
750 TABLET, FILM COATED ORAL 2 TIMES DAILY
Qty: 60 TABLET | Refills: 0 | Status: SHIPPED | OUTPATIENT
Start: 2020-03-03 | End: 2021-01-22

## 2020-03-03 RX ORDER — BUPIVACAINE HCL/PF 2.5 MG/ML
5 VIAL (ML) INJECTION ONCE
Status: COMPLETED | OUTPATIENT
Start: 2020-03-03 | End: 2020-03-03

## 2020-03-03 RX ORDER — METHYLPREDNISOLONE ACETATE 80 MG/ML
80 INJECTION, SUSPENSION INTRA-ARTICULAR; INTRALESIONAL; INTRAMUSCULAR; PARENTERAL; SOFT TISSUE ONCE
Status: COMPLETED | OUTPATIENT
Start: 2020-03-03 | End: 2020-03-03

## 2020-03-03 RX ADMIN — Medication 3 ML: at 10:55

## 2020-03-03 RX ADMIN — METHYLPREDNISOLONE ACETATE 80 MG: 80 INJECTION, SUSPENSION INTRA-ARTICULAR; INTRALESIONAL; INTRAMUSCULAR; PARENTERAL; SOFT TISSUE at 10:55

## 2020-03-03 RX ADMIN — LIDOCAINE HYDROCHLORIDE 4 ML: 10 INJECTION, SOLUTION EPIDURAL; INFILTRATION; INTRACAUDAL; PERINEURAL at 10:47

## 2020-03-03 NOTE — TELEPHONE ENCOUNTER
Pt here for Right RFA, requesting muscle relaxer for occasional back spasms  Had been on Robaxin in past   Can Rx be sent to [id number]? Has Left RFA on 3/17/2020 scheduled

## 2020-03-03 NOTE — H&P
History of Present Illness:  The patient is a 80 y o  female who presents with complaints of low back pain    Patient Active Problem List   Diagnosis    Urinary incontinence    Other insomnia    Lung nodule < 6cm on CT    Elevated rheumatoid factor    Generalized osteoarthritis    Hyperlipidemia    Hyperparathyroidism ([address]    Hypertension    Hypothyroidism    Overactive bladder    Strain of lumbar region    Lumbar disc disease with radiculopathy    Ischial bursitis of right side    Lumbar spondylosis       Past Medical History:   Diagnosis Date    BCC (basal cell carcinoma)     Benign uterine neoplasm     C  difficile colitis        Past Surgical History:   Procedure Laterality Date    CATARACT EXTRACTION      Last assessed - 1/14/16    MALIGNANT SKIN LESION EXCISION      Face    PARATHYROIDECTOMY      REPLACEMENT TOTAL KNEE      ROTATOR CUFF REPAIR      Last assessed - 1/14/16    TOTAL ABDOMINAL HYSTERECTOMY      with derrell oopherectomy, Last assessed - 1/14/16         Current Outpatient Medications:     aspirin 81 MG tablet, Take by mouth, Disp: , Rfl:     atorvastatin (LIPITOR) 20 mg tablet, take 1 tablet by mouth once daily, Disp: 90 tablet, Rfl: 1    cyanocobalamin (VITAMIN B-12) 1,000 mcg tablet, Take by mouth, Disp: , Rfl:     gabapentin (NEURONTIN) 100 mg capsule, Take 1 capsule (100 mg total) by mouth daily at bedtime, Disp: 30 capsule, Rfl: 0    levothyroxine 75 mcg tablet, take 1 tablet by mouth once daily, Disp: 90 tablet, Rfl: 1    lisinopril (ZESTRIL) 10 mg tablet, take 1 tablet by mouth once daily, Disp: 90 tablet, Rfl: 1    Multiple Vitamin (MULTIVITAMIN) tablet, Take 1 tablet by mouth daily, Disp: , Rfl:     omega-3-acid ethyl esters (LOVAZA) 1 g capsule, Take 2 capsules by mouth 2 (two) times a day, Disp: , Rfl:     Probiotic Product (PROBIOTIC-10 PO), Take by mouth daily, Disp: , Rfl:     pyridoxine (VITAMIN B6) 100 mg tablet, Take 100 mg by mouth daily, Disp: , Rfl: Current Facility-Administered Medications:     bupivacaine (PF) (MARCAINE) 0 25 % injection 5 mL, 5 mL, Perineural, Once, Michael Box MD    lidocaine (PF) (XYLOCAINE-MPF) 1 % injection 5 mL, 5 mL, Perineural, Once, Michael Box MD    methylPREDNISolone acetate (DEPO-MEDROL) injection 80 mg, 80 mg, Perineural, Once, Michael Box MD    Allergies   Allergen Reactions    Codeine Sulfate        Physical Exam: There were no vitals filed for this visit  General: Awake, Alert, Oriented x 3, Mood and affect appropriate  Respiratory: Respirations even and unlabored  Cardiovascular: Peripheral pulses intact; no edema  Musculoskeletal Exam:  Within normal limits    ASA Score:  2  Patient/Chart Verification  Patient ID Verified: Verbal  ID Band Applied: No  Consents Confirmed: To be obtained in the Pre-Procedure area  H&P( within 30 days) Verified: To be obtained in the Pre-Procedure area  Interval H&P(within 24 hr) Complete (required for Outpatients and Surgery Admit only): To be obtained in the Pre-Procedure area  Allergies Reviewed: Yes  Anticoag/NSAID held?: NA  Currently on antibiotics?: No  Pregnancy denied?: NA    Assessment:   1   Lumbar spondylosis        Plan: RIGHT L3-L5 RFA

## 2020-03-03 NOTE — DISCHARGE INSTR - LAB

## 2020-03-03 NOTE — H&P (VIEW-ONLY)
History of Present Illness:  The patient is a 80 y o  female who presents with complaints of low back pain    Patient Active Problem List   Diagnosis    Urinary incontinence    Other insomnia    Lung nodule < 6cm on CT    Elevated rheumatoid factor    Generalized osteoarthritis    Hyperlipidemia    Hyperparathyroidism ([address]    Hypertension    Hypothyroidism    Overactive bladder    Strain of lumbar region    Lumbar disc disease with radiculopathy    Ischial bursitis of right side    Lumbar spondylosis       Past Medical History:   Diagnosis Date    BCC (basal cell carcinoma)     Benign uterine neoplasm     C  difficile colitis        Past Surgical History:   Procedure Laterality Date    CATARACT EXTRACTION      Last assessed - 1/14/16    MALIGNANT SKIN LESION EXCISION      Face    PARATHYROIDECTOMY      REPLACEMENT TOTAL KNEE      ROTATOR CUFF REPAIR      Last assessed - 1/14/16    TOTAL ABDOMINAL HYSTERECTOMY      with derrell oopherectomy, Last assessed - 1/14/16         Current Outpatient Medications:     aspirin 81 MG tablet, Take by mouth, Disp: , Rfl:     atorvastatin (LIPITOR) 20 mg tablet, take 1 tablet by mouth once daily, Disp: 90 tablet, Rfl: 1    cyanocobalamin (VITAMIN B-12) 1,000 mcg tablet, Take by mouth, Disp: , Rfl:     gabapentin (NEURONTIN) 100 mg capsule, Take 1 capsule (100 mg total) by mouth daily at bedtime, Disp: 30 capsule, Rfl: 0    levothyroxine 75 mcg tablet, take 1 tablet by mouth once daily, Disp: 90 tablet, Rfl: 1    lisinopril (ZESTRIL) 10 mg tablet, take 1 tablet by mouth once daily, Disp: 90 tablet, Rfl: 1    Multiple Vitamin (MULTIVITAMIN) tablet, Take 1 tablet by mouth daily, Disp: , Rfl:     omega-3-acid ethyl esters (LOVAZA) 1 g capsule, Take 2 capsules by mouth 2 (two) times a day, Disp: , Rfl:     Probiotic Product (PROBIOTIC-10 PO), Take by mouth daily, Disp: , Rfl:     pyridoxine (VITAMIN B6) 100 mg tablet, Take 100 mg by mouth daily, Disp: , Rfl: Current Facility-Administered Medications:     bupivacaine (PF) (MARCAINE) 0 25 % injection 5 mL, 5 mL, Perineural, Once, Chilton Merlin, MD    lidocaine (PF) (XYLOCAINE-MPF) 1 % injection 5 mL, 5 mL, Perineural, Once, Chilton Merlin, MD    methylPREDNISolone acetate (DEPO-MEDROL) injection 80 mg, 80 mg, Perineural, Once, Chilton Merlin, MD    Allergies   Allergen Reactions    Codeine Sulfate        Physical Exam: There were no vitals filed for this visit  General: Awake, Alert, Oriented x 3, Mood and affect appropriate  Respiratory: Respirations even and unlabored  Cardiovascular: Peripheral pulses intact; no edema  Musculoskeletal Exam:  Within normal limits    ASA Score:  2  Patient/Chart Verification  Patient ID Verified: Verbal  ID Band Applied: No  Consents Confirmed: To be obtained in the Pre-Procedure area  H&P( within 30 days) Verified: To be obtained in the Pre-Procedure area  Interval H&P(within 24 hr) Complete (required for Outpatients and Surgery Admit only): To be obtained in the Pre-Procedure area  Allergies Reviewed: Yes  Anticoag/NSAID held?: NA  Currently on antibiotics?: No  Pregnancy denied?: NA    Assessment:   1   Lumbar spondylosis        Plan: RIGHT L3-L5 RFA

## 2020-03-17 ENCOUNTER — HOSPITAL ENCOUNTER (OUTPATIENT)
Dept: RADIOLOGY | Facility: CLINIC | Age: 85
Discharge: HOME | End: 2020-03-17
Attending: ANESTHESIOLOGY
Payer: COMMERCIAL

## 2020-03-17 ENCOUNTER — TELEPHONE (OUTPATIENT)
Dept: RADIOLOGY | Facility: CLINIC | Age: 85
End: 2020-03-17

## 2020-03-17 VITALS
DIASTOLIC BLOOD PRESSURE: 69 MMHG | RESPIRATION RATE: 20 BRPM | SYSTOLIC BLOOD PRESSURE: 118 MMHG | OXYGEN SATURATION: 97 % | HEART RATE: 62 BPM | TEMPERATURE: 96.5 F

## 2020-03-17 DIAGNOSIS — M47.816 LUMBAR SPONDYLOSIS: ICD-10-CM

## 2020-03-17 PROCEDURE — 64636 DESTROY L/S FACET JNT ADDL: CPT | Performed by: ANESTHESIOLOGY

## 2020-03-17 PROCEDURE — 64635 DESTROY LUMB/SAC FACET JNT: CPT | Performed by: ANESTHESIOLOGY

## 2020-03-17 RX ORDER — METHYLPREDNISOLONE ACETATE 80 MG/ML
80 INJECTION, SUSPENSION INTRA-ARTICULAR; INTRALESIONAL; INTRAMUSCULAR; PARENTERAL; SOFT TISSUE ONCE
Status: COMPLETED | OUTPATIENT
Start: 2020-03-17 | End: 2020-03-17

## 2020-03-17 RX ORDER — LIDOCAINE HYDROCHLORIDE 10 MG/ML
5 INJECTION, SOLUTION EPIDURAL; INFILTRATION; INTRACAUDAL; PERINEURAL ONCE
Status: COMPLETED | OUTPATIENT
Start: 2020-03-17 | End: 2020-03-17

## 2020-03-17 RX ORDER — BUPIVACAINE HCL/PF 2.5 MG/ML
5 VIAL (ML) INJECTION ONCE
Status: COMPLETED | OUTPATIENT
Start: 2020-03-17 | End: 2020-03-17

## 2020-03-17 RX ADMIN — LIDOCAINE HYDROCHLORIDE 5 ML: 10 INJECTION, SOLUTION EPIDURAL; INFILTRATION; INTRACAUDAL; PERINEURAL at 10:24

## 2020-03-17 RX ADMIN — Medication 5 ML: at 10:23

## 2020-03-17 RX ADMIN — METHYLPREDNISOLONE ACETATE 80 MG: 80 INJECTION, SUSPENSION INTRA-ARTICULAR; INTRALESIONAL; INTRAMUSCULAR; PARENTERAL; SOFT TISSUE at 10:24

## 2020-03-17 NOTE — DISCHARGE INSTR - LAB

## 2020-03-17 NOTE — INTERVAL H&P NOTE
Update: (This section must be completed if the H&P was completed greater than 24 hrs to procedure or admission)    H&P reviewed  After examining the patient, I find no changed to the H&P since it had been written  Patient re-evaluated   Accept as history and physical     Paty Melendez MD/March 17, 2020/10:02 AM

## 2020-07-07 ENCOUNTER — OFFICE VISIT (OUTPATIENT)
Dept: OBGYN CLINIC | Facility: CLINIC | Age: 85
End: 2020-07-07
Payer: COMMERCIAL

## 2020-07-07 VITALS
WEIGHT: 164.2 LBS | TEMPERATURE: 97.8 F | DIASTOLIC BLOOD PRESSURE: 68 MMHG | SYSTOLIC BLOOD PRESSURE: 116 MMHG | BODY MASS INDEX: 25.72 KG/M2

## 2020-07-07 DIAGNOSIS — M70.62 TROCHANTERIC BURSITIS OF LEFT HIP: Primary | ICD-10-CM

## 2020-07-07 DIAGNOSIS — M70.61 TROCHANTERIC BURSITIS OF RIGHT HIP: ICD-10-CM

## 2020-07-07 PROCEDURE — 1036F TOBACCO NON-USER: CPT | Performed by: ORTHOPAEDIC SURGERY

## 2020-07-07 PROCEDURE — 99203 OFFICE O/P NEW LOW 30 MIN: CPT | Performed by: ORTHOPAEDIC SURGERY

## 2020-07-07 PROCEDURE — 3074F SYST BP LT 130 MM HG: CPT | Performed by: ORTHOPAEDIC SURGERY

## 2020-07-07 PROCEDURE — 1160F RVW MEDS BY RX/DR IN RCRD: CPT | Performed by: ORTHOPAEDIC SURGERY

## 2020-07-07 PROCEDURE — 4040F PNEUMOC VAC/ADMIN/RCVD: CPT | Performed by: ORTHOPAEDIC SURGERY

## 2020-07-07 PROCEDURE — 3078F DIAST BP <80 MM HG: CPT | Performed by: ORTHOPAEDIC SURGERY

## 2020-07-07 NOTE — ASSESSMENT & PLAN NOTE
80-year-old female with bilateral hip trochanteric bursitis, left greater than right  It is too early to give her another injection on the left  I went over aggressive ITB band stretches  Due to the cost of physical therapy she would like to avoid this if possible  I will see her back in six weeks for possible injection versus consideration of therapy at that time  I emphasized the importance of regular aggressive stretching

## 2020-07-07 NOTE — PROGRESS NOTES
Assessment:     1  Trochanteric bursitis of left hip    2  Trochanteric bursitis of right hip          Plan:     Problem List Items Addressed This Visit        Musculoskeletal and Integument    Trochanteric bursitis of left hip - Primary     17-year-old female with bilateral hip trochanteric bursitis, left greater than right  It is too early to give her another injection on the left  I went over aggressive ITB band stretches  Due to the cost of physical therapy she would like to avoid this if possible  I will see her back in six weeks for possible injection versus consideration of therapy at that time  I emphasized the importance of regular aggressive stretching  Trochanteric bursitis of right hip           Patient ID: Benigno Estes is a 80 y o  female  Chief Complaint:  Leg pain    HPI:    17-year-old female who is here today for bilateral hip pain  When asked where the pain is she points to the lateral and posterior lateral aspect of her hips  This is now been going on for months to years  She has had history of back issues and has had multiple injections as well as nerve ablation  She notes in general the back pain is better and she still has stiffness in it from time to time, but has pain in the side and buttock area  The left bothers her much more than the right  She has a lot of difficulty sleeping on her sides  She was seen at [facility] and given an injection and started using leg lifts to help with some imbalance  At times when she sits on the toilet she has hit a certain part on the right leg in gets pain that shoots all the way down to her foot  She is looking to see what is going on with her leg in what can be done for it        Allergy:  Allergies   Allergen Reactions    Codeine Sulfate        Medications:  all current active meds have been reviewed    Past Medical History:  Past Medical History:   Diagnosis Date    BCC (basal cell carcinoma)     Benign uterine neoplasm     C  difficile colitis        Past Surgical History:  Past Surgical History:   Procedure Laterality Date    CATARACT EXTRACTION      Last assessed - 1/14/16    MALIGNANT SKIN LESION EXCISION      Face    PARATHYROIDECTOMY      REPLACEMENT TOTAL KNEE      ROTATOR CUFF REPAIR      Last assessed - 1/14/16    TOTAL ABDOMINAL HYSTERECTOMY      with derrell oopherectomy, Last assessed - 1/14/16       Family History:  Family History   Problem Relation Age of Onset    Heart attack Mother     Substance Abuse Mother         denied    Mental illness Mother         denied    Other Mother         Cardiac Disorder     Cancer Mother         Malignant Neoplasm     Cancer Father         Malignant Neoplasm     Substance Abuse Father         denied    Mental illness Father         denied    Lung cancer Father     Spina bifida Child        Social History:  Social History     Substance and Sexual Activity   Alcohol Use No     Social History     Substance and Sexual Activity   Drug Use No     Social History     Tobacco Use   Smoking Status Former Smoker   Smokeless Tobacco Never Used           ROS:  Review of Systems   Constitutional: Negative  HENT: Negative  Eyes: Negative  Respiratory: Negative  Cardiovascular: Negative  Gastrointestinal: Negative  Endocrine: Negative  Genitourinary: Negative  Musculoskeletal: Positive for arthralgias  Skin: Negative  Allergic/Immunologic: Negative  Neurological: Negative  Hematological: Negative  Psychiatric/Behavioral: Negative  Objective:  BP Readings from Last 1 Encounters:   07/07/20 116/68      Wt Readings from Last 1 Encounters:   07/07/20 74 5 kg (164 lb 3 2 oz)        BMI:   Estimated body mass index is 25 72 kg/m² as calculated from the following:    Height as of 1/20/20: 5' 7" (1 702 m)  Weight as of this encounter: 74 5 kg (164 lb 3 2 oz)      EXAM:   Physical Exam   Constitutional: She appears well-developed and well-nourished  No distress  HENT:   Head: Normocephalic and atraumatic  Eyes: Right eye exhibits no discharge  Left eye exhibits no discharge  Neck: Normal range of motion  Neck supple  No tracheal deviation present  Cardiovascular: Normal rate and regular rhythm  Pulmonary/Chest: Effort normal and breath sounds normal  No respiratory distress  She exhibits no tenderness  Abdominal: Soft  She exhibits no distension  There is no tenderness  Neurological: She is alert  Skin: Skin is warm and dry  She is not diaphoretic  No erythema  Psychiatric: She has a normal mood and affect  Her behavior is normal    Vitals reviewed  Right Hip Exam     Tenderness   The patient is experiencing tenderness in the greater trochanter  Range of Motion   The patient has normal right hip ROM  Muscle Strength   The patient has normal right hip strength  Tests   KERA: negative  Maeve: positive    Other   Erythema: absent  Sensation: normal  Pulse: present    Comments:  Short swing stance      Left Hip Exam     Tenderness   The patient is experiencing tenderness in the greater trochanter  Range of Motion   The patient has normal left hip ROM  Muscle Strength   The patient has normal left hip strength  Tests   KERA: negative  Maeve: positive    Other   Erythema: absent  Sensation: normal  Pulse: present    Comments:  Short swing stance                Radiographs:  I have personally reviewed pertinent films in PACS and my interpretation is mild hip osteoarthritis       Procedures

## 2020-07-15 ENCOUNTER — OFFICE VISIT (OUTPATIENT)
Dept: OBGYN CLINIC | Facility: CLINIC | Age: 85
End: 2020-07-15
Payer: COMMERCIAL

## 2020-07-15 ENCOUNTER — TELEPHONE (OUTPATIENT)
Dept: OBGYN CLINIC | Facility: CLINIC | Age: 85
End: 2020-07-15

## 2020-07-15 ENCOUNTER — TELEPHONE (OUTPATIENT)
Dept: PAIN MEDICINE | Facility: CLINIC | Age: 85
End: 2020-07-15

## 2020-07-15 VITALS
DIASTOLIC BLOOD PRESSURE: 68 MMHG | BODY MASS INDEX: 25.43 KG/M2 | WEIGHT: 162 LBS | SYSTOLIC BLOOD PRESSURE: 116 MMHG | TEMPERATURE: 97.5 F | HEIGHT: 67 IN | HEART RATE: 63 BPM

## 2020-07-15 DIAGNOSIS — G57.02 PIRIFORMIS SYNDROME OF LEFT SIDE: ICD-10-CM

## 2020-07-15 DIAGNOSIS — M70.62 TROCHANTERIC BURSITIS OF LEFT HIP: Primary | ICD-10-CM

## 2020-07-15 DIAGNOSIS — M51.16 LUMBAR DISC DISEASE WITH RADICULOPATHY: ICD-10-CM

## 2020-07-15 PROCEDURE — 4040F PNEUMOC VAC/ADMIN/RCVD: CPT | Performed by: ORTHOPAEDIC SURGERY

## 2020-07-15 PROCEDURE — 3078F DIAST BP <80 MM HG: CPT | Performed by: ORTHOPAEDIC SURGERY

## 2020-07-15 PROCEDURE — 3074F SYST BP LT 130 MM HG: CPT | Performed by: ORTHOPAEDIC SURGERY

## 2020-07-15 PROCEDURE — 1160F RVW MEDS BY RX/DR IN RCRD: CPT | Performed by: ORTHOPAEDIC SURGERY

## 2020-07-15 PROCEDURE — 1036F TOBACCO NON-USER: CPT | Performed by: ORTHOPAEDIC SURGERY

## 2020-07-15 PROCEDURE — 99213 OFFICE O/P EST LOW 20 MIN: CPT | Performed by: ORTHOPAEDIC SURGERY

## 2020-07-15 PROCEDURE — 3008F BODY MASS INDEX DOCD: CPT | Performed by: ORTHOPAEDIC SURGERY

## 2020-07-15 RX ORDER — METHYLPREDNISOLONE 4 MG/1
TABLET ORAL
Qty: 21 TABLET | Refills: 0 | Status: SHIPPED | OUTPATIENT
Start: 2020-07-15 | End: 2021-01-22

## 2020-07-15 NOTE — PROGRESS NOTES
Assessment:     1  Trochanteric bursitis of left hip    2  Piriformis syndrome of left side    3  Lumbar disc disease with radiculopathy          Plan:     Problem List Items Addressed This Visit        Nervous and Auditory    Lumbar disc disease with radiculopathy    Relevant Orders    Ambulatory referral to Pain Management    Piriformis syndrome of left side    Relevant Medications    methylPREDNISolone 4 MG tablet therapy pack    Other Relevant Orders    Ambulatory referral to Pain Management       Musculoskeletal and Integument    Trochanteric bursitis of left hip - Primary     24-year-old female with fairly severe left hip trochanteric bursitis  I discussed with her that it is too early for me to give her another injection  She should not get another injection until toward the end of August   I went over again the importance of doing ITB band stretches and that even though it hurts this is important to do as this will be which gives her long-term relief  I again recommended physical therapy for her  She is adamant against not doing therapy due to the co-pay is period I recommended a use of a walker  She has one at home  I offered her a Medrol Dosepak, with the explanation that it may not give her any long-term relief  She is looking for some other solution to her problem  I discussed a possible evaluation by Pain Management  She would like to think about this  I reiterated to her that this is a chronic problem with inflammation and that until the soft tissue around the area get stretched out she will likely continue having problems  She is very frustrated today is looking for immediate relief  I will be happy to see her back toward the end of August for a repeat injection if she desires  Relevant Medications    methylPREDNISolone 4 MG tablet therapy pack    Other Relevant Orders    Ambulatory referral to Pain Management           Patient ID: Kinjal Del Castillo is a 80 y o  female      Chief Complaint:  Leg pain    HPI:    70-year-old female who is here today for bilateral hip pain  When asked where the pain is she points to the lateral and posterior lateral aspect of her hips  This is now been going on for months to years  She has had history of back issues and has had multiple injections as well as nerve ablation  She notes in general the back pain is better and she still has stiffness in it from time to time, but has pain in the side and buttock area  The left bothers her much more than the right  She has a lot of difficulty sleeping on her sides  She was seen at [facility] and given an injection the end of May and started using leg lifts to help with some imbalance  At times when she sits on the toilet she has hit a certain part on the right leg in gets pain that shoots all the way down to her foot  She returns today with increasing pain  She states she tried to do the stretches that I gave her, but that they hurt  The left side is with bothering her the most   She has not been using any walker        Allergy:  Allergies   Allergen Reactions    Codeine Sulfate        Medications:  all current active meds have been reviewed    Past Medical History:  Past Medical History:   Diagnosis Date    BCC (basal cell carcinoma)     Benign uterine neoplasm     C  difficile colitis        Past Surgical History:  Past Surgical History:   Procedure Laterality Date    CATARACT EXTRACTION      Last assessed - 1/14/16    MALIGNANT SKIN LESION EXCISION      Face    PARATHYROIDECTOMY      REPLACEMENT TOTAL KNEE      ROTATOR CUFF REPAIR      Last assessed - 1/14/16    TOTAL ABDOMINAL HYSTERECTOMY      with derrell oopherectomy, Last assessed - 1/14/16       Family History:  Family History   Problem Relation Age of Onset    Heart attack Mother     Substance Abuse Mother         denied    Mental illness Mother         denied    Other Mother         Cardiac Disorder     Cancer Mother Malignant Neoplasm     Cancer Father         Malignant Neoplasm     Substance Abuse Father         denied    Mental illness Father         denied    Lung cancer Father     Spina bifida Child        Social History:  Social History     Substance and Sexual Activity   Alcohol Use No     Social History     Substance and Sexual Activity   Drug Use No     Social History     Tobacco Use   Smoking Status Former Smoker   Smokeless Tobacco Never Used           ROS:  Review of Systems   Musculoskeletal: Positive for arthralgias  All other systems reviewed and are negative  Objective:  BP Readings from Last 1 Encounters:   07/15/20 116/68      Wt Readings from Last 1 Encounters:   07/15/20 73 5 kg (162 lb)        BMI:   Estimated body mass index is 25 37 kg/m² as calculated from the following:    Height as of this encounter: 5' 7" (1 702 m)  Weight as of this encounter: 73 5 kg (162 lb)  EXAM:   Physical Exam  Left Hip Exam     Tenderness   The patient is experiencing tenderness in the greater trochanter  Range of Motion   The patient has normal left hip ROM  Muscle Strength   The patient has normal left hip strength  Tests   KERA: negative  Maeve: positive    Other   Erythema: absent  Sensation: normal  Pulse: present    Comments:  External rotation of the hip when flex causes her pain all along the ITB band                Radiographs:  I have personally reviewed pertinent films in PACS and my interpretation is mild hip osteoarthritis

## 2020-07-15 NOTE — ASSESSMENT & PLAN NOTE
71-year-old female with fairly severe left hip trochanteric bursitis  I discussed with her that it is too early for me to give her another injection  She should not get another injection until toward the end of August   I went over again the importance of doing ITB band stretches and that even though it hurts this is important to do as this will be which gives her long-term relief  I again recommended physical therapy for her  She is adamant against not doing therapy due to the co-pay is period I recommended a use of a walker  She has one at home  I offered her a Medrol Dosepak, with the explanation that it may not give her any long-term relief  She is looking for some other solution to her problem  I discussed a possible evaluation by Pain Management  She would like to think about this  I reiterated to her that this is a chronic problem with inflammation and that until the soft tissue around the area get stretched out she will likely continue having problems  She is very frustrated today is looking for immediate relief  I will be happy to see her back toward the end of August for a repeat injection if she desires

## 2020-07-15 NOTE — TELEPHONE ENCOUNTER
Patient was offered an appointment in [facility] today  Patient took appointment in [facility]   Thank you

## 2020-07-15 NOTE — TELEPHONE ENCOUNTER
Patient was seen by Joe Lane today and was referred to pain management  Patient is currently under care with Pam Melton  Patient was scheduled for a f/u visit with Dr Patton by ortho staff at the time of visit  Okay to keep f/u visit with ?

## 2020-07-15 NOTE — TELEPHONE ENCOUNTER
Patient called demanding that she needs to talk to the doctor or her team right away  She said she can hardly walk, she feels like her leg keeps giving out because of her hip, and I offered to make her an appointment for another day because Dr Jose Braden has no appointments in [place] [address] office today and she refused because it was not today and she said she is in too much pain to wait another day       Best call back number : [phone]

## 2020-08-03 ENCOUNTER — OFFICE VISIT (OUTPATIENT)
Dept: PAIN MEDICINE | Facility: CLINIC | Age: 85
End: 2020-08-03
Payer: COMMERCIAL

## 2020-08-03 VITALS
BODY MASS INDEX: 25.83 KG/M2 | TEMPERATURE: 97.7 F | DIASTOLIC BLOOD PRESSURE: 62 MMHG | WEIGHT: 164.6 LBS | SYSTOLIC BLOOD PRESSURE: 108 MMHG | HEIGHT: 67 IN

## 2020-08-03 DIAGNOSIS — M47.816 LUMBAR SPONDYLOSIS: ICD-10-CM

## 2020-08-03 DIAGNOSIS — M51.16 LUMBAR DISC DISEASE WITH RADICULOPATHY: Primary | ICD-10-CM

## 2020-08-03 DIAGNOSIS — G57.02 PIRIFORMIS SYNDROME OF LEFT SIDE: ICD-10-CM

## 2020-08-03 DIAGNOSIS — M48.061 SPINAL STENOSIS OF LUMBAR REGION WITHOUT NEUROGENIC CLAUDICATION: ICD-10-CM

## 2020-08-03 DIAGNOSIS — M70.62 TROCHANTERIC BURSITIS OF LEFT HIP: ICD-10-CM

## 2020-08-03 PROCEDURE — 1036F TOBACCO NON-USER: CPT | Performed by: ANESTHESIOLOGY

## 2020-08-03 PROCEDURE — 3078F DIAST BP <80 MM HG: CPT | Performed by: ANESTHESIOLOGY

## 2020-08-03 PROCEDURE — 1160F RVW MEDS BY RX/DR IN RCRD: CPT | Performed by: ANESTHESIOLOGY

## 2020-08-03 PROCEDURE — 4040F PNEUMOC VAC/ADMIN/RCVD: CPT | Performed by: ANESTHESIOLOGY

## 2020-08-03 PROCEDURE — 3074F SYST BP LT 130 MM HG: CPT | Performed by: ANESTHESIOLOGY

## 2020-08-03 PROCEDURE — 99214 OFFICE O/P EST MOD 30 MIN: CPT | Performed by: ANESTHESIOLOGY

## 2020-08-03 PROCEDURE — 3008F BODY MASS INDEX DOCD: CPT | Performed by: ANESTHESIOLOGY

## 2020-08-03 NOTE — TELEPHONE ENCOUNTER
Patient called returning procedure  call  Please be advise thank you        Please call patient back @ [phone]

## 2020-08-03 NOTE — PROGRESS NOTES
Assessment:  1  Lumbar disc disease with radiculopathy    2  Trochanteric bursitis of left hip    3  Piriformis syndrome of left side    4  Spinal stenosis of lumbar region without neurogenic claudication    5  Lumbar spondylosis        Plan:  Patient is an 40-year-old female with bilateral hip pain and left knee pain with chronic pain syndrome secondary to lumbar degenerative disc disease with radiculopathy, greater trochanteric bursitis, piriformis syndrome presents to office for follow-up visit  MRI lumbar spine from 09/14/2019 does show multilevel spondylosis and osteoarthritis with ABX protrusion at the L5-S1 level contacting the right S1 nerve root  Patient also has some ligamentum flavum hypertrophy with severe bilateral recess stenosis at the L4-5 level  Patient's has radiofrequency ablation performed on 03/03/2020  Patient has also had a right L5 and S1 transforaminal epidural steroid injection X 3 in the past for lumbar radicular symptoms  1  We will schedule patient for a bilateral L4-5 transforaminal epidural steroid injection  2  Follow-up 1 month after injection            [place] Prescription Drug Monitoring Program report was reviewed and was appropriate     Complete risks and benefits including bleeding, infection, tissue reaction, nerve injury and allergic reaction were discussed  The approach was demonstrated using models and literature was provided  Verbal and written consent was obtained  History of Present Illness:    Justus Kayser is a 80 y o  female who presents for a follow up office visit in regards to Hip Pain and Knee Pain  The patients current symptoms include 8/10 nearly constant sharp, pressure-like pain in bilateral hips and left knee without any particular time pattern  Current pain medications includes:  Gabapentin 100 mg p o  Q h s , Robaxin 750 mg p o  B i d  The patient reports that this regimen is providing 20% pain relief    The patient is reporting no side effects from this pain medication regimen  Review of Systems:    Review of Systems   Musculoskeletal: Positive for arthralgias and joint swelling  All other systems reviewed and are negative          Past Medical History:   Diagnosis Date    BCC (basal cell carcinoma)     Benign uterine neoplasm     C  difficile colitis        Past Surgical History:   Procedure Laterality Date    CATARACT EXTRACTION      Last assessed - 1/14/16    MALIGNANT SKIN LESION EXCISION      Face    PARATHYROIDECTOMY      REPLACEMENT TOTAL KNEE      ROTATOR CUFF REPAIR      Last assessed - 1/14/16    TOTAL ABDOMINAL HYSTERECTOMY      with derrell oopherectomy, Last assessed - 1/14/16       Family History   Problem Relation Age of Onset    Heart attack Mother     Substance Abuse Mother         denied    Mental illness Mother         denied    Other Mother         Cardiac Disorder     Cancer Mother         Malignant Neoplasm     Cancer Father         Malignant Neoplasm     Substance Abuse Father         denied    Mental illness Father         denied    Lung cancer Father     Spina bifida Child        Social History     Occupational History    Occupation:      Comment: Retired    Tobacco Use    Smoking status: Former Smoker    Smokeless tobacco: Never Used   Substance and Sexual Activity    Alcohol use: No    Drug use: No    Sexual activity: Not on file         Current Outpatient Medications:     aspirin 81 MG tablet, Take by mouth, Disp: , Rfl:     atorvastatin (LIPITOR) 20 mg tablet, take 1 tablet by mouth once daily, Disp: 90 tablet, Rfl: 1    cyanocobalamin (VITAMIN B-12) 1,000 mcg tablet, Take by mouth, Disp: , Rfl:     gabapentin (NEURONTIN) 100 mg capsule, Take 1 capsule (100 mg total) by mouth daily at bedtime, Disp: 30 capsule, Rfl: 0    levothyroxine 75 mcg tablet, take 1 tablet by mouth once daily, Disp: 90 tablet, Rfl: 1    lisinopril (ZESTRIL) 10 mg tablet, take 1 tablet by mouth once daily, Disp: 90 tablet, Rfl: 1    methocarbamol (ROBAXIN) 750 mg tablet, Take 1 tablet (750 mg total) by mouth 2 (two) times a day (Patient not taking: Reported on 7/7/2020), Disp: 60 tablet, Rfl: 0    methylPREDNISolone 4 MG tablet therapy pack, Use as directed on package, Disp: 21 tablet, Rfl: 0    Multiple Vitamin (MULTIVITAMIN) tablet, Take 1 tablet by mouth daily, Disp: , Rfl:     omega-3-acid ethyl esters (LOVAZA) 1 g capsule, Take 2 capsules by mouth 2 (two) times a day, Disp: , Rfl:     Probiotic Product (PROBIOTIC-10 PO), Take by mouth daily, Disp: , Rfl:     pyridoxine (VITAMIN B6) 100 mg tablet, Take 100 mg by mouth daily, Disp: , Rfl:     Allergies   Allergen Reactions    Codeine Sulfate        Physical Exam:    /62 (BP Location: Right arm, Patient Position: Sitting, Cuff Size: Standard)   Temp 97 7 °F (36 5 °C)   Ht 5' 7"   Wt 74 7 kg (164 lb 9 6 oz)   BMI 25 78 kg/m²     Constitutional: normal, well developed, well nourished, alert, in no distress and non-toxic and no overt pain behavior  Eyes: anicteric  HEENT: grossly intact  Neck: supple, symmetric, trachea midline and no masses   Pulmonary:even and unlabored  Cardiovascular:No edema or pitting edema present  Skin:Normal without rashes or lesions and well hydrated  Psychiatric:Mood and affect appropriate  Neurologic:Cranial Nerves II-XII grossly intact  Musculoskeletal:antalgic    Imaging  No orders to display       No orders of the defined types were placed in this encounter

## 2020-08-21 DIAGNOSIS — I10 ESSENTIAL HYPERTENSION: ICD-10-CM

## 2020-08-21 DIAGNOSIS — E03.9 HYPOTHYROIDISM, UNSPECIFIED TYPE: ICD-10-CM

## 2020-08-21 DIAGNOSIS — R32 URINARY INCONTINENCE, UNSPECIFIED TYPE: ICD-10-CM

## 2020-08-21 DIAGNOSIS — E78.00 PURE HYPERCHOLESTEROLEMIA: ICD-10-CM

## 2020-08-21 RX ORDER — OXYBUTYNIN CHLORIDE 5 MG/1
TABLET ORAL
Qty: 180 TABLET | Refills: 1 | Status: SHIPPED | OUTPATIENT
Start: 2020-08-21 | End: 2021-01-18 | Stop reason: SDUPTHER

## 2020-08-21 RX ORDER — LEVOTHYROXINE SODIUM 0.07 MG/1
TABLET ORAL
Qty: 90 TABLET | Refills: 1 | Status: SHIPPED | OUTPATIENT
Start: 2020-08-21 | End: 2021-01-18 | Stop reason: SDUPTHER

## 2020-08-21 RX ORDER — ATORVASTATIN CALCIUM 20 MG/1
TABLET, FILM COATED ORAL
Qty: 90 TABLET | Refills: 1 | Status: SHIPPED | OUTPATIENT
Start: 2020-08-21 | End: 2021-01-18 | Stop reason: SDUPTHER

## 2020-08-21 RX ORDER — LISINOPRIL 10 MG/1
TABLET ORAL
Qty: 90 TABLET | Refills: 1 | Status: SHIPPED | OUTPATIENT
Start: 2020-08-21 | End: 2021-01-18 | Stop reason: SDUPTHER

## 2020-08-25 ENCOUNTER — TELEPHONE (OUTPATIENT)
Dept: PAIN MEDICINE | Facility: CLINIC | Age: 85
End: 2020-08-25

## 2020-08-25 NOTE — TELEPHONE ENCOUNTER
Pt waould like to cancel her procedure with Dr Heber Arreguin on 9/1/20  She can not afford a $350 00 copay      Pt can be reached at [phone]

## 2021-01-18 DIAGNOSIS — I10 ESSENTIAL HYPERTENSION: ICD-10-CM

## 2021-01-18 DIAGNOSIS — E03.9 HYPOTHYROIDISM, UNSPECIFIED TYPE: ICD-10-CM

## 2021-01-18 DIAGNOSIS — R32 URINARY INCONTINENCE, UNSPECIFIED TYPE: ICD-10-CM

## 2021-01-18 DIAGNOSIS — E78.00 PURE HYPERCHOLESTEROLEMIA: ICD-10-CM

## 2021-01-18 RX ORDER — OXYBUTYNIN CHLORIDE 5 MG/1
5 TABLET ORAL 2 TIMES DAILY
Qty: 180 TABLET | Refills: 1 | Status: SHIPPED | OUTPATIENT
Start: 2021-01-18 | End: 2021-03-19 | Stop reason: SDUPTHER

## 2021-01-18 RX ORDER — LEVOTHYROXINE SODIUM 0.07 MG/1
75 TABLET ORAL DAILY
Qty: 90 TABLET | Refills: 1 | Status: SHIPPED | OUTPATIENT
Start: 2021-01-18 | End: 2021-01-26 | Stop reason: SDUPTHER

## 2021-01-18 RX ORDER — ATORVASTATIN CALCIUM 20 MG/1
20 TABLET, FILM COATED ORAL DAILY
Qty: 90 TABLET | Refills: 1 | Status: SHIPPED | OUTPATIENT
Start: 2021-01-18 | End: 2021-06-21 | Stop reason: SDUPTHER

## 2021-01-18 RX ORDER — LISINOPRIL 10 MG/1
10 TABLET ORAL DAILY
Qty: 90 TABLET | Refills: 1 | Status: SHIPPED | OUTPATIENT
Start: 2021-01-18 | End: 2021-03-19 | Stop reason: SDUPTHER

## 2021-01-20 DIAGNOSIS — Z23 ENCOUNTER FOR IMMUNIZATION: ICD-10-CM

## 2021-01-22 ENCOUNTER — APPOINTMENT (OUTPATIENT)
Dept: RADIOLOGY | Facility: CLINIC | Age: 86
End: 2021-01-22
Payer: COMMERCIAL

## 2021-01-22 ENCOUNTER — OFFICE VISIT (OUTPATIENT)
Dept: FAMILY MEDICINE CLINIC | Facility: CLINIC | Age: 86
End: 2021-01-22
Payer: COMMERCIAL

## 2021-01-22 ENCOUNTER — APPOINTMENT (OUTPATIENT)
Dept: LAB | Facility: CLINIC | Age: 86
End: 2021-01-22
Payer: COMMERCIAL

## 2021-01-22 VITALS
OXYGEN SATURATION: 97 % | DIASTOLIC BLOOD PRESSURE: 64 MMHG | WEIGHT: 170 LBS | BODY MASS INDEX: 26.68 KG/M2 | TEMPERATURE: 96.4 F | SYSTOLIC BLOOD PRESSURE: 122 MMHG | HEART RATE: 88 BPM | HEIGHT: 67 IN

## 2021-01-22 DIAGNOSIS — E78.00 PURE HYPERCHOLESTEROLEMIA: ICD-10-CM

## 2021-01-22 DIAGNOSIS — E03.9 HYPOTHYROIDISM, UNSPECIFIED TYPE: ICD-10-CM

## 2021-01-22 DIAGNOSIS — M79.641 BILATERAL HAND PAIN: ICD-10-CM

## 2021-01-22 DIAGNOSIS — Z13.1 SCREENING FOR DIABETES MELLITUS: ICD-10-CM

## 2021-01-22 DIAGNOSIS — M79.642 BILATERAL HAND PAIN: ICD-10-CM

## 2021-01-22 DIAGNOSIS — F51.01 PRIMARY INSOMNIA: ICD-10-CM

## 2021-01-22 DIAGNOSIS — R20.0 NUMBNESS AND TINGLING IN LEFT HAND: ICD-10-CM

## 2021-01-22 DIAGNOSIS — Z00.00 MEDICARE ANNUAL WELLNESS VISIT, SUBSEQUENT: ICD-10-CM

## 2021-01-22 DIAGNOSIS — M79.641 BILATERAL HAND PAIN: Primary | ICD-10-CM

## 2021-01-22 DIAGNOSIS — R20.2 NUMBNESS AND TINGLING IN LEFT HAND: ICD-10-CM

## 2021-01-22 DIAGNOSIS — H81.93 VESTIBULOPATHY OF BOTH EARS: ICD-10-CM

## 2021-01-22 DIAGNOSIS — M79.642 BILATERAL HAND PAIN: Primary | ICD-10-CM

## 2021-01-22 LAB
ALBUMIN SERPL BCP-MCNC: 4 G/DL (ref 3.5–5)
ALP SERPL-CCNC: 37 U/L (ref 46–116)
ALT SERPL W P-5'-P-CCNC: 26 U/L (ref 12–78)
ANION GAP SERPL CALCULATED.3IONS-SCNC: 5 MMOL/L (ref 4–13)
AST SERPL W P-5'-P-CCNC: 16 U/L (ref 5–45)
BILIRUB SERPL-MCNC: 0.59 MG/DL (ref 0.2–1)
BUN SERPL-MCNC: 22 MG/DL (ref 5–25)
CALCIUM SERPL-MCNC: 10.5 MG/DL (ref 8.3–10.1)
CHLORIDE SERPL-SCNC: 111 MMOL/L (ref 100–108)
CHOLEST SERPL-MCNC: 174 MG/DL (ref 50–200)
CO2 SERPL-SCNC: 25 MMOL/L (ref 21–32)
CREAT SERPL-MCNC: 0.92 MG/DL (ref 0.6–1.3)
GFR SERPL CREATININE-BSD FRML MDRD: 57 ML/MIN/1.73SQ M
GLUCOSE P FAST SERPL-MCNC: 89 MG/DL (ref 65–99)
HDLC SERPL-MCNC: 46 MG/DL
LDLC SERPL CALC-MCNC: 84 MG/DL (ref 0–100)
NONHDLC SERPL-MCNC: 128 MG/DL
POTASSIUM SERPL-SCNC: 4.2 MMOL/L (ref 3.5–5.3)
PROT SERPL-MCNC: 7.5 G/DL (ref 6.4–8.2)
SODIUM SERPL-SCNC: 141 MMOL/L (ref 136–145)
T4 FREE SERPL-MCNC: 1.07 NG/DL (ref 0.76–1.46)
TRIGL SERPL-MCNC: 221 MG/DL
TSH SERPL DL<=0.05 MIU/L-ACNC: 0.16 UIU/ML (ref 0.36–3.74)

## 2021-01-22 PROCEDURE — G0439 PPPS, SUBSEQ VISIT: HCPCS | Performed by: FAMILY MEDICINE

## 2021-01-22 PROCEDURE — 99214 OFFICE O/P EST MOD 30 MIN: CPT | Performed by: FAMILY MEDICINE

## 2021-01-22 PROCEDURE — 73130 X-RAY EXAM OF HAND: CPT

## 2021-01-22 PROCEDURE — 1036F TOBACCO NON-USER: CPT | Performed by: FAMILY MEDICINE

## 2021-01-22 PROCEDURE — 84443 ASSAY THYROID STIM HORMONE: CPT

## 2021-01-22 PROCEDURE — 3725F SCREEN DEPRESSION PERFORMED: CPT | Performed by: FAMILY MEDICINE

## 2021-01-22 PROCEDURE — 36415 COLL VENOUS BLD VENIPUNCTURE: CPT

## 2021-01-22 PROCEDURE — 1125F AMNT PAIN NOTED PAIN PRSNT: CPT | Performed by: FAMILY MEDICINE

## 2021-01-22 PROCEDURE — 80053 COMPREHEN METABOLIC PANEL: CPT

## 2021-01-22 PROCEDURE — 80061 LIPID PANEL: CPT

## 2021-01-22 PROCEDURE — 1160F RVW MEDS BY RX/DR IN RCRD: CPT | Performed by: FAMILY MEDICINE

## 2021-01-22 PROCEDURE — 3074F SYST BP LT 130 MM HG: CPT | Performed by: FAMILY MEDICINE

## 2021-01-22 PROCEDURE — 1170F FXNL STATUS ASSESSED: CPT | Performed by: FAMILY MEDICINE

## 2021-01-22 PROCEDURE — 84439 ASSAY OF FREE THYROXINE: CPT

## 2021-01-22 PROCEDURE — 3078F DIAST BP <80 MM HG: CPT | Performed by: FAMILY MEDICINE

## 2021-01-22 RX ORDER — TRAZODONE HYDROCHLORIDE 50 MG/1
50 TABLET ORAL
Qty: 30 TABLET | Refills: 1 | Status: SHIPPED | OUTPATIENT
Start: 2021-01-22 | End: 2021-03-11 | Stop reason: SDUPTHER

## 2021-01-22 RX ORDER — ARM BRACE
EACH MISCELLANEOUS
Qty: 2 EACH | Refills: 1 | Status: SHIPPED | OUTPATIENT
Start: 2021-01-22

## 2021-01-22 NOTE — PATIENT INSTRUCTIONS
Medicare Preventive Visit Patient Instructions  Thank you for completing your Welcome to Medicare Visit or Medicare Annual Wellness Visit today  Your next wellness visit will be due in one year (1/22/2022)  The screening/preventive services that you may require over the next 5-10 years are detailed below  Some tests may not apply to you based off risk factors and/or age  Screening tests ordered at today's visit but not completed yet may show as past due  Also, please note that scanned in results may not display below  Preventive Screenings:  Service Recommendations Previous Testing/Comments   Colorectal Cancer Screening  * Colonoscopy    * Fecal Occult Blood Test (FOBT)/Fecal Immunochemical Test (FIT)  * Fecal DNA/Cologuard Test  * Flexible Sigmoidoscopy Age: 54-65 years old   Colonoscopy: every 10 years (may be performed more frequently if at higher risk)  OR  FOBT/FIT: every 1 year  OR  Cologuard: every 3 years  OR  Sigmoidoscopy: every 5 years  Screening may be recommended earlier than age 48 if at higher risk for colorectal cancer  Also, an individualized decision between you and your healthcare provider will decide whether screening between the ages of 74-80 would be appropriate  Colonoscopy: Not on file  FOBT/FIT: Not on file  Cologuard: Not on file  Sigmoidoscopy: Not on file    Screening Not Indicated     Breast Cancer Screening Age: 36 years old  Frequency: every 1-2 years  Not required if history of left and right mastectomy Mammogram: Not on file       Cervical Cancer Screening Between the ages of 21-29, pap smear recommended once every 3 years  Between the ages of 33-67, can perform pap smear with HPV co-testing every 5 years     Recommendations may differ for women with a history of total hysterectomy, cervical cancer, or abnormal pap smears in past  Pap Smear: Not on file    Screening Not Indicated   Hepatitis C Screening Once for adults born between 1945 and 1965  More frequently in patients at high risk for Hepatitis C Hep C Antibody: Not on file       Diabetes Screening 1-2 times per year if you're at risk for diabetes or have pre-diabetes Fasting glucose: 90 mg/dL   A1C: No results in last 5 years       Cholesterol Screening Once every 5 years if you don't have a lipid disorder  May order more often based on risk factors  Lipid panel: 03/18/2019    Screening Not Indicated  History Lipid Disorder     Other Preventive Screenings Covered by Medicare:  1  Abdominal Aortic Aneurysm (AAA) Screening: covered once if your at risk  You're considered to be at risk if you have a family history of AAA  2  Lung Cancer Screening: covers low dose CT scan once per year if you meet all of the following conditions: (1) Age 50-69; (2) No signs or symptoms of lung cancer; (3) Current smoker or have quit smoking within the last 15 years; (4) You have a tobacco smoking history of at least 30 pack years (packs per day multiplied by number of years you smoked); (5) You get a written order from a healthcare provider  3  Glaucoma Screening: covered annually if you're considered high risk: (1) You have diabetes OR (2) Family history of glaucoma OR (3)  aged 48 and older OR (3)  American aged 72 and older  3  Osteoporosis Screening: covered every 2 years if you meet one of the following conditions: (1) You're estrogen deficient and at risk for osteoporosis based off medical history and other findings; (2) Have a vertebral abnormality; (3) On glucocorticoid therapy for more than 3 months; (4) Have primary hyperparathyroidism; (5) On osteoporosis medications and need to assess response to drug therapy  · Last bone density test (DXA Scan): 01/04/2018  5  HIV Screening: covered annually if you're between the age of 12-76  Also covered annually if you are younger than 13 and older than 72 with risk factors for HIV infection   For pregnant patients, it is covered up to 3 times per pregnancy  Immunizations:  Immunization Recommendations   Influenza Vaccine Annual influenza vaccination during flu season is recommended for all persons aged >= 6 months who do not have contraindications   Pneumococcal Vaccine (Prevnar and Pneumovax)  * Prevnar = PCV13  * Pneumovax = PPSV23   Adults 25-60 years old: 1-3 doses may be recommended based on certain risk factors  Adults 72 years old: Prevnar (PCV13) vaccine recommended followed by Pneumovax (PPSV23) vaccine  If already received PPSV23 since turning 65, then PCV13 recommended at least one year after PPSV23 dose  Hepatitis B Vaccine 3 dose series if at intermediate or high risk (ex: diabetes, end stage renal disease, liver disease)   Tetanus (Td) Vaccine - COST NOT COVERED BY MEDICARE PART B Following completion of primary series, a booster dose should be given every 10 years to maintain immunity against tetanus  Td may also be given as tetanus wound prophylaxis  Tdap Vaccine - COST NOT COVERED BY MEDICARE PART B Recommended at least once for all adults  For pregnant patients, recommended with each pregnancy  Shingles Vaccine (Shingrix) - COST NOT COVERED BY MEDICARE PART B  2 shot series recommended in those aged 48 and above     Health Maintenance Due:  There are no preventive care reminders to display for this patient  Immunizations Due:      Topic Date Due    DTaP,Tdap,and Td Vaccines (1 - Tdap) 10/20/1956     Advance Directives   What are advance directives? Advance directives are legal documents that state your wishes and plans for medical care  These plans are made ahead of time in case you lose your ability to make decisions for yourself  Advance directives can apply to any medical decision, such as the treatments you want, and if you want to donate organs  What are the types of advance directives? There are many types of advance directives, and each state has rules about how to use them   You may choose a combination of any of the following:  · Living will: This is a written record of the treatment you want  You can also choose which treatments you do not want, which to limit, and which to stop at a certain time  This includes surgery, medicine, IV fluid, and tube feedings  · Durable power of  for healthcare [facility]): This is a written record that states who you want to make healthcare choices for you when you are unable to make them for yourself  This person, called a proxy, is usually a family member or a friend  You may choose more than 1 proxy  · Do not resuscitate (DNR) order:  A DNR order is used in case your heart stops beating or you stop breathing  It is a request not to have certain forms of treatment, such as CPR  A DNR order may be included in other types of advance directives  · Medical directive: This covers the care that you want if you are in a coma, near death, or unable to make decisions for yourself  You can list the treatments you want for each condition  Treatment may include pain medicine, surgery, blood transfusions, dialysis, IV or tube feedings, and a ventilator (breathing machine)  · Values history: This document has questions about your views, beliefs, and how you feel and think about life  This information can help others choose the care that you would choose  Why are advance directives important? An advance directive helps you control your care  Although spoken wishes may be used, it is better to have your wishes written down  Spoken wishes can be misunderstood, or not followed  Treatments may be given even if you do not want them  An advance directive may make it easier for your family to make difficult choices about your care  Urinary Incontinence   Urinary incontinence (UI)  is when you lose control of your bladder  UI develops because your bladder cannot store or empty urine properly  The 3 most common types of UI are stress incontinence, urge incontinence, or both    Medicines:   · May be given to help strengthen your bladder control  Report any side effects of medication to your healthcare provider  Do pelvic muscle exercises often:  Your pelvic muscles help you stop urinating  Squeeze these muscles tight for 5 seconds, then relax for 5 seconds  Gradually work up to squeezing for 10 seconds  Do 3 sets of 15 repetitions a day, or as directed  This will help strengthen your pelvic muscles and improve bladder control  Train your bladder:  Go to the bathroom at set times, such as every 2 hours, even if you do not feel the urge to go  You can also try to hold your urine when you feel the urge to go  For example, hold your urine for 5 minutes when you feel the urge to go  As that becomes easier, hold your urine for 10 minutes  Self-care:   · Keep a UI record  Write down how often you leak urine and how much you leak  Make a note of what you were doing when you leaked urine  · Drink liquids as directed  You may need to limit the amount of liquid you drink to help control your urine leakage  Do not drink any liquid right before you go to bed  Limit or do not have drinks that contain caffeine or alcohol  · Prevent constipation  Eat a variety of high-fiber foods  Good examples are high-fiber cereals, beans, vegetables, and whole-grain breads  Walking is the best way to trigger your intestines to have a bowel movement  · Exercise regularly and maintain a healthy weight  Weight loss and exercise will decrease pressure on your bladder and help you control your leakage  · Use a catheter as directed  to help empty your bladder  A catheter is a tiny, plastic tube that is put into your bladder to drain your urine  · Go to behavior therapy as directed  Behavior therapy may be used to help you learn to control your urge to urinate      Weight Management   Why it is important to manage your weight:  Being overweight increases your risk of health conditions such as heart disease, high blood pressure, type 2 diabetes, and certain types of cancer  It can also increase your risk for osteoarthritis, sleep apnea, and other respiratory problems  Aim for a slow, steady weight loss  Even a small amount of weight loss can lower your risk of health problems  How to lose weight safely:  A safe and healthy way to lose weight is to eat fewer calories and get regular exercise  You can lose up about 1 pound a week by decreasing the number of calories you eat by 500 calories each day  Healthy meal plan for weight management:  A healthy meal plan includes a variety of foods, contains fewer calories, and helps you stay healthy  A healthy meal plan includes the following:  · Eat whole-grain foods more often  A healthy meal plan should contain fiber  Fiber is the part of grains, fruits, and vegetables that is not broken down by your body  Whole-grain foods are healthy and provide extra fiber in your diet  Some examples of whole-grain foods are whole-wheat breads and pastas, oatmeal, brown rice, and bulgur  · Eat a variety of vegetables every day  Include dark, leafy greens such as spinach, kale, landry greens, and mustard greens  Eat yellow and orange vegetables such as carrots, sweet potatoes, and winter squash  · Eat a variety of fruits every day  Choose fresh or canned fruit (canned in its own juice or light syrup) instead of juice  Fruit juice has very little or no fiber  · Eat low-fat dairy foods  Drink fat-free (skim) milk or 1% milk  Eat fat-free yogurt and low-fat cottage cheese  Try low-fat cheeses such as mozzarella and other reduced-fat cheeses  · Choose meat and other protein foods that are low in fat  Choose beans or other legumes such as split peas or lentils  Choose fish, skinless poultry (chicken or turkey), or lean cuts of red meat (beef or pork)  Before you cook meat or poultry, cut off any visible fat  · Use less fat and oil  Try baking foods instead of frying them   Add less fat, such as margarine, sour cream, regular salad dressing and mayonnaise to foods  Eat fewer high-fat foods  Some examples of high-fat foods include french fries, doughnuts, ice cream, and cakes  · Eat fewer sweets  Limit foods and drinks that are high in sugar  This includes candy, cookies, regular soda, and sweetened drinks  Exercise:  Exercise at least 30 minutes per day on most days of the week  Some examples of exercise include walking, biking, dancing, and swimming  You can also fit in more physical activity by taking the stairs instead of the elevator or parking farther away from stores  Ask your healthcare provider about the best exercise plan for you  © Copyright [employer] 2018 Information is for End User's use only and may not be sold, redistributed or otherwise used for commercial purposes   All illustrations and images included in CareNotes® are the copyrighted property of [employer]  or [address] [place] [address]

## 2021-01-22 NOTE — PROGRESS NOTES
Assessment and Plan:     Problem List Items Addressed This Visit     None           Preventive health issues were discussed with patient, and age appropriate screening tests were ordered as noted in patient's After Visit Summary  Personalized health advice and appropriate referrals for health education or preventive services given if needed, as noted in patient's After Visit Summary       History of Present Illness:     Patient presents for Medicare Annual Wellness visit    Patient Care Team:  Ari Soler MD as PCP - General  Zuleika Jordan MD     Problem List:     Patient Active Problem List   Diagnosis    Urinary incontinence    Other insomnia    Lung nodule < 6cm on CT    Elevated rheumatoid factor    Generalized osteoarthritis    Hyperlipidemia    Hyperparathyroidism ([address] )    Hypertension    Hypothyroidism    Overactive bladder    Strain of lumbar region    Lumbar disc disease with radiculopathy    Ischial bursitis of right side    Lumbar spondylosis    Trochanteric bursitis of left hip    Trochanteric bursitis of right hip    Piriformis syndrome of left side    Spinal stenosis of lumbar region without neurogenic claudication      Past Medical and Surgical History:     Past Medical History:   Diagnosis Date    BCC (basal cell carcinoma)     Benign uterine neoplasm     C  difficile colitis      Past Surgical History:   Procedure Laterality Date    CATARACT EXTRACTION      Last assessed - 1/14/16    MALIGNANT SKIN LESION EXCISION      Face    PARATHYROIDECTOMY      REPLACEMENT TOTAL KNEE      ROTATOR CUFF REPAIR      Last assessed - 1/14/16    TOTAL ABDOMINAL HYSTERECTOMY      with derrell oopherectomy, Last assessed - 1/14/16      Family History:     Family History   Problem Relation Age of Onset    Heart attack Mother     Substance Abuse Mother         denied    Mental illness Mother         denied    Other Mother         Cardiac Disorder     Cancer Mother         Malignant Neoplasm     Cancer Father         Malignant Neoplasm     Substance Abuse Father         denied    Mental illness Father         denied    Lung cancer Father     Spina bifida Child       Social History:        Social History     Socioeconomic History    Marital status: /Civil Union     Spouse name: None    Number of children: 3    Years of education: None    Highest education level: None   Occupational History    Occupation: [employer]     Comment: Retired    Social Needs    Financial resource strain: None    Food insecurity     Worry: None     Inability: None    Transportation needs     Medical: None     Non-medical: None   Tobacco Use    Smoking status: Former Smoker    Smokeless tobacco: Never Used   Substance and Sexual Activity    Alcohol use: No    Drug use: No    Sexual activity: None   Lifestyle    Physical activity     Days per week: None     Minutes per session: None    Stress: None   Relationships    Social connections     Talks on phone: None     Gets together: None     Attends Islam service: None     Active member of club or organization: None     Attends meetings of clubs or organizations: None     Relationship status: None    Intimate partner violence     Fear of current or ex partner: None     Emotionally abused: None     Physically abused: None     Forced sexual activity: None   Other Topics Concern    None   Social History Narrative    Lives with spouse      Medications and Allergies:     Current Outpatient Medications   Medication Sig Dispense Refill    aspirin 81 MG tablet Take by mouth      atorvastatin (LIPITOR) 20 mg tablet Take 1 tablet (20 mg total) by mouth daily 90 tablet 1    cyanocobalamin (VITAMIN B-12) 1,000 mcg tablet Take by mouth      levothyroxine 75 mcg tablet Take 1 tablet (75 mcg total) by mouth daily 90 tablet 1    lisinopril (ZESTRIL) 10 mg tablet Take 1 tablet (10 mg total) by mouth daily 90 tablet 1    Multiple Vitamin (MULTIVITAMIN) tablet Take 1 tablet by mouth daily      omega-3-acid ethyl esters (LOVAZA) 1 g capsule Take 2 capsules by mouth 2 (two) times a day      oxybutynin (DITROPAN) 5 mg tablet Take 1 tablet (5 mg total) by mouth 2 (two) times a day 180 tablet 1    Probiotic Product (PROBIOTIC-10 PO) Take by mouth daily      pyridoxine (VITAMIN B6) 100 mg tablet Take 100 mg by mouth daily       No current facility-administered medications for this visit  Allergies   Allergen Reactions    Codeine Sulfate       Immunizations:     Immunization History   Administered Date(s) Administered    INFLUENZA 09/26/2016, 09/29/2017, 08/27/2020    Influenza Split High Dose Preservative Free IM 09/26/2016, 09/29/2017    Influenza, high dose seasonal 0 7 mL 09/07/2018, 09/16/2019    Pneumococcal Conjugate 13-Valent 01/14/2016    Pneumococcal Polysaccharide PPV23 05/04/2017      Health Maintenance: There are no preventive care reminders to display for this patient  Topic Date Due    DTaP,Tdap,and Td Vaccines (1 - Tdap) 10/20/1956      Medicare Health Risk Assessment:     /64   Pulse 88   Temp (!) 96 4 °F (35 8 °C)   Ht 5' 7" (1 702 m)   Wt 77 1 kg (170 lb)   SpO2 97%   BMI 26 63 kg/m²      Yessenia Khalil is here for her Subsequent Wellness visit  Health Risk Assessment:   Patient rates overall health as good  Patient feels that their physical health rating is same  Eyesight was rated as same  Hearing was rated as same  Patient feels that their emotional and mental health rating is same  Pain experienced in the last 7 days has been none  Patient states that she has experienced no weight loss or gain in last 6 months  Depression Screening:   PHQ-2 Score: 0      Fall Risk Screening: In the past year, patient has experienced: no history of falling in past year      Urinary Incontinence Screening:   Patient has leaked urine accidently in the last six months   Takes pills     Home Safety:  Patient does not have trouble with stairs inside or outside of their home  Patient has working smoke alarms and has working carbon monoxide detector  Home safety hazards include: none  Nutrition:   Current diet is Regular  BMI Counseling: @BMI@ The BMI is above normal  Nutrition recommendations include 3-5 servings of fruits/vegetables daily, consuming healthier snacks and increasing intake of lean protein  Exercise recommendations include strength training exercises  Medications:   Patient is currently taking over-the-counter supplements  OTC medications include: see medication list  Patient is able to manage medications  Activities of Daily Living (ADLs)/Instrumental Activities of Daily Living (IADLs):   Walk and transfer into and out of bed and chair?: Yes  Dress and groom yourself?: Yes    Bathe or shower yourself?: Yes    Feed yourself?  Yes  Do your laundry/housekeeping?: Yes  Manage your money, pay your bills and track your expenses?: Yes  Make your own meals?: Yes    Do your own shopping?: Yes    Previous Hospitalizations:   Any hospitalizations or ED visits within the last 12 months?: No      Advance Care Planning:     Five wishes given: No      PREVENTIVE SCREENINGS      Cardiovascular Screening:    General: History Lipid Disorder    Due for: Lipid Panel      Diabetes Screening:       Due for: Blood Glucose      Colorectal Cancer Screening:     General: Screening Not Indicated      Breast Cancer Screening:     General: Screening Not Indicated      Cervical Cancer Screening:    General: Screening Not Indicated      Osteoporosis Screening:    General: Patient Declines      Abdominal Aortic Aneurysm (AAA) Screening:        General: Screening Not Indicated      Lung Cancer Screening:     General: Patient Declines      Hepatitis C Screening:    General: Screening Not Indicated      Radha Barker MD

## 2021-01-22 NOTE — PROGRESS NOTES
Assessment/Plan:    No problem-specific Assessment & Plan notes found for this encounter  Diagnoses and all orders for this visit:    Bilateral hand pain  -     XR hand 3+ vw right; Future  -     XR hand 3+ vw left; Future  -     EMG 2 Limb Upper Extremity; Future    Numbness and tingling in left hand  -     Elastic Bandages & Supports (B & B Carpal Tunnel Brace) MISC; Use daily at bedtime  -     EMG 2 Limb Upper Extremity; Future  -     Ambulatory referral to Hand Surgery; Future    Pure hypercholesterolemia  -     Lipid panel; Future    Screening for diabetes mellitus  -     Comprehensive metabolic panel; Future    Hypothyroidism, unspecified type  -     TSH, 3rd generation with Free T4 reflex; Future    Medicare annual wellness visit, subsequent    Primary insomnia  -     traZODone (DESYREL) 50 mg tablet; Take 1 tablet (50 mg total) by mouth daily at bedtime    Vestibulopathy of both ears  -     Ambulatory referral to Physical Therapy; Future      Follow up in 1 month or as needed if symptoms continue    Subjective:      Patient ID: Estela Rogers is a 80 y o  female  Estela Rogers is a 80 y o  female who presents for evaluation of bilateral hand/finger pain  Onset was sudden, not related to any specific activity  The pain is moderate, worsens with movement, and is relieved by rest  There is associated numbness, tingling in left hand  Evaluation to date: none  Treatment to date: nothing specific  Also she has not been able to sleep at night  She is unable to go to sleep and maintain sleep at this time  Also she feels dizzy when she gets up in the middle of the night to go to the bathroom  She denies any falls at home  The following portions of the patient's history were reviewed and updated as appropriate:   She  has a past medical history of BCC (basal cell carcinoma), Benign uterine neoplasm, and C  difficile colitis    She   Patient Active Problem List    Diagnosis Date Noted    Bilateral hand pain 01/22/2021    Numbness and tingling in left hand 01/22/2021    Screening for diabetes mellitus 01/22/2021    Medicare annual wellness visit, subsequent 01/22/2021    Primary insomnia 01/22/2021    Vestibulopathy of both ears 01/22/2021    Spinal stenosis of lumbar region without neurogenic claudication 08/03/2020    Piriformis syndrome of left side 07/15/2020    Trochanteric bursitis of left hip 07/07/2020    Trochanteric bursitis of right hip 07/07/2020    Lumbar spondylosis 01/20/2020    Ischial bursitis of right side     Lumbar disc disease with radiculopathy     Strain of lumbar region 03/18/2019    Lung nodule < 6cm on CT 09/07/2018    Urinary incontinence 03/19/2018    Other insomnia 03/19/2018    Elevated rheumatoid factor 10/10/2016    Hyperlipidemia 06/01/2016    Overactive bladder 01/28/2016    Generalized osteoarthritis 01/14/2016    Hyperparathyroidism ([address] ) 01/14/2016    Hypothyroidism 01/14/2016    Hypertension 01/04/2016     She  has a past surgical history that includes Cataract extraction; Malignant skin lesion excision; Replacement total knee; Parathyroidectomy; Rotator cuff repair; and Total abdominal hysterectomy  Her family history includes Cancer in her father and mother; Heart attack in her mother; Lung cancer in her father; Mental illness in her father and mother; Other in her mother; Spina bifida in her child; Substance Abuse in her father and mother  She  reports that she has quit smoking  She has never used smokeless tobacco  She reports that she does not drink alcohol or use drugs    Current Outpatient Medications   Medication Sig Dispense Refill    aspirin 81 MG tablet Take by mouth      atorvastatin (LIPITOR) 20 mg tablet Take 1 tablet (20 mg total) by mouth daily 90 tablet 1    cyanocobalamin (VITAMIN B-12) 1,000 mcg tablet Take by mouth      levothyroxine 75 mcg tablet Take 1 tablet (75 mcg total) by mouth daily 90 tablet 1    lisinopril (ZESTRIL) 10 mg tablet Take 1 tablet (10 mg total) by mouth daily 90 tablet 1    Multiple Vitamin (MULTIVITAMIN) tablet Take 1 tablet by mouth daily      omega-3-acid ethyl esters (LOVAZA) 1 g capsule Take 2 capsules by mouth 2 (two) times a day      oxybutynin (DITROPAN) 5 mg tablet Take 1 tablet (5 mg total) by mouth 2 (two) times a day 180 tablet 1    Probiotic Product (PROBIOTIC-10 PO) Take by mouth daily      pyridoxine (VITAMIN B6) 100 mg tablet Take 100 mg by mouth daily      Elastic Bandages & Supports (B & B Carpal Tunnel Brace) MISC Use daily at bedtime 2 each 1    traZODone (DESYREL) 50 mg tablet Take 1 tablet (50 mg total) by mouth daily at bedtime 30 tablet 1     No current facility-administered medications for this visit        Current Outpatient Medications on File Prior to Visit   Medication Sig    aspirin 81 MG tablet Take by mouth    atorvastatin (LIPITOR) 20 mg tablet Take 1 tablet (20 mg total) by mouth daily    cyanocobalamin (VITAMIN B-12) 1,000 mcg tablet Take by mouth    levothyroxine 75 mcg tablet Take 1 tablet (75 mcg total) by mouth daily    lisinopril (ZESTRIL) 10 mg tablet Take 1 tablet (10 mg total) by mouth daily    Multiple Vitamin (MULTIVITAMIN) tablet Take 1 tablet by mouth daily    omega-3-acid ethyl esters (LOVAZA) 1 g capsule Take 2 capsules by mouth 2 (two) times a day    oxybutynin (DITROPAN) 5 mg tablet Take 1 tablet (5 mg total) by mouth 2 (two) times a day    Probiotic Product (PROBIOTIC-10 PO) Take by mouth daily    pyridoxine (VITAMIN B6) 100 mg tablet Take 100 mg by mouth daily    [DISCONTINUED] gabapentin (NEURONTIN) 100 mg capsule Take 1 capsule (100 mg total) by mouth daily at bedtime    [DISCONTINUED] methocarbamol (ROBAXIN) 750 mg tablet Take 1 tablet (750 mg total) by mouth 2 (two) times a day    [DISCONTINUED] methylPREDNISolone 4 MG tablet therapy pack Use as directed on package (Patient not taking: Reported on 8/3/2020)     No current facility-administered medications on file prior to visit  She is allergic to codeine sulfate       Review of Systems   Constitutional: Negative for activity change, appetite change, fatigue and fever  HENT: Negative for congestion and ear discharge  Respiratory: Negative for cough and shortness of breath  Cardiovascular: Negative for chest pain and palpitations  Gastrointestinal: Negative for diarrhea and nausea  Musculoskeletal: Positive for arthralgias  Negative for back pain  Skin: Negative for color change and rash  Neurological: Positive for dizziness and numbness  Negative for headaches  Psychiatric/Behavioral: Negative for agitation and behavioral problems  Objective:      /64   Pulse 88   Temp (!) 96 4 °F (35 8 °C)   Ht 5' 7" (1 702 m)   Wt 77 1 kg (170 lb)   SpO2 97%   BMI 26 63 kg/m²          Physical Exam  Constitutional:       General: She is not in acute distress  Appearance: She is well-developed  She is not diaphoretic  HENT:      Head: Normocephalic and atraumatic  Nose: Nose normal    Eyes:      Conjunctiva/sclera: Conjunctivae normal       Pupils: Pupils are equal, round, and reactive to light  Cardiovascular:      Rate and Rhythm: Normal rate and regular rhythm  Heart sounds: Normal heart sounds  No murmur  Pulmonary:      Effort: Pulmonary effort is normal  No respiratory distress  Breath sounds: Normal breath sounds  No wheezing  Abdominal:      General: Bowel sounds are normal  There is no distension  Palpations: Abdomen is soft  Tenderness: There is no abdominal tenderness  Musculoskeletal:         General: Tenderness present  Skin:     General: Skin is warm and dry  Findings: No erythema or rash  Neurological:      Mental Status: She is alert and oriented to person, place, and time

## 2021-01-26 DIAGNOSIS — E03.9 HYPOTHYROIDISM, UNSPECIFIED TYPE: ICD-10-CM

## 2021-01-26 RX ORDER — LEVOTHYROXINE SODIUM 0.05 MG/1
50 TABLET ORAL DAILY
Qty: 30 TABLET | Refills: 1 | Status: SHIPPED | OUTPATIENT
Start: 2021-01-26 | End: 2021-03-19

## 2021-01-27 ENCOUNTER — IMMUNIZATIONS (OUTPATIENT)
Dept: FAMILY MEDICINE CLINIC | Facility: HOSPITAL | Age: 86
End: 2021-01-27

## 2021-01-27 DIAGNOSIS — Z23 ENCOUNTER FOR IMMUNIZATION: Primary | ICD-10-CM

## 2021-01-27 PROCEDURE — 0011A SARS-COV-2 / COVID-19 MRNA VACCINE (MODERNA) 100 MCG: CPT

## 2021-01-27 PROCEDURE — 91301 SARS-COV-2 / COVID-19 MRNA VACCINE (MODERNA) 100 MCG: CPT

## 2021-02-23 ENCOUNTER — IMMUNIZATIONS (OUTPATIENT)
Dept: FAMILY MEDICINE CLINIC | Facility: HOSPITAL | Age: 86
End: 2021-02-23

## 2021-02-23 DIAGNOSIS — Z23 ENCOUNTER FOR IMMUNIZATION: Primary | ICD-10-CM

## 2021-02-23 PROCEDURE — 0012A SARS-COV-2 / COVID-19 MRNA VACCINE (MODERNA) 100 MCG: CPT

## 2021-02-23 PROCEDURE — 91301 SARS-COV-2 / COVID-19 MRNA VACCINE (MODERNA) 100 MCG: CPT

## 2021-03-04 ENCOUNTER — RA CDI HCC (OUTPATIENT)
Dept: OTHER | Facility: HOSPITAL | Age: 86
End: 2021-03-04

## 2021-03-04 NOTE — PROGRESS NOTES
[facility]  coding opportunities             Chart reviewed, (number of) suggestions sent to provider: 1        Provider Rejected Suggestions for: E21 3              [facility]  coding oppertunities       Provider did not respond to IB message      Chart reviewed, (number of) suggestions sent to provider: 1                   E21 3 Hyperparathyroidism [facility])      If this is correct, please document and assess at your next visit 3/11/21

## 2021-03-11 ENCOUNTER — OFFICE VISIT (OUTPATIENT)
Dept: FAMILY MEDICINE CLINIC | Facility: CLINIC | Age: 86
End: 2021-03-11
Payer: COMMERCIAL

## 2021-03-11 VITALS
SYSTOLIC BLOOD PRESSURE: 118 MMHG | HEART RATE: 82 BPM | TEMPERATURE: 97.9 F | RESPIRATION RATE: 18 BRPM | BODY MASS INDEX: 26.21 KG/M2 | WEIGHT: 167 LBS | DIASTOLIC BLOOD PRESSURE: 66 MMHG | OXYGEN SATURATION: 97 % | HEIGHT: 67 IN

## 2021-03-11 DIAGNOSIS — F51.01 PRIMARY INSOMNIA: ICD-10-CM

## 2021-03-11 PROCEDURE — 1160F RVW MEDS BY RX/DR IN RCRD: CPT | Performed by: FAMILY MEDICINE

## 2021-03-11 PROCEDURE — 99213 OFFICE O/P EST LOW 20 MIN: CPT | Performed by: FAMILY MEDICINE

## 2021-03-11 RX ORDER — TRAZODONE HYDROCHLORIDE 100 MG/1
100 TABLET ORAL
Qty: 90 TABLET | Refills: 3 | Status: SHIPPED | OUTPATIENT
Start: 2021-03-11 | End: 2021-06-21 | Stop reason: SDUPTHER

## 2021-03-11 NOTE — PROGRESS NOTES
Assessment/Plan:    No problem-specific Assessment & Plan notes found for this encounter  Diagnoses and all orders for this visit:    Primary insomnia  Stable with 100 mg dose  -     traZODone (DESYREL) 100 mg tablet; Take 1 tablet (100 mg total) by mouth daily at bedtime      Follow up in 6 months or as needed    Subjective:      Patient ID: Jonah Parham is a 80 y o  female  Patient is here to follow up for insomnia  She says that when she takes Trazodone 100 mg at bedtime it helps her sleep  No side effects  The following portions of the patient's history were reviewed and updated as appropriate: She  has a past medical history of BCC (basal cell carcinoma), Benign uterine neoplasm, and C  difficile colitis  She   Patient Active Problem List    Diagnosis Date Noted    Bilateral hand pain 01/22/2021    Numbness and tingling in left hand 01/22/2021    Screening for diabetes mellitus 01/22/2021    Medicare annual wellness visit, subsequent 01/22/2021    Primary insomnia 01/22/2021    Vestibulopathy of both ears 01/22/2021    Spinal stenosis of lumbar region without neurogenic claudication 08/03/2020    Piriformis syndrome of left side 07/15/2020    Trochanteric bursitis of left hip 07/07/2020    Trochanteric bursitis of right hip 07/07/2020    Lumbar spondylosis 01/20/2020    Ischial bursitis of right side     Lumbar disc disease with radiculopathy     Strain of lumbar region 03/18/2019    Lung nodule < 6cm on CT 09/07/2018    Urinary incontinence 03/19/2018    Other insomnia 03/19/2018    Elevated rheumatoid factor 10/10/2016    Hyperlipidemia 06/01/2016    Overactive bladder 01/28/2016    Generalized osteoarthritis 01/14/2016    Hyperparathyroidism ([address] ) 01/14/2016    Hypothyroidism 01/14/2016    Hypertension 01/04/2016     She  has a past surgical history that includes Cataract extraction; Malignant skin lesion excision; Replacement total knee; Parathyroidectomy;  Rotator cuff repair; and Total abdominal hysterectomy  Her family history includes Cancer in her father and mother; Heart attack in her mother; Lung cancer in her father; Mental illness in her father and mother; Other in her mother; Spina bifida in her child; Substance Abuse in her father and mother  She  reports that she has quit smoking  She has never used smokeless tobacco  She reports that she does not drink alcohol or use drugs  Current Outpatient Medications   Medication Sig Dispense Refill    aspirin 81 MG tablet Take by mouth      atorvastatin (LIPITOR) 20 mg tablet Take 1 tablet (20 mg total) by mouth daily 90 tablet 1    cyanocobalamin (VITAMIN B-12) 1,000 mcg tablet Take by mouth      Elastic Bandages & Supports (B & B Carpal Tunnel Brace) MISC Use daily at bedtime 2 each 1    levothyroxine 50 mcg tablet Take 1 tablet (50 mcg total) by mouth daily 30 tablet 1    lisinopril (ZESTRIL) 10 mg tablet Take 1 tablet (10 mg total) by mouth daily 90 tablet 1    Multiple Vitamin (MULTIVITAMIN) tablet Take 1 tablet by mouth daily      omega-3-acid ethyl esters (LOVAZA) 1 g capsule Take 2 capsules by mouth 2 (two) times a day      oxybutynin (DITROPAN) 5 mg tablet Take 1 tablet (5 mg total) by mouth 2 (two) times a day 180 tablet 1    Probiotic Product (PROBIOTIC-10 PO) Take by mouth daily      pyridoxine (VITAMIN B6) 100 mg tablet Take 100 mg by mouth daily      traZODone (DESYREL) 100 mg tablet Take 1 tablet (100 mg total) by mouth daily at bedtime 90 tablet 3     No current facility-administered medications for this visit        Current Outpatient Medications on File Prior to Visit   Medication Sig    aspirin 81 MG tablet Take by mouth    atorvastatin (LIPITOR) 20 mg tablet Take 1 tablet (20 mg total) by mouth daily    cyanocobalamin (VITAMIN B-12) 1,000 mcg tablet Take by mouth    Elastic Bandages & Supports (B & B Carpal Tunnel Brace) MISC Use daily at bedtime    levothyroxine 50 mcg tablet Take 1 tablet (50 mcg total) by mouth daily    lisinopril (ZESTRIL) 10 mg tablet Take 1 tablet (10 mg total) by mouth daily    Multiple Vitamin (MULTIVITAMIN) tablet Take 1 tablet by mouth daily    omega-3-acid ethyl esters (LOVAZA) 1 g capsule Take 2 capsules by mouth 2 (two) times a day    oxybutynin (DITROPAN) 5 mg tablet Take 1 tablet (5 mg total) by mouth 2 (two) times a day    Probiotic Product (PROBIOTIC-10 PO) Take by mouth daily    pyridoxine (VITAMIN B6) 100 mg tablet Take 100 mg by mouth daily    [DISCONTINUED] traZODone (DESYREL) 50 mg tablet Take 1 tablet (50 mg total) by mouth daily at bedtime     No current facility-administered medications on file prior to visit  She is allergic to codeine sulfate       Review of Systems   Constitutional: Negative for activity change, appetite change, fatigue and fever  HENT: Negative for congestion and ear discharge  Respiratory: Negative for cough and shortness of breath  Cardiovascular: Negative for chest pain and palpitations  Gastrointestinal: Negative for diarrhea and nausea  Musculoskeletal: Negative for arthralgias and back pain  Skin: Negative for color change and rash  Neurological: Negative for dizziness and headaches  Psychiatric/Behavioral: Negative for agitation and behavioral problems  Objective:      /66 (BP Location: Left arm, Patient Position: Sitting)   Pulse 82   Temp 97 9 °F (36 6 °C)   Resp 18   Ht 5' 7" (1 702 m)   Wt 75 8 kg (167 lb)   SpO2 97%   BMI 26 16 kg/m²          Physical Exam  Constitutional:       General: She is not in acute distress  Appearance: She is well-developed  She is not diaphoretic  HENT:      Head: Normocephalic and atraumatic  Nose: Nose normal    Eyes:      Conjunctiva/sclera: Conjunctivae normal       Pupils: Pupils are equal, round, and reactive to light  Cardiovascular:      Rate and Rhythm: Normal rate and regular rhythm  Heart sounds: Normal heart sounds   No murmur  Pulmonary:      Effort: Pulmonary effort is normal  No respiratory distress  Breath sounds: Normal breath sounds  No wheezing  Abdominal:      General: Bowel sounds are normal  There is no distension  Palpations: Abdomen is soft  Tenderness: There is no abdominal tenderness  Skin:     General: Skin is warm and dry  Findings: No erythema or rash  Neurological:      Mental Status: She is alert and oriented to person, place, and time

## 2021-03-18 ENCOUNTER — OFFICE VISIT (OUTPATIENT)
Dept: OBGYN CLINIC | Facility: CLINIC | Age: 86
End: 2021-03-18
Payer: COMMERCIAL

## 2021-03-18 VITALS
WEIGHT: 167 LBS | BODY MASS INDEX: 26.21 KG/M2 | HEIGHT: 67 IN | DIASTOLIC BLOOD PRESSURE: 64 MMHG | SYSTOLIC BLOOD PRESSURE: 100 MMHG | HEART RATE: 70 BPM

## 2021-03-18 DIAGNOSIS — G56.02 CARPAL TUNNEL SYNDROME ON LEFT: Primary | ICD-10-CM

## 2021-03-18 DIAGNOSIS — R20.0 NUMBNESS AND TINGLING IN LEFT HAND: ICD-10-CM

## 2021-03-18 DIAGNOSIS — R20.2 NUMBNESS AND TINGLING IN LEFT HAND: ICD-10-CM

## 2021-03-18 PROCEDURE — 99214 OFFICE O/P EST MOD 30 MIN: CPT | Performed by: ORTHOPAEDIC SURGERY

## 2021-03-18 PROCEDURE — 1160F RVW MEDS BY RX/DR IN RCRD: CPT | Performed by: ORTHOPAEDIC SURGERY

## 2021-03-18 PROCEDURE — 1036F TOBACCO NON-USER: CPT | Performed by: ORTHOPAEDIC SURGERY

## 2021-03-18 RX ORDER — IBUPROFEN 600 MG/1
TABLET ORAL
Qty: 30 TABLET | Refills: 0 | Status: SHIPPED | OUTPATIENT
Start: 2021-03-18 | End: 2022-01-11

## 2021-03-18 RX ORDER — ACETAMINOPHEN 500 MG
TABLET ORAL
Qty: 30 TABLET | Refills: 0 | Status: SHIPPED | OUTPATIENT
Start: 2021-03-18 | End: 2022-01-11

## 2021-03-18 NOTE — PROGRESS NOTES
CHIEF COMPLAINT:  Chief Complaint   Patient presents with    Left Hand - Pain, Numbness    Right Hand - Pain, Numbness       SUBJECTIVE:  Ru Arellano is a 80y o  year old female who presents bilateral hand numbness and tingling  Left > Right  Numbness and tingling affectslong finger, ring finger Patient states they does have night time symptoms  They have used braces at night  Patient states the symptoms are constant  The symptoms have been going on for year(s)  The patient denies any cardiac or pulmonary issues  Denies diabetes  Denies any history of MI, gastric ulcers, kidney or liver issues  Denies blood thinners  Patient does have a history of C   Diff    PAST MEDICAL HISTORY:  Past Medical History:   Diagnosis Date    BCC (basal cell carcinoma)     Benign uterine neoplasm     C  difficile colitis        PAST SURGICAL HISTORY:  Past Surgical History:   Procedure Laterality Date    CATARACT EXTRACTION      Last assessed - 1/14/16    MALIGNANT SKIN LESION EXCISION      Face    PARATHYROIDECTOMY      REPLACEMENT TOTAL KNEE      ROTATOR CUFF REPAIR      Last assessed - 1/14/16    TOTAL ABDOMINAL HYSTERECTOMY      with derrell oopherectomy, Last assessed - 1/14/16       FAMILY HISTORY:  Family History   Problem Relation Age of Onset    Heart attack Mother     Substance Abuse Mother         denied    Mental illness Mother         denied    Other Mother         Cardiac Disorder     Cancer Mother         Malignant Neoplasm     Cancer Father         Malignant Neoplasm     Substance Abuse Father         denied    Mental illness Father         denied    Lung cancer Father     Spina bifida Child        SOCIAL HISTORY:  Social History     Tobacco Use    Smoking status: Former Smoker    Smokeless tobacco: Never Used   Substance Use Topics    Alcohol use: No    Drug use: No       MEDICATIONS:    Current Outpatient Medications:     aspirin 81 MG tablet, Take by mouth, Disp: , Rfl:    atorvastatin (LIPITOR) 20 mg tablet, Take 1 tablet (20 mg total) by mouth daily, Disp: 90 tablet, Rfl: 1    cyanocobalamin (VITAMIN B-12) 1,000 mcg tablet, Take by mouth, Disp: , Rfl:     Elastic Bandages & Supports (B & B Carpal Tunnel Brace) MISC, Use daily at bedtime, Disp: 2 each, Rfl: 1    lisinopril (ZESTRIL) 10 mg tablet, Take 1 tablet (10 mg total) by mouth daily, Disp: 90 tablet, Rfl: 1    Multiple Vitamin (MULTIVITAMIN) tablet, Take 1 tablet by mouth daily, Disp: , Rfl:     omega-3-acid ethyl esters (LOVAZA) 1 g capsule, Take 2 capsules by mouth 2 (two) times a day, Disp: , Rfl:     oxybutynin (DITROPAN) 5 mg tablet, Take 1 tablet (5 mg total) by mouth 2 (two) times a day, Disp: 180 tablet, Rfl: 1    Probiotic Product (PROBIOTIC-10 PO), Take by mouth daily, Disp: , Rfl:     pyridoxine (VITAMIN B6) 100 mg tablet, Take 100 mg by mouth daily, Disp: , Rfl:     traZODone (DESYREL) 100 mg tablet, Take 1 tablet (100 mg total) by mouth daily at bedtime, Disp: 90 tablet, Rfl: 3    acetaminophen (TYLENOL) 500 mg tablet, Take 1 500mg tablet in the morning prior to surgery, then 1 tablet every 6 hours for 5-7 days  , Disp: 30 tablet, Rfl: 0    ibuprofen (MOTRIN) 600 mg tablet, Take 1 600mg tablet in the morning prior to surgery, then 1 tablet every 6 hours for 5-7 days  , Disp: 30 tablet, Rfl: 0    levothyroxine 50 mcg tablet, Take 1 tablet (50 mcg total) by mouth daily, Disp: 30 tablet, Rfl: 1    ALLERGIES:  Allergies   Allergen Reactions    Codeine Sulfate        REVIEW OF SYSTEMS:  Review of Systems   ROS:   General: no fever, no chills  HEENT:  No loss of hearing or eyesight problems  Eyes:  No red eyes  Respiratory:  No coughing, shortness of breath or wheezing  Cardiovascular:  No chest pain, no palpitations  GI:  Abdomen soft nontender, denies nausea  Endocrine:  No muscle weakness, no frequent urination, no excessive thirst  Urinary:  No dysuria, no incontinence  Musculoskeletal: see HPI and PE  SKIN:  No skin rash, no dry skin  Neurological:  No headaches, no confusion  Psychiatric:  No suicide thoughts, no anxiety, no depression  Review of all other systems is negative    VITALS:  Vitals:    03/18/21 1305   BP: 100/64   Pulse: 70       LABS:  HgA1c: No results found for: HGBA1C  BMP:   Lab Results   Component Value Date    CALCIUM 10 5 (H) 01/22/2021    K 4 2 01/22/2021    CO2 25 01/22/2021     (H) 01/22/2021    BUN 22 01/22/2021    CREATININE 0 92 01/22/2021       _____________________________________________________  PHYSICAL EXAMINATION:  General: well developed and well nourished, alert, oriented times 3 and appears comfortable  Psychiatric: Normal  HEENT: Trachea Midline, No torticollis  Pulmonary: No audible wheezing or strider  Cardiovascular: No discernable arrhythmia   Skin: No masses, erythema, lacerations, fluctation, ulcerations  Neurovascular: Sensation intact to the Ulnar Nerve, Sensation Intact to the Radial Nerve, Decreased Sensation to  the Median Nerve, Motor Intact to the Median, Ulnar, Radial Nerve and Pulses Intact    MUSCULOSKELETAL EXAMINATION:  Bilateral Carpal Tunnel Exam:    Positive thenar atrophy  Positive phalen's test  Positive carpal tunnel compression  Negative tinels over median nerve at the wrist   Opposition strength 5/5  Abduction strength 5/5  With 2 point discrimination, both hands patient demonstrated guessing between 1 and 2 points    left [facility] Exam:  No adduction contracture  No hyperextension deformity of MCP joint  Positive localized tenderness over radial and dorsal aspect of thumb (CMC joint)  Grind test is Positive for pain and Positive for crepitus  No triggering or tenderness over the A1 pulley  No pain with Finkelsteins maneuver           ___________________________________________________  STUDIES REVIEWED:  No studies reviewed         PROCEDURES PERFORMED:  Procedures  No Procedures performed today    _____________________________________________________  ASSESSMENT/PLAN:    Left thumb cmc arthritis  - patient was advised to take tylenol and ibuprofen as needed for pain  - she can use heat and massage  - she will follow up with us as needed  Left carpal tunnel  -  Conservative and operative treatment were discussed with the patient  Patient would like to proceed with surgical intervention  Detail consent was obtained today   Surgery: left endoscopic carpal tunnel release    Anesthesia: iv sedation   Antibiotics: none   Medical clearance: not needed   Hand therapy: ordered   Consent: obtained   Patient will take tylenol and ibuprofen as needed for the pain    Surgery medication instructions: You will stop eating and drinking at midnight the night before your surgery, but you may continue to take your normal medications with a small sip of water  In the morning on the day of your surgery, we would like you to take the following medications (as long as you have never been told to avoid Tylenol or NSAIDs like ibuprofen, Naproxen, Aleve, Advil, etc):   Ibuprofen 600mg one tablet by mouth   Tylenol 500mg one tablet by mouth    After surgery, we would like you to take Ibuprofen 600mg one tablet by mouth every 6 hours with food (at breakfast, lunch and dinner)  AND Tylenol 500 mg one tablet by mouth every 6 hours  (at breakfast, lunch and dinner) for 5-7 days after your surgery  Please take these medication EVERYDAY after surgery for 5-7 days, and not just as needed  You can take these medications at the same time  Taking these medications after surgery will limit your need for prescription pain medication  We will also prescribe a narcotic pain medication for a limited time after surgery that you can take as needed for moderate or severe pain           Diagnoses and all orders for this visit:    Carpal tunnel syndrome on left  -     Case request operating room: RELEASE LEFT CARPAL TUNNEL ENDOSCOPIC; Standing  -     Case request operating room: RELEASE LEFT CARPAL TUNNEL ENDOSCOPIC  -     Ambulatory referral to PT/OT hand therapy; Future  -     ibuprofen (MOTRIN) 600 mg tablet; Take 1 600mg tablet in the morning prior to surgery, then 1 tablet every 6 hours for 5-7 days  -     acetaminophen (TYLENOL) 500 mg tablet; Take 1 500mg tablet in the morning prior to surgery, then 1 tablet every 6 hours for 5-7 days  Numbness and tingling in left hand  -     Ambulatory referral to Hand Surgery    Other orders  -     Diet NPO; Sips with meds; Standing  -     Nursing Communication [address] [place] [address] Interventions Implemented; Standing  -     Nursing Communication [facility] bath, have staff wash entire body (neck down) per pre-op bathing protocol  Routine, evening prior to, and day of surgery ; Standing  -     Void on call to OR; Standing  -     Insert peripheral IV; Standing            Follow Up:  Return for post op  Work/school status:   no restrictions    To Do Next Visit:  Re-evaluation of current issue    General Discussions:  Carpal Tunnel Syndrome: The anatomy and physiology of carpal tunnel syndrome was discussed with the patient today  Increase pressure localized under the transverse carpal ligament can cause pain, numbness, tingling, or dysesthesias within the median nerve distribution as well as feelings of fatigue, clumsiness, or awkwardness  These symptoms typically occur at night and worse in the morning upon waking  Eventually, untreated carpal tunnel syndrome can result in weakness and permanent loss of muscle within the thenar compartment of the hand  Treatment options were discussed with the patient  Conservative treatment includes nocturnal resting splints to keep the nerve in a neutral position, ergonomic changes within the work or home environment, activity modification, and tendon gliding exercises  Vitamin B6 one tablet daily over the counter may helpful to reduce symptoms     Steroid injections within the carpal canal can help a majority of patients, however this is often self-limited in a majority of patients  Surgical intervention to divide the transverse carpal ligament typically results in a long-lasting relief of the patient's complaints, with the recurrence rate of less than 1%  Scribe Attestation    I,:  Ashley Luna PA-C am acting as a scribe while in the presence of the attending physician :       I,:  Issac Ochoa MD personally performed the services described in this documentation    as scribed in my presence :           Portions of the record may have been created with voice recognition software  Occasional wrong word or "sound a like" substitutions may have occurred due to the inherent limitations of voice recognition software  Read the chart carefully and recognize, using context, where substitutions have occurred

## 2021-03-18 NOTE — H&P
CHIEF COMPLAINT:  Chief Complaint   Patient presents with    Left Hand - Pain, Numbness    Right Hand - Pain, Numbness       SUBJECTIVE:  Dayna Rodríguez is a 80y o  year old female who presents bilateral hand numbness and tingling  Left > Right  Numbness and tingling affectslong finger, ring finger Patient states they does have night time symptoms  They have used braces at night  Patient states the symptoms are constant  The symptoms have been going on for year(s)  The patient denies any cardiac or pulmonary issues  Denies diabetes  Denies any history of MI, gastric ulcers, kidney or liver issues  Denies blood thinners  Patient does have a history of C   Diff    PAST MEDICAL HISTORY:  Past Medical History:   Diagnosis Date    BCC (basal cell carcinoma)     Benign uterine neoplasm     C  difficile colitis        PAST SURGICAL HISTORY:  Past Surgical History:   Procedure Laterality Date    CATARACT EXTRACTION      Last assessed - 1/14/16    MALIGNANT SKIN LESION EXCISION      Face    PARATHYROIDECTOMY      REPLACEMENT TOTAL KNEE      ROTATOR CUFF REPAIR      Last assessed - 1/14/16    TOTAL ABDOMINAL HYSTERECTOMY      with derrell oopherectomy, Last assessed - 1/14/16       FAMILY HISTORY:  Family History   Problem Relation Age of Onset    Heart attack Mother     Substance Abuse Mother         denied    Mental illness Mother         denied    Other Mother         Cardiac Disorder     Cancer Mother         Malignant Neoplasm     Cancer Father         Malignant Neoplasm     Substance Abuse Father         denied    Mental illness Father         denied    Lung cancer Father     Spina bifida Child        SOCIAL HISTORY:  Social History     Tobacco Use    Smoking status: Former Smoker    Smokeless tobacco: Never Used   Substance Use Topics    Alcohol use: No    Drug use: No       MEDICATIONS:    Current Outpatient Medications:     aspirin 81 MG tablet, Take by mouth, Disp: , Rfl:    atorvastatin (LIPITOR) 20 mg tablet, Take 1 tablet (20 mg total) by mouth daily, Disp: 90 tablet, Rfl: 1    cyanocobalamin (VITAMIN B-12) 1,000 mcg tablet, Take by mouth, Disp: , Rfl:     Elastic Bandages & Supports (B & B Carpal Tunnel Brace) MISC, Use daily at bedtime, Disp: 2 each, Rfl: 1    lisinopril (ZESTRIL) 10 mg tablet, Take 1 tablet (10 mg total) by mouth daily, Disp: 90 tablet, Rfl: 1    Multiple Vitamin (MULTIVITAMIN) tablet, Take 1 tablet by mouth daily, Disp: , Rfl:     omega-3-acid ethyl esters (LOVAZA) 1 g capsule, Take 2 capsules by mouth 2 (two) times a day, Disp: , Rfl:     oxybutynin (DITROPAN) 5 mg tablet, Take 1 tablet (5 mg total) by mouth 2 (two) times a day, Disp: 180 tablet, Rfl: 1    Probiotic Product (PROBIOTIC-10 PO), Take by mouth daily, Disp: , Rfl:     pyridoxine (VITAMIN B6) 100 mg tablet, Take 100 mg by mouth daily, Disp: , Rfl:     traZODone (DESYREL) 100 mg tablet, Take 1 tablet (100 mg total) by mouth daily at bedtime, Disp: 90 tablet, Rfl: 3    acetaminophen (TYLENOL) 500 mg tablet, Take 1 500mg tablet in the morning prior to surgery, then 1 tablet every 6 hours for 5-7 days  , Disp: 30 tablet, Rfl: 0    ibuprofen (MOTRIN) 600 mg tablet, Take 1 600mg tablet in the morning prior to surgery, then 1 tablet every 6 hours for 5-7 days  , Disp: 30 tablet, Rfl: 0    levothyroxine 50 mcg tablet, Take 1 tablet (50 mcg total) by mouth daily, Disp: 30 tablet, Rfl: 1    ALLERGIES:  Allergies   Allergen Reactions    Codeine Sulfate        REVIEW OF SYSTEMS:  Review of Systems   ROS:   General: no fever, no chills  HEENT:  No loss of hearing or eyesight problems  Eyes:  No red eyes  Respiratory:  No coughing, shortness of breath or wheezing  Cardiovascular:  No chest pain, no palpitations  GI:  Abdomen soft nontender, denies nausea  Endocrine:  No muscle weakness, no frequent urination, no excessive thirst  Urinary:  No dysuria, no incontinence  Musculoskeletal: see HPI and PE  SKIN:  No skin rash, no dry skin  Neurological:  No headaches, no confusion  Psychiatric:  No suicide thoughts, no anxiety, no depression  Review of all other systems is negative    VITALS:  Vitals:    03/18/21 1305   BP: 100/64   Pulse: 70       LABS:  HgA1c: No results found for: HGBA1C  BMP:   Lab Results   Component Value Date    CALCIUM 10 5 (H) 01/22/2021    K 4 2 01/22/2021    CO2 25 01/22/2021     (H) 01/22/2021    BUN 22 01/22/2021    CREATININE 0 92 01/22/2021       _____________________________________________________  PHYSICAL EXAMINATION:  General: well developed and well nourished, alert, oriented times 3 and appears comfortable  Psychiatric: Normal  HEENT: Trachea Midline, No torticollis  Pulmonary: No audible wheezing or strider  Cardiovascular: No discernable arrhythmia   Skin: No masses, erythema, lacerations, fluctation, ulcerations  Neurovascular: Sensation intact to the Ulnar Nerve, Sensation Intact to the Radial Nerve, Decreased Sensation to  the Median Nerve, Motor Intact to the Median, Ulnar, Radial Nerve and Pulses Intact    MUSCULOSKELETAL EXAMINATION:  Bilateral Carpal Tunnel Exam:    Positive thenar atrophy  Positive phalen's test  Positive carpal tunnel compression  Negative tinels over median nerve at the wrist   Opposition strength 5/5  Abduction strength 5/5  With 2 point discrimination, both hands patient demonstrated guessing between 1 and 2 points    left [facility] Exam:  No adduction contracture  No hyperextension deformity of MCP joint  Positive localized tenderness over radial and dorsal aspect of thumb (CMC joint)  Grind test is Positive for pain and Positive for crepitus  No triggering or tenderness over the A1 pulley  No pain with Finkelsteins maneuver           ___________________________________________________  STUDIES REVIEWED:  No studies reviewed         PROCEDURES PERFORMED:  Procedures  No Procedures performed today    _____________________________________________________  ASSESSMENT/PLAN:    Left thumb cmc arthritis  - patient was advised to take tylenol and ibuprofen as needed for pain  - she can use heat and massage  - she will follow up with us as needed  Left carpal tunnel  -  Conservative and operative treatment were discussed with the patient  Patient would like to proceed with surgical intervention  Detail consent was obtained today   Surgery: left endoscopic carpal tunnel release    Anesthesia: iv sedation   Antibiotics: none   Medical clearance: not needed   Hand therapy: ordered   Consent: obtained   Patient will take tylenol and ibuprofen as needed for the pain    Surgery medication instructions: You will stop eating and drinking at midnight the night before your surgery, but you may continue to take your normal medications with a small sip of water  In the morning on the day of your surgery, we would like you to take the following medications (as long as you have never been told to avoid Tylenol or NSAIDs like ibuprofen, Naproxen, Aleve, Advil, etc):   Ibuprofen 600mg one tablet by mouth   Tylenol 500mg one tablet by mouth    After surgery, we would like you to take Ibuprofen 600mg one tablet by mouth every 6 hours with food (at breakfast, lunch and dinner)  AND Tylenol 500 mg one tablet by mouth every 6 hours  (at breakfast, lunch and dinner) for 5-7 days after your surgery  Please take these medication EVERYDAY after surgery for 5-7 days, and not just as needed  You can take these medications at the same time  Taking these medications after surgery will limit your need for prescription pain medication  We will also prescribe a narcotic pain medication for a limited time after surgery that you can take as needed for moderate or severe pain           Diagnoses and all orders for this visit:    Carpal tunnel syndrome on left  -     Case request operating room: RELEASE LEFT CARPAL TUNNEL ENDOSCOPIC; Standing  -     Case request operating room: RELEASE LEFT CARPAL TUNNEL ENDOSCOPIC  -     Ambulatory referral to PT/OT hand therapy; Future  -     ibuprofen (MOTRIN) 600 mg tablet; Take 1 600mg tablet in the morning prior to surgery, then 1 tablet every 6 hours for 5-7 days  -     acetaminophen (TYLENOL) 500 mg tablet; Take 1 500mg tablet in the morning prior to surgery, then 1 tablet every 6 hours for 5-7 days  Numbness and tingling in left hand  -     Ambulatory referral to Hand Surgery    Other orders  -     Diet NPO; Sips with meds; Standing  -     Nursing Communication [address] [place] [address] Interventions Implemented; Standing  -     Nursing Communication [facility] bath, have staff wash entire body (neck down) per pre-op bathing protocol  Routine, evening prior to, and day of surgery ; Standing  -     Void on call to OR; Standing  -     Insert peripheral IV; Standing            Follow Up:  Return for post op  Work/school status:   no restrictions    To Do Next Visit:  Re-evaluation of current issue    General Discussions:  Carpal Tunnel Syndrome: The anatomy and physiology of carpal tunnel syndrome was discussed with the patient today  Increase pressure localized under the transverse carpal ligament can cause pain, numbness, tingling, or dysesthesias within the median nerve distribution as well as feelings of fatigue, clumsiness, or awkwardness  These symptoms typically occur at night and worse in the morning upon waking  Eventually, untreated carpal tunnel syndrome can result in weakness and permanent loss of muscle within the thenar compartment of the hand  Treatment options were discussed with the patient  Conservative treatment includes nocturnal resting splints to keep the nerve in a neutral position, ergonomic changes within the work or home environment, activity modification, and tendon gliding exercises  Vitamin B6 one tablet daily over the counter may helpful to reduce symptoms     Steroid injections within the carpal canal can help a majority of patients, however this is often self-limited in a majority of patients  Surgical intervention to divide the transverse carpal ligament typically results in a long-lasting relief of the patient's complaints, with the recurrence rate of less than 1%  Scribe Attestation    I,:  Abiodun Montenegro PA-C am acting as a scribe while in the presence of the attending physician :       I,:  Akanksha Jane MD personally performed the services described in this documentation    as scribed in my presence :           Portions of the record may have been created with voice recognition software  Occasional wrong word or "sound a like" substitutions may have occurred due to the inherent limitations of voice recognition software  Read the chart carefully and recognize, using context, where substitutions have occurred

## 2021-03-18 NOTE — H&P (VIEW-ONLY)
CHIEF COMPLAINT:  Chief Complaint   Patient presents with    Left Hand - Pain, Numbness    Right Hand - Pain, Numbness       SUBJECTIVE:  Ede Jara is a 80y o  year old female who presents bilateral hand numbness and tingling  Left > Right  Numbness and tingling affectslong finger, ring finger Patient states they does have night time symptoms  They have used braces at night  Patient states the symptoms are constant  The symptoms have been going on for year(s)  The patient denies any cardiac or pulmonary issues  Denies diabetes  Denies any history of MI, gastric ulcers, kidney or liver issues  Denies blood thinners  Patient does have a history of C   Diff    PAST MEDICAL HISTORY:  Past Medical History:   Diagnosis Date    BCC (basal cell carcinoma)     Benign uterine neoplasm     C  difficile colitis        PAST SURGICAL HISTORY:  Past Surgical History:   Procedure Laterality Date    CATARACT EXTRACTION      Last assessed - 1/14/16    MALIGNANT SKIN LESION EXCISION      Face    PARATHYROIDECTOMY      REPLACEMENT TOTAL KNEE      ROTATOR CUFF REPAIR      Last assessed - 1/14/16    TOTAL ABDOMINAL HYSTERECTOMY      with derrell oopherectomy, Last assessed - 1/14/16       FAMILY HISTORY:  Family History   Problem Relation Age of Onset    Heart attack Mother     Substance Abuse Mother         denied    Mental illness Mother         denied    Other Mother         Cardiac Disorder     Cancer Mother         Malignant Neoplasm     Cancer Father         Malignant Neoplasm     Substance Abuse Father         denied    Mental illness Father         denied    Lung cancer Father     Spina bifida Child        SOCIAL HISTORY:  Social History     Tobacco Use    Smoking status: Former Smoker    Smokeless tobacco: Never Used   Substance Use Topics    Alcohol use: No    Drug use: No       MEDICATIONS:    Current Outpatient Medications:     aspirin 81 MG tablet, Take by mouth, Disp: , Rfl:    atorvastatin (LIPITOR) 20 mg tablet, Take 1 tablet (20 mg total) by mouth daily, Disp: 90 tablet, Rfl: 1    cyanocobalamin (VITAMIN B-12) 1,000 mcg tablet, Take by mouth, Disp: , Rfl:     Elastic Bandages & Supports (B & B Carpal Tunnel Brace) MISC, Use daily at bedtime, Disp: 2 each, Rfl: 1    lisinopril (ZESTRIL) 10 mg tablet, Take 1 tablet (10 mg total) by mouth daily, Disp: 90 tablet, Rfl: 1    Multiple Vitamin (MULTIVITAMIN) tablet, Take 1 tablet by mouth daily, Disp: , Rfl:     omega-3-acid ethyl esters (LOVAZA) 1 g capsule, Take 2 capsules by mouth 2 (two) times a day, Disp: , Rfl:     oxybutynin (DITROPAN) 5 mg tablet, Take 1 tablet (5 mg total) by mouth 2 (two) times a day, Disp: 180 tablet, Rfl: 1    Probiotic Product (PROBIOTIC-10 PO), Take by mouth daily, Disp: , Rfl:     pyridoxine (VITAMIN B6) 100 mg tablet, Take 100 mg by mouth daily, Disp: , Rfl:     traZODone (DESYREL) 100 mg tablet, Take 1 tablet (100 mg total) by mouth daily at bedtime, Disp: 90 tablet, Rfl: 3    acetaminophen (TYLENOL) 500 mg tablet, Take 1 500mg tablet in the morning prior to surgery, then 1 tablet every 6 hours for 5-7 days  , Disp: 30 tablet, Rfl: 0    ibuprofen (MOTRIN) 600 mg tablet, Take 1 600mg tablet in the morning prior to surgery, then 1 tablet every 6 hours for 5-7 days  , Disp: 30 tablet, Rfl: 0    levothyroxine 50 mcg tablet, Take 1 tablet (50 mcg total) by mouth daily, Disp: 30 tablet, Rfl: 1    ALLERGIES:  Allergies   Allergen Reactions    Codeine Sulfate        REVIEW OF SYSTEMS:  Review of Systems   ROS:   General: no fever, no chills  HEENT:  No loss of hearing or eyesight problems  Eyes:  No red eyes  Respiratory:  No coughing, shortness of breath or wheezing  Cardiovascular:  No chest pain, no palpitations  GI:  Abdomen soft nontender, denies nausea  Endocrine:  No muscle weakness, no frequent urination, no excessive thirst  Urinary:  No dysuria, no incontinence  Musculoskeletal: see HPI and PE  SKIN:  No skin rash, no dry skin  Neurological:  No headaches, no confusion  Psychiatric:  No suicide thoughts, no anxiety, no depression  Review of all other systems is negative    VITALS:  Vitals:    03/18/21 1305   BP: 100/64   Pulse: 70       LABS:  HgA1c: No results found for: HGBA1C  BMP:   Lab Results   Component Value Date    CALCIUM 10 5 (H) 01/22/2021    K 4 2 01/22/2021    CO2 25 01/22/2021     (H) 01/22/2021    BUN 22 01/22/2021    CREATININE 0 92 01/22/2021       _____________________________________________________  PHYSICAL EXAMINATION:  General: well developed and well nourished, alert, oriented times 3 and appears comfortable  Psychiatric: Normal  HEENT: Trachea Midline, No torticollis  Pulmonary: No audible wheezing or strider  Cardiovascular: No discernable arrhythmia   Skin: No masses, erythema, lacerations, fluctation, ulcerations  Neurovascular: Sensation intact to the Ulnar Nerve, Sensation Intact to the Radial Nerve, Decreased Sensation to  the Median Nerve, Motor Intact to the Median, Ulnar, Radial Nerve and Pulses Intact    MUSCULOSKELETAL EXAMINATION:  Bilateral Carpal Tunnel Exam:    Positive thenar atrophy  Positive phalen's test  Positive carpal tunnel compression  Negative tinels over median nerve at the wrist   Opposition strength 5/5  Abduction strength 5/5  With 2 point discrimination, both hands patient demonstrated guessing between 1 and 2 points    left [facility] Exam:  No adduction contracture  No hyperextension deformity of MCP joint  Positive localized tenderness over radial and dorsal aspect of thumb (CMC joint)  Grind test is Positive for pain and Positive for crepitus  No triggering or tenderness over the A1 pulley  No pain with Finkelsteins maneuver           ___________________________________________________  STUDIES REVIEWED:  No studies reviewed         PROCEDURES PERFORMED:  Procedures  No Procedures performed today    _____________________________________________________  ASSESSMENT/PLAN:    Left thumb cmc arthritis  - patient was advised to take tylenol and ibuprofen as needed for pain  - she can use heat and massage  - she will follow up with us as needed  Left carpal tunnel  -  Conservative and operative treatment were discussed with the patient  Patient would like to proceed with surgical intervention  Detail consent was obtained today   Surgery: left endoscopic carpal tunnel release    Anesthesia: iv sedation   Antibiotics: none   Medical clearance: not needed   Hand therapy: ordered   Consent: obtained   Patient will take tylenol and ibuprofen as needed for the pain    Surgery medication instructions: You will stop eating and drinking at midnight the night before your surgery, but you may continue to take your normal medications with a small sip of water  In the morning on the day of your surgery, we would like you to take the following medications (as long as you have never been told to avoid Tylenol or NSAIDs like ibuprofen, Naproxen, Aleve, Advil, etc):   Ibuprofen 600mg one tablet by mouth   Tylenol 500mg one tablet by mouth    After surgery, we would like you to take Ibuprofen 600mg one tablet by mouth every 6 hours with food (at breakfast, lunch and dinner)  AND Tylenol 500 mg one tablet by mouth every 6 hours  (at breakfast, lunch and dinner) for 5-7 days after your surgery  Please take these medication EVERYDAY after surgery for 5-7 days, and not just as needed  You can take these medications at the same time  Taking these medications after surgery will limit your need for prescription pain medication  We will also prescribe a narcotic pain medication for a limited time after surgery that you can take as needed for moderate or severe pain           Diagnoses and all orders for this visit:    Carpal tunnel syndrome on left  -     Case request operating room: RELEASE LEFT CARPAL TUNNEL ENDOSCOPIC; Standing  -     Case request operating room: RELEASE LEFT CARPAL TUNNEL ENDOSCOPIC  -     Ambulatory referral to PT/OT hand therapy; Future  -     ibuprofen (MOTRIN) 600 mg tablet; Take 1 600mg tablet in the morning prior to surgery, then 1 tablet every 6 hours for 5-7 days  -     acetaminophen (TYLENOL) 500 mg tablet; Take 1 500mg tablet in the morning prior to surgery, then 1 tablet every 6 hours for 5-7 days  Numbness and tingling in left hand  -     Ambulatory referral to Hand Surgery    Other orders  -     Diet NPO; Sips with meds; Standing  -     Nursing Communication [address] [place] [address] Interventions Implemented; Standing  -     Nursing Communication [facility] bath, have staff wash entire body (neck down) per pre-op bathing protocol  Routine, evening prior to, and day of surgery ; Standing  -     Void on call to OR; Standing  -     Insert peripheral IV; Standing            Follow Up:  Return for post op  Work/school status:   no restrictions    To Do Next Visit:  Re-evaluation of current issue    General Discussions:  Carpal Tunnel Syndrome: The anatomy and physiology of carpal tunnel syndrome was discussed with the patient today  Increase pressure localized under the transverse carpal ligament can cause pain, numbness, tingling, or dysesthesias within the median nerve distribution as well as feelings of fatigue, clumsiness, or awkwardness  These symptoms typically occur at night and worse in the morning upon waking  Eventually, untreated carpal tunnel syndrome can result in weakness and permanent loss of muscle within the thenar compartment of the hand  Treatment options were discussed with the patient  Conservative treatment includes nocturnal resting splints to keep the nerve in a neutral position, ergonomic changes within the work or home environment, activity modification, and tendon gliding exercises  Vitamin B6 one tablet daily over the counter may helpful to reduce symptoms     Steroid injections within the carpal canal can help a majority of patients, however this is often self-limited in a majority of patients  Surgical intervention to divide the transverse carpal ligament typically results in a long-lasting relief of the patient's complaints, with the recurrence rate of less than 1%  Scribe Attestation    I,:  Kari Smith PA-C am acting as a scribe while in the presence of the attending physician :       I,:  Grecia Trimble MD personally performed the services described in this documentation    as scribed in my presence :           Portions of the record may have been created with voice recognition software  Occasional wrong word or "sound a like" substitutions may have occurred due to the inherent limitations of voice recognition software  Read the chart carefully and recognize, using context, where substitutions have occurred

## 2021-03-19 DIAGNOSIS — I10 ESSENTIAL HYPERTENSION: ICD-10-CM

## 2021-03-19 DIAGNOSIS — E03.9 HYPOTHYROIDISM, UNSPECIFIED TYPE: ICD-10-CM

## 2021-03-19 DIAGNOSIS — R32 URINARY INCONTINENCE, UNSPECIFIED TYPE: ICD-10-CM

## 2021-03-19 RX ORDER — LEVOTHYROXINE SODIUM 0.05 MG/1
TABLET ORAL
Qty: 30 TABLET | Refills: 0 | Status: SHIPPED | OUTPATIENT
Start: 2021-03-19 | End: 2021-04-16

## 2021-03-19 RX ORDER — LISINOPRIL 10 MG/1
10 TABLET ORAL DAILY
Qty: 90 TABLET | Refills: 1 | Status: SHIPPED | OUTPATIENT
Start: 2021-03-19 | End: 2021-06-21 | Stop reason: SDUPTHER

## 2021-03-19 RX ORDER — OXYBUTYNIN CHLORIDE 5 MG/1
5 TABLET ORAL 2 TIMES DAILY
Qty: 180 TABLET | Refills: 1 | Status: SHIPPED | OUTPATIENT
Start: 2021-03-19 | End: 2021-06-21 | Stop reason: SDUPTHER

## 2021-03-19 NOTE — PRE-PROCEDURE INSTRUCTIONS
Pre-Surgery Instructions:   Medication Instructions    aspirin 81 MG tablet INSTRUCTED TO HOLD FROM NOW TILL AFTER SURGERY    atorvastatin (LIPITOR) 20 mg tablet TAKE DAY OF SURGERY    cyanocobalamin (VITAMIN B-12) 1,000 mcg tablet HOLD FROM NOW TILL AFTER SURGERY    levothyroxine 50 mcg tablet TAKE DAY OF SURGERY    lisinopril (ZESTRIL) 10 mg tablet HOLD DAY OF SURGERY    Multiple Vitamin (MULTIVITAMIN) tablet HOLD FROM NOW TILL AFTER SURGERY    omega-3-acid ethyl esters (LOVAZA) 1 g capsule HOLD FROM NOW TILL AFTER SURGERY    oxybutynin (DITROPAN) 5 mg tablet HOLD DAY OF SURGERY    Probiotic Product (PROBIOTIC-10 PO) HOLD FROM NOW TILL AFTER SURGERY    pyridoxine (VITAMIN B6) 100 mg tablet HOLD FROM NOW TILL AFTER SURGERY    traZODone (DESYREL) 100 mg tablet TAKES AT NIGHT    My Surgical Experience    The following information was developed to assist you to prepare for your operation  What do I need to do before coming to the hospital?   Arrange for a responsible person to drive you to and from the hospital    Arrange care for your children at home  Children are not allowed in the recovery areas of the hospital   Plan to wear clothing that is easy to put on and take off  If you are having shoulder surgery, wear a shirt that buttons or zippers in the front  Bathing  o Shower the evening before and the morning of your surgery with an antibacterial soap  Please refer to the Pre Op Showering Instructions for Surgery Patients Sheet   o Remove nail polish and all body piercing jewelry  o Do not shave any body part for at least 24 hours before surgery-this includes face, arms, legs and upper body  Food  o Nothing to eat or drink after midnight the night before your surgery   This includes candy and chewing gum  o Exception: If your surgery is after 12:00pm (noon), you may have clear liquids such as 7-Up®, ginger ale, apple or cranberry juice, Jell-O®, water, or clear broth until 8:00 am  o Do not drink milk or juice with pulp on the morning before surgery  o Do not drink alcohol 24 hours before surgery  Medicine  o Follow instructions you received from your surgeon about which medicines you may take on the day of surgery  o If instructed to take medicine on the morning of surgery, take pills with just a small sip of water  Call your prescribing doctor for specific infroamtion on what to do if you take insulin    What should I bring to the hospital?    Bring:  Lafrances Asa or a walker, if you have them, for foot or knee surgery   A list of the daily medicines, vitamins, minerals, herbals and nutritional supplements you take  Include the dosages of medicines and the time you take them each day   Glasses, dentures or hearing aids   Minimal clothing; you will be wearing hospital sleepwear   Photo ID; required to verify your identity   If you have a Living Will or Power of , bring a copy of the documents   If you have an ostomy, bring an extra pouch and any supplies you use    Do not bring   Medicines or inhalers   Money, valuables or jewelry    What other information should I know about the day of surgery?  Notify your surgeons if you develop a cold, sore throat, cough, fever, rash or any other illness   Report to the Ambulatory Surgical/Same Day Surgery Unit   You will be instructed to stop at Registration only if you have not been pre-registered   Inform your  fi they do not stay that they will be asked by the staff to leave a phone number where they can be reached   Be available to be reached before surgery  In the event the operating room schedule changes, you may be asked to come in earlier or later than expected    *It is important to tell your doctor and others involved in your health care if you are taking or have been taking any non-prescription drugs, vitamins, minerals, herbals or other nutritional supplements   Any of these may interact with some food or medicines and cause a reaction

## 2021-03-23 ENCOUNTER — HOSPITAL ENCOUNTER (OUTPATIENT)
Facility: HOSPITAL | Age: 86
Setting detail: OUTPATIENT SURGERY
Discharge: HOME | End: 2021-03-23
Attending: ORTHOPAEDIC SURGERY | Admitting: ORTHOPAEDIC SURGERY
Payer: COMMERCIAL

## 2021-03-23 ENCOUNTER — ANESTHESIA EVENT (OUTPATIENT)
Dept: PERIOP | Facility: HOSPITAL | Age: 86
End: 2021-03-23
Payer: COMMERCIAL

## 2021-03-23 ENCOUNTER — ANESTHESIA (OUTPATIENT)
Dept: PERIOP | Facility: HOSPITAL | Age: 86
End: 2021-03-23
Payer: COMMERCIAL

## 2021-03-23 VITALS
HEART RATE: 59 BPM | HEIGHT: 67 IN | SYSTOLIC BLOOD PRESSURE: 140 MMHG | DIASTOLIC BLOOD PRESSURE: 63 MMHG | BODY MASS INDEX: 25.33 KG/M2 | OXYGEN SATURATION: 95 % | RESPIRATION RATE: 20 BRPM | WEIGHT: 161.38 LBS | TEMPERATURE: 97.7 F

## 2021-03-23 PROCEDURE — 29848 WRIST ENDOSCOPY/SURGERY: CPT | Performed by: ORTHOPAEDIC SURGERY

## 2021-03-23 PROCEDURE — 29848 WRIST ENDOSCOPY/SURGERY: CPT | Performed by: PHYSICIAN ASSISTANT

## 2021-03-23 RX ORDER — PROPOFOL 10 MG/ML
INJECTION, EMULSION INTRAVENOUS AS NEEDED
Status: DISCONTINUED | OUTPATIENT
Start: 2021-03-23 | End: 2021-03-23

## 2021-03-23 RX ORDER — SODIUM CHLORIDE, SODIUM LACTATE, POTASSIUM CHLORIDE, CALCIUM CHLORIDE 600; 310; 30; 20 MG/100ML; MG/100ML; MG/100ML; MG/100ML
125 INJECTION, SOLUTION INTRAVENOUS CONTINUOUS
Status: DISCONTINUED | OUTPATIENT
Start: 2021-03-23 | End: 2021-03-23 | Stop reason: HOSPADM

## 2021-03-23 RX ORDER — PROPOFOL 10 MG/ML
INJECTION, EMULSION INTRAVENOUS CONTINUOUS PRN
Status: DISCONTINUED | OUTPATIENT
Start: 2021-03-23 | End: 2021-03-23

## 2021-03-23 RX ORDER — ACETAMINOPHEN 325 MG/1
650 TABLET ORAL EVERY 6 HOURS PRN
Status: CANCELLED | OUTPATIENT
Start: 2021-03-23

## 2021-03-23 RX ORDER — ONDANSETRON 2 MG/ML
4 INJECTION INTRAMUSCULAR; INTRAVENOUS EVERY 6 HOURS PRN
Status: CANCELLED | OUTPATIENT
Start: 2021-03-23

## 2021-03-23 RX ORDER — MAGNESIUM HYDROXIDE 1200 MG/15ML
LIQUID ORAL AS NEEDED
Status: DISCONTINUED | OUTPATIENT
Start: 2021-03-23 | End: 2021-03-23 | Stop reason: HOSPADM

## 2021-03-23 RX ADMIN — SODIUM CHLORIDE, SODIUM LACTATE, POTASSIUM CHLORIDE, AND CALCIUM CHLORIDE: .6; .31; .03; .02 INJECTION, SOLUTION INTRAVENOUS at 07:21

## 2021-03-23 RX ADMIN — PROPOFOL 30 MG: 10 INJECTION, EMULSION INTRAVENOUS at 07:30

## 2021-03-23 RX ADMIN — PROPOFOL 80 MCG/KG/MIN: 10 INJECTION, EMULSION INTRAVENOUS at 07:30

## 2021-03-23 NOTE — DISCHARGE INSTRUCTIONS
Post Operative Instructions    You have had surgery on your arm today, please read and follow the information below:  · Elevate your hand above your elbow during the next 24-48 hours to help with swelling  · Place your hand and arm over your head with motion at your shoulder three times a day  · Do not apply any cream/ointment/oil to your incisions including antibiotics  · Do not soak your hands in standing water (dishwater, tubs, Jacuzzi's, pools, etc ) until given permission (typically 2-3 weeks after injury)    Call the office at [phone]  if you notice any:  · Increased numbness or tingling of your hand or fingers that is not relieved with elevation  · Increasing pain that is not controlled with medication  · Difficulty chewing, breathing, swallowing  · Chest pains or shortness of breath  · Fever over 101 4 degrees  Bandage: Your therapist will remove your bandage at your first therapy appointment  Motion: Move fingers into a fist 5 times a day, DO NOT move any splinted fingers  Weight bearing status: Avoid heavy lifting (>5 pounds) with the extremity that was operated on until follow up appointment  Normal activities of daily living are OK  Ice: Ice for 10 minutes every hour as needed for swelling x 24 hours  Sling: No sling necessary  Pain medication:       After surgery, we would like you to take Ibuprofen 600mg one tablet by mouth every 6 hours with food (at breakfast, lunch and dinner)  AND Tylenol 500 mg one tablet by mouth every 6 hours  (at breakfast, lunch and dinner) for 5-7 days after your surgery  Please take these medication EVERYDAY after surgery for 5-7 days, and not just as needed  You can take these medications at the same time  Taking these medications after surgery will limit your need for prescription pain medication        If the pain becomes severe, and the pain medication is not alleviating symptoms, The greatest source of pain after surgery is usually a tight dressing due to increased swelling after surgery  If the pain becomes severe after surgery, and the patient medication is NOT alleviating the symptoms, the patient should do the following: The patient should  loosen the top dressing (usually coban or an ace bandage) and loosen/cut the rolled gauze beneath  The 4x4 gauze that is directly covering the incision should remain in place  The splint (if the patient has one) should remain in place  The ace bandage/coban can then be replaced on top in a less constrictive manor  If this does not help relieve the pain/numbness in a few hours, the patient should call our office (number listed below)  and we can have them seen in the office for further evaluation  Follow-up Appointment: 7-10 days with Dr Timi Box: Shady Perla, the patient may remove the splint/dressing for showering and clean the incision with soap and water  Keep incision dry after washing  Do not expose the incision to dirty water (oceans, pools, hot tubs, etc)     If you need help scheduling Therapy, you can call [phone]        Please call the office at [phone] if you have any questions or concerns regarding your post-operative care

## 2021-03-23 NOTE — OP NOTE
OPERATIVE REPORT    PATIENT NAME: Magy Garza    MEDICAL RECORD NO:  [id number]    PROCEDURE DATE:  21    :  1935    SURGEON:  SINDHU Davis , Ph D     Sangita Bone: Jacy Ramesh PA-C    No qualified resident was available to assist for this surgery  A Physician Assistant was used to hold the endoscope during the release of the transverse carpal ligament  PREOPERATIVE DIAGNOSIS:    left carpal tunnel syndrome      POSTOPERATIVE DIAGNOSIS:   left carpal tunnel syndrome     PROCEDURE PERFORMED:   left endoscopic carpal tunnel release     ANESTHESIA:  conscious sedation and local    COMPLICATIONS:  None    TOURNIQUET TIME: 2 minutes at 250 mmHg on the left    DISPOSITION: Patient was sent to the PACU in stable condition  INDICATION:  The patient is an 80 y o  female with clinical and/or electrodiagnostic evidence of left carpal tunnel syndrome  The patient had failed non-operative management and opted for carpal tunnel release  After informing the patient of the risks and benefits of endoscopic carpal tunnel release, consent was obtained for surgical intervention  PROCEDURE: The patient was identified in the preoperative screening area  The consent form was signed and verified after identifying the correct operative site  The patient was taken to the operating room and underwent conscious sedation and local   The left upper extremity was then prepped and draped in normal sterile fashion with Chlorhexidine solution  The left arm was then elevated, exsanguinated with an Esmarch and the tourniquet placed about the brachium was insufflated to 250 mmHg  The Esmarch was removed  A transverse incision, approximately 1 cm in length, was made just proximal to the distal wrist crease and just ulnar to the median nerve  The subcutaneous tissue was dissected bluntly down to the level of the forearm fascia   A transverse split in the fascia was created by gently piercing the fascia with the tips of tenotomy scissors  The proximal portion of the fascia was released longitudinally into the forearm using tenotomy scissors  The distal fascia was left intact for insertion of dilators  The carpal canal was then dilated using sequentially increasing sized dilators  Once the canal was adequately dilated, the scope and canula tray were then introduced into the carpal canal  The transverse carpal ligament was covered with a thin layer of synovial tissue on its undersurface  The synovial layer was debrided to expose the transverse fibers and the distal edge of the ligament  Once clear visualization of the transverse carpal ligament was obtained, the knife blade was introduced into the canula tray and the ligament was then released from distal to proximal maintaining complete visualization throughout the procedure  Complete release was verified with the endoscopic camera in place clearly visualizing the  cut edges of the transverse carpal ligament with the overlying volar fatty tissue and palmaris brevis muscle fibers protruding through  The transverse carpal ligament was moderately thickended  The scope and cannula were then removed and the wound was irrigated with copious amounts of saline solution  The tourniquet was released at 2 minutes with good capillary refill and color in the digits  The small incision was then repaired using #4-0 nylon sutures placed in an interrupted horizontal mattress fashion  The patient tolerated the procedure well  The incision was dressed in a sterile soft bandage  There were no complications during the case  The patient was sent to the PACU in stable condition        I was present for the entire procedure     Patient Disposition:  PACU      SIGNATURE: Radha Mijares MD/PhD  DATE: 03/23/21  TIME:  7:27 AM

## 2021-03-23 NOTE — ANESTHESIA PREPROCEDURE EVALUATION
Procedure:  RELEASE LEFT CARPAL TUNNEL ENDOSCOPIC (Left Wrist)    Relevant Problems   CARDIO   (+) Hyperlipidemia   (+) Hypertension      ENDO   (+) Hyperparathyroidism (HCC)   (+) Hypothyroidism      MUSCULOSKELETAL   (+) Generalized osteoarthritis   (+) Lumbar spondylosis   (+) Piriformis syndrome of left side   (+) Strain of lumbar region      NEURO/PSYCH   (+) Numbness and tingling in left hand        Physical Exam    Airway    Mallampati score: II  TM Distance: >3 FB  Neck ROM: full     Dental   Comment: Denies loose teeth, upper dentures,     Cardiovascular  Cardiovascular exam normal    Pulmonary  Pulmonary exam normal     Other Findings  Portions of exam deferred due to low yield and/or unknown COVID status      Anesthesia Plan  ASA Score- 3     Anesthesia Type- IV sedation with anesthesia with ASA Monitors  Additional Monitors:   Airway Plan:           Plan Factors-Exercise tolerance (METS): >4 METS  Chart reviewed  Existing labs reviewed  Patient summary reviewed  Patient is not a current smoker  Induction- intravenous  Postoperative Plan-     Informed Consent- Anesthetic plan and risks discussed with patient  I personally reviewed this patient with the CRNA  Discussed and agreed on the Anesthesia Plan with the CRNA  Jose Bloom

## 2021-03-26 ENCOUNTER — EVALUATION (OUTPATIENT)
Dept: PHYSICAL THERAPY | Age: 86
End: 2021-03-26
Payer: COMMERCIAL

## 2021-03-26 DIAGNOSIS — Z98.890 HISTORY OF CARPAL TUNNEL SURGERY OF RIGHT WRIST: Primary | ICD-10-CM

## 2021-03-26 DIAGNOSIS — G56.02 CARPAL TUNNEL SYNDROME ON LEFT: ICD-10-CM

## 2021-03-26 PROCEDURE — 97110 THERAPEUTIC EXERCISES: CPT | Performed by: PHYSICAL THERAPIST

## 2021-03-26 PROCEDURE — 97161 PT EVAL LOW COMPLEX 20 MIN: CPT | Performed by: PHYSICAL THERAPIST

## 2021-03-26 NOTE — PROGRESS NOTES
PT Evaluation     Today's date: 3/26/2021  Patient name: Oleksandr Siu  : 1935  MRN: [id number]  Referring provider: Geovani Donovan PA-C  Dx:   Encounter Diagnosis     ICD-10-CM    1  History of carpal tunnel surgery of left wrist  [id number]    2  Carpal tunnel syndrome on left  G56 02 Ambulatory referral to PT/OT hand therapy                  Assessment  Assessment details: Oleksandr Siu is a 80 y o  female who presents with pain, decreased strength, decreased ROM, decreased joint mobility and decreased sensation  Due to these impairments, Patient has difficulty performing a/iadls  Patient's clinical presentation is consistent with their referring diagnosis of left CTR  Patient would benefit from skilled physical therapy to address their aforementioned impairments, improve their level of function and to improve their overall quality of life  Impairments: abnormal or restricted ROM, activity intolerance, impaired physical strength, lacks appropriate home exercise program, pain with function, poor posture  and poor body mechanics  Understanding of Dx/Px/POC: good   Prognosis: good    Goals  ST-3 WEEKS  1  Decrease pain by 2 points on VAS at its worst   2   Increase ROM by > 5 deg in all deficients planes  3   Increase UE by 1/2 MMT grade in all deficient planes especially  strength     LT-6 WEEKS  1  Patient to be independent with a/iadls especially with eating and keyboarding  2  Increase functional activities for leisure and home activities to previous LOF    3  Independent with HEP and/or fitness program     Plan  Patient would benefit from: skilled physical therapy  Planned modality interventions: cryotherapy, electrical stimulation/Russian stimulation, thermotherapy: hydrocollator packs and unattended electrical stimulation  Planned therapy interventions: activity modification, behavior modification, body mechanics training, aquatic therapy, flexibility, functional ROM exercises, home exercise program, IADL retraining, joint mobilization, manual therapy, neuromuscular re-education, patient education, postural training, strengthening, stretching, therapeutic activities and therapeutic exercise  Frequency: 2x week (2-3x week)  Duration in weeks: 12  Plan of Care beginning date: 3/26/2021  Plan of Care expiration date: 2021  Treatment plan discussed with: patient        Subjective Evaluation    History of Present Illness  Date of onset: 4/3/2019  Mechanism of injury: Left CTS secondary to unknown , s/p left CTR 3/23/2021, complains of left wrist soreness          Not a recurrent problem   Quality of life: good    Pain  Current pain rating: 3  At best pain ratin  At worst pain ratin  Quality: tight, pressure and throbbing  Relieving factors: ice, medications and rest  Aggravating factors: eating and keyboarding  Progression: improved    Hand dominance: right    Patient Goals  Patient goals for therapy: increased motion, increased strength, independence with ADLs/IADLs and decreased pain          Objective     Observations     Left Wrist/Hand   Positive for incision  Negative for drainage  Active Range of Motion     Left Wrist   Wrist flexion: 45 degrees   Wrist extension: 38 degrees     Right Wrist   Wrist flexion: 50 degrees   Wrist extension: 45 degrees     Strength/Myotome Testing     Left Wrist/Hand   Wrist extension: 4-  Wrist flexion: 4-  Radial deviation: 4-  Ulnar deviation: 4-     (2nd hand position)     Trial 1: 5    Right Wrist/Hand      (2nd hand position)     Trial 1: 19    Tests     Left Wrist/Hand   Negative Finkelstein's and Phalen's sign  Right Wrist/Hand   Positive Finkelstein's  Negative Phalen's sign       Swelling     Left Wrist/Hand   Circumference wrist: 12 5 cm    Right Wrist/Hand   Circumference wrist: 11 cm      Flowsheet Rows      Most Recent Value   PT/OT G-Codes   Current Score  51   Projected Score  65   Assessment Type  Evaluation   G code set Other PT/OT Primary   Other PT Primary Current Status ()  CJ   Other PT Primary Goal Status ()  CI             Precautions: HTN      Manuals 3/26                         MT left wrist 10                                      Neuro Re-Ed                                                                                           C             Ther Ex             Wrist exs 1/30            WEB 20x            Gripper 20x            Putty 20x            digiflex 20x                                                   Ther Activity                                       Gait Training                                       Modalities             CP 10

## 2021-04-06 ENCOUNTER — OFFICE VISIT (OUTPATIENT)
Dept: OBGYN CLINIC | Facility: CLINIC | Age: 86
End: 2021-04-06

## 2021-04-06 VITALS
HEART RATE: 73 BPM | WEIGHT: 161.6 LBS | DIASTOLIC BLOOD PRESSURE: 63 MMHG | BODY MASS INDEX: 25.36 KG/M2 | SYSTOLIC BLOOD PRESSURE: 104 MMHG | HEIGHT: 67 IN

## 2021-04-06 DIAGNOSIS — Z98.890 S/P CARPAL TUNNEL RELEASE: Primary | ICD-10-CM

## 2021-04-06 PROCEDURE — 99024 POSTOP FOLLOW-UP VISIT: CPT | Performed by: ORTHOPAEDIC SURGERY

## 2021-04-06 NOTE — PROGRESS NOTES
SUBJECTIVE:  Theo Valladares is a 80y o  year old female who presents for follow up after surgery, [facility] performed on  3/23/2021  Today patient states that she no longer has any numbness and tingling and no longer has night time symptoms  Prior to surgery patient was unable to discriminate between 2 points  VITALS:  Vitals:    04/06/21 1039   BP: 104/63   Pulse: 73       PHYSICAL EXAMINATION:  General: well developed and well nourished, alert, oriented times 3 and appears comfortable  Psychiatric: Normal    MUSCULOSKELETAL EXAMINATION:  Left wrist   Incision: Clean, dry, with sutures intact  Range of Motion: normal  Neurovascular status: Neuro intact, good cap refill      STUDIES REVIEWED:  No studies reviewed  PROCEDURES PERFORMED:  Procedures  No Procedures performed today      ASSESSMENT/PLAN:    S/P left ECTR   Sutures were removed today without complications   Pt was advised that they may have increased pain in the palm of the hand due to scar tissue formation and this discomfort will increase before it subsides   Pt was advised to call the office if they have any questions or concerns      FOLLOW UP:  Return if symptoms worsen or fail to improve        TO DO AT NEXT VISIT:  Re-evaluation of current issue      Scribe Attestation    I,:  Matty Donovan am acting as a scribe while in the presence of the attending physician :       I,:  Nati Brown MD personally performed the services described in this documentation    as scribed in my presence :

## 2021-04-16 DIAGNOSIS — E03.9 HYPOTHYROIDISM, UNSPECIFIED TYPE: ICD-10-CM

## 2021-04-16 RX ORDER — LEVOTHYROXINE SODIUM 0.05 MG/1
TABLET ORAL
Qty: 30 TABLET | Refills: 0 | Status: SHIPPED | OUTPATIENT
Start: 2021-04-16 | End: 2021-05-14 | Stop reason: SDUPTHER

## 2021-04-30 ENCOUNTER — APPOINTMENT (EMERGENCY)
Dept: RADIOLOGY | Facility: HOSPITAL | Age: 86
End: 2021-04-30
Payer: COMMERCIAL

## 2021-04-30 ENCOUNTER — HOSPITAL ENCOUNTER (EMERGENCY)
Facility: HOSPITAL | Age: 86
Discharge: HOME | End: 2021-04-30
Attending: EMERGENCY MEDICINE | Admitting: EMERGENCY MEDICINE
Payer: COMMERCIAL

## 2021-04-30 VITALS
WEIGHT: 163 LBS | BODY MASS INDEX: 25.53 KG/M2 | OXYGEN SATURATION: 95 % | TEMPERATURE: 98.1 F | HEART RATE: 70 BPM | SYSTOLIC BLOOD PRESSURE: 142 MMHG | RESPIRATION RATE: 16 BRPM | DIASTOLIC BLOOD PRESSURE: 65 MMHG

## 2021-04-30 DIAGNOSIS — M54.41 ACUTE RIGHT-SIDED LOW BACK PAIN WITH RIGHT-SIDED SCIATICA: Primary | ICD-10-CM

## 2021-04-30 PROCEDURE — 99284 EMERGENCY DEPT VISIT MOD MDM: CPT | Performed by: EMERGENCY MEDICINE

## 2021-04-30 PROCEDURE — 73502 X-RAY EXAM HIP UNI 2-3 VIEWS: CPT

## 2021-04-30 PROCEDURE — 99283 EMERGENCY DEPT VISIT LOW MDM: CPT

## 2021-04-30 RX ORDER — PREDNISONE 20 MG/1
40 TABLET ORAL DAILY
Qty: 10 TABLET | Refills: 0 | Status: SHIPPED | OUTPATIENT
Start: 2021-04-30 | End: 2021-11-11 | Stop reason: ALTCHOICE

## 2021-04-30 NOTE — ED PROVIDER NOTES
History  Chief Complaint   Patient presents with    Back Pain     right lower back pain that goes into her buttock , no injury     81 yo female with h/o chronic/recurrent low back pain and sciatica who presents to the ED for evaluation of R low back pain x several days  Radiates to R buttock, constant, moderate, shooting pain  No aggravating or alleviating factors  States she has never before had back pain but review of Epic reveals prior spinal injections for chronic back pain as well as an ED visit approximately one year ago when I saw pt for similar complaints of R low back pain radiating down R leg  She denies injury  Denies fever, chills, incontinence, and anesthesia  Denies numbness and weakness  Prior to Admission Medications   Prescriptions Last Dose Informant Patient Reported? Taking? Elastic Bandages & Supports (B & B Carpal Tunnel Brace) MISC  Self No No   Sig: Use daily at bedtime   Multiple Vitamin (MULTIVITAMIN) tablet  Self Yes No   Sig: Take 1 tablet by mouth daily   Probiotic Product (PROBIOTIC-10 PO)  Self Yes No   Sig: Take by mouth daily   acetaminophen (TYLENOL) 500 mg tablet  Self No No   Sig: Take 1 500mg tablet in the morning prior to surgery, then 1 tablet every 6 hours for 5-7 days  aspirin 81 MG tablet  Self Yes No   Sig: Take by mouth   atorvastatin (LIPITOR) 20 mg tablet  Self No No   Sig: Take 1 tablet (20 mg total) by mouth daily   cyanocobalamin (VITAMIN B-12) 1,000 mcg tablet  Self Yes No   Sig: Take by mouth   ibuprofen (MOTRIN) 600 mg tablet  Self No No   Sig: Take 1 600mg tablet in the morning prior to surgery, then 1 tablet every 6 hours for 5-7 days     levothyroxine 50 mcg tablet   No No   Sig: TAKE ONE TABLET BY MOUTH EVERY DAY   lisinopril (ZESTRIL) 10 mg tablet  Self No No   Sig: Take 1 tablet (10 mg total) by mouth daily   omega-3-acid ethyl esters (LOVAZA) 1 g capsule  Self Yes No   Sig: Take 2 capsules by mouth 2 (two) times a day   oxybutynin (DITROPAN) 5 mg tablet  Self No No   Sig: Take 1 tablet (5 mg total) by mouth 2 (two) times a day   pyridoxine (VITAMIN B6) 100 mg tablet  Self Yes No   Sig: Take 100 mg by mouth daily   traZODone (DESYREL) 100 mg tablet  Self No No   Sig: Take 1 tablet (100 mg total) by mouth daily at bedtime      Facility-Administered Medications: None       Past Medical History:   Diagnosis Date    BCC (basal cell carcinoma)     Benign uterine neoplasm     C  difficile colitis     Disease of thyroid gland     Hyperlipidemia     Hypertension        Past Surgical History:   Procedure Laterality Date    APPENDECTOMY      CATARACT EXTRACTION      Last assessed - 16    MALIGNANT SKIN LESION EXCISION      Face    PARATHYROIDECTOMY      TN WRIST Raúl Fadumo LIG Left 3/23/2021    Procedure: RELEASE LEFT CARPAL TUNNEL ENDOSCOPIC;  Surgeon: Beau Snow MD;  Location: [facility] OR;  Service: Orthopedics    REPLACEMENT TOTAL KNEE Bilateral     ROTATOR CUFF REPAIR Left     Last assessed - 16    TOTAL ABDOMINAL HYSTERECTOMY      with derrell oopherectomy, Last assessed - 16       Family History   Problem Relation Age of Onset    Heart attack Mother     Substance Abuse Mother         denied    Mental illness Mother         denied    Other Mother         Cardiac Disorder     Cancer Mother         Malignant Neoplasm     Cancer Father         Malignant Neoplasm     Substance Abuse Father         denied    Mental illness Father         denied    Lung cancer Father     Spina bifida Child      I have reviewed and agree with the history as documented      E-Cigarette/Vaping    E-Cigarette Use Never User      E-Cigarette/Vaping Substances    Nicotine No     THC No     CBD No     Flavoring No     Other No     Unknown No      Social History     Tobacco Use    Smoking status: Former Smoker     Quit date:      Years since quittin 3    Smokeless tobacco: Never Used   Substance Use Topics    Alcohol use: No    Drug use: No       Review of Systems   Musculoskeletal: Positive for back pain  All other systems reviewed and are negative  Physical Exam  Physical Exam  Vitals signs and nursing note reviewed  Constitutional:       General: She is not in acute distress  Appearance: Normal appearance  She is well-developed  She is not ill-appearing, toxic-appearing or diaphoretic  HENT:      Head: Normocephalic and atraumatic  Eyes:      General:         Right eye: No discharge  Left eye: No discharge  Conjunctiva/sclera: Conjunctivae normal       Pupils: Pupils are equal, round, and reactive to light  Neck:      Musculoskeletal: Normal range of motion and neck supple  Vascular: No JVD  Pulmonary:      Effort: Pulmonary effort is normal  No respiratory distress  Breath sounds: No stridor  Abdominal:      Comments: Back is normal to inspection and palpation  Musculoskeletal: Normal range of motion  General: No tenderness or deformity  Skin:     General: Skin is warm and dry  Capillary Refill: Capillary refill takes less than 2 seconds  Neurological:      General: No focal deficit present  Mental Status: She is alert and oriented to person, place, and time  Cranial Nerves: No cranial nerve deficit  Sensory: No sensory deficit  Motor: No weakness or abnormal muscle tone        Coordination: Coordination normal       Gait: Gait normal          Vital Signs  ED Triage Vitals [04/30/21 1417]   Temperature Pulse Respirations Blood Pressure SpO2   98 1 °F (36 7 °C) 70 16 142/65 95 %      Temp Source Heart Rate Source Patient Position - Orthostatic VS BP Location FiO2 (%)   Oral Monitor -- -- --      Pain Score       Worst Possible Pain           Vitals:    04/30/21 1417   BP: 142/65   Pulse: 70         Visual Acuity      ED Medications  Medications - No data to display    Diagnostic Studies  Results Reviewed     None                 XR hip/pelv 2-3 vws right if performed   Final Result by Lissa Murillo MD (05/01 0117)      No acute osseous abnormality  Degenerative changes as described  Workstation performed: [id number]                    Procedures  Procedures         ED Course                                           MDM  Number of Diagnoses or Management Options  Acute right-sided low back pain with right-sided sciatica:   Diagnosis management comments: Acute on chronic low back pain without red flags  Steroids, prn apap/nsaids, f/u pcp or pain mgmt  Disposition  Final diagnoses:   Acute right-sided low back pain with right-sided sciatica     Time reflects when diagnosis was documented in both MDM as applicable and the Disposition within this note     Time User Action Codes Description Comment    4/30/2021  4:08 PM Elliot Gaitan [M54 41] Acute right-sided low back pain with right-sided sciatica       ED Disposition     ED Disposition Condition Date/Time Comment    Discharge Stable Fri Apr 30, 2021  4:08 PM Kevin Luo discharge to home/self care  Follow-up Information     Follow up With Specialties Details Why [address] MD Family Medicine In 1 week If symptoms worsen [address] [facility]  [phone]            Discharge Medication List as of 4/30/2021  4:09 PM      START taking these medications    Details   predniSONE 20 mg tablet Take 2 tablets (40 mg total) by mouth daily, Starting Fri 4/30/2021, Print         CONTINUE these medications which have NOT CHANGED    Details   acetaminophen (TYLENOL) 500 mg tablet Take 1 500mg tablet in the morning prior to surgery, then 1 tablet every 6 hours for 5-7 days  , Normal      aspirin 81 MG tablet Take by mouth, Historical Med      atorvastatin (LIPITOR) 20 mg tablet Take 1 tablet (20 mg total) by mouth daily, Starting Mon 1/18/2021, Normal      cyanocobalamin (VITAMIN B-12) 1,000 mcg tablet Take by mouth, Historical Med Elastic Bandages & Supports (B & B Carpal Tunnel Brace) MISC Use daily at bedtime, Starting Fri 1/22/2021, Print      ibuprofen (MOTRIN) 600 mg tablet Take 1 600mg tablet in the morning prior to surgery, then 1 tablet every 6 hours for 5-7 days  , Normal      levothyroxine 50 mcg tablet TAKE ONE TABLET BY MOUTH EVERY DAY, Normal      lisinopril (ZESTRIL) 10 mg tablet Take 1 tablet (10 mg total) by mouth daily, Starting Fri 3/19/2021, Normal      Multiple Vitamin (MULTIVITAMIN) tablet Take 1 tablet by mouth daily, Historical Med      omega-3-acid ethyl esters (LOVAZA) 1 g capsule Take 2 capsules by mouth 2 (two) times a day, Starting Fri 6/3/2016, Historical Med      oxybutynin (DITROPAN) 5 mg tablet Take 1 tablet (5 mg total) by mouth 2 (two) times a day, Starting Fri 3/19/2021, Normal      Probiotic Product (PROBIOTIC-10 PO) Take by mouth daily, Historical Med      pyridoxine (VITAMIN B6) 100 mg tablet Take 100 mg by mouth daily, Historical Med      traZODone (DESYREL) 100 mg tablet Take 1 tablet (100 mg total) by mouth daily at bedtime, Starting Thu 3/11/2021, Until Wed 6/9/2021, Normal           No discharge procedures on file      PDMP Review     None          ED Provider  Electronically Signed by           Chris Kulkarni MD  05/01/21 2494

## 2021-05-14 DIAGNOSIS — E03.9 HYPOTHYROIDISM, UNSPECIFIED TYPE: ICD-10-CM

## 2021-05-14 RX ORDER — LEVOTHYROXINE SODIUM 0.05 MG/1
50 TABLET ORAL DAILY
Qty: 90 TABLET | Refills: 0 | Status: SHIPPED | OUTPATIENT
Start: 2021-05-14 | End: 2021-06-21 | Stop reason: SDUPTHER

## 2021-06-21 DIAGNOSIS — E03.9 HYPOTHYROIDISM, UNSPECIFIED TYPE: ICD-10-CM

## 2021-06-21 DIAGNOSIS — R32 URINARY INCONTINENCE, UNSPECIFIED TYPE: ICD-10-CM

## 2021-06-21 DIAGNOSIS — F51.01 PRIMARY INSOMNIA: ICD-10-CM

## 2021-06-21 DIAGNOSIS — E78.00 PURE HYPERCHOLESTEROLEMIA: ICD-10-CM

## 2021-06-21 DIAGNOSIS — I10 ESSENTIAL HYPERTENSION: ICD-10-CM

## 2021-06-21 RX ORDER — LEVOTHYROXINE SODIUM 0.05 MG/1
50 TABLET ORAL DAILY
Qty: 90 TABLET | Refills: 0 | Status: SHIPPED | OUTPATIENT
Start: 2021-06-21 | End: 2021-11-12

## 2021-06-21 RX ORDER — ATORVASTATIN CALCIUM 20 MG/1
20 TABLET, FILM COATED ORAL DAILY
Qty: 90 TABLET | Refills: 1 | Status: SHIPPED | OUTPATIENT
Start: 2021-06-21 | End: 2021-10-29

## 2021-06-21 RX ORDER — OXYBUTYNIN CHLORIDE 5 MG/1
5 TABLET ORAL 2 TIMES DAILY
Qty: 180 TABLET | Refills: 1 | Status: SHIPPED | OUTPATIENT
Start: 2021-06-21 | End: 2021-09-15

## 2021-06-21 RX ORDER — TRAZODONE HYDROCHLORIDE 100 MG/1
100 TABLET ORAL
Qty: 90 TABLET | Refills: 3 | Status: SHIPPED | OUTPATIENT
Start: 2021-06-21 | End: 2021-11-11 | Stop reason: ALTCHOICE

## 2021-06-21 RX ORDER — LISINOPRIL 10 MG/1
10 TABLET ORAL DAILY
Qty: 90 TABLET | Refills: 1 | Status: SHIPPED | OUTPATIENT
Start: 2021-06-21 | End: 2021-09-15

## 2021-09-15 DIAGNOSIS — R32 URINARY INCONTINENCE, UNSPECIFIED TYPE: ICD-10-CM

## 2021-09-15 DIAGNOSIS — I10 ESSENTIAL HYPERTENSION: ICD-10-CM

## 2021-09-15 RX ORDER — LISINOPRIL 10 MG/1
TABLET ORAL
Qty: 90 TABLET | Refills: 1 | Status: SHIPPED | OUTPATIENT
Start: 2021-09-15 | End: 2022-01-02

## 2021-09-15 RX ORDER — OXYBUTYNIN CHLORIDE 5 MG/1
TABLET ORAL
Qty: 180 TABLET | Refills: 1 | Status: SHIPPED | OUTPATIENT
Start: 2021-09-15 | End: 2022-01-04

## 2021-10-27 ENCOUNTER — IMMUNIZATIONS (OUTPATIENT)
Dept: FAMILY MEDICINE CLINIC | Facility: HOSPITAL | Age: 86
End: 2021-10-27

## 2021-10-27 DIAGNOSIS — Z23 ENCOUNTER FOR IMMUNIZATION: Primary | ICD-10-CM

## 2021-10-29 DIAGNOSIS — E78.00 PURE HYPERCHOLESTEROLEMIA: ICD-10-CM

## 2021-10-29 RX ORDER — ATORVASTATIN CALCIUM 20 MG/1
TABLET, FILM COATED ORAL
Qty: 90 TABLET | Refills: 1 | Status: SHIPPED | OUTPATIENT
Start: 2021-10-29 | End: 2022-04-05 | Stop reason: SDUPTHER

## 2021-11-11 ENCOUNTER — OFFICE VISIT (OUTPATIENT)
Dept: FAMILY MEDICINE CLINIC | Facility: CLINIC | Age: 86
End: 2021-11-11
Payer: COMMERCIAL

## 2021-11-11 ENCOUNTER — TELEPHONE (OUTPATIENT)
Dept: FAMILY MEDICINE CLINIC | Facility: CLINIC | Age: 86
End: 2021-11-11

## 2021-11-11 ENCOUNTER — APPOINTMENT (OUTPATIENT)
Dept: LAB | Facility: CLINIC | Age: 86
End: 2021-11-11
Payer: COMMERCIAL

## 2021-11-11 VITALS
OXYGEN SATURATION: 96 % | HEART RATE: 73 BPM | DIASTOLIC BLOOD PRESSURE: 62 MMHG | BODY MASS INDEX: 26.53 KG/M2 | TEMPERATURE: 97.9 F | WEIGHT: 169 LBS | HEIGHT: 67 IN | SYSTOLIC BLOOD PRESSURE: 112 MMHG

## 2021-11-11 DIAGNOSIS — E78.00 PURE HYPERCHOLESTEROLEMIA: ICD-10-CM

## 2021-11-11 DIAGNOSIS — E03.9 HYPOTHYROIDISM, UNSPECIFIED TYPE: ICD-10-CM

## 2021-11-11 DIAGNOSIS — N18.30 STAGE 3 CHRONIC KIDNEY DISEASE, UNSPECIFIED WHETHER STAGE 3A OR 3B CKD (HCC): ICD-10-CM

## 2021-11-11 DIAGNOSIS — Z13.1 SCREENING FOR DIABETES MELLITUS: ICD-10-CM

## 2021-11-11 DIAGNOSIS — F51.01 PRIMARY INSOMNIA: ICD-10-CM

## 2021-11-11 DIAGNOSIS — R68.2 DRY MOUTH: Primary | ICD-10-CM

## 2021-11-11 DIAGNOSIS — D75.1 POLYCYTHEMIA: ICD-10-CM

## 2021-11-11 DIAGNOSIS — R49.0 HOARSENESS OF VOICE: ICD-10-CM

## 2021-11-11 LAB
ALBUMIN SERPL BCP-MCNC: 3.8 G/DL (ref 3.5–5)
ALP SERPL-CCNC: 44 U/L (ref 46–116)
ALT SERPL W P-5'-P-CCNC: 25 U/L (ref 12–78)
ANION GAP SERPL CALCULATED.3IONS-SCNC: 10 MMOL/L (ref 4–13)
AST SERPL W P-5'-P-CCNC: 18 U/L (ref 5–45)
BASOPHILS # BLD AUTO: 0.07 THOUSANDS/ΜL (ref 0–0.1)
BASOPHILS NFR BLD AUTO: 1 % (ref 0–1)
BILIRUB SERPL-MCNC: 0.73 MG/DL (ref 0.2–1)
BUN SERPL-MCNC: 20 MG/DL (ref 5–25)
CALCIUM SERPL-MCNC: 10.1 MG/DL (ref 8.3–10.1)
CHLORIDE SERPL-SCNC: 107 MMOL/L (ref 100–108)
CHOLEST SERPL-MCNC: 181 MG/DL (ref 50–200)
CO2 SERPL-SCNC: 25 MMOL/L (ref 21–32)
CREAT SERPL-MCNC: 0.97 MG/DL (ref 0.6–1.3)
EOSINOPHIL # BLD AUTO: 0.17 THOUSAND/ΜL (ref 0–0.61)
EOSINOPHIL NFR BLD AUTO: 2 % (ref 0–6)
ERYTHROCYTE [DISTWIDTH] IN BLOOD BY AUTOMATED COUNT: 13.1 % (ref 11.6–15.1)
GFR SERPL CREATININE-BSD FRML MDRD: 53 ML/MIN/1.73SQ M
GLUCOSE P FAST SERPL-MCNC: 90 MG/DL (ref 65–99)
HCT VFR BLD AUTO: 43.8 % (ref 34.8–46.1)
HDLC SERPL-MCNC: 47 MG/DL
HGB BLD-MCNC: 14.2 G/DL (ref 11.5–15.4)
IMM GRANULOCYTES # BLD AUTO: 0.05 THOUSAND/UL (ref 0–0.2)
IMM GRANULOCYTES NFR BLD AUTO: 1 % (ref 0–2)
LDLC SERPL CALC-MCNC: 76 MG/DL (ref 0–100)
LYMPHOCYTES # BLD AUTO: 4.93 THOUSANDS/ΜL (ref 0.6–4.47)
LYMPHOCYTES NFR BLD AUTO: 43 % (ref 14–44)
MCH RBC QN AUTO: 31.2 PG (ref 26.8–34.3)
MCHC RBC AUTO-ENTMCNC: 32.4 G/DL (ref 31.4–37.4)
MCV RBC AUTO: 96 FL (ref 82–98)
MONOCYTES # BLD AUTO: 0.97 THOUSAND/ΜL (ref 0.17–1.22)
MONOCYTES NFR BLD AUTO: 9 % (ref 4–12)
NEUTROPHILS # BLD AUTO: 4.85 THOUSANDS/ΜL (ref 1.85–7.62)
NEUTS SEG NFR BLD AUTO: 44 % (ref 43–75)
NONHDLC SERPL-MCNC: 134 MG/DL
NRBC BLD AUTO-RTO: 0 /100 WBCS
PLATELET # BLD AUTO: 274 THOUSANDS/UL (ref 149–390)
PMV BLD AUTO: 10.2 FL (ref 8.9–12.7)
POTASSIUM SERPL-SCNC: 4.1 MMOL/L (ref 3.5–5.3)
PROT SERPL-MCNC: 7.7 G/DL (ref 6.4–8.2)
RBC # BLD AUTO: 4.55 MILLION/UL (ref 3.81–5.12)
SODIUM SERPL-SCNC: 142 MMOL/L (ref 136–145)
TRIGL SERPL-MCNC: 292 MG/DL
TSH SERPL DL<=0.05 MIU/L-ACNC: 1.65 UIU/ML (ref 0.36–3.74)
WBC # BLD AUTO: 11.04 THOUSAND/UL (ref 4.31–10.16)

## 2021-11-11 PROCEDURE — 1160F RVW MEDS BY RX/DR IN RCRD: CPT | Performed by: FAMILY MEDICINE

## 2021-11-11 PROCEDURE — 36415 COLL VENOUS BLD VENIPUNCTURE: CPT

## 2021-11-11 PROCEDURE — 80061 LIPID PANEL: CPT

## 2021-11-11 PROCEDURE — 99214 OFFICE O/P EST MOD 30 MIN: CPT | Performed by: FAMILY MEDICINE

## 2021-11-11 PROCEDURE — 85025 COMPLETE CBC W/AUTO DIFF WBC: CPT

## 2021-11-11 PROCEDURE — 84443 ASSAY THYROID STIM HORMONE: CPT

## 2021-11-11 PROCEDURE — 80053 COMPREHEN METABOLIC PANEL: CPT

## 2021-11-11 RX ORDER — LORAZEPAM 1 MG/1
1 TABLET ORAL
Qty: 30 TABLET | Refills: 3 | Status: SHIPPED | OUTPATIENT
Start: 2021-11-11 | End: 2021-12-06 | Stop reason: SDUPTHER

## 2021-11-11 RX ORDER — LORAZEPAM 1 MG/1
1 TABLET ORAL
Qty: 30 TABLET | Refills: 3 | Status: SHIPPED | OUTPATIENT
Start: 2021-11-11 | End: 2021-11-11 | Stop reason: SDUPTHER

## 2021-11-12 DIAGNOSIS — E03.9 HYPOTHYROIDISM, UNSPECIFIED TYPE: ICD-10-CM

## 2021-11-12 RX ORDER — LEVOTHYROXINE SODIUM 0.05 MG/1
TABLET ORAL
Qty: 90 TABLET | Refills: 0 | Status: SHIPPED | OUTPATIENT
Start: 2021-11-12 | End: 2022-02-11

## 2021-12-06 ENCOUNTER — TELEPHONE (OUTPATIENT)
Dept: FAMILY MEDICINE CLINIC | Facility: CLINIC | Age: 86
End: 2021-12-06

## 2021-12-06 DIAGNOSIS — F51.01 PRIMARY INSOMNIA: ICD-10-CM

## 2021-12-06 RX ORDER — LORAZEPAM 1 MG/1
1 TABLET ORAL
Qty: 30 TABLET | Refills: 3 | Status: SHIPPED | OUTPATIENT
Start: 2021-12-06 | End: 2021-12-15 | Stop reason: SDUPTHER

## 2021-12-15 ENCOUNTER — OFFICE VISIT (OUTPATIENT)
Dept: FAMILY MEDICINE CLINIC | Facility: CLINIC | Age: 86
End: 2021-12-15
Payer: COMMERCIAL

## 2021-12-15 VITALS
SYSTOLIC BLOOD PRESSURE: 112 MMHG | HEIGHT: 67 IN | DIASTOLIC BLOOD PRESSURE: 60 MMHG | HEART RATE: 72 BPM | BODY MASS INDEX: 26.68 KG/M2 | WEIGHT: 170 LBS | TEMPERATURE: 97.6 F | OXYGEN SATURATION: 96 %

## 2021-12-15 DIAGNOSIS — F51.01 PRIMARY INSOMNIA: Primary | ICD-10-CM

## 2021-12-15 DIAGNOSIS — E78.00 PURE HYPERCHOLESTEROLEMIA: ICD-10-CM

## 2021-12-15 DIAGNOSIS — E03.9 HYPOTHYROIDISM, UNSPECIFIED TYPE: ICD-10-CM

## 2021-12-15 DIAGNOSIS — D75.1 POLYCYTHEMIA: ICD-10-CM

## 2021-12-15 PROCEDURE — 99213 OFFICE O/P EST LOW 20 MIN: CPT | Performed by: FAMILY MEDICINE

## 2021-12-15 PROCEDURE — 1160F RVW MEDS BY RX/DR IN RCRD: CPT | Performed by: FAMILY MEDICINE

## 2021-12-15 RX ORDER — LORAZEPAM 1 MG/1
1 TABLET ORAL
Qty: 30 TABLET | Refills: 3 | Status: SHIPPED | OUTPATIENT
Start: 2021-12-15 | End: 2021-12-15 | Stop reason: SDUPTHER

## 2021-12-15 RX ORDER — LORAZEPAM 1 MG/1
1 TABLET ORAL
Qty: 90 TABLET | Refills: 2 | Status: SHIPPED | OUTPATIENT
Start: 2021-12-15 | End: 2022-01-11 | Stop reason: SDUPTHER

## 2021-12-31 DIAGNOSIS — I10 ESSENTIAL HYPERTENSION: ICD-10-CM

## 2022-01-02 RX ORDER — LISINOPRIL 10 MG/1
TABLET ORAL
Qty: 90 TABLET | Refills: 1 | Status: SHIPPED | OUTPATIENT
Start: 2022-01-02 | End: 2022-04-05 | Stop reason: SDUPTHER

## 2022-01-04 DIAGNOSIS — R32 URINARY INCONTINENCE, UNSPECIFIED TYPE: ICD-10-CM

## 2022-01-04 RX ORDER — OXYBUTYNIN CHLORIDE 5 MG/1
TABLET ORAL
Qty: 180 TABLET | Refills: 1 | Status: SHIPPED | OUTPATIENT
Start: 2022-01-04 | End: 2022-04-05 | Stop reason: SDUPTHER

## 2022-01-11 ENCOUNTER — OFFICE VISIT (OUTPATIENT)
Dept: FAMILY MEDICINE CLINIC | Facility: CLINIC | Age: 87
End: 2022-01-11
Payer: COMMERCIAL

## 2022-01-11 VITALS
DIASTOLIC BLOOD PRESSURE: 80 MMHG | WEIGHT: 166 LBS | TEMPERATURE: 97.7 F | OXYGEN SATURATION: 97 % | HEIGHT: 67 IN | BODY MASS INDEX: 26.06 KG/M2 | HEART RATE: 92 BPM | SYSTOLIC BLOOD PRESSURE: 140 MMHG

## 2022-01-11 DIAGNOSIS — F51.01 PRIMARY INSOMNIA: ICD-10-CM

## 2022-01-11 DIAGNOSIS — R30.0 DYSURIA: Primary | ICD-10-CM

## 2022-01-11 LAB
SL AMB  POCT GLUCOSE, UA: NEGATIVE
SL AMB LEUKOCYTE ESTERASE,UA: ABNORMAL
SL AMB POCT BILIRUBIN,UA: NEGATIVE
SL AMB POCT BLOOD,UA: ABNORMAL
SL AMB POCT KETONES,UA: NEGATIVE
SL AMB POCT NITRITE,UA: NEGATIVE
SL AMB POCT PH,UA: 6
SL AMB POCT SPECIFIC GRAVITY,UA: 1.03
SL AMB POCT URINE PROTEIN: 300
SL AMB POCT UROBILINOGEN: 0.2

## 2022-01-11 PROCEDURE — 1036F TOBACCO NON-USER: CPT | Performed by: FAMILY MEDICINE

## 2022-01-11 PROCEDURE — 99213 OFFICE O/P EST LOW 20 MIN: CPT | Performed by: FAMILY MEDICINE

## 2022-01-11 PROCEDURE — 1160F RVW MEDS BY RX/DR IN RCRD: CPT | Performed by: FAMILY MEDICINE

## 2022-01-11 PROCEDURE — 87086 URINE CULTURE/COLONY COUNT: CPT | Performed by: FAMILY MEDICINE

## 2022-01-11 PROCEDURE — 81003 URINALYSIS AUTO W/O SCOPE: CPT | Performed by: FAMILY MEDICINE

## 2022-01-11 PROCEDURE — 3725F SCREEN DEPRESSION PERFORMED: CPT | Performed by: FAMILY MEDICINE

## 2022-01-11 RX ORDER — LORAZEPAM 1 MG/1
1 TABLET ORAL
Qty: 90 TABLET | Refills: 2 | Status: SHIPPED | OUTPATIENT
Start: 2022-01-11 | End: 2022-05-22

## 2022-01-11 RX ORDER — AMOXICILLIN AND CLAVULANATE POTASSIUM 875; 125 MG/1; MG/1
1 TABLET, FILM COATED ORAL EVERY 12 HOURS SCHEDULED
Qty: 14 TABLET | Refills: 0 | Status: SHIPPED | OUTPATIENT
Start: 2022-01-11 | End: 2022-01-18

## 2022-01-11 NOTE — PROGRESS NOTES
Assessment/Plan:    No problem-specific Assessment & Plan notes found for this encounter  Diagnoses and all orders for this visit:    Dysuria  -     amoxicillin-clavulanate (Augmentin) 875-125 mg per tablet; Take 1 tablet by mouth every 12 (twelve) hours for 7 days  -     Ambulatory Referral to Urogynecology; Future       if symptoms continue    Subjective:      Patient ID: Nabeel Vergara is a 80 y o  female  Patient is here because she has been having increased urination with associated burning sensation  No gross hematuria  The following portions of the patient's history were reviewed and updated as appropriate:   She  has a past medical history of BCC (basal cell carcinoma), Benign uterine neoplasm, C  difficile colitis, Disease of thyroid gland, Hyperlipidemia, and Hypertension    She   Patient Active Problem List    Diagnosis Date Noted    Dysuria 01/11/2022    Stage 3 chronic kidney disease, unspecified whether stage 3a or 3b CKD ([facility] [address] ) 11/11/2021    Dry mouth 11/11/2021    Hoarseness of voice 11/11/2021    Bilateral hand pain 01/22/2021    Numbness and tingling in left hand 01/22/2021    Screening for diabetes mellitus 01/22/2021    Medicare annual wellness visit, subsequent 01/22/2021    Primary insomnia 01/22/2021    Vestibulopathy of both ears 01/22/2021    Spinal stenosis of lumbar region without neurogenic claudication 08/03/2020    Piriformis syndrome of left side 07/15/2020    Trochanteric bursitis of left hip 07/07/2020    Trochanteric bursitis of right hip 07/07/2020    Lumbar spondylosis 01/20/2020    Ischial bursitis of right side     Lumbar disc disease with radiculopathy     Strain of lumbar region 03/18/2019    Lung nodule < 6cm on CT 09/07/2018    Urinary incontinence 03/19/2018    Other insomnia 03/19/2018    Elevated rheumatoid factor 10/10/2016    Hyperlipidemia 06/01/2016    Overactive bladder 01/28/2016    Generalized osteoarthritis 01/14/2016    Hyperparathyroidism ([facility] [address] ) 01/14/2016    Hypothyroidism 01/14/2016    Hypertension 01/04/2016     She  has a past surgical history that includes Cataract extraction; Malignant skin lesion excision; Replacement total knee (Bilateral); Parathyroidectomy; Total abdominal hysterectomy; Rotator cuff repair (Left); Appendectomy; and pr wrist arthroscop,release xvers lig (Left, 3/23/2021)  Her family history includes Cancer in her father and mother; Heart attack in her mother; Lung cancer in her father; Mental illness in her father and mother; Other in her mother; Spina bifida in her child; Substance Abuse in her father and mother  She  reports that she quit smoking about 64 years ago  She has never used smokeless tobacco  She reports that she does not drink alcohol and does not use drugs    Current Outpatient Medications   Medication Sig Dispense Refill    amoxicillin-clavulanate (Augmentin) 875-125 mg per tablet Take 1 tablet by mouth every 12 (twelve) hours for 7 days 14 tablet 0    aspirin 81 MG tablet Take by mouth      atorvastatin (LIPITOR) 20 mg tablet TAKE ONE TABLET BY MOUTH DAILY 90 tablet 1    cyanocobalamin (VITAMIN B-12) 1,000 mcg tablet Take by mouth      Elastic Bandages & Supports (B & B Carpal Tunnel Brace) MISC Use daily at bedtime 2 each 1    levothyroxine 50 mcg tablet TAKE ONE TABLET BY MOUTH DAILY 90 tablet 0    lisinopril (ZESTRIL) 10 mg tablet TAKE ONE TABLET BY MOUTH DAILY 90 tablet 1    LORazepam (ATIVAN) 1 mg tablet Take 1 tablet (1 mg total) by mouth daily at bedtime 90 tablet 2    Multiple Vitamin (MULTIVITAMIN) tablet Take 1 tablet by mouth daily      omega-3-acid ethyl esters (LOVAZA) 1 g capsule Take 2 capsules by mouth 2 (two) times a day      oxybutynin (DITROPAN) 5 mg tablet TAKE ONE TABLET BY MOUTH TWO TIMES A  tablet 1    Probiotic Product (PROBIOTIC-10 PO) Take by mouth daily      pyridoxine (VITAMIN B6) 100 mg tablet Take 100 mg by mouth daily No current facility-administered medications for this visit  Current Outpatient Medications on File Prior to Visit   Medication Sig    aspirin 81 MG tablet Take by mouth    atorvastatin (LIPITOR) 20 mg tablet TAKE ONE TABLET BY MOUTH DAILY    cyanocobalamin (VITAMIN B-12) 1,000 mcg tablet Take by mouth    Elastic Bandages & Supports (B & B Carpal Tunnel Brace) MISC Use daily at bedtime    levothyroxine 50 mcg tablet TAKE ONE TABLET BY MOUTH DAILY    lisinopril (ZESTRIL) 10 mg tablet TAKE ONE TABLET BY MOUTH DAILY    LORazepam (ATIVAN) 1 mg tablet Take 1 tablet (1 mg total) by mouth daily at bedtime    Multiple Vitamin (MULTIVITAMIN) tablet Take 1 tablet by mouth daily    omega-3-acid ethyl esters (LOVAZA) 1 g capsule Take 2 capsules by mouth 2 (two) times a day    oxybutynin (DITROPAN) 5 mg tablet TAKE ONE TABLET BY MOUTH TWO TIMES A DAY    Probiotic Product (PROBIOTIC-10 PO) Take by mouth daily    pyridoxine (VITAMIN B6) 100 mg tablet Take 100 mg by mouth daily    [DISCONTINUED] acetaminophen (TYLENOL) 500 mg tablet Take 1 500mg tablet in the morning prior to surgery, then 1 tablet every 6 hours for 5-7 days  (Patient not taking: Reported on 11/11/2021 )    [DISCONTINUED] ibuprofen (MOTRIN) 600 mg tablet Take 1 600mg tablet in the morning prior to surgery, then 1 tablet every 6 hours for 5-7 days  (Patient not taking: Reported on 11/11/2021 )     No current facility-administered medications on file prior to visit  She is allergic to codeine sulfate       Review of Systems   Constitutional: Negative for activity change, appetite change, fatigue and fever  HENT: Negative for congestion and ear discharge  Respiratory: Negative for cough and shortness of breath  Cardiovascular: Negative for chest pain and palpitations  Gastrointestinal: Negative for diarrhea and nausea  Genitourinary: Positive for difficulty urinating, dysuria, frequency and urgency     Musculoskeletal: Negative for arthralgias and back pain  Skin: Negative for color change and rash  Neurological: Negative for dizziness and headaches  Psychiatric/Behavioral: Negative for agitation and behavioral problems  Objective:      /80 (BP Location: Left arm, Patient Position: Sitting, Cuff Size: Standard)   Pulse 92   Temp 97 7 °F (36 5 °C)   Ht 5' 7" (1 702 m)   Wt 75 3 kg (166 lb)   SpO2 97%   BMI 26 00 kg/m²          Physical Exam  Constitutional:       General: She is not in acute distress  Appearance: She is well-developed  She is not diaphoretic  HENT:      Head: Normocephalic and atraumatic  Nose: Nose normal    Eyes:      Conjunctiva/sclera: Conjunctivae normal       Pupils: Pupils are equal, round, and reactive to light  Cardiovascular:      Rate and Rhythm: Normal rate and regular rhythm  Heart sounds: Normal heart sounds  No murmur heard  Pulmonary:      Effort: Pulmonary effort is normal  No respiratory distress  Breath sounds: Normal breath sounds  No wheezing  Abdominal:      General: Bowel sounds are normal  There is no distension  Palpations: Abdomen is soft  Tenderness: There is no abdominal tenderness  Skin:     General: Skin is warm and dry  Findings: No erythema or rash  Neurological:      Mental Status: She is alert and oriented to person, place, and time

## 2022-01-12 LAB — BACTERIA UR CULT: NORMAL

## 2022-02-11 DIAGNOSIS — E03.9 HYPOTHYROIDISM, UNSPECIFIED TYPE: ICD-10-CM

## 2022-02-11 RX ORDER — LEVOTHYROXINE SODIUM 0.05 MG/1
TABLET ORAL
Qty: 90 TABLET | Refills: 0 | Status: SHIPPED | OUTPATIENT
Start: 2022-02-11 | End: 2022-04-05 | Stop reason: SDUPTHER

## 2022-03-15 ENCOUNTER — APPOINTMENT (OUTPATIENT)
Dept: LAB | Facility: CLINIC | Age: 87
End: 2022-03-15
Payer: COMMERCIAL

## 2022-03-15 DIAGNOSIS — E03.9 HYPOTHYROIDISM, UNSPECIFIED TYPE: ICD-10-CM

## 2022-03-15 DIAGNOSIS — D75.1 POLYCYTHEMIA: ICD-10-CM

## 2022-03-15 DIAGNOSIS — E78.00 PURE HYPERCHOLESTEROLEMIA: ICD-10-CM

## 2022-03-15 LAB
BASOPHILS # BLD AUTO: 0.06 THOUSANDS/ΜL (ref 0–0.1)
BASOPHILS NFR BLD AUTO: 1 % (ref 0–1)
CHOLEST SERPL-MCNC: 194 MG/DL
EOSINOPHIL # BLD AUTO: 0.23 THOUSAND/ΜL (ref 0–0.61)
EOSINOPHIL NFR BLD AUTO: 2 % (ref 0–6)
ERYTHROCYTE [DISTWIDTH] IN BLOOD BY AUTOMATED COUNT: 13 % (ref 11.6–15.1)
HCT VFR BLD AUTO: 41.2 % (ref 34.8–46.1)
HDLC SERPL-MCNC: 41 MG/DL
HGB BLD-MCNC: 13.8 G/DL (ref 11.5–15.4)
IMM GRANULOCYTES # BLD AUTO: 0.04 THOUSAND/UL (ref 0–0.2)
IMM GRANULOCYTES NFR BLD AUTO: 0 % (ref 0–2)
LDLC SERPL CALC-MCNC: 85 MG/DL (ref 0–100)
LYMPHOCYTES # BLD AUTO: 4.7 THOUSANDS/ΜL (ref 0.6–4.47)
LYMPHOCYTES NFR BLD AUTO: 47 % (ref 14–44)
MCH RBC QN AUTO: 31 PG (ref 26.8–34.3)
MCHC RBC AUTO-ENTMCNC: 33.5 G/DL (ref 31.4–37.4)
MCV RBC AUTO: 93 FL (ref 82–98)
MONOCYTES # BLD AUTO: 0.9 THOUSAND/ΜL (ref 0.17–1.22)
MONOCYTES NFR BLD AUTO: 9 % (ref 4–12)
NEUTROPHILS # BLD AUTO: 4.18 THOUSANDS/ΜL (ref 1.85–7.62)
NEUTS SEG NFR BLD AUTO: 41 % (ref 43–75)
NONHDLC SERPL-MCNC: 153 MG/DL
NRBC BLD AUTO-RTO: 0 /100 WBCS
PLATELET # BLD AUTO: 258 THOUSANDS/UL (ref 149–390)
PMV BLD AUTO: 10.4 FL (ref 8.9–12.7)
RBC # BLD AUTO: 4.45 MILLION/UL (ref 3.81–5.12)
TRIGL SERPL-MCNC: 341 MG/DL
TSH SERPL DL<=0.05 MIU/L-ACNC: 3 UIU/ML (ref 0.36–3.74)
WBC # BLD AUTO: 10.11 THOUSAND/UL (ref 4.31–10.16)

## 2022-03-15 PROCEDURE — 84443 ASSAY THYROID STIM HORMONE: CPT

## 2022-03-15 PROCEDURE — 36415 COLL VENOUS BLD VENIPUNCTURE: CPT

## 2022-03-15 PROCEDURE — 80061 LIPID PANEL: CPT

## 2022-03-15 PROCEDURE — 85025 COMPLETE CBC W/AUTO DIFF WBC: CPT

## 2022-03-16 ENCOUNTER — RA CDI HCC (OUTPATIENT)
Dept: OTHER | Facility: HOSPITAL | Age: 87
End: 2022-03-16

## 2022-03-16 NOTE — PROGRESS NOTES
[facility]  coding opportunities       Chart reviewed, no opportunity found:   [address]        Patients Insurance     Medicare Insurance: [employer]

## 2022-03-23 ENCOUNTER — OFFICE VISIT (OUTPATIENT)
Dept: FAMILY MEDICINE CLINIC | Facility: CLINIC | Age: 87
End: 2022-03-23
Payer: COMMERCIAL

## 2022-03-23 VITALS
WEIGHT: 165 LBS | SYSTOLIC BLOOD PRESSURE: 112 MMHG | HEIGHT: 67 IN | HEART RATE: 69 BPM | DIASTOLIC BLOOD PRESSURE: 62 MMHG | BODY MASS INDEX: 25.9 KG/M2 | OXYGEN SATURATION: 97 % | TEMPERATURE: 98.4 F

## 2022-03-23 DIAGNOSIS — F51.01 PRIMARY INSOMNIA: Primary | ICD-10-CM

## 2022-03-23 DIAGNOSIS — Z00.00 MEDICARE ANNUAL WELLNESS VISIT, SUBSEQUENT: ICD-10-CM

## 2022-03-23 DIAGNOSIS — E78.1 HYPERTRIGLYCERIDEMIA: ICD-10-CM

## 2022-03-23 PROCEDURE — 3725F SCREEN DEPRESSION PERFORMED: CPT | Performed by: FAMILY MEDICINE

## 2022-03-23 PROCEDURE — 99214 OFFICE O/P EST MOD 30 MIN: CPT | Performed by: FAMILY MEDICINE

## 2022-03-23 PROCEDURE — 1036F TOBACCO NON-USER: CPT | Performed by: FAMILY MEDICINE

## 2022-03-23 PROCEDURE — 3288F FALL RISK ASSESSMENT DOCD: CPT | Performed by: FAMILY MEDICINE

## 2022-03-23 PROCEDURE — 1170F FXNL STATUS ASSESSED: CPT | Performed by: FAMILY MEDICINE

## 2022-03-23 PROCEDURE — 1160F RVW MEDS BY RX/DR IN RCRD: CPT | Performed by: FAMILY MEDICINE

## 2022-03-23 PROCEDURE — G0439 PPPS, SUBSEQ VISIT: HCPCS | Performed by: FAMILY MEDICINE

## 2022-03-23 RX ORDER — TRAZODONE HYDROCHLORIDE 50 MG/1
150 TABLET ORAL
Qty: 15 TABLET | Refills: 3
Start: 2022-03-23 | End: 2022-04-05 | Stop reason: SDUPTHER

## 2022-03-23 RX ORDER — TRAZODONE HYDROCHLORIDE 100 MG/1
100 TABLET ORAL
COMMUNITY
End: 2022-03-23 | Stop reason: SDUPTHER

## 2022-03-23 NOTE — PROGRESS NOTES
Assessment/Plan:    No problem-specific Assessment & Plan notes found for this encounter  Diagnoses and all orders for this visit:    Primary insomnia  After discussing risks and benefits of medication along with side effects will start the following:  -     traZODone (DESYREL) 50 mg tablet; Take 3 tablets (150 mg total) by mouth daily at bedtime    Hypertriglyceridemia  Recommend to take fish oil 2 capsule daily    Medicare annual wellness visit, subsequent  See Medicare wellness note    Other orders  -     Discontinue: traZODone (DESYREL) 100 mg tablet; Take 100 mg by mouth daily at bedtime      Follow up in 6 months or as needed    Subjective:      Patient ID: Preeti Giles is a 80 y o  female  Patient is here for a follow up her triglycerides were borderline elevated  She takes fish oil 1 capsule daily  Also has not been sleeping well  She recently re-started taking trazodone 100 mg at bedtime which is helping some  The following portions of the patient's history were reviewed and updated as appropriate:   She  has a past medical history of BCC (basal cell carcinoma), Benign uterine neoplasm, C  difficile colitis, Disease of thyroid gland, Hyperlipidemia, and Hypertension    She   Patient Active Problem List    Diagnosis Date Noted    Hypertriglyceridemia 03/23/2022    Dysuria 01/11/2022    Stage 3 chronic kidney disease, unspecified whether stage 3a or 3b CKD ([facility] [address] ) 11/11/2021    Dry mouth 11/11/2021    Hoarseness of voice 11/11/2021    Bilateral hand pain 01/22/2021    Numbness and tingling in left hand 01/22/2021    Screening for diabetes mellitus 01/22/2021    Medicare annual wellness visit, subsequent 01/22/2021    Primary insomnia 01/22/2021    Vestibulopathy of both ears 01/22/2021    Spinal stenosis of lumbar region without neurogenic claudication 08/03/2020    Piriformis syndrome of left side 07/15/2020    Trochanteric bursitis of left hip 07/07/2020    Trochanteric bursitis of right hip 07/07/2020    Lumbar spondylosis 01/20/2020    Ischial bursitis of right side     Lumbar disc disease with radiculopathy     Strain of lumbar region 03/18/2019    Lung nodule < 6cm on CT 09/07/2018    Urinary incontinence 03/19/2018    Other insomnia 03/19/2018    Elevated rheumatoid factor 10/10/2016    Hyperlipidemia 06/01/2016    Overactive bladder 01/28/2016    Generalized osteoarthritis 01/14/2016    Hyperparathyroidism ([address] ) 01/14/2016    Hypothyroidism 01/14/2016    Hypertension 01/04/2016     She  has a past surgical history that includes Cataract extraction; Malignant skin lesion excision; Replacement total knee (Bilateral); Parathyroidectomy; Total abdominal hysterectomy; Rotator cuff repair (Left); Appendectomy; and pr wrist arthroscop,release xvers lig (Left, 3/23/2021)  Her family history includes Cancer in her father and mother; Heart attack in her mother; Lung cancer in her father; Mental illness in her father and mother; Other in her mother; Spina bifida in her child; Substance Abuse in her father and mother  She  reports that she quit smoking about 64 years ago  She has never used smokeless tobacco  She reports that she does not drink alcohol and does not use drugs    Current Outpatient Medications   Medication Sig Dispense Refill    aspirin 81 MG tablet Take by mouth      atorvastatin (LIPITOR) 20 mg tablet TAKE ONE TABLET BY MOUTH DAILY 90 tablet 1    cyanocobalamin (VITAMIN B-12) 1,000 mcg tablet Take by mouth      Elastic Bandages & Supports (B & B Carpal Tunnel Brace) MISC Use daily at bedtime 2 each 1    levothyroxine 50 mcg tablet TAKE ONE TABLET BY MOUTH EVERY DAY 90 tablet 0    lisinopril (ZESTRIL) 10 mg tablet TAKE ONE TABLET BY MOUTH DAILY 90 tablet 1    Multiple Vitamin (MULTIVITAMIN) tablet Take 1 tablet by mouth daily      omega-3-acid ethyl esters (LOVAZA) 1 g capsule Take 2 capsules by mouth daily        oxybutynin (DITROPAN) 5 mg tablet TAKE ONE TABLET BY MOUTH TWO TIMES A  tablet 1    Probiotic Product (PROBIOTIC-10 PO) Take by mouth daily      pyridoxine (VITAMIN B6) 100 mg tablet Take 100 mg by mouth daily      traZODone (DESYREL) 50 mg tablet Take 3 tablets (150 mg total) by mouth daily at bedtime 15 tablet 3    LORazepam (ATIVAN) 1 mg tablet Take 1 tablet (1 mg total) by mouth daily at bedtime (Patient not taking: Reported on 3/23/2022 ) 90 tablet 2     No current facility-administered medications for this visit  Current Outpatient Medications on File Prior to Visit   Medication Sig    aspirin 81 MG tablet Take by mouth    atorvastatin (LIPITOR) 20 mg tablet TAKE ONE TABLET BY MOUTH DAILY    cyanocobalamin (VITAMIN B-12) 1,000 mcg tablet Take by mouth    Elastic Bandages & Supports (B & B Carpal Tunnel Brace) MISC Use daily at bedtime    levothyroxine 50 mcg tablet TAKE ONE TABLET BY MOUTH EVERY DAY    lisinopril (ZESTRIL) 10 mg tablet TAKE ONE TABLET BY MOUTH DAILY    Multiple Vitamin (MULTIVITAMIN) tablet Take 1 tablet by mouth daily    omega-3-acid ethyl esters (LOVAZA) 1 g capsule Take 2 capsules by mouth daily      oxybutynin (DITROPAN) 5 mg tablet TAKE ONE TABLET BY MOUTH TWO TIMES A DAY    Probiotic Product (PROBIOTIC-10 PO) Take by mouth daily    pyridoxine (VITAMIN B6) 100 mg tablet Take 100 mg by mouth daily    [DISCONTINUED] traZODone (DESYREL) 100 mg tablet Take 100 mg by mouth daily at bedtime    LORazepam (ATIVAN) 1 mg tablet Take 1 tablet (1 mg total) by mouth daily at bedtime (Patient not taking: Reported on 3/23/2022 )     No current facility-administered medications on file prior to visit  She is allergic to codeine sulfate       Review of Systems   Constitutional: Negative for activity change, appetite change, fatigue and fever  HENT: Negative for congestion and ear discharge  Respiratory: Negative for cough and shortness of breath  Cardiovascular: Negative for chest pain and palpitations  Gastrointestinal: Negative for diarrhea and nausea  Musculoskeletal: Negative for arthralgias and back pain  Skin: Negative for color change and rash  Neurological: Negative for dizziness and headaches  Psychiatric/Behavioral: Positive for sleep disturbance  Negative for agitation and behavioral problems  Objective:      /62   Pulse 69   Temp 98 4 °F (36 9 °C)   Ht 5' 7" (1 702 m)   Wt 74 8 kg (165 lb)   SpO2 97%   BMI 25 84 kg/m²          Physical Exam  Constitutional:       General: She is not in acute distress  Appearance: She is well-developed  She is not diaphoretic  HENT:      Head: Normocephalic and atraumatic  Nose: Nose normal    Eyes:      Conjunctiva/sclera: Conjunctivae normal       Pupils: Pupils are equal, round, and reactive to light  Cardiovascular:      Rate and Rhythm: Normal rate and regular rhythm  Heart sounds: Normal heart sounds  No murmur heard  Pulmonary:      Effort: Pulmonary effort is normal  No respiratory distress  Breath sounds: Normal breath sounds  No wheezing  Abdominal:      General: Bowel sounds are normal  There is no distension  Palpations: Abdomen is soft  Tenderness: There is no abdominal tenderness  Skin:     General: Skin is warm and dry  Findings: No erythema or rash  Neurological:      Mental Status: She is alert and oriented to person, place, and time

## 2022-03-23 NOTE — PROGRESS NOTES
Assessment and Plan:     Problem List Items Addressed This Visit     None           Preventive health issues were discussed with patient, and age appropriate screening tests were ordered as noted in patient's After Visit Summary  Personalized health advice and appropriate referrals for health education or preventive services given if needed, as noted in patient's After Visit Summary       History of Present Illness:     Patient presents for Medicare Annual Wellness visit    Patient Care Team:  Debbie Zepeda MD as PCP - General Ne Quintero DO as PCP - [address] [place] [address] (RTE)  Debbie Zepeda MD as PCP - PCP-[facility] (RTE)  Eren Ryan MD     Problem List:     Patient Active Problem List   Diagnosis    Urinary incontinence    Other insomnia    Lung nodule < 6cm on CT    Elevated rheumatoid factor    Generalized osteoarthritis    Hyperlipidemia    Hyperparathyroidism ([address] )    Hypertension    Hypothyroidism    Overactive bladder    Strain of lumbar region    Lumbar disc disease with radiculopathy    Ischial bursitis of right side    Lumbar spondylosis    Trochanteric bursitis of left hip    Trochanteric bursitis of right hip    Piriformis syndrome of left side    Spinal stenosis of lumbar region without neurogenic claudication    Bilateral hand pain    Numbness and tingling in left hand    Screening for diabetes mellitus    Medicare annual wellness visit, subsequent    Primary insomnia    Vestibulopathy of both ears    Stage 3 chronic kidney disease, unspecified whether stage 3a or 3b CKD (HCC)    Dry mouth    Hoarseness of voice    Dysuria      Past Medical and Surgical History:     Past Medical History:   Diagnosis Date    BCC (basal cell carcinoma)     Benign uterine neoplasm     C  difficile colitis     Disease of thyroid gland     Hyperlipidemia     Hypertension      Past Surgical History:   Procedure Laterality Date    APPENDECTOMY      CATARACT EXTRACTION      Last assessed - 16    MALIGNANT SKIN LESION EXCISION      Face    PARATHYROIDECTOMY      DE WRIST Raúl Thorne LIG Left 3/23/2021    Procedure: RELEASE LEFT CARPAL TUNNEL ENDOSCOPIC;  Surgeon: Beau Snow MD;  Location: [facility];  Service: Orthopedics    REPLACEMENT TOTAL KNEE Bilateral     ROTATOR CUFF REPAIR Left     Last assessed - 16    TOTAL ABDOMINAL HYSTERECTOMY      with derrell oopherectomy, Last assessed - 16      Family History:     Family History   Problem Relation Age of Onset    Heart attack Mother     Substance Abuse Mother         denied    Mental illness Mother         denied    Other Mother         Cardiac Disorder     Cancer Mother         Malignant Neoplasm     Cancer Father         Malignant Neoplasm     Substance Abuse Father         denied    Mental illness Father         denied    Lung cancer Father     Spina bifida Child       Social History:     Social History     Socioeconomic History    Marital status: /Civil Union     Spouse name: Not on file    Number of children: 3    Years of education: Not on file    Highest education level: Not on file   Occupational History    Occupation:      Comment: Retired    Tobacco Use    Smoking status: Former Smoker     Quit date:      Years since quittin 2    Smokeless tobacco: Never Used   Vaping Use    Vaping Use: Never used   Substance and Sexual Activity    Alcohol use: No    Drug use: No    Sexual activity: Not on file   Other Topics Concern    Not on file   Social History Narrative    Lives with spouse     Social Determinants of Health     Financial Resource Strain: Not on file   Food Insecurity: Not on file   Transportation Needs: Not on file   Physical Activity: Not on file   Stress: Not on file   Social Connections: Not on file   Intimate Partner Violence: Not on file   Housing Stability: Not on file      Medications and Allergies:     Current Outpatient Medications Medication Sig Dispense Refill    aspirin 81 MG tablet Take by mouth      atorvastatin (LIPITOR) 20 mg tablet TAKE ONE TABLET BY MOUTH DAILY 90 tablet 1    cyanocobalamin (VITAMIN B-12) 1,000 mcg tablet Take by mouth      Elastic Bandages & Supports (B & B Carpal Tunnel Brace) MISC Use daily at bedtime 2 each 1    levothyroxine 50 mcg tablet TAKE ONE TABLET BY MOUTH EVERY DAY 90 tablet 0    lisinopril (ZESTRIL) 10 mg tablet TAKE ONE TABLET BY MOUTH DAILY 90 tablet 1    LORazepam (ATIVAN) 1 mg tablet Take 1 tablet (1 mg total) by mouth daily at bedtime 90 tablet 2    Multiple Vitamin (MULTIVITAMIN) tablet Take 1 tablet by mouth daily      omega-3-acid ethyl esters (LOVAZA) 1 g capsule Take 2 capsules by mouth 2 (two) times a day      oxybutynin (DITROPAN) 5 mg tablet TAKE ONE TABLET BY MOUTH TWO TIMES A  tablet 1    Probiotic Product (PROBIOTIC-10 PO) Take by mouth daily      pyridoxine (VITAMIN B6) 100 mg tablet Take 100 mg by mouth daily       No current facility-administered medications for this visit  Allergies   Allergen Reactions    Codeine Sulfate       Immunizations:     Immunization History   Administered Date(s) Administered    COVID-19 MODERNA VACC 0 25 ML IM BOOSTER 10/27/2021    COVID-19 MODERNA VACC 0 5 ML IM 01/27/2021, 02/23/2021    INFLUENZA 09/26/2016, 09/29/2017, 08/27/2020    Influenza Split High Dose Preservative Free IM 09/26/2016, 09/29/2017    Influenza, high dose seasonal 0 7 mL 09/07/2018, 09/16/2019    Pneumococcal Conjugate 13-Valent 01/14/2016    Pneumococcal Polysaccharide PPV23 05/04/2017      Health Maintenance: There are no preventive care reminders to display for this patient  Topic Date Due    DTaP,Tdap,and Td Vaccines (1 - Tdap) Never done      Medicare Health Risk Assessment:     Ht 5' 7" (1 702 m)   Wt 74 8 kg (165 lb)   BMI 25 84 kg/m²      Matty Harp is here for her Subsequent Wellness visit       Health Risk Assessment:   Patient rates overall health as good  Patient feels that their physical health rating is same  Patient is satisfied with their life  Eyesight was rated as same  Hearing was rated as same  Patient feels that their emotional and mental health rating is much better  Patients states they are never, rarely angry  Patient states they are never, rarely unusually tired/fatigued  Pain experienced in the last 7 days has been none  Patient states that she has experienced no weight loss or gain in last 6 months  Depression Screening:   PHQ-2 Score: 0      Fall Risk Screening: In the past year, patient has experienced: no history of falling in past year      Urinary Incontinence Screening:   Patient has not leaked urine accidently in the last six months  Home Safety:  Patient does not have trouble with stairs inside or outside of their home  Patient has working smoke alarms and has working carbon monoxide detector  Home safety hazards include: none  Nutrition:   Current diet is Regular  Medications:   Patient is not currently taking any over-the-counter supplements  Patient is able to manage medications  Activities of Daily Living (ADLs)/Instrumental Activities of Daily Living (IADLs):   Walk and transfer into and out of bed and chair?: Yes  Dress and groom yourself?: Yes    Bathe or shower yourself?: Yes    Feed yourself? Yes  Do your laundry/housekeeping?: Yes  Manage your money, pay your bills and track your expenses?: Yes  Make your own meals?: Yes    Do your own shopping?: Yes    Previous Hospitalizations:   Any hospitalizations or ED visits within the last 12 months?: No      Advance Care Planning:   Living will: Yes    Durable POA for healthcare:  Yes    Advanced directive: Yes    Five wishes given: No      PREVENTIVE SCREENINGS      Cardiovascular Screening:    General: History Lipid Disorder and Screening Current      Diabetes Screening:     General: Screening Current      Colorectal Cancer Screening: General: Screening Not Indicated      Breast Cancer Screening:     General: Screening Not Indicated      Cervical Cancer Screening:    General: Screening Not Indicated      Abdominal Aortic Aneurysm (AAA) Screening:        General: Screening Not Indicated      Lung Cancer Screening:     General: Screening Not Indicated    Screening, Brief Intervention, and Referral to Treatment (SBIRT)    Screening  Typical number of drinks in a day: 1  Typical number of drinks in a week: 7  Interpretation: Low risk drinking behavior      Single Item Drug Screening:  How often have you used an illegal drug (including marijuana) or a prescription medication for non-medical reasons in the past year? never    Single Item Drug Screen Score: 0  Interpretation: Negative screen for possible drug use disorder      Dandre Dey MD

## 2022-04-04 ENCOUNTER — TELEPHONE (OUTPATIENT)
Dept: FAMILY MEDICINE CLINIC | Facility: CLINIC | Age: 87
End: 2022-04-04

## 2022-04-05 DIAGNOSIS — F51.01 PRIMARY INSOMNIA: ICD-10-CM

## 2022-04-05 DIAGNOSIS — R32 URINARY INCONTINENCE, UNSPECIFIED TYPE: ICD-10-CM

## 2022-04-05 DIAGNOSIS — I10 ESSENTIAL HYPERTENSION: ICD-10-CM

## 2022-04-05 DIAGNOSIS — E78.00 PURE HYPERCHOLESTEROLEMIA: ICD-10-CM

## 2022-04-05 DIAGNOSIS — E03.9 HYPOTHYROIDISM, UNSPECIFIED TYPE: ICD-10-CM

## 2022-04-05 RX ORDER — LISINOPRIL 10 MG/1
10 TABLET ORAL DAILY
Qty: 90 TABLET | Refills: 1 | Status: SHIPPED | OUTPATIENT
Start: 2022-04-05 | End: 2022-08-08 | Stop reason: SDUPTHER

## 2022-04-05 RX ORDER — ATORVASTATIN CALCIUM 20 MG/1
20 TABLET, FILM COATED ORAL DAILY
Qty: 90 TABLET | Refills: 1 | Status: SHIPPED | OUTPATIENT
Start: 2022-04-05 | End: 2022-08-08 | Stop reason: SDUPTHER

## 2022-04-05 RX ORDER — OXYBUTYNIN CHLORIDE 5 MG/1
5 TABLET ORAL 2 TIMES DAILY
Qty: 180 TABLET | Refills: 1 | Status: SHIPPED | OUTPATIENT
Start: 2022-04-05 | End: 2022-08-08 | Stop reason: SDUPTHER

## 2022-04-05 RX ORDER — LEVOTHYROXINE SODIUM 0.05 MG/1
50 TABLET ORAL DAILY
Qty: 90 TABLET | Refills: 0 | Status: SHIPPED | OUTPATIENT
Start: 2022-04-05 | End: 2022-08-08 | Stop reason: SDUPTHER

## 2022-04-05 RX ORDER — TRAZODONE HYDROCHLORIDE 50 MG/1
TABLET ORAL
Qty: 90 TABLET | Refills: 1
Start: 2022-04-05 | End: 2022-04-12 | Stop reason: SDUPTHER

## 2022-04-12 DIAGNOSIS — F51.01 PRIMARY INSOMNIA: ICD-10-CM

## 2022-04-12 RX ORDER — TRAZODONE HYDROCHLORIDE 50 MG/1
TABLET ORAL
Qty: 90 TABLET | Refills: 1
Start: 2022-04-12 | End: 2022-04-26 | Stop reason: SDUPTHER

## 2022-04-26 DIAGNOSIS — F51.01 PRIMARY INSOMNIA: ICD-10-CM

## 2022-04-26 RX ORDER — TRAZODONE HYDROCHLORIDE 150 MG/1
150 TABLET ORAL
Qty: 90 TABLET | Refills: 0 | Status: SHIPPED | OUTPATIENT
Start: 2022-04-26 | End: 2022-04-26

## 2022-05-03 DIAGNOSIS — F51.01 PRIMARY INSOMNIA: ICD-10-CM

## 2022-05-03 RX ORDER — TRAZODONE HYDROCHLORIDE 150 MG/1
150 TABLET ORAL
Qty: 90 TABLET | Refills: 3 | Status: SHIPPED | OUTPATIENT
Start: 2022-05-03 | End: 2022-08-08 | Stop reason: SDUPTHER

## 2022-05-15 ENCOUNTER — HOSPITAL ENCOUNTER (INPATIENT)
Facility: HOSPITAL | Age: 87
LOS: 6 days | Discharge: HOME | DRG: 392 | End: 2022-05-22
Attending: EMERGENCY MEDICINE | Admitting: INTERNAL MEDICINE
Payer: COMMERCIAL

## 2022-05-15 ENCOUNTER — APPOINTMENT (EMERGENCY)
Dept: CT IMAGING | Facility: HOSPITAL | Age: 87
DRG: 392 | End: 2022-05-15
Payer: COMMERCIAL

## 2022-05-15 DIAGNOSIS — K52.9 COLITIS: Primary | ICD-10-CM

## 2022-05-15 DIAGNOSIS — E87.6 HYPOKALEMIA: ICD-10-CM

## 2022-05-15 DIAGNOSIS — R10.84 GENERALIZED ABDOMINAL PAIN: ICD-10-CM

## 2022-05-15 LAB
2HR DELTA HS TROPONIN: 0 NG/L
4HR DELTA HS TROPONIN: 1 NG/L
ALBUMIN SERPL BCP-MCNC: 3.7 G/DL (ref 3.5–5)
ALP SERPL-CCNC: 40 U/L (ref 46–116)
ALT SERPL W P-5'-P-CCNC: 31 U/L (ref 12–78)
ANION GAP SERPL CALCULATED.3IONS-SCNC: 15 MMOL/L (ref 4–13)
AST SERPL W P-5'-P-CCNC: 23 U/L (ref 5–45)
ATRIAL RATE: 82 BPM
BASOPHILS # BLD AUTO: 0.05 THOUSANDS/ΜL (ref 0–0.1)
BASOPHILS NFR BLD AUTO: 0 % (ref 0–1)
BILIRUB SERPL-MCNC: 1.01 MG/DL (ref 0.2–1)
BUN SERPL-MCNC: 29 MG/DL (ref 5–25)
CALCIUM SERPL-MCNC: 9.4 MG/DL (ref 8.3–10.1)
CARDIAC TROPONIN I PNL SERPL HS: 12 NG/L
CARDIAC TROPONIN I PNL SERPL HS: 12 NG/L
CARDIAC TROPONIN I PNL SERPL HS: 13 NG/L
CHLORIDE SERPL-SCNC: 104 MMOL/L (ref 100–108)
CO2 SERPL-SCNC: 21 MMOL/L (ref 21–32)
CREAT SERPL-MCNC: 1.71 MG/DL (ref 0.6–1.3)
EOSINOPHIL # BLD AUTO: 0 THOUSAND/ΜL (ref 0–0.61)
EOSINOPHIL NFR BLD AUTO: 0 % (ref 0–6)
ERYTHROCYTE [DISTWIDTH] IN BLOOD BY AUTOMATED COUNT: 13.2 % (ref 11.6–15.1)
GFR SERPL CREATININE-BSD FRML MDRD: 26 ML/MIN/1.73SQ M
GLUCOSE SERPL-MCNC: 156 MG/DL (ref 65–140)
HCT VFR BLD AUTO: 40.6 % (ref 34.8–46.1)
HGB BLD-MCNC: 13.8 G/DL (ref 11.5–15.4)
IMM GRANULOCYTES # BLD AUTO: 0.16 THOUSAND/UL (ref 0–0.2)
IMM GRANULOCYTES NFR BLD AUTO: 1 % (ref 0–2)
INR PPP: 1.15 (ref 0.84–1.19)
LACTATE SERPL-SCNC: 2 MMOL/L (ref 0.5–2)
LACTATE SERPL-SCNC: 2.6 MMOL/L (ref 0.5–2)
LACTATE SERPL-SCNC: 3.1 MMOL/L (ref 0.5–2)
LIPASE SERPL-CCNC: 38 U/L (ref 73–393)
LYMPHOCYTES # BLD AUTO: 1.84 THOUSANDS/ΜL (ref 0.6–4.47)
LYMPHOCYTES NFR BLD AUTO: 9 % (ref 14–44)
MCH RBC QN AUTO: 31.7 PG (ref 26.8–34.3)
MCHC RBC AUTO-ENTMCNC: 34 G/DL (ref 31.4–37.4)
MCV RBC AUTO: 93 FL (ref 82–98)
MONOCYTES # BLD AUTO: 1.53 THOUSAND/ΜL (ref 0.17–1.22)
MONOCYTES NFR BLD AUTO: 7 % (ref 4–12)
NEUTROPHILS # BLD AUTO: 17.75 THOUSANDS/ΜL (ref 1.85–7.62)
NEUTS SEG NFR BLD AUTO: 83 % (ref 43–75)
NRBC BLD AUTO-RTO: 0 /100 WBCS
P AXIS: 67 DEGREES
PLATELET # BLD AUTO: 235 THOUSANDS/UL (ref 149–390)
PLATELET # BLD AUTO: 249 THOUSANDS/UL (ref 149–390)
PMV BLD AUTO: 9.1 FL (ref 8.9–12.7)
PMV BLD AUTO: 9.2 FL (ref 8.9–12.7)
POTASSIUM SERPL-SCNC: 4.5 MMOL/L (ref 3.5–5.3)
PR INTERVAL: 160 MS
PROT SERPL-MCNC: 6.9 G/DL (ref 6.4–8.2)
PROTHROMBIN TIME: 14.2 SECONDS (ref 11.6–14.5)
QRS AXIS: -33 DEGREES
QRSD INTERVAL: 66 MS
QT INTERVAL: 384 MS
QTC INTERVAL: 448 MS
RBC # BLD AUTO: 4.36 MILLION/UL (ref 3.81–5.12)
SODIUM SERPL-SCNC: 140 MMOL/L (ref 136–145)
T WAVE AXIS: 104 DEGREES
VENTRICULAR RATE: 82 BPM
WBC # BLD AUTO: 21.33 THOUSAND/UL (ref 4.31–10.16)

## 2022-05-15 PROCEDURE — 85610 PROTHROMBIN TIME: CPT | Performed by: EMERGENCY MEDICINE

## 2022-05-15 PROCEDURE — 99204 OFFICE O/P NEW MOD 45 MIN: CPT | Performed by: SURGERY

## 2022-05-15 PROCEDURE — 99285 EMERGENCY DEPT VISIT HI MDM: CPT

## 2022-05-15 PROCEDURE — 93005 ELECTROCARDIOGRAM TRACING: CPT

## 2022-05-15 PROCEDURE — 99220 PR INITIAL OBSERVATION CARE/DAY 70 MINUTES: CPT | Performed by: INTERNAL MEDICINE

## 2022-05-15 PROCEDURE — 85025 COMPLETE CBC W/AUTO DIFF WBC: CPT | Performed by: EMERGENCY MEDICINE

## 2022-05-15 PROCEDURE — 96374 THER/PROPH/DIAG INJ IV PUSH: CPT

## 2022-05-15 PROCEDURE — 83690 ASSAY OF LIPASE: CPT | Performed by: EMERGENCY MEDICINE

## 2022-05-15 PROCEDURE — 84484 ASSAY OF TROPONIN QUANT: CPT | Performed by: EMERGENCY MEDICINE

## 2022-05-15 PROCEDURE — 93010 ELECTROCARDIOGRAM REPORT: CPT | Performed by: INTERNAL MEDICINE

## 2022-05-15 PROCEDURE — 85049 AUTOMATED PLATELET COUNT: CPT | Performed by: INTERNAL MEDICINE

## 2022-05-15 PROCEDURE — 83605 ASSAY OF LACTIC ACID: CPT | Performed by: INTERNAL MEDICINE

## 2022-05-15 PROCEDURE — 83605 ASSAY OF LACTIC ACID: CPT | Performed by: EMERGENCY MEDICINE

## 2022-05-15 PROCEDURE — 80053 COMPREHEN METABOLIC PANEL: CPT | Performed by: EMERGENCY MEDICINE

## 2022-05-15 PROCEDURE — 36415 COLL VENOUS BLD VENIPUNCTURE: CPT | Performed by: EMERGENCY MEDICINE

## 2022-05-15 PROCEDURE — 99284 EMERGENCY DEPT VISIT MOD MDM: CPT | Performed by: EMERGENCY MEDICINE

## 2022-05-15 PROCEDURE — 84484 ASSAY OF TROPONIN QUANT: CPT | Performed by: INTERNAL MEDICINE

## 2022-05-15 PROCEDURE — 74176 CT ABD & PELVIS W/O CONTRAST: CPT

## 2022-05-15 PROCEDURE — 96375 TX/PRO/DX INJ NEW DRUG ADDON: CPT

## 2022-05-15 PROCEDURE — 96361 HYDRATE IV INFUSION ADD-ON: CPT

## 2022-05-15 RX ORDER — OXYCODONE HYDROCHLORIDE 5 MG/1
5 TABLET ORAL EVERY 4 HOURS PRN
Status: DISCONTINUED | OUTPATIENT
Start: 2022-05-15 | End: 2022-05-22 | Stop reason: HOSPADM

## 2022-05-15 RX ORDER — ASPIRIN 81 MG/1
81 TABLET, CHEWABLE ORAL DAILY
Status: DISCONTINUED | OUTPATIENT
Start: 2022-05-15 | End: 2022-05-22 | Stop reason: HOSPADM

## 2022-05-15 RX ORDER — METRONIDAZOLE 500 MG/100ML
500 INJECTION, SOLUTION INTRAVENOUS EVERY 8 HOURS
Status: DISCONTINUED | OUTPATIENT
Start: 2022-05-15 | End: 2022-05-19

## 2022-05-15 RX ORDER — OXYBUTYNIN CHLORIDE 5 MG/1
5 TABLET ORAL 2 TIMES DAILY
Status: DISCONTINUED | OUTPATIENT
Start: 2022-05-15 | End: 2022-05-22 | Stop reason: HOSPADM

## 2022-05-15 RX ORDER — ONDANSETRON 2 MG/ML
4 INJECTION INTRAMUSCULAR; INTRAVENOUS ONCE
Status: COMPLETED | OUTPATIENT
Start: 2022-05-15 | End: 2022-05-15

## 2022-05-15 RX ORDER — HEPARIN SODIUM 5000 [USP'U]/ML
5000 INJECTION, SOLUTION INTRAVENOUS; SUBCUTANEOUS EVERY 8 HOURS SCHEDULED
Status: DISCONTINUED | OUTPATIENT
Start: 2022-05-15 | End: 2022-05-22 | Stop reason: HOSPADM

## 2022-05-15 RX ORDER — ATORVASTATIN CALCIUM 20 MG/1
20 TABLET, FILM COATED ORAL DAILY
Status: DISCONTINUED | OUTPATIENT
Start: 2022-05-15 | End: 2022-05-22 | Stop reason: HOSPADM

## 2022-05-15 RX ORDER — ACETAMINOPHEN 325 MG/1
650 TABLET ORAL EVERY 6 HOURS PRN
Status: DISCONTINUED | OUTPATIENT
Start: 2022-05-15 | End: 2022-05-22 | Stop reason: HOSPADM

## 2022-05-15 RX ORDER — OXYCODONE HYDROCHLORIDE 5 MG/1
2.5 TABLET ORAL EVERY 4 HOURS PRN
Status: DISCONTINUED | OUTPATIENT
Start: 2022-05-15 | End: 2022-05-22 | Stop reason: HOSPADM

## 2022-05-15 RX ORDER — DIPHENHYDRAMINE HYDROCHLORIDE 50 MG/ML
50 INJECTION INTRAMUSCULAR; INTRAVENOUS ONCE
Status: COMPLETED | OUTPATIENT
Start: 2022-05-15 | End: 2022-05-15

## 2022-05-15 RX ORDER — CHLORAL HYDRATE 500 MG
1000 CAPSULE ORAL DAILY
Status: DISCONTINUED | OUTPATIENT
Start: 2022-05-15 | End: 2022-05-22 | Stop reason: HOSPADM

## 2022-05-15 RX ORDER — SODIUM CHLORIDE, SODIUM LACTATE, POTASSIUM CHLORIDE, CALCIUM CHLORIDE 600; 310; 30; 20 MG/100ML; MG/100ML; MG/100ML; MG/100ML
100 INJECTION, SOLUTION INTRAVENOUS CONTINUOUS
Status: DISCONTINUED | OUTPATIENT
Start: 2022-05-15 | End: 2022-05-15

## 2022-05-15 RX ORDER — LEVOTHYROXINE SODIUM 0.05 MG/1
50 TABLET ORAL DAILY
Status: DISCONTINUED | OUTPATIENT
Start: 2022-05-15 | End: 2022-05-22 | Stop reason: HOSPADM

## 2022-05-15 RX ORDER — SODIUM CHLORIDE, SODIUM GLUCONATE, SODIUM ACETATE, POTASSIUM CHLORIDE, MAGNESIUM CHLORIDE, SODIUM PHOSPHATE, DIBASIC, AND POTASSIUM PHOSPHATE .53; .5; .37; .037; .03; .012; .00082 G/100ML; G/100ML; G/100ML; G/100ML; G/100ML; G/100ML; G/100ML
100 INJECTION, SOLUTION INTRAVENOUS CONTINUOUS
Status: DISCONTINUED | OUTPATIENT
Start: 2022-05-15 | End: 2022-05-17

## 2022-05-15 RX ADMIN — DIPHENHYDRAMINE HYDROCHLORIDE 50 MG: 50 INJECTION, SOLUTION INTRAMUSCULAR; INTRAVENOUS at 08:20

## 2022-05-15 RX ADMIN — Medication 125 MG: at 23:29

## 2022-05-15 RX ADMIN — TRAZODONE HYDROCHLORIDE 150 MG: 100 TABLET ORAL at 21:40

## 2022-05-15 RX ADMIN — LEVOTHYROXINE SODIUM 50 MCG: 50 TABLET ORAL at 11:00

## 2022-05-15 RX ADMIN — SODIUM CHLORIDE 500 ML: 0.9 INJECTION, SOLUTION INTRAVENOUS at 08:07

## 2022-05-15 RX ADMIN — ATORVASTATIN CALCIUM 20 MG: 20 TABLET, FILM COATED ORAL at 11:00

## 2022-05-15 RX ADMIN — SODIUM CHLORIDE, SODIUM GLUCONATE, SODIUM ACETATE, POTASSIUM CHLORIDE, MAGNESIUM CHLORIDE, SODIUM PHOSPHATE, DIBASIC, AND POTASSIUM PHOSPHATE 100 ML/HR: .53; .5; .37; .037; .03; .012; .00082 INJECTION, SOLUTION INTRAVENOUS at 15:00

## 2022-05-15 RX ADMIN — ASPIRIN 81 MG 81 MG: 81 TABLET ORAL at 11:00

## 2022-05-15 RX ADMIN — METRONIDAZOLE 500 MG: 500 INJECTION, SOLUTION INTRAVENOUS at 18:34

## 2022-05-15 RX ADMIN — HEPARIN SODIUM 5000 UNITS: 5000 INJECTION INTRAVENOUS; SUBCUTANEOUS at 21:40

## 2022-05-15 RX ADMIN — SODIUM CHLORIDE, SODIUM LACTATE, POTASSIUM CHLORIDE, AND CALCIUM CHLORIDE 100 ML/HR: .6; .31; .03; .02 INJECTION, SOLUTION INTRAVENOUS at 11:41

## 2022-05-15 RX ADMIN — Medication 125 MG: at 18:33

## 2022-05-15 RX ADMIN — OXYCODONE HYDROCHLORIDE 5 MG: 5 TABLET ORAL at 12:14

## 2022-05-15 RX ADMIN — OXYBUTYNIN CHLORIDE 5 MG: 5 TABLET ORAL at 18:33

## 2022-05-15 RX ADMIN — OXYBUTYNIN CHLORIDE 5 MG: 5 TABLET ORAL at 11:00

## 2022-05-15 RX ADMIN — HEPARIN SODIUM 5000 UNITS: 5000 INJECTION INTRAVENOUS; SUBCUTANEOUS at 15:00

## 2022-05-15 RX ADMIN — OMEGA-3 FATTY ACIDS CAP 1000 MG 1000 MG: 1000 CAP at 11:00

## 2022-05-15 RX ADMIN — ONDANSETRON 4 MG: 2 INJECTION INTRAMUSCULAR; INTRAVENOUS at 08:20

## 2022-05-15 NOTE — PLAN OF CARE
Problem: Potential for Falls  Goal: Patient will remain free of falls  Description: INTERVENTIONS:  - Educate patient/family on patient safety including physical limitations  - Instruct patient to call for assistance with activity   - Consult OT/PT to assist with strengthening/mobility   - Keep Call bell within reach  - Keep bed low and locked with side rails adjusted as appropriate  - Keep care items and personal belongings within reach  - Initiate and maintain comfort rounds  - Make Fall Risk Sign visible to staff  - Offer Toileting every  Hours, in advance of need  - Initiate/Maintain alarm  - Obtain necessary fall risk management equipment:   - Apply yellow socks and bracelet for high fall risk patients  - Consider moving patient to room near nurses station  Outcome: Progressing     Problem: PAIN - ADULT  Goal: Verbalizes/displays adequate comfort level or baseline comfort level  Description: Interventions:  - Encourage patient to monitor pain and request assistance  - Assess pain using appropriate pain scale  - Administer analgesics based on type and severity of pain and evaluate response  - Implement non-pharmacological measures as appropriate and evaluate response  - Consider cultural and social influences on pain and pain management  - Notify physician/advanced practitioner if interventions unsuccessful or patient reports new pain  Outcome: Progressing     Problem: INFECTION - ADULT  Goal: Absence or prevention of progression during hospitalization  Description: INTERVENTIONS:  - Assess and monitor for signs and symptoms of infection  - Monitor lab/diagnostic results  - Monitor all insertion sites, i e  indwelling lines, tubes, and drains  - Monitor endotracheal if appropriate and nasal secretions for changes in amount and color  - [place] appropriate cooling/warming therapies per order  - Administer medications as ordered  - Instruct and encourage patient and family to use good hand hygiene technique  - Identify and instruct in appropriate isolation precautions for identified infection/condition  Outcome: Progressing  Goal: Absence of fever/infection during neutropenic period  Description: INTERVENTIONS:  - Monitor WBC    Outcome: Progressing     Problem: SAFETY ADULT  Goal: Patient will remain free of falls  Description: INTERVENTIONS:  - Educate patient/family on patient safety including physical limitations  - Instruct patient to call for assistance with activity   - Consult OT/PT to assist with strengthening/mobility   - Keep Call bell within reach  - Keep bed low and locked with side rails adjusted as appropriate  - Keep care items and personal belongings within reach  - Initiate and maintain comfort rounds  - Make Fall Risk Sign visible to staff  - Offer Toileting every  Hours, in advance of need  - Initiate/Maintain alarm  - Obtain necessary fall risk management equipment:   - Apply yellow socks and bracelet for high fall risk patients  - Consider moving patient to room near nurses station  Outcome: Progressing  Goal: Maintain or return to baseline ADL function  Description: INTERVENTIONS:  -  Assess patient's ability to carry out ADLs; assess patient's baseline for ADL function and identify physical deficits which impact ability to perform ADLs (bathing, care of mouth/teeth, toileting, grooming, dressing, etc )  - Assess/evaluate cause of self-care deficits   - Assess range of motion  - Assess patient's mobility; develop plan if impaired  - Assess patient's need for assistive devices and provide as appropriate  - Encourage maximum independence but intervene and supervise when necessary  - Involve family in performance of ADLs  - Assess for home care needs following discharge   - Consider OT consult to assist with ADL evaluation and planning for discharge  - Provide patient education as appropriate  Outcome: Progressing  Goal: Maintains/Returns to pre admission functional level  Description: INTERVENTIONS:  - Perform BMAT or MOVE assessment daily    - Set and communicate daily mobility goal to care team and patient/family/caregiver  - Collaborate with rehabilitation services on mobility goals if consulted  - Perform Range of Motion  times a day  - Reposition patient every  hours  - Dangle patient  times a day  - Stand patient  times a day  - Ambulate patient  times a day  - Out of bed to chair  times a day   - Out of bed for meals  times a day  - Out of bed for toileting  - Record patient progress and toleration of activity level   Outcome: Progressing     Problem: DISCHARGE PLANNING  Goal: Discharge to home or other facility with appropriate resources  Description: INTERVENTIONS:  - Identify barriers to discharge w/patient and caregiver  - Arrange for needed discharge resources and transportation as appropriate  - Identify discharge learning needs (meds, wound care, etc )  - Arrange for interpretive services to assist at discharge as needed  - Refer to Case Management Department for coordinating discharge planning if the patient needs post-hospital services based on physician/advanced practitioner order or complex needs related to functional status, cognitive ability, or social support system  Outcome: Progressing     Problem: Knowledge Deficit  Goal: Patient/family/caregiver demonstrates understanding of disease process, treatment plan, medications, and discharge instructions  Description: Complete learning assessment and assess knowledge base    Interventions:  - Provide teaching at level of understanding  - Provide teaching via preferred learning methods  Outcome: Progressing

## 2022-05-15 NOTE — ASSESSMENT & PLAN NOTE
· Patient complaining of abdominal pain that started acutely last night and also associated diarrhea which has almost been constant since last night    Patient does have history of C diff in the past  · CT abdomen pelvis showing colitis  · Likely etiology C diff infection  · White count 21    Plan  · Once patient is able to give stool study will start on oral vancomycin given high likelihood of C diff and will start IV Flagyl  · serial abdominal exams  · Follow-up C diff and stool enteric panel  · Continue IV fluids  · General surgery consulted  · Continue to monitor

## 2022-05-15 NOTE — ASSESSMENT & PLAN NOTE
Lab Results   Component Value Date    EGFR 26 05/15/2022    EGFR 53 11/11/2021    EGFR 57 01/22/2021    CREATININE 1 71 (H) 05/15/2022    CREATININE 0 97 11/11/2021    CREATININE 0 92 01/22/2021   · Baseline kidney function appears to be around 0 9-1  · Creatinine currently 1 71  · Likely prerenal secondary to ongoing diarrhea and decreased oral intake  · Will continue with IV fluids at this time  · Continue to monitor kidney function

## 2022-05-15 NOTE — H&P
[address] [facility]  H&P- Ema Grant 1935, 80 y o  female MRN: [id number]  Unit/Bed#: -[facility] Encounter: [id number]  Primary Care Provider: Joon Choe MD   Date and time admitted to hospital: 5/15/2022  7:33 AM    * Colitis  Assessment & Plan  · Patient complaining of abdominal pain that started acutely last night and also associated diarrhea which has almost been constant since last night  Patient does have history of C diff in the past  · CT abdomen pelvis showing colitis  · Likely etiology C diff infection  · White count 21    Plan  · Once patient is able to give stool study will start on oral vancomycin given high likelihood of C diff and will start IV Flagyl  · serial abdominal exams  · Follow-up C diff and stool enteric panel  · Continue IV fluids  · General surgery consulted  · Continue to monitor    Stage 3 chronic kidney disease, unspecified whether stage 3a or 3b CKD [facility])  Assessment & Plan  Lab Results   Component Value Date    EGFR 26 05/15/2022    EGFR 53 11/11/2021    EGFR 57 01/22/2021    CREATININE 1 71 (H) 05/15/2022    CREATININE 0 97 11/11/2021    CREATININE 0 92 01/22/2021   · Baseline kidney function appears to be around 0 9-1  · Creatinine currently 1 71  · Likely prerenal secondary to ongoing diarrhea and decreased oral intake  · Will continue with IV fluids at this time  · Continue to monitor kidney function    Hypothyroidism  Assessment & Plan  · Continue levothyroxine    Hypertension  Assessment & Plan  · Blood pressure stable at this time  · Will hold lisinopril given RANJAN  · Continue to monitor    Hyperlipidemia  Assessment & Plan  · Continue statin    RANJAN (acute kidney injury) ([facility] [address] )  Assessment & Plan  Plan as above    VTE Pharmacologic Prophylaxis: VTE Score: 3 Moderate Risk (Score 3-4) - Pharmacological DVT Prophylaxis Ordered: heparin  Code Status: Level 3 - DNAR and DNI   Discussion with family: Updated  (daughter) at bedside      Anticipated Length of Stay: Patient will be admitted on an observation basis with an anticipated length of stay of less than 2 midnights secondary to colitis   Total Time for Visit, including Counseling / Coordination of Care: 45 minutes Greater than 50% of this total time spent on direct patient counseling and coordination of care  Chief Complaint: abd pain and diarrhea     History of Present Illness:  Efren Garcia is a 80 y o  female with a PMH of hypothyroid, hyperlipidemia, hypertension, CKD stage 3 who presents with abd pain and diarrhea  Patient coming in with acute onset abdominal pain starting last night around 5:00 p m  Lita Meredith Patient states since that time  She has been having almost constant bowel movements which are watery and "foul" in smell  Patient does have history of C diff in the past and states that she presented similar to how she feels at this time  Patient states she is still having abdominal pain the time of my examination, but does feel slightly better compared to when she came in  Patient denied any recent antibiotic use  Review of Systems:  Review of Systems   Constitutional: Negative for chills, fatigue, fever and unexpected weight change  HENT: Negative for congestion, ear pain, sore throat and tinnitus  Eyes: Negative for visual disturbance  Respiratory: Negative for cough, chest tightness, shortness of breath and wheezing  Cardiovascular: Negative for chest pain, palpitations and leg swelling  Gastrointestinal: Negative for abdominal pain, constipation, diarrhea, nausea and vomiting  Genitourinary: Negative for dysuria and frequency  Skin: Negative for rash  Neurological: Negative for dizziness, weakness, light-headedness, numbness and headaches  All other systems reviewed and are negative        Past Medical and Surgical History:   Past Medical History:   Diagnosis Date    BCC (basal cell carcinoma)     Benign uterine neoplasm     C  difficile colitis     Disease of thyroid gland     Hyperlipidemia     Hypertension        Past Surgical History:   Procedure Laterality Date    APPENDECTOMY      CATARACT EXTRACTION      Last assessed - 1/14/16    MALIGNANT SKIN LESION EXCISION      Face    PARATHYROIDECTOMY      GA WRIST [place] Sole LIG Left 3/23/2021    Procedure: RELEASE LEFT CARPAL TUNNEL ENDOSCOPIC;  Surgeon: Miah Bryant MD;  Location: [facility];  Service: Orthopedics    REPLACEMENT TOTAL KNEE Bilateral     ROTATOR CUFF REPAIR Left     Last assessed - 1/14/16    TOTAL ABDOMINAL HYSTERECTOMY      with derrell oopherectomy, Last assessed - 1/14/16       Meds/Allergies:  Prior to Admission medications    Medication Sig Start Date End Date Taking?  Authorizing Provider   aspirin 81 MG tablet Take by mouth   Yes Historical Provider, MD   atorvastatin (LIPITOR) 20 mg tablet Take 1 tablet (20 mg total) by mouth daily 4/5/22  Yes Estela Santiago MD   levothyroxine 50 mcg tablet Take 1 tablet (50 mcg total) by mouth daily 4/5/22  Yes Estela Santiago MD   lisinopril (ZESTRIL) 10 mg tablet Take 1 tablet (10 mg total) by mouth daily 4/5/22  Yes Estela Santiago MD   oxybutynin (DITROPAN) 5 mg tablet Take 1 tablet (5 mg total) by mouth 2 (two) times a day 4/5/22  Yes Estela Santiago MD   traZODone (DESYREL) 150 mg tablet Take 1 tablet (150 mg total) by mouth daily at bedtime 5/3/22  Yes Estela Santiago MD   cyanocobalamin (VITAMIN B-12) 1,000 mcg tablet Take by mouth    Historical Provider, MD   Elastic Bandages & Supports (B & B Carpal Tunnel Brace) MISC Use daily at bedtime 1/22/21   Estela Santiago MD   LORazepam (ATIVAN) 1 mg tablet Take 1 tablet (1 mg total) by mouth daily at bedtime  Patient not taking: Reported on 3/23/2022  1/11/22 4/11/22  Estela Santiago MD   Multiple Vitamin (MULTIVITAMIN) tablet Take 1 tablet by mouth daily    Historical Provider, MD   omega-3-acid ethyl esters (LOVAZA) 1 g capsule Take 2 capsules by mouth daily   6/3/16   Historical Provider, MD   Probiotic Product (PROBIOTIC-10 PO) Take by mouth daily    Historical Provider, MD   pyridoxine (VITAMIN B6) 100 mg tablet Take 100 mg by mouth daily    Historical Provider, MD     I have reviewed home medications with patient personally  Allergies: Allergies   Allergen Reactions    Codeine Sulfate        Social History:  Marital Status: /Civil Union   Patient Pre-hospital Living Situation: Home  Patient Pre-hospital Level of Mobility: walks  Substance Use History:   Social History     Substance and Sexual Activity   Alcohol Use No     Social History     Tobacco Use   Smoking Status Former Smoker    Quit date:     Years since quittin 4   Smokeless Tobacco Never Used     Social History     Substance and Sexual Activity   Drug Use No       Family History:  Family History   Problem Relation Age of Onset    Heart attack Mother     Substance Abuse Mother         denied    Mental illness Mother         denied    Other Mother         Cardiac Disorder     Cancer Mother         Malignant Neoplasm     Cancer Father         Malignant Neoplasm     Substance Abuse Father         denied    Mental illness Father         denied    Lung cancer Father     Spina bifida Child        Physical Exam:     Vitals:   Blood Pressure: (!) 124/45 (05/15/22 1045)  Pulse: 71 (05/15/22 1045)  Temperature: 99 3 °F (37 4 °C) (05/15/22 1045)  Temp Source: Oral (05/15/22 0735)  Respirations: 18 (05/15/22 1045)  SpO2: 93 % (05/15/22 1045)    Physical Exam  Vitals and nursing note reviewed  Constitutional:       General: She is not in acute distress  Appearance: She is well-developed  HENT:      Head: Normocephalic and atraumatic  Eyes:      General: No scleral icterus  Conjunctiva/sclera: Conjunctivae normal    Cardiovascular:      Rate and Rhythm: Normal rate and regular rhythm  Heart sounds: Normal heart sounds  No murmur heard  No friction rub  No gallop     Pulmonary:      Effort: Pulmonary effort is normal  No respiratory distress  Breath sounds: Normal breath sounds  No wheezing or rales  Abdominal:      General: Bowel sounds are normal  There is no distension  Palpations: Abdomen is soft  Tenderness: There is abdominal tenderness  Comments: Diffuse abdominal pain   Musculoskeletal:         General: Normal range of motion  Skin:     General: Skin is warm  Findings: No rash  Neurological:      Mental Status: She is alert and oriented to person, place, and time  Additional Data:     Lab Results:  Results from last 7 days   Lab Units 05/15/22  1227 05/15/22  0807   WBC Thousand/uL  --  21 33*   HEMOGLOBIN g/dL  --  13 8   HEMATOCRIT %  --  40 6   PLATELETS Thousands/uL 235 249   NEUTROS PCT %  --  83*   LYMPHS PCT %  --  9*   MONOS PCT %  --  7   EOS PCT %  --  0     Results from last 7 days   Lab Units 05/15/22  0807   SODIUM mmol/L 140   POTASSIUM mmol/L 4 5   CHLORIDE mmol/L 104   CO2 mmol/L 21   BUN mg/dL 29*   CREATININE mg/dL 1 71*   ANION GAP mmol/L 15*   CALCIUM mg/dL 9 4   ALBUMIN g/dL 3 7   TOTAL BILIRUBIN mg/dL 1 01*   ALK PHOS U/L 40*   ALT U/L 31   AST U/L 23   GLUCOSE RANDOM mg/dL 156*     Results from last 7 days   Lab Units 05/15/22  0807   INR  1 15             Results from last 7 days   Lab Units 05/15/22  1006   LACTIC ACID mmol/L 2 6*       Imaging: Reviewed radiology reports from this admission including: abdominal/pelvic CT  CT abdomen pelvis wo contrast   Final Result by Christy Peabody, DO (05/15 9267)   1  Diffuse abnormal appearance of the distal transverse colon, descending colon and sigmoid colon, most compatible with colitis, either infectious or inflammatory  Less likely would be ischemic, given the lack of pneumatosis, however intravenous    contrast material not administered  Recommend follow-up GI and/or surgical consultation  2   Cholelithiasis without CT evidence of acute cholecystitis              The study was marked in [facility] for immediate notification  Workstation performed: [id number]             EKG and Other Studies Reviewed on Admission:   · EKG: NSR  HR 82     ** Please Note: This note has been constructed using a voice recognition system   **

## 2022-05-15 NOTE — ED PROVIDER NOTES
History  Chief Complaint   Patient presents with    Abdominal Pain     Pt c/o N/V/D and abd pain since last night     80year old female patient presents emergency department for evaluation of abdominal discomfort  On physical exam the patient is exquisitely tender in all four quadrants  The only previous abdominal surgery that she relays to me is in appendicitis in the 1950s  Patient states that she is been having diarrhea since yesterday  The patient does have a history of C diff, she does not remember this was brought on by antibiotic use is no recollection of those events  Family member at bedside states that that was 2-4 years ago  Plans for evaluation with a differential diagnosis that includes but is not limited to diverticulitis, bowel obstruction which I am concerned for, colitis  History provided by:  Patient   used: No    Abdominal Pain  Pain location:  Generalized  Pain quality: aching    Pain radiates to:  Does not radiate  Onset quality:  Gradual  Timing:  Constant  Progression:  Worsening  Chronicity:  New  Context: not alcohol use, not diet changes, not recent illness and not sick contacts    Relieved by:  Nothing  Worsened by:  Nothing  Ineffective treatments:  None tried  Associated symptoms: no chills, no diarrhea, no fatigue, no hematemesis and no sore throat        Prior to Admission Medications   Prescriptions Last Dose Informant Patient Reported? Taking?    Elastic Bandages & Supports (B & B Carpal Tunnel Brace) MISC  Self No No   Sig: Use daily at bedtime   LORazepam (ATIVAN) 1 mg tablet   No No   Sig: Take 1 tablet (1 mg total) by mouth daily at bedtime   Patient not taking: Reported on 3/23/2022    Multiple Vitamin (MULTIVITAMIN) tablet  Self Yes No   Sig: Take 1 tablet by mouth daily   Probiotic Product (PROBIOTIC-10 PO)  Self Yes No   Sig: Take by mouth daily   aspirin 81 MG tablet 5/14/2022 at Unknown time Self Yes Yes   Sig: Take by mouth   atorvastatin (LIPITOR) 20 mg tablet 5/14/2022 at Unknown time  No Yes   Sig: Take 1 tablet (20 mg total) by mouth daily   cyanocobalamin (VITAMIN B-12) 1,000 mcg tablet  Self Yes No   Sig: Take by mouth   levothyroxine 50 mcg tablet 5/14/2022 at Unknown time  No Yes   Sig: Take 1 tablet (50 mcg total) by mouth daily   lisinopril (ZESTRIL) 10 mg tablet 5/14/2022 at Unknown time  No Yes   Sig: Take 1 tablet (10 mg total) by mouth daily   omega-3-acid ethyl esters (LOVAZA) 1 g capsule  Self Yes No   Sig: Take 2 capsules by mouth daily     oxybutynin (DITROPAN) 5 mg tablet 5/14/2022 at Unknown time  No Yes   Sig: Take 1 tablet (5 mg total) by mouth 2 (two) times a day   pyridoxine (VITAMIN B6) 100 mg tablet  Self Yes No   Sig: Take 100 mg by mouth daily   traZODone (DESYREL) 150 mg tablet 5/14/2022 at Unknown time  No Yes   Sig: Take 1 tablet (150 mg total) by mouth daily at bedtime      Facility-Administered Medications: None       Past Medical History:   Diagnosis Date    BCC (basal cell carcinoma)     Benign uterine neoplasm     C  difficile colitis     Disease of thyroid gland     Hyperlipidemia     Hypertension        Past Surgical History:   Procedure Laterality Date    APPENDECTOMY      CATARACT EXTRACTION      Last assessed - 1/14/16    MALIGNANT SKIN LESION EXCISION      Face    PARATHYROIDECTOMY      ND WRIST Leann Yazan LIG Left 3/23/2021    Procedure: RELEASE LEFT CARPAL TUNNEL ENDOSCOPIC;  Surgeon: Akanksha Jane MD;  Location: [facility];  Service: Orthopedics    REPLACEMENT TOTAL KNEE Bilateral     ROTATOR CUFF REPAIR Left     Last assessed - 1/14/16    TOTAL ABDOMINAL HYSTERECTOMY      with derrell oopherectomy, Last assessed - 1/14/16       Family History   Problem Relation Age of Onset    Heart attack Mother     Substance Abuse Mother         denied    Mental illness Mother         denied    Other Mother         Cardiac Disorder     Cancer Mother         Malignant Neoplasm     Cancer Father         Malignant Neoplasm     Substance Abuse Father         denied    Mental illness Father         denied    Lung cancer Father     Spina bifida Child      I have reviewed and agree with the history as documented  E-Cigarette/Vaping    E-Cigarette Use Never User      E-Cigarette/Vaping Substances    Nicotine No     THC No     CBD No     Flavoring No     Other No     Unknown No      Social History     Tobacco Use    Smoking status: Former Smoker     Quit date:      Years since quittin 4    Smokeless tobacco: Never Used   Vaping Use    Vaping Use: Never used   Substance Use Topics    Alcohol use: Never    Drug use: No       Review of Systems   Constitutional: Negative for chills and fatigue  HENT: Negative for sore throat  Gastrointestinal: Positive for abdominal pain  Negative for diarrhea and hematemesis  All other systems reviewed and are negative  Physical Exam  Physical Exam  Vitals and nursing note reviewed  Constitutional:       Appearance: She is well-developed  HENT:      Head: Normocephalic and atraumatic  Right Ear: External ear normal       Left Ear: External ear normal    Eyes:      Conjunctiva/sclera: Conjunctivae normal    Neck:      Thyroid: No thyromegaly  Vascular: No JVD  Trachea: No tracheal deviation  Cardiovascular:      Rate and Rhythm: Normal rate  Pulmonary:      Effort: Pulmonary effort is normal       Breath sounds: Normal breath sounds  No stridor  Abdominal:      General: There is no distension  Palpations: Abdomen is soft  There is no mass  Tenderness: There is generalized abdominal tenderness  There is no guarding  Hernia: No hernia is present  Musculoskeletal:         General: No tenderness or deformity  Normal range of motion  Lymphadenopathy:      Cervical: No cervical adenopathy  Skin:     General: Skin is warm  Coloration: Skin is not pale  Findings: No erythema or rash  Neurological:      Mental Status: She is alert and oriented to person, place, and time     Psychiatric:         Behavior: Behavior normal          Vital Signs  ED Triage Vitals   Temperature Pulse Respirations Blood Pressure SpO2   05/15/22 0735 05/15/22 0735 05/15/22 0735 05/15/22 0735 05/15/22 0735   99 1 °F (37 3 °C) 87 20 106/59 94 %      Temp Source Heart Rate Source Patient Position - Orthostatic VS BP Location FiO2 (%)   05/15/22 0735 05/15/22 0735 05/15/22 0735 05/15/22 0735 --   Oral Monitor Lying Right arm       Pain Score       05/15/22 1100       No Pain           Vitals:    05/15/22 1045 05/15/22 2220 05/16/22 0812 05/16/22 1510   BP: (!) 124/45 110/52 90/61 95/54   Pulse: 71 88 90 87   Patient Position - Orthostatic VS:  Lying Lying          Visual Acuity      ED Medications  Medications   aspirin chewable tablet 81 mg (81 mg Oral Given 5/16/22 0831)   atorvastatin (LIPITOR) tablet 20 mg (20 mg Oral Given 5/16/22 0831)   levothyroxine tablet 50 mcg (50 mcg Oral Given 5/16/22 0528)   fish oil capsule 1,000 mg (1,000 mg Oral Given 5/16/22 0831)   oxybutynin (DITROPAN) tablet 5 mg (5 mg Oral Given 5/16/22 0831)   traZODone (DESYREL) tablet 150 mg (150 mg Oral Given 5/15/22 2140)   heparin (porcine) subcutaneous injection 5,000 Units (5,000 Units Subcutaneous Given 5/16/22 1337)   acetaminophen (TYLENOL) tablet 650 mg (has no administration in time range)   oxyCODONE (ROXICODONE) IR tablet 2 5 mg (has no administration in time range)   oxyCODONE (ROXICODONE) IR tablet 5 mg (5 mg Oral Given 5/15/22 1214)   multi-electrolyte (PLASMALYTE-A/ISOLYTE-S PH 7 4) IV solution (100 mL/hr Intravenous New Bag 5/16/22 1124)   vancomycin (VANCOCIN) oral solution 125 mg (125 mg Oral Given 5/16/22 1125)   metroNIDAZOLE (FLAGYL) IVPB (premix) 500 mg 100 mL (500 mg Intravenous New Bag 5/16/22 0831)   ondansetron (ZOFRAN) injection 4 mg (4 mg Intravenous Given 5/15/22 0820)   diphenhydrAMINE (BENADRYL) injection 50 mg (50 mg Intravenous Given 5/15/22 0820)   sodium chloride 0 9 % bolus 500 mL (500 mL Intravenous New Bag 5/15/22 0807)       Diagnostic Studies  Results Reviewed     Procedure Component Value Units Date/Time    UA w Reflex to Microscopic w Reflex to Culture [[id number]]  (Abnormal) Collected: 05/16/22 0820    Lab Status: Final result Specimen: Urine, Clean Catch Updated: 05/16/22 0830     Color, UA Yellow     Clarity, UA Clear     Specific Gravity, UA 1 015     pH, UA 6 0     Leukocytes, UA Trace     Nitrite, UA Negative     Protein, UA Negative mg/dl      Glucose, UA Negative mg/dl      Ketones, UA Negative mg/dl      Urobilinogen, UA 0 2 E U /dl      Bilirubin, UA Negative     Blood, UA Small    Basic metabolic panel [[id number]]  (Abnormal) Collected: 05/16/22 0516    Lab Status: Final result Specimen: Blood from Arm, Left Updated: 05/16/22 1444     Sodium 136 mmol/L      Potassium 3 8 mmol/L      Chloride 105 mmol/L      CO2 23 mmol/L      ANION GAP 8 mmol/L      BUN 23 mg/dL      Creatinine 1 05 mg/dL      Glucose 107 mg/dL      Glucose, Fasting 107 mg/dL      Calcium 8 1 mg/dL      eGFR 48 ml/min/1 73sq m     Narrative:      [place] guidelines for Chronic Kidney Disease (CKD):     Stage 1 with normal or high GFR (GFR > 90 mL/min/1 73 square meters)    Stage 2 Mild CKD (GFR = 60-89 mL/min/1 73 square meters)    Stage 3A Moderate CKD (GFR = 45-59 mL/min/1 73 square meters)    Stage 3B Moderate CKD (GFR = 30-44 mL/min/1 73 square meters)    Stage 4 Severe CKD (GFR = 15-29 mL/min/1 73 square meters)    Stage 5 End Stage CKD (GFR <15 mL/min/1 73 square meters)  Note: GFR calculation is accurate only with a steady state creatinine    CBC and differential [[id number]]  (Abnormal) Collected: 05/16/22 0516    Lab Status: Final result Specimen: Blood from Arm, Left Updated: 05/16/22 0544     WBC 14 21 Thousand/uL      RBC 3 52 Million/uL      Hemoglobin 11 3 g/dL      Hematocrit 33 0 %      MCV 94 fL      MCH 32 1 pg      MCHC 34 2 g/dL      RDW 13 6 %      MPV 9 4 fL      Platelets 446 Thousands/uL      nRBC 0 /100 WBCs      Neutrophils Relative 71 %      Immat GRANS % 1 %      Lymphocytes Relative 18 %      Monocytes Relative 10 %      Eosinophils Relative 0 %      Basophils Relative 0 %      Neutrophils Absolute 10 12 Thousands/µL      Immature Grans Absolute 0 08 Thousand/uL      Lymphocytes Absolute 2 50 Thousands/µL      Monocytes Absolute 1 45 Thousand/µL      Eosinophils Absolute 0 01 Thousand/µL      Basophils Absolute 0 05 Thousands/µL     HS Troponin I 4hr [[id number]]  (Normal) Collected: 05/15/22 1227    Lab Status: Final result Specimen: Blood from Arm, Left Updated: 05/15/22 1255     hs TnI 4hr 13 ng/L      Delta 4hr hsTnI 1 ng/L     Platelet count [[id number]]  (Normal) Collected: 05/15/22 1227    Lab Status: Final result Specimen: Blood from Arm, Left Updated: 05/15/22 1235     Platelets 530 Thousands/uL      MPV 9 2 fL     Lactic acid, plasma [[id number]]  (Abnormal) Collected: 05/15/22 1006    Lab Status: Final result Specimen: Blood from Arm, Left Updated: 05/15/22 1117     LACTIC ACID 2 6 mmol/L     Narrative:      Result may be elevated if tourniquet was used during collection      HS Troponin I 2hr [[id number]]  (Normal) Collected: 05/15/22 1006    Lab Status: Final result Specimen: Blood from Arm, Left Updated: 05/15/22 1043     hs TnI 2hr 12 ng/L      Delta 2hr hsTnI 0 ng/L     Stool Enteric Bacterial Panel by PCR [[id number]]     Lab Status: No result Specimen: Stool     HS Troponin 0hr (reflex protocol) [[id number]]  (Normal) Collected: 05/15/22 0807    Lab Status: Final result Specimen: Blood from Arm, Right Updated: 05/15/22 0839     hs TnI 0hr 12 ng/L     Comprehensive metabolic panel [[id number]]  (Abnormal) Collected: 05/15/22 0807    Lab Status: Final result Specimen: Blood from Arm, Right Updated: 05/15/22 0830     Sodium 140 mmol/L      Potassium 4 5 mmol/L      Chloride 104 mmol/L CO2 21 mmol/L      ANION GAP 15 mmol/L      BUN 29 mg/dL      Creatinine 1 71 mg/dL      Glucose 156 mg/dL      Calcium 9 4 mg/dL      AST 23 U/L      ALT 31 U/L      Alkaline Phosphatase 40 U/L      Total Protein 6 9 g/dL      Albumin 3 7 g/dL      Total Bilirubin 1 01 mg/dL      eGFR 26 ml/min/1 73sq m     Narrative:      [employer] guidelines for Chronic Kidney Disease (CKD):     Stage 1 with normal or high GFR (GFR > 90 mL/min/1 73 square meters)    Stage 2 Mild CKD (GFR = 60-89 mL/min/1 73 square meters)    Stage 3A Moderate CKD (GFR = 45-59 mL/min/1 73 square meters)    Stage 3B Moderate CKD (GFR = 30-44 mL/min/1 73 square meters)    Stage 4 Severe CKD (GFR = 15-29 mL/min/1 73 square meters)    Stage 5 End Stage CKD (GFR <15 mL/min/1 73 square meters)  Note: GFR calculation is accurate only with a steady state creatinine    Lipase [[id number]]  (Abnormal) Collected: 05/15/22 0807    Lab Status: Final result Specimen: Blood from Arm, Right Updated: 05/15/22 0830     Lipase 38 u/L     Protime-INR [[id number]]  (Normal) Collected: 05/15/22 0807    Lab Status: Final result Specimen: Blood from Arm, Right Updated: 05/15/22 0826     Protime 14 2 seconds      INR 1 15    CBC and differential [[id number]]  (Abnormal) Collected: 05/15/22 0807    Lab Status: Final result Specimen: Blood from Arm, Right Updated: 05/15/22 0815     WBC 21 33 Thousand/uL      RBC 4 36 Million/uL      Hemoglobin 13 8 g/dL      Hematocrit 40 6 %      MCV 93 fL      MCH 31 7 pg      MCHC 34 0 g/dL      RDW 13 2 %      MPV 9 1 fL      Platelets 822 Thousands/uL      nRBC 0 /100 WBCs      Neutrophils Relative 83 %      Immat GRANS % 1 %      Lymphocytes Relative 9 %      Monocytes Relative 7 %      Eosinophils Relative 0 %      Basophils Relative 0 %      Neutrophils Absolute 17 75 Thousands/µL      Immature Grans Absolute 0 16 Thousand/uL      Lymphocytes Absolute 1 84 Thousands/µL      Monocytes Absolute 1 53 Thousand/µL      Eosinophils Absolute 0 00 Thousand/µL      Basophils Absolute 0 05 Thousands/µL     Clostridium difficile toxin by PCR with EIA [[id number]]     Lab Status: No result Specimen: Stool from Per Rectum                  XR abdomen obstruction series   Final Result by Carlos Eduardo Comer MD (05/16 1137)      1  No obstruction, free air, or other acute abdominal findings  2   No acute cardiopulmonary findings  Workstation performed: [id number]         CT abdomen pelvis wo contrast   Final Result by Marquez Church DO (05/15 2237)   1  Diffuse abnormal appearance of the distal transverse colon, descending colon and sigmoid colon, most compatible with colitis, either infectious or inflammatory  Less likely would be ischemic, given the lack of pneumatosis, however intravenous    contrast material not administered  Recommend follow-up GI and/or surgical consultation  2   Cholelithiasis without CT evidence of acute cholecystitis  The study was marked in [facility] for immediate notification  Workstation performed: [id number]                    Procedures  Procedures         ED Course               Identification of Seniors at [address] [place] [address] Most Recent Value   (ISAR) Identification of Seniors at Risk    Before the illness or injury that brought you to the Emergency, did you need someone to help you on a regular basis? 0 Filed at: 05/15/2022 0739   In the last 24 hours, have you needed more help than usual? 0 Filed at: 05/15/2022 9194   Have you been hospitalized for one or more nights during the past 6 months? 0 Filed at: 05/15/2022 0739   In general, do you see well? 0 Filed at: 05/15/2022 0739   In general, do you have serious problems with your memory?  0 Filed at: 05/15/2022 3851   Do you take more than three different medications every day? 0 Filed at: 05/15/2022 0739   ISAR Score 0 Filed at: 05/15/2022 3314                   Initial Sepsis Screening     Row Name 05/16/22 0742                Is the patient's history suggestive of a new or worsening infection? Yes (Proceed)  -EM        Suspected source of infection acute abdominal infection  -EM        Are two or more of the following signs & symptoms of infection both present and new to the patient? --        Indicate SIRS criteria Leukocytosis (WBC > 34954 IJL)  -EM        If the answer is yes to both questions, suspicion of sepsis is present --        If severe sepsis is present AND tissue hypoperfusion perists in the hour after fluid resuscitation or lactate > 4, the patient meets criteria for SEPTIC SHOCK --        Are any of the following organ dysfunction criteria present within 6 hours of suspected infection and SIRS criteria that are NOT considered to be chronic conditions? --        Organ dysfunction --        Date of presentation of severe sepsis --        Time of presentation of severe sepsis --        Tissue hypoperfusion persists in the hour after crystalloid fluid administration, evidenced, by either: --        Was hypotension present within one hour of the conclusion of crystalloid fluid administration? --        Date of presentation of septic shock --        Time of presentation of septic shock --              User Key  (r) = Recorded By, (t) = Taken By, (c) = Cosigned By    [facility] [address] Name Provider Type    EM Kenya Major PA-C Physician Assistant                SBIRT 22yo+    Lark Sacks Most Recent Value   SBIRT (25 yo +)    In order to provide better care to our patients, we are screening all of our patients for alcohol and drug use  Would it be okay to ask you these screening questions? Yes Filed at: 05/15/2022 0754   Initial Alcohol Screen: US AUDIT-C     1  How often do you have a drink containing alcohol? 0 Filed at: 05/15/2022 0754   2  How many drinks containing alcohol do you have on a typical day you are drinking? 0 Filed at: 05/15/2022 0754   3b  FEMALE Any Age, or MALE 65+:  How often do you have 4 or more drinks on one occassion? 0 Filed at: 05/15/2022 0754   Audit-C Score 0 Filed at: 05/15/2022 7555   ELY: How many times in the past year have you    Used an illegal drug or used a prescription medication for non-medical reasons? Never Filed at: 05/15/2022 0754                    MDM  Number of Diagnoses or Management Options  Colitis: new and requires workup     Amount and/or Complexity of Data Reviewed  Clinical lab tests: ordered and reviewed  Tests in the radiology section of CPT®: ordered and reviewed    Patient Progress  Patient progress: stable      Disposition  Final diagnoses:   Colitis     Time reflects when diagnosis was documented in both MDM as applicable and the Disposition within this note     Time User Action Codes Description Comment    5/15/2022  9:26 AM Tony Ahmadi Add [K52 9] Colitis       ED Disposition     ED Disposition   Admit    Condition   Stable    Date/Time   Sun May 15, 2022  9:26 AM    Comment   Case was discussed with Dr Valentino Hutching and the patient's admission status was agreed to be Admission Status: inpatient status to the service of Dr Valentino Hutching              Follow-up Information    None         Current Discharge Medication List      CONTINUE these medications which have NOT CHANGED    Details   aspirin 81 MG tablet Take by mouth      atorvastatin (LIPITOR) 20 mg tablet Take 1 tablet (20 mg total) by mouth daily  Qty: 90 tablet, Refills: 1    Associated Diagnoses: Pure hypercholesterolemia      levothyroxine 50 mcg tablet Take 1 tablet (50 mcg total) by mouth daily  Qty: 90 tablet, Refills: 0    Associated Diagnoses: Hypothyroidism, unspecified type      lisinopril (ZESTRIL) 10 mg tablet Take 1 tablet (10 mg total) by mouth daily  Qty: 90 tablet, Refills: 1    Associated Diagnoses: Essential hypertension      oxybutynin (DITROPAN) 5 mg tablet Take 1 tablet (5 mg total) by mouth 2 (two) times a day  Qty: 180 tablet, Refills: 1    Associated Diagnoses: Urinary incontinence, unspecified type      traZODone (DESYREL) 150 mg tablet Take 1 tablet (150 mg total) by mouth daily at bedtime  Qty: 90 tablet, Refills: 3    Associated Diagnoses: Primary insomnia      cyanocobalamin (VITAMIN B-12) 1,000 mcg tablet Take by mouth      Elastic Bandages & Supports (B & B Carpal Tunnel Brace) MISC Use daily at bedtime  Qty: 2 each, Refills: 1    Associated Diagnoses: Numbness and tingling in left hand      LORazepam (ATIVAN) 1 mg tablet Take 1 tablet (1 mg total) by mouth daily at bedtime  Qty: 90 tablet, Refills: 2    Associated Diagnoses: Primary insomnia      Multiple Vitamin (MULTIVITAMIN) tablet Take 1 tablet by mouth daily      omega-3-acid ethyl esters (LOVAZA) 1 g capsule Take 2 capsules by mouth daily        Probiotic Product (PROBIOTIC-10 PO) Take by mouth daily      pyridoxine (VITAMIN B6) 100 mg tablet Take 100 mg by mouth daily             No discharge procedures on file      PDMP Review       Value Time User    PDMP Reviewed  Yes 12/15/2021 11:19 AM Jordana King MD          ED Provider  Electronically Signed by           Jitendra Santacruz DO  05/16/22 1186

## 2022-05-15 NOTE — CONSULTS
GENERAL SURGERY CONSULT                                                                                                                                               Mejia Giles 80 y o  female MRN:                                                                     [id number]  Unit/Bed#: -01                                                         Encounter: [id number]                                                        Assessment/Plan   Colitis involving distal transverse colon, descending, sigmoid to rectum  History of C-diff 4 years ago  Leukocytosis 21     Pt states she feels better since admission, she continues to have some diarrhea, but the nausea and vomiting have stopped  -cont IVF  -cont NPO, advance to CLD if able to tolerate   -agree with p o  Vanco and Flagyl  -cont to monitor exam, vss, diarrhea output, leukocytosis   -no surgical intervention at this time    -cont with c-diff and stool studies        CHIEF COMPLAINT:  Last night after I had pizza I had sudden watery diarrhea and vomited with abdominal pain all over  HPI: Mejia Giles is a 80y o  year oldfemale,PMH: CKD, III, urinary incontinence overactive baldder, h/o lung nodule <6 mm HLD, HTN, Hypothyroidism, OA/RA spine with lumbar disc disease and spondylosis,   Surgical history: appendectomy, ADONAY/BSO Parathyroidectomy, multiple joint surgeries,     consulted for sudden onset of watery diarrhea, abrupt vomiting and abdominal pain  Pt states she was playing cards last night and after eating pizza the symptoms occurred  She felt well before this  The pain, diarrhea and vomiting persisted, the pain increased  She denies sick contacts, use of antibiotics  She denies other changes in bowel habits  She denies black stool/diarrhea or hematemesis   She denies f/c/cp/sob/ She reported to the ED for the mentioned symptoms  Pt adds she had a similar episode 4 yrs ago and was hospitalized with C-diff  Last colonoscopy was 25 yrs ago  Imaging Studies: CT abdomen pelvis wo contrast    Result Date: 5/15/2022  Impression: 1  Diffuse abnormal appearance of the distal transverse colon, descending colon and sigmoid colon, most compatible with colitis, either infectious or inflammatory  Less likely would be ischemic, given the lack of pneumatosis, however intravenous contrast material not administered  Recommend follow-up GI and/or surgical consultation  2   Cholelithiasis without CT evidence of acute cholecystitis  The study was marked in [facility] for immediate notification  Workstation performed: [id number]     Lab Results:   Lab Results   Component Value Date    WBC 21 33 (H) 05/15/2022    HGB 13 8 05/15/2022    HCT 40 6 05/15/2022     05/15/2022     Lab Results   Component Value Date    K 4 5 05/15/2022     05/15/2022    CO2 21 05/15/2022    BUN 29 (H) 05/15/2022    CREATININE 1 71 (H) 05/15/2022     Lab Results   Component Value Date    AST 23 05/15/2022    ALT 31 05/15/2022    ALKPHOS 40 (L) 05/15/2022    TBILI 1 01 (H) 05/15/2022    ALB 3 7 05/15/2022       Inpatient consult to Acute Care Surgery  Consult performed by: Oliver Florence PA-C  Consult ordered by:  Miles Mccarthy DO          Historical Information   Past Medical History:   Diagnosis Date    BCC (basal cell carcinoma)     Benign uterine neoplasm     C  difficile colitis     Disease of thyroid gland     Hyperlipidemia     Hypertension      Past Surgical History:   Procedure Laterality Date    APPENDECTOMY      CATARACT EXTRACTION      Last assessed - 1/14/16    MALIGNANT SKIN LESION EXCISION      Face    PARATHYROIDECTOMY      IA WRIST [place] Cost LIG Left 3/23/2021    Procedure: RELEASE LEFT CARPAL TUNNEL ENDOSCOPIC;  Surgeon: Grecia Trimble MD;  Location: [facility] OR;  Service: Orthopedics    REPLACEMENT TOTAL KNEE Bilateral     ROTATOR CUFF REPAIR Left     Last assessed - 1/14/16    TOTAL ABDOMINAL HYSTERECTOMY      with derrell oopherectomy, Last assessed - 16     Social History   Social History     Substance and Sexual Activity   Alcohol Use No     Social History     Substance and Sexual Activity   Drug Use No     Social History     Tobacco Use   Smoking Status Former Smoker    Quit date:     Years since quittin 4   Smokeless Tobacco Never Used     Family History: non-contributory    Allergies   Allergen Reactions    Codeine Sulfate        Meds/Allergies   current meds:   Current Facility-Administered Medications   Medication Dose Route Frequency    acetaminophen (TYLENOL) tablet 650 mg  650 mg Oral Q6H PRN    aspirin chewable tablet 81 mg  81 mg Oral Daily    atorvastatin (LIPITOR) tablet 20 mg  20 mg Oral Daily    fish oil capsule 1,000 mg  1,000 mg Oral Daily    heparin (porcine) subcutaneous injection 5,000 Units  5,000 Units Subcutaneous Q8H [address]    levothyroxine tablet 50 mcg  50 mcg Oral Daily    multi-electrolyte (PLASMALYTE-A/ISOLYTE-S PH 7 4) IV solution  100 mL/hr Intravenous Continuous    oxybutynin (DITROPAN) tablet 5 mg  5 mg Oral BID    oxyCODONE (ROXICODONE) IR tablet 2 5 mg  2 5 mg Oral Q4H PRN    oxyCODONE (ROXICODONE) IR tablet 5 mg  5 mg Oral Q4H PRN    traZODone (DESYREL) tablet 150 mg  150 mg Oral HS              Objective   Vitals:  No intake or output data in the 24 hours ending 05/15/22 1148  Invasive Devices  Report    Peripheral Intravenous Line  Duration           Peripheral IV 05/15/22 Right Antecubital <1 day                Review of Systems  12 point ROS reviewed and Negative except[de-identified]   Suddenly vomiting  Suddenly had diarrhea  Suddenly had abdominal pain  Physical Exam    Blood pressure (!) 124/45, pulse 71, temperature 99 3 °F (37 4 °C), resp  rate 18, SpO2 93 %  GEN: A & O x 3, cooperative   HEENT: PERRLA EOMI, sclera anicterus  NECK: supple   LUNGS: clear throughout  COR: RRR no murmur    ABD: soft, pt states this is her normal belly, it is rotund, no rebound   Mild voluntary guarding  Echoic bowel sounds, no hernia, masses,     EXTREM: FROM no joint deformities    No pedal edema   SKIN:  NEURO: CN II -XII intact, no tremor, affect appropriate            Jeannette Louise PA-C  5/15/2022

## 2022-05-16 ENCOUNTER — APPOINTMENT (OUTPATIENT)
Dept: RADIOLOGY | Facility: HOSPITAL | Age: 87
DRG: 392 | End: 2022-05-16
Payer: COMMERCIAL

## 2022-05-16 LAB
ANION GAP SERPL CALCULATED.3IONS-SCNC: 8 MMOL/L (ref 4–13)
BACTERIA UR QL AUTO: NORMAL /HPF
BASOPHILS # BLD AUTO: 0.05 THOUSANDS/ΜL (ref 0–0.1)
BASOPHILS NFR BLD AUTO: 0 % (ref 0–1)
BILIRUB UR QL STRIP: NEGATIVE
BUN SERPL-MCNC: 23 MG/DL (ref 5–25)
CALCIUM SERPL-MCNC: 8.1 MG/DL (ref 8.3–10.1)
CHLORIDE SERPL-SCNC: 105 MMOL/L (ref 100–108)
CLARITY UR: CLEAR
CO2 SERPL-SCNC: 23 MMOL/L (ref 21–32)
COLOR UR: YELLOW
CREAT SERPL-MCNC: 1.05 MG/DL (ref 0.6–1.3)
CRP SERPL QL: 225.5 MG/L
EOSINOPHIL # BLD AUTO: 0.01 THOUSAND/ΜL (ref 0–0.61)
EOSINOPHIL NFR BLD AUTO: 0 % (ref 0–6)
ERYTHROCYTE [DISTWIDTH] IN BLOOD BY AUTOMATED COUNT: 13.6 % (ref 11.6–15.1)
ERYTHROCYTE [SEDIMENTATION RATE] IN BLOOD: 18 MM/HOUR (ref 0–29)
GFR SERPL CREATININE-BSD FRML MDRD: 48 ML/MIN/1.73SQ M
GLUCOSE P FAST SERPL-MCNC: 107 MG/DL (ref 65–99)
GLUCOSE SERPL-MCNC: 107 MG/DL (ref 65–140)
GLUCOSE UR STRIP-MCNC: NEGATIVE MG/DL
HCT VFR BLD AUTO: 33 % (ref 34.8–46.1)
HGB BLD-MCNC: 11.3 G/DL (ref 11.5–15.4)
HGB UR QL STRIP.AUTO: ABNORMAL
IMM GRANULOCYTES # BLD AUTO: 0.08 THOUSAND/UL (ref 0–0.2)
IMM GRANULOCYTES NFR BLD AUTO: 1 % (ref 0–2)
KETONES UR STRIP-MCNC: NEGATIVE MG/DL
LEUKOCYTE ESTERASE UR QL STRIP: ABNORMAL
LYMPHOCYTES # BLD AUTO: 2.5 THOUSANDS/ΜL (ref 0.6–4.47)
LYMPHOCYTES NFR BLD AUTO: 18 % (ref 14–44)
MCH RBC QN AUTO: 32.1 PG (ref 26.8–34.3)
MCHC RBC AUTO-ENTMCNC: 34.2 G/DL (ref 31.4–37.4)
MCV RBC AUTO: 94 FL (ref 82–98)
MONOCYTES # BLD AUTO: 1.45 THOUSAND/ΜL (ref 0.17–1.22)
MONOCYTES NFR BLD AUTO: 10 % (ref 4–12)
NEUTROPHILS # BLD AUTO: 10.12 THOUSANDS/ΜL (ref 1.85–7.62)
NEUTS SEG NFR BLD AUTO: 71 % (ref 43–75)
NITRITE UR QL STRIP: NEGATIVE
NON-SQ EPI CELLS URNS QL MICRO: NORMAL /HPF
NRBC BLD AUTO-RTO: 0 /100 WBCS
PH UR STRIP.AUTO: 6 [PH]
PLATELET # BLD AUTO: 192 THOUSANDS/UL (ref 149–390)
PMV BLD AUTO: 9.4 FL (ref 8.9–12.7)
POTASSIUM SERPL-SCNC: 3.8 MMOL/L (ref 3.5–5.3)
PROT UR STRIP-MCNC: NEGATIVE MG/DL
RBC # BLD AUTO: 3.52 MILLION/UL (ref 3.81–5.12)
RBC #/AREA URNS AUTO: NORMAL /HPF
SODIUM SERPL-SCNC: 136 MMOL/L (ref 136–145)
SP GR UR STRIP.AUTO: 1.01 (ref 1–1.03)
UROBILINOGEN UR QL STRIP.AUTO: 0.2 E.U./DL
WBC # BLD AUTO: 14.21 THOUSAND/UL (ref 4.31–10.16)
WBC #/AREA URNS AUTO: NORMAL /HPF

## 2022-05-16 PROCEDURE — 99232 SBSQ HOSP IP/OBS MODERATE 35: CPT | Performed by: SURGERY

## 2022-05-16 PROCEDURE — 80048 BASIC METABOLIC PNL TOTAL CA: CPT | Performed by: INTERNAL MEDICINE

## 2022-05-16 PROCEDURE — 85025 COMPLETE CBC W/AUTO DIFF WBC: CPT | Performed by: INTERNAL MEDICINE

## 2022-05-16 PROCEDURE — 85652 RBC SED RATE AUTOMATED: CPT | Performed by: PHYSICIAN ASSISTANT

## 2022-05-16 PROCEDURE — 81001 URINALYSIS AUTO W/SCOPE: CPT | Performed by: INTERNAL MEDICINE

## 2022-05-16 PROCEDURE — 99232 SBSQ HOSP IP/OBS MODERATE 35: CPT | Performed by: PHYSICIAN ASSISTANT

## 2022-05-16 PROCEDURE — 74022 RADEX COMPL AQT ABD SERIES: CPT

## 2022-05-16 PROCEDURE — 86140 C-REACTIVE PROTEIN: CPT | Performed by: PHYSICIAN ASSISTANT

## 2022-05-16 RX ADMIN — Medication 125 MG: at 05:28

## 2022-05-16 RX ADMIN — TRAZODONE HYDROCHLORIDE 150 MG: 100 TABLET ORAL at 21:33

## 2022-05-16 RX ADMIN — HEPARIN SODIUM 5000 UNITS: 5000 INJECTION INTRAVENOUS; SUBCUTANEOUS at 21:33

## 2022-05-16 RX ADMIN — HEPARIN SODIUM 5000 UNITS: 5000 INJECTION INTRAVENOUS; SUBCUTANEOUS at 05:28

## 2022-05-16 RX ADMIN — ASPIRIN 81 MG 81 MG: 81 TABLET ORAL at 08:31

## 2022-05-16 RX ADMIN — SODIUM CHLORIDE, SODIUM GLUCONATE, SODIUM ACETATE, POTASSIUM CHLORIDE, MAGNESIUM CHLORIDE, SODIUM PHOSPHATE, DIBASIC, AND POTASSIUM PHOSPHATE 100 ML/HR: .53; .5; .37; .037; .03; .012; .00082 INJECTION, SOLUTION INTRAVENOUS at 00:31

## 2022-05-16 RX ADMIN — METRONIDAZOLE 500 MG: 500 INJECTION, SOLUTION INTRAVENOUS at 01:58

## 2022-05-16 RX ADMIN — ATORVASTATIN CALCIUM 20 MG: 20 TABLET, FILM COATED ORAL at 08:31

## 2022-05-16 RX ADMIN — SODIUM CHLORIDE, SODIUM GLUCONATE, SODIUM ACETATE, POTASSIUM CHLORIDE, MAGNESIUM CHLORIDE, SODIUM PHOSPHATE, DIBASIC, AND POTASSIUM PHOSPHATE 100 ML/HR: .53; .5; .37; .037; .03; .012; .00082 INJECTION, SOLUTION INTRAVENOUS at 11:24

## 2022-05-16 RX ADMIN — OMEGA-3 FATTY ACIDS CAP 1000 MG 1000 MG: 1000 CAP at 08:31

## 2022-05-16 RX ADMIN — ACETAMINOPHEN 650 MG: 325 TABLET, FILM COATED ORAL at 23:09

## 2022-05-16 RX ADMIN — SODIUM CHLORIDE, SODIUM GLUCONATE, SODIUM ACETATE, POTASSIUM CHLORIDE, MAGNESIUM CHLORIDE, SODIUM PHOSPHATE, DIBASIC, AND POTASSIUM PHOSPHATE 100 ML/HR: .53; .5; .37; .037; .03; .012; .00082 INJECTION, SOLUTION INTRAVENOUS at 20:29

## 2022-05-16 RX ADMIN — Medication 125 MG: at 11:25

## 2022-05-16 RX ADMIN — OXYBUTYNIN CHLORIDE 5 MG: 5 TABLET ORAL at 08:31

## 2022-05-16 RX ADMIN — Medication 125 MG: at 23:35

## 2022-05-16 RX ADMIN — Medication 125 MG: at 17:40

## 2022-05-16 RX ADMIN — METRONIDAZOLE 500 MG: 500 INJECTION, SOLUTION INTRAVENOUS at 17:39

## 2022-05-16 RX ADMIN — LEVOTHYROXINE SODIUM 50 MCG: 50 TABLET ORAL at 05:28

## 2022-05-16 RX ADMIN — METRONIDAZOLE 500 MG: 500 INJECTION, SOLUTION INTRAVENOUS at 08:31

## 2022-05-16 RX ADMIN — OXYBUTYNIN CHLORIDE 5 MG: 5 TABLET ORAL at 17:41

## 2022-05-16 RX ADMIN — HEPARIN SODIUM 5000 UNITS: 5000 INJECTION INTRAVENOUS; SUBCUTANEOUS at 13:37

## 2022-05-16 NOTE — CASE MANAGEMENT
Case Management Assessment & Discharge Planning Note    Patient name Hiro Stewart  Location [address] 233/-[id number] MRN [id number]  : 1935 Date 2022       Current Admission Date: 5/15/2022  Current Admission Diagnosis:Colitis   Patient Active Problem List    Diagnosis Date Noted    Colitis 05/15/2022    Hypertriglyceridemia 2022    Dysuria 2022    Stage 3 chronic kidney disease, unspecified whether stage 3a or 3b CKD ([facility] [address] ) 2021    Dry mouth 2021    Hoarseness of voice 2021    Bilateral hand pain 2021    Numbness and tingling in left hand 2021    Screening for diabetes mellitus 2021    Medicare annual wellness visit, subsequent 2021    Primary insomnia 2021    Vestibulopathy of both ears 2021    Spinal stenosis of lumbar region without neurogenic claudication 2020    Piriformis syndrome of left side 07/15/2020    Trochanteric bursitis of left hip 2020    Trochanteric bursitis of right hip 2020    Lumbar spondylosis 2020    Ischial bursitis of right side     Lumbar disc disease with radiculopathy     Strain of lumbar region 2019    Lung nodule < 6cm on CT 2018    Abdominal pain 2018    RANJAN (acute kidney injury) ([facility] [address] ) 2018    Urinary incontinence 2018    Other insomnia 2018    Elevated rheumatoid factor 10/10/2016    Hyperlipidemia 2016    Overactive bladder 2016    Generalized osteoarthritis 2016    Hyperparathyroidism ([facility] [address] ) 2016    Hypothyroidism 2016    Hypertension 2016      LOS (days): 0  Geometric Mean LOS (GMLOS) (days):   Days to GMLOS:     OBJECTIVE:              Current admission status: Observation       Preferred Pharmacy:   [facility] [address] [place] [address] [place] ANIYA Martin - [address] [facility] [address]  Phone: [phone] Fax: [phone] [facility] Marjan keita [address] [place] [address]  Phone: [phone] Fax: [phone]     RHIANNA [address]  [place] [address]  Phone: [phone] Fax: [phone]    Primary Care Provider: Gagandeep Ontiveros MD    Primary Insurance: [employer] D[employer] REP  Secondary Insurance:     ASSESSMENT:  [facility] - Spouse   Primary Phone: [phone] (Home)               Advance Directives  Does patient have Advance Directives?: Yes  Advance Directives: Power of  for health care, Living will  Primary Contact: Lawanda Puentes (Spouse)   [phone] (H)              Patient Information  Admitted from[de-identified] Home  Mental Status: Alert  During Assessment patient was accompanied by: Not accompanied during assessment  Assessment information provided by[de-identified] Spouse  Primary Caregiver: Self  Support Systems: Spouse/significant other  [place] of Residence: [address] [place] [address] [facility]   Select all that apply : Stairs  Number of steps to enter home : 2  Do the steps have railings?: Yes  Type of Current Residence: [place]  In the last 12 months, was there a time when you were not able to pay the mortgage or rent on time?: No  In the last 12 months, how many places have you lived?: 1  In the last 12 months, was there a time when you did not have a steady place to sleep or slept in a shelter (including now)?: No  Homeless/housing insecurity resource given?: N/A  Living Arrangements: Lives w/ Spouse/significant other  Is patient a ?: No    Activities of Daily Living Prior to Admission  Functional Status: Independent  Completes ADLs independently?: Yes  Ambulates independently?: Yes  Does patient use assisted devices?: Yes  Assisted Devices (DME) used: Shauna Osuna  Does patient currently own DME?: Yes  What DME does the patient currently own?: Straight Sapna Alverto, Wheelchair  Does patient have a history of Outpatient Therapy (PT/OT)?: No  Does the patient have a history of Short-Term Rehab?: No  Does patient have a history of HHC?: No  Does patient currently have [facility]?: No         Patient Information Continued  Income Source: Pension/assisted  Does patient have prescription coverage?: Yes  Within the past 12 months, you worried that your food would run out before you got the money to buy more : Never true  Within the past 12 months, the food you bought just didn't last and you didn't have money to get more : Never true  Food insecurity resource given?: N/A  Does patient receive dialysis treatments?: No  Does patient have a history of substance abuse?: No  Does patient have a history of Mental Health Diagnosis?: No         Means of Transportation  Means of Transport to Appts[de-identified] Family transport  In the past 12 months, has lack of transportation kept you from medical appointments or from getting medications?: No  In the past 12 months, has lack of transportation kept you from meetings, work, or from getting things needed for daily living?: No  Was application for public transport provided?: N/A        DISCHARGE DETAILS:    Discharge planning discussed with[de-identified] Zane Urbinajayde (Spouse)   [phone] (H)  Freedom of Choice: Yes  Comments - Angela of Choice: CM DISUCSSED FREEDOM OF CHOICE  SPOUSE DOES NOT WANT HHC SERVICES  AND IS DECLINING STR WILL MNEED TO READDRESS WHENB PT OT EVAL ARE DONE    CM contacted family/caregiver?: Yes  Were Treatment Team discharge recommendations reviewed with patient/caregiver?: Yes  Did patient/caregiver verbalize understanding of patient care needs?: Yes  Were patient/caregiver advised of the risks associated with not following Treatment Team discharge recommendations?: Yes    Contacts  Patient Contacts: Zane Kristian (Spouse)   [phone] (H)  Relationship to Patient[de-identified] Family  Contact Method: Phone  Phone Number: Chris Boston Zeinab Soares (Spouse)   [phone] (H)  Reason/Outcome: Continuity of Care, Emergency Contact, Discharge [address]         Is the patient interested in [facility] at discharge?: No    DME Referral Provided  Referral made for DME?: No              Treatment Team Recommendation: Home  Discharge Destination Plan[de-identified] Home  Transport at Discharge : Family

## 2022-05-16 NOTE — PROGRESS NOTES
Progress Note - General Surgery   Fatemeh Rincon 80 y o  female MRN: [id number]  Unit/Bed#: -01 Encounter: [id number]      Assessment:   79 yo with Colitis involving distal transverse colon, descending, sigmoid to rectum  History of C-diff 4 years ago  Leukocytosis   - improving - 14 21 today     - Pt states she feels better since admission   - Denies bowel movement since admission but reports flatus    Plan:  - No bowel movements since she was admitted  Collect stool for cultures/c-diff when patient has bm  -cont IVF  -cont clear liquids   -agree with p o  Vanco and Flagyl  -cont to monitor exam, vss, diarrhea output, leukocytosis   -no surgical intervention at this time    -cont with c-diff and stool studies  - Continue to monitor vitals and lab results  - serial abd exams      Subjective/Objective     Subjective: No acute events  Patient reports flatus but no BM  Objective:     Blood pressure 90/61, pulse 90, temperature 99 2 °F (37 3 °C), temperature source Oral, resp  rate 18, height 5' 7" (1 702 m), weight 74 8 kg (164 lb 14 5 oz), SpO2 93 %  Body mass index is 25 83 kg/m²  I/O       05/14 0701  05/15 0700 05/15 0701  05/16 0700 05/16 0701  05/17 0700    P  O   1200     I V  (mL/kg)   2040 (27 3)    Total Intake(mL/kg)  1200 (16) 2040 (27 3)    Urine (mL/kg/hr)  675     Total Output  675     Net  +525 +2040                 Invasive Devices  Report    Peripheral Intravenous Line  Duration           Peripheral IV 05/15/22 Right Antecubital 1 day                Physical Exam: BP 90/61 (BP Location: Right arm)   Pulse 90   Temp 99 2 °F (37 3 °C) (Oral)   Resp 18   Ht 5' 7" (1 702 m)   Wt 74 8 kg (164 lb 14 5 oz)   SpO2 93%   BMI 25 83 kg/m²   General appearance: alert and oriented, in no acute distress  Lungs: clear to auscultation bilaterally  Heart: regular rate and rhythm  Abdomen: soft, generalized tenderness to palpation, moderately distended, tympanic to percussion, hypoactive BS, Extremities: extremities normal, warm and well-perfused; no cyanosis, clubbing, or edema    Labs   Recent Results (from the past 24 hour(s))   HS Troponin I 4hr    Collection Time: 05/15/22 12:27 PM   Result Value Ref Range    hs TnI 4hr 13 "Refer to ACS Flowchart"- see link ng/L    Delta 4hr hsTnI 1 <20 ng/L   Platelet count    Collection Time: 05/15/22 12:27 PM   Result Value Ref Range    Platelets 633 353 - 328 Thousands/uL    MPV 9 2 8 9 - 12 7 fL   Lactic acid 2 Hours    Collection Time: 05/15/22 12:27 PM   Result Value Ref Range    LACTIC ACID 3 1 (HH) 0 5 - 2 0 mmol/L   Lactic acid, plasma    Collection Time: 05/15/22  3:48 PM   Result Value Ref Range    LACTIC ACID 2 0 0 5 - 2 0 mmol/L   Basic metabolic panel    Collection Time: 05/16/22  5:16 AM   Result Value Ref Range    Sodium 136 136 - 145 mmol/L    Potassium 3 8 3 5 - 5 3 mmol/L    Chloride 105 100 - 108 mmol/L    CO2 23 21 - 32 mmol/L    ANION GAP 8 4 - 13 mmol/L    BUN 23 5 - 25 mg/dL    Creatinine 1 05 0 60 - 1 30 mg/dL    Glucose 107 65 - 140 mg/dL    Glucose, Fasting 107 (H) 65 - 99 mg/dL    Calcium 8 1 (L) 8 3 - 10 1 mg/dL    eGFR 48 ml/min/1 73sq m   CBC and differential    Collection Time: 05/16/22  5:16 AM   Result Value Ref Range    WBC 14 21 (H) 4 31 - 10 16 Thousand/uL    RBC 3 52 (L) 3 81 - 5 12 Million/uL    Hemoglobin 11 3 (L) 11 5 - 15 4 g/dL    Hematocrit 33 0 (L) 34 8 - 46 1 %    MCV 94 82 - 98 fL    MCH 32 1 26 8 - 34 3 pg    MCHC 34 2 31 4 - 37 4 g/dL    RDW 13 6 11 6 - 15 1 %    MPV 9 4 8 9 - 12 7 fL    Platelets 686 543 - 642 Thousands/uL    nRBC 0 /100 WBCs    Neutrophils Relative 71 43 - 75 %    Immat GRANS % 1 0 - 2 %    Lymphocytes Relative 18 14 - 44 %    Monocytes Relative 10 4 - 12 %    Eosinophils Relative 0 0 - 6 %    Basophils Relative 0 0 - 1 %    Neutrophils Absolute 10 12 (H) 1 85 - 7 62 Thousands/µL    Immature Grans Absolute 0 08 0 00 - 0 20 Thousand/uL    Lymphocytes Absolute 2 50 0 60 - 4 47 Thousands/µL Monocytes Absolute 1 45 (H) 0 17 - 1 22 Thousand/µL    Eosinophils Absolute 0 01 0 00 - 0 61 Thousand/µL    Basophils Absolute 0 05 0 00 - 0 10 Thousands/µL   UA w Reflex to Microscopic w Reflex to Culture    Collection Time: 05/16/22  8:20 AM    Specimen: Urine, Clean Catch   Result Value Ref Range    Color, UA Yellow     Clarity, UA Clear     Specific [place], UA 1 015 1 003 - 1 030    pH, UA 6 0 4 5, 5 0, 5 5, 6 0, 6 5, 7 0, 7 5, 8 0    Leukocytes, UA Trace (A) Negative    Nitrite, UA Negative Negative    Protein, UA Negative Negative mg/dl    Glucose, UA Negative Negative mg/dl    Ketones, UA Negative Negative mg/dl    Urobilinogen, UA 0 2 0 2, 1 0 E U /dl E U /dl    Bilirubin, UA Negative Negative    Blood, UA Small (A) Negative   Urine Microscopic    Collection Time: 05/16/22  8:20 AM   Result Value Ref Range    RBC, UA 2-4 None Seen, 0-1, 1-2, 2-4, 0-5 /hpf    WBC, UA 2-4 None Seen, 0-1, 1-2, 0-5, 2-4 /hpf    Epithelial Cells Occasional None Seen, Occasional /hpf    Bacteria, UA Occasional None Seen, Occasional /hpf        Imaging and other studies:  CT abdomen pelvis wo contrast    Result Date: 5/15/2022  Impression: 1  Diffuse abnormal appearance of the distal transverse colon, descending colon and sigmoid colon, most compatible with colitis, either infectious or inflammatory  Less likely would be ischemic, given the lack of pneumatosis, however intravenous contrast material not administered  Recommend follow-up GI and/or surgical consultation  2   Cholelithiasis without CT evidence of acute cholecystitis  The study was marked in [facility] for immediate notification  Workstation performed: [id number]     XR abdomen obstruction series    Result Date: 5/16/2022  Impression: 1  No obstruction, free air, or other acute abdominal findings  2   No acute cardiopulmonary findings   Workstation performed: [id number]       VTE Pharmacologic Prophylaxis: Heparin  VTE Mechanical Prophylaxis: sequential compression device    Patience Simmons PA-C  5/16/2022

## 2022-05-16 NOTE — PROGRESS NOTES
[facility] Progress Note - Marielena Jiménez 1935, 80 y o  female MRN: [id number]  Unit/Bed#: -[facility] Encounter: [id number]  Primary Care Provider: Christopher Graham MD   Date and time admitted to hospital: 5/15/2022  7:33 AM    * Colitis  Assessment & Plan  · Patient complaining of abdominal pain that started acutely night prior to admit and also associated diarrhea which has almost been constant since last night  Patient does have history of C diff in the past  · CT abdomen pelvis showing colitis  · Likely etiology C diff infection  · White count 21k >14k, Lactic 2 6 >3 1 >2 0  · Not meeting sepsis criteria   · Follow-up C diff and stool enteric panel  · Continue IV fluids, clear liquid diet and advance as tolerated to low fiber  · General surgery consulted - no intervention planned  · Continue to monitor, serial abdominal exams    RANJAN (acute kidney injury) ([facility] [address] )  Assessment & Plan  · Baseline creatinine 0 8-0 9, presented with creatinine 1 71   · IV fluids, repeat BMP shows near resolution to 1 05 today  · Avoid nephrotoxins as able    Stage 3 chronic kidney disease, unspecified whether stage 3a or 3b CKD [facility])  Assessment & Plan  Lab Results   Component Value Date    EGFR 48 05/16/2022    EGFR 26 05/15/2022    EGFR 53 11/11/2021    CREATININE 1 05 05/16/2022    CREATININE 1 71 (H) 05/15/2022    CREATININE 0 97 11/11/2021       Hypertension  Assessment & Plan  · Blood pressure stable at this time  · Will hold lisinopril given RANJAN  · Continue to monitor    Hypothyroidism  Assessment & Plan  · Continue levothyroxine    Hyperlipidemia  Assessment & Plan  · Continue statin        VTE Pharmacologic Prophylaxis: VTE Score: 3 Moderate Risk (Score 3-4) - Pharmacological DVT Prophylaxis Ordered: heparin  Patient Centered Rounds: I performed bedside rounds with nursing staff today    Discussions with Specialists or Other Care Team Provider: CM     Education and Discussions with Family / Patient: Patient declined call to   Time Spent for Care: 20 minutes  More than 50% of total time spent on counseling and coordination of care as described above  Current Length of Stay: 0 day(s)  Current Patient Status: Observation   Certification Statement: The patient, admitted on an observation basis, will now require > 2 midnight hospital stay due to ongoing abdominal pain  Discharge Plan: Anticipate discharge in 24-48 hrs to home  Code Status: Level 3 - DNAR and DNI    Subjective:   Patient seen this morning, she does feel little bit distended and has more pain, especially after trying to drink the broth this morning  No nausea or vomiting  Has not had a bowel movement but is still passing some gas since yesterday  Objective:     Vitals:   Temp (24hrs), Av 5 °F (37 5 °C), Min:99 3 °F (37 4 °C), Max:99 6 °F (37 6 °C)    Temp:  [99 3 °F (37 4 °C)-99 6 °F (37 6 °C)] 99 6 °F (37 6 °C)  HR:  [70-88] 88  Resp:  [18-20] 18  BP: (101-124)/(45-55) 110/52  SpO2:  [91 %-96 %] 91 %  Body mass index is 25 83 kg/m²  Input and Output Summary (last 24 hours): Intake/Output Summary (Last 24 hours) at 2022 0745  Last data filed at 2022 0300  Gross per 24 hour   Intake 1200 ml   Output 675 ml   Net 525 ml       Physical Exam:   Physical Exam  Vitals and nursing note reviewed  Constitutional:       General: She is not in acute distress  Appearance: She is not toxic-appearing  Cardiovascular:      Rate and Rhythm: Normal rate and regular rhythm  Pulmonary:      Effort: Pulmonary effort is normal  No respiratory distress  Breath sounds: Normal breath sounds  Abdominal:      General: Bowel sounds are decreased  There is distension  Palpations: Abdomen is soft  Tenderness: There is generalized abdominal tenderness  Skin:     Coloration: Skin is not pale  Neurological:      Mental Status: She is alert and oriented to person, place, and time  Psychiatric:         Mood and Affect: Mood normal            Additional Data:     Labs:  Results from last 7 days   Lab Units 05/16/22  0516   WBC Thousand/uL 14 21*   HEMOGLOBIN g/dL 11 3*   HEMATOCRIT % 33 0*   PLATELETS Thousands/uL 192   NEUTROS PCT % 71   LYMPHS PCT % 18   MONOS PCT % 10   EOS PCT % 0     Results from last 7 days   Lab Units 05/16/22  0516 05/15/22  0807   SODIUM mmol/L 136 140   POTASSIUM mmol/L 3 8 4 5   CHLORIDE mmol/L 105 104   CO2 mmol/L 23 21   BUN mg/dL 23 29*   CREATININE mg/dL 1 05 1 71*   ANION GAP mmol/L 8 15*   CALCIUM mg/dL 8 1* 9 4   ALBUMIN g/dL  --  3 7   TOTAL BILIRUBIN mg/dL  --  1 01*   ALK PHOS U/L  --  40*   ALT U/L  --  31   AST U/L  --  23   GLUCOSE RANDOM mg/dL 107 156*     Results from last 7 days   Lab Units 05/15/22  0807   INR  1 15             Results from last 7 days   Lab Units 05/15/22  1548 05/15/22  1227 05/15/22  1006   LACTIC ACID mmol/L 2 0 3 1* 2 6*       Lines/Drains:  Invasive Devices  Report    Peripheral Intravenous Line  Duration           Peripheral IV 05/15/22 Right Antecubital <1 day                      Imaging: Reviewed radiology reports from this admission including: abdominal/pelvic CT and Personally reviewed the following imaging: xray(s)    Recent Cultures (last 7 days):         Last 24 Hours Medication List:   Current Facility-Administered Medications   Medication Dose Route Frequency Provider Last Rate    acetaminophen  650 mg Oral Q6H PRN Sunil Clock, DO      aspirin  81 mg Oral Daily Sunil Clock, DO      atorvastatin  20 mg Oral Daily Sunil Clock, DO      fish oil  1,000 mg Oral Daily Sunil Clock, DO      heparin (porcine)  5,000 Units Subcutaneous [facility] Sunil Clock, DO      levothyroxine  50 mcg Oral Daily Sunil Clock, DO      metroNIDAZOLE  500 mg Intravenous Q8H Sunil Clock,  mg (05/16/22 0158)    multi-electrolyte  100 mL/hr Intravenous Continuous Sunil Clock,  mL/hr (05/16/22 0031)    oxybutynin  5 mg Oral BID [place], DO      oxyCODONE  2 5 mg Oral Q4H PRN [place], DO      oxyCODONE  5 mg Oral Q4H PRN [place], DO      traZODone  150 mg Oral HS [place], DO      vancomycin  125 mg Oral Q6H [address] [place], DO          Today, Patient Was Seen By: Estephania Rosales PA-C    **Please Note: This note may have been constructed using a voice recognition system  **

## 2022-05-16 NOTE — PLAN OF CARE
Problem: Potential for Falls  Goal: Patient will remain free of falls  Description: INTERVENTIONS:  - Educate patient/family on patient safety including physical limitations  - Instruct patient to call for assistance with activity   - Consult OT/PT to assist with strengthening/mobility   - Keep Call bell within reach  - Keep bed low and locked with side rails adjusted as appropriate  - Keep care items and personal belongings within reach  - Initiate and maintain comfort rounds  - Make Fall Risk Sign visible to staff  - Offer Toileting every  Hours, in advance of need  - Initiate/Maintain alarm  - Obtain necessary fall risk management equipment:   - Apply yellow socks and bracelet for high fall risk patients  - Consider moving patient to room near nurses station  Outcome: Progressing     Problem: PAIN - ADULT  Goal: Verbalizes/displays adequate comfort level or baseline comfort level  Description: Interventions:  - Encourage patient to monitor pain and request assistance  - Assess pain using appropriate pain scale  - Administer analgesics based on type and severity of pain and evaluate response  - Implement non-pharmacological measures as appropriate and evaluate response  - Consider cultural and social influences on pain and pain management  - Notify physician/advanced practitioner if interventions unsuccessful or patient reports new pain  Outcome: Progressing     Problem: INFECTION - ADULT  Goal: Absence or prevention of progression during hospitalization  Description: INTERVENTIONS:  - Assess and monitor for signs and symptoms of infection  - Monitor lab/diagnostic results  - Monitor all insertion sites, i e  indwelling lines, tubes, and drains  - Monitor endotracheal if appropriate and nasal secretions for changes in amount and color  - [place] appropriate cooling/warming therapies per order  - Administer medications as ordered  - Instruct and encourage patient and family to use good hand hygiene technique  - Identify and instruct in appropriate isolation precautions for identified infection/condition  Outcome: Progressing  Goal: Absence of fever/infection during neutropenic period  Description: INTERVENTIONS:  - Monitor WBC    Outcome: Progressing     Problem: SAFETY ADULT  Goal: Patient will remain free of falls  Description: INTERVENTIONS:  - Educate patient/family on patient safety including physical limitations  - Instruct patient to call for assistance with activity   - Consult OT/PT to assist with strengthening/mobility   - Keep Call bell within reach  - Keep bed low and locked with side rails adjusted as appropriate  - Keep care items and personal belongings within reach  - Initiate and maintain comfort rounds  - Make Fall Risk Sign visible to staff  - Offer Toileting every  Hours, in advance of need  - Initiate/Maintain alarm  - Obtain necessary fall risk management equipment:   - Apply yellow socks and bracelet for high fall risk patients  - Consider moving patient to room near nurses station  Outcome: Progressing  Goal: Maintain or return to baseline ADL function  Description: INTERVENTIONS:  -  Assess patient's ability to carry out ADLs; assess patient's baseline for ADL function and identify physical deficits which impact ability to perform ADLs (bathing, care of mouth/teeth, toileting, grooming, dressing, etc )  - Assess/evaluate cause of self-care deficits   - Assess range of motion  - Assess patient's mobility; develop plan if impaired  - Assess patient's need for assistive devices and provide as appropriate  - Encourage maximum independence but intervene and supervise when necessary  - Involve family in performance of ADLs  - Assess for home care needs following discharge   - Consider OT consult to assist with ADL evaluation and planning for discharge  - Provide patient education as appropriate  Outcome: Progressing  Goal: Maintains/Returns to pre admission functional level  Description: INTERVENTIONS:  - Perform BMAT or MOVE assessment daily    - Set and communicate daily mobility goal to care team and patient/family/caregiver  - Collaborate with rehabilitation services on mobility goals if consulted  - Perform Range of Motion  times a day  - Reposition patient every  hours  - Dangle patient  times a day  - Stand patient  times a day  - Ambulate patient  times a day  - Out of bed to chair  times a day   - Out of bed for meals  times a day  - Out of bed for toileting  - Record patient progress and toleration of activity level   Outcome: Progressing     Problem: DISCHARGE PLANNING  Goal: Discharge to home or other facility with appropriate resources  Description: INTERVENTIONS:  - Identify barriers to discharge w/patient and caregiver  - Arrange for needed discharge resources and transportation as appropriate  - Identify discharge learning needs (meds, wound care, etc )  - Arrange for interpretive services to assist at discharge as needed  - Refer to Case Management Department for coordinating discharge planning if the patient needs post-hospital services based on physician/advanced practitioner order or complex needs related to functional status, cognitive ability, or social support system  Outcome: Progressing     Problem: Knowledge Deficit  Goal: Patient/family/caregiver demonstrates understanding of disease process, treatment plan, medications, and discharge instructions  Description: Complete learning assessment and assess knowledge base    Interventions:  - Provide teaching at level of understanding  - Provide teaching via preferred learning methods  Outcome: Progressing     Problem: MOBILITY - ADULT  Goal: Maintain or return to baseline ADL function  Description: INTERVENTIONS:  -  Assess patient's ability to carry out ADLs; assess patient's baseline for ADL function and identify physical deficits which impact ability to perform ADLs (bathing, care of mouth/teeth, toileting, grooming, dressing, etc )  - Assess/evaluate cause of self-care deficits   - Assess range of motion  - Assess patient's mobility; develop plan if impaired  - Assess patient's need for assistive devices and provide as appropriate  - Encourage maximum independence but intervene and supervise when necessary  - Involve family in performance of ADLs  - Assess for home care needs following discharge   - Consider OT consult to assist with ADL evaluation and planning for discharge  - Provide patient education as appropriate  Outcome: Progressing  Goal: Maintains/Returns to pre admission functional level  Description: INTERVENTIONS:  - Perform BMAT or MOVE assessment daily    - Set and communicate daily mobility goal to care team and patient/family/caregiver  - Collaborate with rehabilitation services on mobility goals if consulted  - Perform Range of Motion  times a day  - Reposition patient every  hours    - Dangle patient  times a day  - Stand patient  times a day  - Ambulate patient  times a day  - Out of bed to chair  times a day   - Out of bed for meals  times a day  - Out of bed for toileting  - Record patient progress and toleration of activity level   Outcome: Progressing

## 2022-05-16 NOTE — ASSESSMENT & PLAN NOTE
Lab Results   Component Value Date    EGFR 48 05/16/2022    EGFR 26 05/15/2022    EGFR 53 11/11/2021    CREATININE 1 05 05/16/2022    CREATININE 1 71 (H) 05/15/2022    CREATININE 0 97 11/11/2021

## 2022-05-16 NOTE — PLAN OF CARE
Problem: Potential for Falls  Goal: Patient will remain free of falls  Description: INTERVENTIONS:  - Educate patient/family on patient safety including physical limitations  - Instruct patient to call for assistance with activity   - Consult OT/PT to assist with strengthening/mobility   - Keep Call bell within reach  - Keep bed low and locked with side rails adjusted as appropriate  - Keep care items and personal belongings within reach  - Initiate and maintain comfort rounds  - Make Fall Risk Sign visible to staff  - Offer Toileting every 2 Hours, in advance of need  - Initiate/Maintain bed alarm  - Obtain necessary fall risk management equipment:   - Apply yellow socks and bracelet for high fall risk patients  - Consider moving patient to room near nurses station  Outcome: Progressing     Problem: PAIN - ADULT  Goal: Verbalizes/displays adequate comfort level or baseline comfort level  Description: Interventions:  - Encourage patient to monitor pain and request assistance  - Assess pain using appropriate pain scale  - Administer analgesics based on type and severity of pain and evaluate response  - Implement non-pharmacological measures as appropriate and evaluate response  - Consider cultural and social influences on pain and pain management  - Notify physician/advanced practitioner if interventions unsuccessful or patient reports new pain  Outcome: Progressing     Problem: INFECTION - ADULT  Goal: Absence or prevention of progression during hospitalization  Description: INTERVENTIONS:  - Assess and monitor for signs and symptoms of infection  - Monitor lab/diagnostic results  - Monitor all insertion sites, i e  indwelling lines, tubes, and drains  - Monitor endotracheal if appropriate and nasal secretions for changes in amount and color  - [place] appropriate cooling/warming therapies per order  - Administer medications as ordered  - Instruct and encourage patient and family to use good hand hygiene technique  - Identify and instruct in appropriate isolation precautions for identified infection/condition  Outcome: Progressing  Goal: Absence of fever/infection during neutropenic period  Description: INTERVENTIONS:  - Monitor WBC    Outcome: Progressing     Problem: SAFETY ADULT  Goal: Patient will remain free of falls  Description: INTERVENTIONS:  - Educate patient/family on patient safety including physical limitations  - Instruct patient to call for assistance with activity   - Consult OT/PT to assist with strengthening/mobility   - Keep Call bell within reach  - Keep bed low and locked with side rails adjusted as appropriate  - Keep care items and personal belongings within reach  - Initiate and maintain comfort rounds  - Make Fall Risk Sign visible to staff  - Offer Toileting every 2 Hours, in advance of need  - Initiate/Maintain bed alarm  - Obtain necessary fall risk management equipment:   - Apply yellow socks and bracelet for high fall risk patients  - Consider moving patient to room near nurses station  Outcome: Progressing  Goal: Maintain or return to baseline ADL function  Description: INTERVENTIONS:  -  Assess patient's ability to carry out ADLs; assess patient's baseline for ADL function and identify physical deficits which impact ability to perform ADLs (bathing, care of mouth/teeth, toileting, grooming, dressing, etc )  - Assess/evaluate cause of self-care deficits   - Assess range of motion  - Assess patient's mobility; develop plan if impaired  - Assess patient's need for assistive devices and provide as appropriate  - Encourage maximum independence but intervene and supervise when necessary  - Involve family in performance of ADLs  - Assess for home care needs following discharge   - Consider OT consult to assist with ADL evaluation and planning for discharge  - Provide patient education as appropriate  Outcome: Progressing  Goal: Maintains/Returns to pre admission functional level  Description: INTERVENTIONS:  - Perform BMAT or MOVE assessment daily    - Set and communicate daily mobility goal to care team and patient/family/caregiver  - Collaborate with rehabilitation services on mobility goals if consulted  - Perform Range of Motion 3 times a day  - Reposition patient every 2 hours  - Dangle patient 3 times a day  - Stand patient 3 times a day  - Ambulate patient 3 times a day  - Out of bed to chair 3 times a day   - Out of bed for meals 3 times a day  - Out of bed for toileting  - Record patient progress and toleration of activity level   Outcome: Progressing     Problem: DISCHARGE PLANNING  Goal: Discharge to home or other facility with appropriate resources  Description: INTERVENTIONS:  - Identify barriers to discharge w/patient and caregiver  - Arrange for needed discharge resources and transportation as appropriate  - Identify discharge learning needs (meds, wound care, etc )  - Arrange for interpretive services to assist at discharge as needed  - Refer to Case Management Department for coordinating discharge planning if the patient needs post-hospital services based on physician/advanced practitioner order or complex needs related to functional status, cognitive ability, or social support system  Outcome: Progressing     Problem: Knowledge Deficit  Goal: Patient/family/caregiver demonstrates understanding of disease process, treatment plan, medications, and discharge instructions  Description: Complete learning assessment and assess knowledge base    Interventions:  - Provide teaching at level of understanding  - Provide teaching via preferred learning methods  Outcome: Progressing     Problem: MOBILITY - ADULT  Goal: Maintain or return to baseline ADL function  Description: INTERVENTIONS:  -  Assess patient's ability to carry out ADLs; assess patient's baseline for ADL function and identify physical deficits which impact ability to perform ADLs (bathing, care of mouth/teeth, toileting, grooming, dressing, etc )  - Assess/evaluate cause of self-care deficits   - Assess range of motion  - Assess patient's mobility; develop plan if impaired  - Assess patient's need for assistive devices and provide as appropriate  - Encourage maximum independence but intervene and supervise when necessary  - Involve family in performance of ADLs  - Assess for home care needs following discharge   - Consider OT consult to assist with ADL evaluation and planning for discharge  - Provide patient education as appropriate  Outcome: Progressing  Goal: Maintains/Returns to pre admission functional level  Description: INTERVENTIONS:  - Perform BMAT or MOVE assessment daily    - Set and communicate daily mobility goal to care team and patient/family/caregiver  - Collaborate with rehabilitation services on mobility goals if consulted  - Perform Range of Motion 3 times a day  - Reposition patient every 2 hours    - Dangle patient 3 times a day  - Stand patient 3 times a day  - Ambulate patient 3 times a day  - Out of bed to chair 3 times a day   - Out of bed for meals 3 times a day  - Out of bed for toileting  - Record patient progress and toleration of activity level   Outcome: Progressing

## 2022-05-16 NOTE — ASSESSMENT & PLAN NOTE
· Patient complaining of abdominal pain that started acutely night prior to admit and also associated diarrhea which has almost been constant since last night    Patient does have history of C diff in the past  · CT abdomen pelvis showing colitis  · Likely etiology C diff infection  · White count 21k >14k, Lactic 2 6 >3 1 >2 0  · Not meeting sepsis criteria   · Follow-up C diff and stool enteric panel  · Continue IV fluids, clear liquid diet and advance as tolerated to low fiber  · General surgery consulted - no intervention planned  · Continue to monitor, serial abdominal exams

## 2022-05-16 NOTE — ASSESSMENT & PLAN NOTE
· Baseline creatinine 0 8-0 9, presented with creatinine 1 71   · IV fluids, repeat BMP shows near resolution to 1 05 today  · Avoid nephrotoxins as able

## 2022-05-16 NOTE — UTILIZATION REVIEW
OBSERVATION 5/15/22 @ 0935 CONVERTED TO INPATIENT [email or web address] DUE TO COLITIS REQUIRING CONTINUED IV FLUIDS, GEN/SURG EVAL  Initial Clinical Review    Admission: Date/Time/Statement:     05/16/22 1422  Inpatient Admission  Once        Transfer Service: Hospitalist       Question Answer Comment   Level of Care Med Surg    Estimated length of stay More than 2 Midnights    Certification I certify that inpatient services are medically necessary for this patient for a duration of greater than two midnights  See H&P and MD Progress Notes for additional information about the patient's course of treatment  05/16/22 1421       ED Arrival Information     Expected   -    Arrival   5/15/2022 07:29    Acuity   Urgent            Means of arrival   Walk-In    Escorted by   Family Member    Service   Hospitalist    Admission type   Urgent            Arrival complaint   ABDOMINAL PAIN            Chief Complaint   Patient presents with    Abdominal Pain     Pt c/o N/V/D and abd pain since last night       Initial Presentation: 80 y o  female to the ED from home with complaints of abdominal pain, nausea, vomiting, diarrhea for a day  Admitted under observation then converted to inpatient  for colitis, CKD  H/O Hypothyroid, HLD, HTN, CKD has had foul smelling bowel movements  H/O Cdiff  Arrives with diffuse abdominal tenderness, Has elevated WBCs  CT A/P shows colitis  Started on vanco, flagyl IV  Gen/surg consult  Started on IV fluids  Creat on arrival 1 71 with baseline around 0 9-1  Continue fluids and recheck  Gen/surg: Vomiting has stopped, continues with diarrhea  Start clear liquids  Continue with IV fluids  5/16 Gen/surg :  WBCs improving  Has had no bm since admit  Sent stool for studies when able  Clear liquids  Continue with IV fluids        ED Triage Vitals   Temperature Pulse Respirations Blood Pressure SpO2   05/15/22 0735 05/15/22 0735 05/15/22 0735 05/15/22 0735 05/15/22 0735   99 1 °F (37 3 °C) 87 20 106/59 94 %      Temp Source Heart Rate Source Patient Position - Orthostatic VS BP Location FiO2 (%)   05/15/22 0735 05/15/22 0735 05/15/22 0735 05/15/22 0735 --   Oral Monitor Lying Right arm       Pain Score       05/15/22 1100       No Pain          Wt Readings from Last 1 Encounters:   05/15/22 74 8 kg (164 lb 14 5 oz)     Additional Vital Signs:   /Time Temp Pulse Resp BP MAP (mmHg) SpO2 O2 Device Patient Position - Orthostatic VS   05/16/22 08:12:31 99 2 °F (37 3 °C) 90 18 90/61 71 89 % Abnormal  -- --   05/15/22 2220 99 6 °F (37 6 °C) 88 18 110/52 -- 91 % None (Room air) Lying   05/15/22 1100 -- -- -- -- -- 91 % None (Room air) --   05/15/22 10:45:48 99 3 °F (37 4 °C) 71 18 124/45 Abnormal  71 93 % -- --   05/15/22 0830 -- 74 20 101/55 76 94 % None (Room air) --   05/15/22 0820 -- 70 20 115/55 -- 96 % None (Room air) --   05/15/22 0754 -- -- -- -- -- -- None (Room air) --   05/15/22 0735 99 1 °F (37 3 °C) 87 20 106/59 -- 94 % None (Room air)        Pertinent Labs/Diagnostic Test Results:   5/15 EKG: Normal sinus rhythm with sinus arrhythmia  Left axis deviation  Abnormal QRS-T angle, consider primary T wave abnormality  Abnormal ECG  When compared with ECG of 22-AUG-2018 22:54,  No significant change was found  CT abdomen pelvis wo contrast   Final Result by Jacki Damico DO (05/15 2055)   1  Diffuse abnormal appearance of the distal transverse colon, descending colon and sigmoid colon, most compatible with colitis, either infectious or inflammatory  Less likely would be ischemic, given the lack of pneumatosis, however intravenous    contrast material not administered  Recommend follow-up GI and/or surgical consultation  2   Cholelithiasis without CT evidence of acute cholecystitis  The study was marked in [facility] for immediate notification        Workstation performed: [id number]               Results from last 7 days   Lab Units 05/16/22  0516 05/15/22  1227 05/15/22  6572 WBC Thousand/uL 14 21*  --  21 33*   HEMOGLOBIN g/dL 11 3*  --  13 8   HEMATOCRIT % 33 0*  --  40 6   PLATELETS Thousands/uL 192 235 249   NEUTROS ABS Thousands/µL 10 12*  --  17 75*         Results from last 7 days   Lab Units 05/16/22  0516 05/15/22  0807   SODIUM mmol/L 136 140   POTASSIUM mmol/L 3 8 4 5   CHLORIDE mmol/L 105 104   CO2 mmol/L 23 21   ANION GAP mmol/L 8 15*   BUN mg/dL 23 29*   CREATININE mg/dL 1 05 1 71*   EGFR ml/min/1 73sq m 48 26   CALCIUM mg/dL 8 1* 9 4     Results from last 7 days   Lab Units 05/15/22  0807   AST U/L 23   ALT U/L 31   ALK PHOS U/L 40*   TOTAL PROTEIN g/dL 6 9   ALBUMIN g/dL 3 7   TOTAL BILIRUBIN mg/dL 1 01*         Results from last 7 days   Lab Units 05/16/22  0516 05/15/22  0807   GLUCOSE RANDOM mg/dL 107 156*     Results from last 7 days   Lab Units 05/15/22  1227 05/15/22  1006 05/15/22  0807   HS TNI 0HR ng/L  --   --  12   HS TNI 2HR ng/L  --  12  --    HSTNI D2 ng/L  --  0  --    HS TNI 4HR ng/L 13  --   --    HSTNI D4 ng/L 1  --   --          Results from last 7 days   Lab Units 05/15/22  0807   PROTIME seconds 14 2   INR  1 15             Results from last 7 days   Lab Units 05/15/22  1548 05/15/22  1227 05/15/22  1006   LACTIC ACID mmol/L 2 0 3 1* 2 6*       ED Treatment:   Medication Administration from 05/15/2022 0729 to 05/15/2022 1041       Date/Time Order Dose Route Action     05/15/2022 0820 ondansetron (ZOFRAN) injection 4 mg 4 mg Intravenous Given     05/15/2022 0820 diphenhydrAMINE (BENADRYL) injection 50 mg 50 mg Intravenous Given     05/15/2022 0807 sodium chloride 0 9 % bolus 500 mL 500 mL Intravenous New Bag        Past Medical History:   Diagnosis Date    BCC (basal cell carcinoma)     Benign uterine neoplasm     C  difficile colitis     Disease of thyroid gland     Hyperlipidemia     Hypertension        Admitting Diagnosis: Colitis [K52 9]  Abdominal pain [R10 9]  Age/Sex: 80 y o  female  Admission Orders:  UP and OOB  Stool enteric bacterila, c-diff  Scheduled Medications:  aspirin, 81 mg, Oral, Daily  atorvastatin, 20 mg, Oral, Daily  fish oil, 1,000 mg, Oral, Daily  heparin (porcine), 5,000 Units, Subcutaneous, Q8H SAMM  levothyroxine, 50 mcg, Oral, Daily  metroNIDAZOLE, 500 mg, Intravenous, Q8H  oxybutynin, 5 mg, Oral, BID  traZODone, 150 mg, Oral, HS  vancomycin, 125 mg, Oral, Q6H SAMM      Continuous IV Infusions:  multi-electrolyte, 100 mL/hr, Intravenous, Continuous      PRN Meds:  acetaminophen, 650 mg, Oral, Q6H PRN  oxyCODONE, 2 5 mg, Oral, Q4H PRN  oxyCODONE, 5 mg, Oral, Q4H PRN        IP CONSULT TO ACUTE CARE SURGERY    Network Utilization Review Department  ATTENTION: Please call with any questions or concerns to [phone] and carefully listen to the prompts so that you are directed to the right person  All voicemails are confidential   Hollis Garduno all requests for admission clinical reviews, approved or denied determinations and any other requests to dedicated fax number below belonging to the campus where the patient is receiving treatment   List of dedicated fax numbers for the Facilities:  [address] DENIALS (Administrative/Medical Necessity) [phone]   [address] (Maternity/NICU/Pediatrics) [phone]   [address]  [address] [place] [address] [address] [place] [address] [address] [facility] [facility] [phone]

## 2022-05-17 LAB
ALBUMIN SERPL BCP-MCNC: 2.7 G/DL (ref 3.5–5)
ALP SERPL-CCNC: 26 U/L (ref 46–116)
ALT SERPL W P-5'-P-CCNC: 19 U/L (ref 12–78)
ANION GAP SERPL CALCULATED.3IONS-SCNC: 8 MMOL/L (ref 4–13)
AST SERPL W P-5'-P-CCNC: 22 U/L (ref 5–45)
BILIRUB SERPL-MCNC: 0.99 MG/DL (ref 0.2–1)
BUN SERPL-MCNC: 13 MG/DL (ref 5–25)
CALCIUM ALBUM COR SERPL-MCNC: 9.4 MG/DL (ref 8.3–10.1)
CALCIUM SERPL-MCNC: 8.4 MG/DL (ref 8.3–10.1)
CHLORIDE SERPL-SCNC: 108 MMOL/L (ref 100–108)
CO2 SERPL-SCNC: 23 MMOL/L (ref 21–32)
CREAT SERPL-MCNC: 0.89 MG/DL (ref 0.6–1.3)
ERYTHROCYTE [DISTWIDTH] IN BLOOD BY AUTOMATED COUNT: 13.4 % (ref 11.6–15.1)
GFR SERPL CREATININE-BSD FRML MDRD: 58 ML/MIN/1.73SQ M
GLUCOSE SERPL-MCNC: 111 MG/DL (ref 65–140)
HCT VFR BLD AUTO: 34.2 % (ref 34.8–46.1)
HGB BLD-MCNC: 11.2 G/DL (ref 11.5–15.4)
MCH RBC QN AUTO: 30.9 PG (ref 26.8–34.3)
MCHC RBC AUTO-ENTMCNC: 32.7 G/DL (ref 31.4–37.4)
MCV RBC AUTO: 95 FL (ref 82–98)
PLATELET # BLD AUTO: 190 THOUSANDS/UL (ref 149–390)
PMV BLD AUTO: 9.2 FL (ref 8.9–12.7)
POTASSIUM SERPL-SCNC: 3.7 MMOL/L (ref 3.5–5.3)
PROT SERPL-MCNC: 5.7 G/DL (ref 6.4–8.2)
RBC # BLD AUTO: 3.62 MILLION/UL (ref 3.81–5.12)
SODIUM SERPL-SCNC: 139 MMOL/L (ref 136–145)
WBC # BLD AUTO: 9.22 THOUSAND/UL (ref 4.31–10.16)

## 2022-05-17 PROCEDURE — 85027 COMPLETE CBC AUTOMATED: CPT | Performed by: PHYSICIAN ASSISTANT

## 2022-05-17 PROCEDURE — 80053 COMPREHEN METABOLIC PANEL: CPT | Performed by: PHYSICIAN ASSISTANT

## 2022-05-17 PROCEDURE — 87493 C DIFF AMPLIFIED PROBE: CPT | Performed by: INTERNAL MEDICINE

## 2022-05-17 PROCEDURE — 87505 NFCT AGENT DETECTION GI: CPT | Performed by: INTERNAL MEDICINE

## 2022-05-17 PROCEDURE — 99232 SBSQ HOSP IP/OBS MODERATE 35: CPT | Performed by: CLINICAL NURSE SPECIALIST

## 2022-05-17 PROCEDURE — 99232 SBSQ HOSP IP/OBS MODERATE 35: CPT | Performed by: PHYSICIAN ASSISTANT

## 2022-05-17 RX ORDER — DICYCLOMINE HCL 20 MG
10 TABLET ORAL
Status: DISCONTINUED | OUTPATIENT
Start: 2022-05-17 | End: 2022-05-22 | Stop reason: HOSPADM

## 2022-05-17 RX ADMIN — Medication 125 MG: at 18:00

## 2022-05-17 RX ADMIN — ASPIRIN 81 MG 81 MG: 81 TABLET ORAL at 08:45

## 2022-05-17 RX ADMIN — OXYBUTYNIN CHLORIDE 5 MG: 5 TABLET ORAL at 18:00

## 2022-05-17 RX ADMIN — METRONIDAZOLE 500 MG: 500 INJECTION, SOLUTION INTRAVENOUS at 15:51

## 2022-05-17 RX ADMIN — TRAZODONE HYDROCHLORIDE 150 MG: 100 TABLET ORAL at 21:38

## 2022-05-17 RX ADMIN — Medication 125 MG: at 05:02

## 2022-05-17 RX ADMIN — OXYBUTYNIN CHLORIDE 5 MG: 5 TABLET ORAL at 08:44

## 2022-05-17 RX ADMIN — HEPARIN SODIUM 5000 UNITS: 5000 INJECTION INTRAVENOUS; SUBCUTANEOUS at 21:38

## 2022-05-17 RX ADMIN — METRONIDAZOLE 500 MG: 500 INJECTION, SOLUTION INTRAVENOUS at 07:52

## 2022-05-17 RX ADMIN — DICYCLOMINE HYDROCHLORIDE 10 MG: 20 TABLET ORAL at 21:38

## 2022-05-17 RX ADMIN — OXYCODONE HYDROCHLORIDE 2.5 MG: 5 TABLET ORAL at 22:28

## 2022-05-17 RX ADMIN — Medication 125 MG: at 11:19

## 2022-05-17 RX ADMIN — OMEGA-3 FATTY ACIDS CAP 1000 MG 1000 MG: 1000 CAP at 08:45

## 2022-05-17 RX ADMIN — ATORVASTATIN CALCIUM 20 MG: 20 TABLET, FILM COATED ORAL at 08:45

## 2022-05-17 RX ADMIN — METRONIDAZOLE 500 MG: 500 INJECTION, SOLUTION INTRAVENOUS at 01:04

## 2022-05-17 RX ADMIN — LEVOTHYROXINE SODIUM 50 MCG: 50 TABLET ORAL at 05:02

## 2022-05-17 RX ADMIN — HEPARIN SODIUM 5000 UNITS: 5000 INJECTION INTRAVENOUS; SUBCUTANEOUS at 05:02

## 2022-05-17 RX ADMIN — HEPARIN SODIUM 5000 UNITS: 5000 INJECTION INTRAVENOUS; SUBCUTANEOUS at 14:03

## 2022-05-17 RX ADMIN — SODIUM CHLORIDE, SODIUM GLUCONATE, SODIUM ACETATE, POTASSIUM CHLORIDE, MAGNESIUM CHLORIDE, SODIUM PHOSPHATE, DIBASIC, AND POTASSIUM PHOSPHATE 100 ML/HR: .53; .5; .37; .037; .03; .012; .00082 INJECTION, SOLUTION INTRAVENOUS at 07:52

## 2022-05-17 RX ADMIN — DICYCLOMINE HYDROCHLORIDE 10 MG: 20 TABLET ORAL at 15:51

## 2022-05-17 NOTE — PROGRESS NOTES
[facility] Progress Note - Anay Monreal 1935, 80 y o  female MRN: [id number]  Unit/Bed#: -01 Encounter: [id number]  Primary Care Provider: Soco Huang MD   Date and time admitted to hospital: 5/15/2022  7:33 AM    * Colitis  Assessment & Plan  · Patient complaining of abdominal pain that started acutely night prior to admit and also associated diarrhea which has almost been constant since last night  Patient does have history of C diff in the past  · CT abdomen pelvis showing colitis  · Likely etiology C diff infection  · Leukocytosis resolved, lactic acidosis resolved   · Not meeting sepsis criteria   · Follow-up C diff and stool enteric panel  · Advanced diet to full liquids  · General surgery consulted - no intervention planned  · Continue course of vancomycin, discontinue flagyl on discharge     RANJAN (acute kidney injury) ([facility] )  Assessment & Plan  · Baseline creatinine 0 8-0 9, presented with creatinine 1 71   · Resolved with IV fluids     Stage 3 chronic kidney disease, unspecified whether stage 3a or 3b CKD ([place] [address] )  Assessment & Plan  Lab Results   Component Value Date    EGFR 58 05/17/2022    EGFR 48 05/16/2022    EGFR 26 05/15/2022    CREATININE 0 89 05/17/2022    CREATININE 1 05 05/16/2022    CREATININE 1 71 (H) 05/15/2022       Hypertension  Assessment & Plan  · Blood pressure stable at this time  · Can resume lisinopril on discharge   · Monitor per unit protocol     Hypothyroidism  Assessment & Plan  · Continue levothyroxine    Hyperlipidemia  Assessment & Plan  · Continue statin          VTE Pharmacologic Prophylaxis: VTE Score: 3 Moderate Risk (Score 3-4) - Pharmacological DVT Prophylaxis Ordered: heparin  Patient Centered Rounds: I performed bedside rounds with nursing staff today    Discussions with Specialists or Other Care Team Provider: CM     Education and Discussions with Family / Patient: Updated  () at bedside who is also admitted to this hospital     Time Spent for Care: 20 minutes  More than 50% of total time spent on counseling and coordination of care as described above  Current Length of Stay: 1 day(s)  Current Patient Status: Inpatient   Certification Statement: The patient will continue to require additional inpatient hospital stay due to pending toleration of full liquids  Discharge Plan: Anticipate discharge later today or tomorrow to home  Code Status: Level 3 - DNAR and DNI    Subjective:   Patient seen this morning, reports abdominal pain is improved  She does complain of frequent bowel movements since last evening now  No blood  Reports urgency, no tenesmus  No fevers or chills  Wants to try advancing to full liquids  Objective:     Vitals:   Temp (24hrs), Av 3 °F (37 4 °C), Min:98 9 °F (37 2 °C), Max:99 6 °F (37 6 °C)    Temp:  [98 9 °F (37 2 °C)-99 6 °F (37 6 °C)] 98 9 °F (37 2 °C)  HR:  [79-87] 82  Resp:  [18] 18  BP: ()/(49-54) 112/53  SpO2:  [90 %-95 %] 91 %  Body mass index is 25 83 kg/m²  Input and Output Summary (last 24 hours): Intake/Output Summary (Last 24 hours) at 2022 1107  Last data filed at 2022 0752  Gross per 24 hour   Intake 4326 66 ml   Output --   Net 4326 66 ml       Physical Exam:   Physical Exam  Vitals and nursing note reviewed  Constitutional:       General: She is not in acute distress  Appearance: She is not ill-appearing or toxic-appearing  Cardiovascular:      Rate and Rhythm: Normal rate and regular rhythm  Pulmonary:      Effort: Pulmonary effort is normal  No respiratory distress  Breath sounds: Normal breath sounds  Abdominal:      General: Bowel sounds are normal  There is distension  Palpations: Abdomen is soft  Tenderness: There is abdominal tenderness (Left lower quadrant)  There is no guarding  Musculoskeletal:      Right lower leg: No edema  Left lower leg: No edema  Skin:     Coloration: Skin is not pale  Neurological:      Mental Status: She is alert and oriented to person, place, and time  Additional Data:     Labs:  Results from last 7 days   Lab Units 05/17/22  0519 05/16/22  0516   WBC Thousand/uL 9 22 14 21*   HEMOGLOBIN g/dL 11 2* 11 3*   HEMATOCRIT % 34 2* 33 0*   PLATELETS Thousands/uL 190 192   NEUTROS PCT %  --  71   LYMPHS PCT %  --  18   MONOS PCT %  --  10   EOS PCT %  --  0     Results from last 7 days   Lab Units 05/17/22  0519   SODIUM mmol/L 139   POTASSIUM mmol/L 3 7   CHLORIDE mmol/L 108   CO2 mmol/L 23   BUN mg/dL 13   CREATININE mg/dL 0 89   ANION GAP mmol/L 8   CALCIUM mg/dL 8 4   ALBUMIN g/dL 2 7*   TOTAL BILIRUBIN mg/dL 0 99   ALK PHOS U/L 26*   ALT U/L 19   AST U/L 22   GLUCOSE RANDOM mg/dL 111     Results from last 7 days   Lab Units 05/15/22  0807   INR  1 15             Results from last 7 days   Lab Units 05/15/22  1548 05/15/22  1227 05/15/22  1006   LACTIC ACID mmol/L 2 0 3 1* 2 6*       Lines/Drains:  Invasive Devices  Report    Peripheral Intravenous Line  Duration           Peripheral IV 05/15/22 Right Antecubital 2 days                      Imaging: No pertinent imaging reviewed      Recent Cultures (last 7 days):         Last 24 Hours Medication List:   Current Facility-Administered Medications   Medication Dose Route Frequency Provider Last Rate    acetaminophen  650 mg Oral Q6H PRN Marlen Martha, DO      aspirin  81 mg Oral Daily Marlen Martha, DO      atorvastatin  20 mg Oral Daily Marlen Martha, DO      fish oil  1,000 mg Oral Daily Marlen Martha, DO      heparin (porcine)  5,000 Units Subcutaneous [facility] Marlen Martha, DO      levothyroxine  50 mcg Oral Daily Marlen Martha, DO      metroNIDAZOLE  500 mg Intravenous Q8H Marlen Martha,  mg (05/17/22 0752)    oxybutynin  5 mg Oral BID Marlen Martha, DO      oxyCODONE  2 5 mg Oral Q4H PRN Marlen Martha, DO      oxyCODONE  5 mg Oral Q4H PRN Marlen Martha, DO     [facility] traZODone  150 mg Oral HS Sisi Mcintyre DO      vancomycin  125 mg Oral Q6H [address] Sisi Mcintyre,           Today, Patient Was Seen By: Celina Ruggiero PA-C    **Please Note: This note may have been constructed using a voice recognition system  **

## 2022-05-17 NOTE — PLAN OF CARE
Problem: Potential for Falls  Goal: Patient will remain free of falls  Description: INTERVENTIONS:  - Educate patient/family on patient safety including physical limitations  - Instruct patient to call for assistance with activity   - Consult OT/PT to assist with strengthening/mobility   - Keep Call bell within reach  - Keep bed low and locked with side rails adjusted as appropriate  - Keep care items and personal belongings within reach  - Initiate and maintain comfort rounds  - Make Fall Risk Sign visible to staff  - Offer Toileting every  Hours, in advance of need  - Initiate/Maintain alarm  - Obtain necessary fall risk management equipment:   - Apply yellow socks and bracelet for high fall risk patients  - Consider moving patient to room near nurses station  Outcome: Progressing     Problem: PAIN - ADULT  Goal: Verbalizes/displays adequate comfort level or baseline comfort level  Description: Interventions:  - Encourage patient to monitor pain and request assistance  - Assess pain using appropriate pain scale  - Administer analgesics based on type and severity of pain and evaluate response  - Implement non-pharmacological measures as appropriate and evaluate response  - Consider cultural and social influences on pain and pain management  - Notify physician/advanced practitioner if interventions unsuccessful or patient reports new pain  Outcome: Progressing     Problem: INFECTION - ADULT  Goal: Absence or prevention of progression during hospitalization  Description: INTERVENTIONS:  - Assess and monitor for signs and symptoms of infection  - Monitor lab/diagnostic results  - Monitor all insertion sites, i e  indwelling lines, tubes, and drains  - Monitor endotracheal if appropriate and nasal secretions for changes in amount and color  - [employer] appropriate cooling/warming therapies per order  - Administer medications as ordered  - Instruct and encourage patient and family to use good hand hygiene technique  - Identify and instruct in appropriate isolation precautions for identified infection/condition  Outcome: Progressing  Goal: Absence of fever/infection during neutropenic period  Description: INTERVENTIONS:  - Monitor WBC    Outcome: Progressing     Problem: SAFETY ADULT  Goal: Patient will remain free of falls  Description: INTERVENTIONS:  - Educate patient/family on patient safety including physical limitations  - Instruct patient to call for assistance with activity   - Consult OT/PT to assist with strengthening/mobility   - Keep Call bell within reach  - Keep bed low and locked with side rails adjusted as appropriate  - Keep care items and personal belongings within reach  - Initiate and maintain comfort rounds  - Make Fall Risk Sign visible to staff  - Offer Toileting every  Hours, in advance of need  - Initiate/Maintain alarm  - Obtain necessary fall risk management equipment:   - Apply yellow socks and bracelet for high fall risk patients  - Consider moving patient to room near nurses station  Outcome: Progressing  Goal: Maintain or return to baseline ADL function  Description: INTERVENTIONS:  -  Assess patient's ability to carry out ADLs; assess patient's baseline for ADL function and identify physical deficits which impact ability to perform ADLs (bathing, care of mouth/teeth, toileting, grooming, dressing, etc )  - Assess/evaluate cause of self-care deficits   - Assess range of motion  - Assess patient's mobility; develop plan if impaired  - Assess patient's need for assistive devices and provide as appropriate  - Encourage maximum independence but intervene and supervise when necessary  - Involve family in performance of ADLs  - Assess for home care needs following discharge   - Consider OT consult to assist with ADL evaluation and planning for discharge  - Provide patient education as appropriate  Outcome: Progressing  Goal: Maintains/Returns to pre admission functional level  Description: INTERVENTIONS:  - Perform BMAT or MOVE assessment daily    - Set and communicate daily mobility goal to care team and patient/family/caregiver  - Collaborate with rehabilitation services on mobility goals if consulted  - Perform Range of Motion  times a day  - Reposition patient every  hours  - Dangle patient  times a day  - Stand patient  times a day  - Ambulate patient  times a day  - Out of bed to chair  times a day   - Out of bed for meals  times a day  - Out of bed for toileting  - Record patient progress and toleration of activity level   Outcome: Progressing     Problem: DISCHARGE PLANNING  Goal: Discharge to home or other facility with appropriate resources  Description: INTERVENTIONS:  - Identify barriers to discharge w/patient and caregiver  - Arrange for needed discharge resources and transportation as appropriate  - Identify discharge learning needs (meds, wound care, etc )  - Arrange for interpretive services to assist at discharge as needed  - Refer to Case Management Department for coordinating discharge planning if the patient needs post-hospital services based on physician/advanced practitioner order or complex needs related to functional status, cognitive ability, or social support system  Outcome: Progressing     Problem: Knowledge Deficit  Goal: Patient/family/caregiver demonstrates understanding of disease process, treatment plan, medications, and discharge instructions  Description: Complete learning assessment and assess knowledge base    Interventions:  - Provide teaching at level of understanding  - Provide teaching via preferred learning methods  Outcome: Progressing     Problem: MOBILITY - ADULT  Goal: Maintain or return to baseline ADL function  Description: INTERVENTIONS:  -  Assess patient's ability to carry out ADLs; assess patient's baseline for ADL function and identify physical deficits which impact ability to perform ADLs (bathing, care of mouth/teeth, toileting, grooming, dressing, etc )  - Assess/evaluate cause of self-care deficits   - Assess range of motion  - Assess patient's mobility; develop plan if impaired  - Assess patient's need for assistive devices and provide as appropriate  - Encourage maximum independence but intervene and supervise when necessary  - Involve family in performance of ADLs  - Assess for home care needs following discharge   - Consider OT consult to assist with ADL evaluation and planning for discharge  - Provide patient education as appropriate  Outcome: Progressing  Goal: Maintains/Returns to pre admission functional level  Description: INTERVENTIONS:  - Perform BMAT or MOVE assessment daily    - Set and communicate daily mobility goal to care team and patient/family/caregiver  - Collaborate with rehabilitation services on mobility goals if consulted  - Perform Range of Motion  times a day  - Reposition patient every  hours    - Dangle patient  times a day  - Stand patient  times a day  - Ambulate patient  times a day  - Out of bed to chair  times a day   - Out of bed for meals  times a day  - Out of bed for toileting  - Record patient progress and toleration of activity level   Outcome: Progressing

## 2022-05-17 NOTE — PROGRESS NOTES
Progress Note - General Surgery   Elidia Lobo 80 y o  female MRN: [id number]  Unit/Bed#: -[facility] Encounter: [id number]      Assessment:   81 yo with Colitis involving distal transverse colon, descending, sigmoid to rectum     History of C-diff 4 years ago  Leukocytosis   - resolved    - Patient reports feeling better and having multiple bowel movements today  - AVSS    Plan:  - no further management from general surgery required at this time  - continue to advance diet as tolerated  - C Diff labs pending  - Continue medical management as per primary team  - Surgery will sign off  If there are any further questions or concerns please contact one of the surgical team members  Subjective/Objective   Chief Complaint: None    Subjective: No acute events overnight  As per nursing and patient, she had multiple explosive episodes of diarrhea, but patient reports feeling better than yesterday  She denies and increase in abd pain, nausea, vomiting with PO intake  Denies and Fevers, chills, CP, SOB, palpitations  Objective:     Blood pressure 112/53, pulse 82, temperature 98 9 °F (37 2 °C), temperature source Oral, resp  rate 18, height 5' 7" (1 702 m), weight 74 8 kg (164 lb 14 5 oz), SpO2 91 %  Body mass index is 25 83 kg/m²  I/O       05/15 0701  05/16 0700 05/16 0701  05/17 0700 05/17 0701  05/18 0700    P  O  1200 480 200    I V  (mL/kg)  2948 3 (39 4) 1138 3 (15 2)    Total Intake(mL/kg) 1200 (16) 3428 3 (45 8) 1338 3 (17 9)    Urine (mL/kg/hr) 675      Total Output 675      Net +525 +3428 3 +1338 3           Unmeasured Urine Occurrence  1 x     Unmeasured Stool Occurrence  1 x 2 x          Invasive Devices  Report    Peripheral Intravenous Line  Duration           Peripheral IV 05/15/22 Right Antecubital 2 days                Physical Exam: /53 (BP Location: Right arm)   Pulse 82   Temp 98 9 °F (37 2 °C) (Oral)   Resp 18   Ht 5' 7" (1 702 m)   Wt 74 8 kg (164 lb 14 5 oz)   SpO2 91%   BMI 25 83 kg/m²   General appearance: alert and oriented, in no acute distress  Lungs: clear to auscultation bilaterally  Heart: regular rate and rhythm and S1, S2 normal  Abdomen: soft, slightly distended, slight generalized tenderness to deep palpation, normoactive bs  Extremities: extremities normal, warm and well-perfused; no cyanosis, clubbing, or edema    Labs   Recent Results (from the past 24 hour(s))   CBC    Collection Time: 05/17/22  5:19 AM   Result Value Ref Range    WBC 9 22 4 31 - 10 16 Thousand/uL    RBC 3 62 (L) 3 81 - 5 12 Million/uL    Hemoglobin 11 2 (L) 11 5 - 15 4 g/dL    Hematocrit 34 2 (L) 34 8 - 46 1 %    MCV 95 82 - 98 fL    MCH 30 9 26 8 - 34 3 pg    MCHC 32 7 31 4 - 37 4 g/dL    RDW 13 4 11 6 - 15 1 %    Platelets 649 377 - 452 Thousands/uL    MPV 9 2 8 9 - 12 7 fL   Comprehensive metabolic panel    Collection Time: 05/17/22  5:19 AM   Result Value Ref Range    Sodium 139 136 - 145 mmol/L    Potassium 3 7 3 5 - 5 3 mmol/L    Chloride 108 100 - 108 mmol/L    CO2 23 21 - 32 mmol/L    ANION GAP 8 4 - 13 mmol/L    BUN 13 5 - 25 mg/dL    Creatinine 0 89 0 60 - 1 30 mg/dL    Glucose 111 65 - 140 mg/dL    Calcium 8 4 8 3 - 10 1 mg/dL    Corrected Calcium 9 4 8 3 - 10 1 mg/dL    AST 22 5 - 45 U/L    ALT 19 12 - 78 U/L    Alkaline Phosphatase 26 (L) 46 - 116 U/L    Total Protein 5 7 (L) 6 4 - 8 2 g/dL    Albumin 2 7 (L) 3 5 - 5 0 g/dL    Total Bilirubin 0 99 0 20 - 1 00 mg/dL    eGFR 58 ml/min/1 73sq m        Imaging and other studies:  CT abdomen pelvis wo contrast    Result Date: 5/15/2022  Impression: 1  Diffuse abnormal appearance of the distal transverse colon, descending colon and sigmoid colon, most compatible with colitis, either infectious or inflammatory  Less likely would be ischemic, given the lack of pneumatosis, however intravenous contrast material not administered  Recommend follow-up GI and/or surgical consultation  2   Cholelithiasis without CT evidence of acute cholecystitis   The study was marked in [facility] for immediate notification  Workstation performed: [id number]     XR abdomen obstruction series    Result Date: 5/16/2022  Impression: 1  No obstruction, free air, or other acute abdominal findings  2   No acute cardiopulmonary findings   Workstation performed: [id number]       VTE Pharmacologic Prophylaxis: Heparin  VTE Mechanical Prophylaxis: sequential compression device    Ellie Sanchez PA-C  5/17/2022

## 2022-05-17 NOTE — ASSESSMENT & PLAN NOTE
· Patient complaining of abdominal pain that started acutely night prior to admit and also associated diarrhea which has almost been constant since last night    Patient does have history of C diff in the past  · CT abdomen pelvis showing colitis  · Likely etiology C diff infection  · Leukocytosis resolved, lactic acidosis resolved   · Not meeting sepsis criteria   · Follow-up C diff and stool enteric panel  · Advanced diet to full liquids  · General surgery consulted - no intervention planned  · Continue course of vancomycin, discontinue flagyl on discharge

## 2022-05-17 NOTE — UTILIZATION REVIEW
Continued Stay Review    Date: 5/17                          Current Patient Class: INpatient  Current Level of Care: MEd/surg    HPI:86 y o  female initially admitted on 5/16     Assessment/Plan:   FOllow up stool studies  Continue with vanco   Advance to full liquid diet  Continues with frequent bowel movements  Abdomen distended with left lower quadrant tenderness  Gen/surg consult: H/O C-diff 4 years ago  Continue to advance diet as tolerated  WBCs improved       Vital Signs:   Time Temp Pulse Resp BP MAP (mmHg) SpO2 O2 Device Patient Position - Orthostatic VS   05/17/22 07:52:11 98 9 °F (37 2 °C) 82 18 112/53 73 91 % None (Room air) Lying   05/16/22 23:09:01 99 6 °F (37 6 °C) 79 18 109/49 Abnormal  69 90 % -- Lying   05/16/22 15:10:38 99 3 °F (37 4 °C) 87 18 95/54 68 95 % None (Room air) Lying   05/16/22 08:12:31 99 2 °F (37 3 °C) 90 18 90/61 71 93 % None       Pertinent Labs/diagnostic Results:         Results from last 7 days   Lab Units 05/17/22  0519 05/16/22  0516 05/15/22  1227 05/15/22  0807   WBC Thousand/uL 9 22 14 21*  --  21 33*   HEMOGLOBIN g/dL 11 2* 11 3*  --  13 8   HEMATOCRIT % 34 2* 33 0*  --  40 6   PLATELETS Thousands/uL 190 192 235 249   NEUTROS ABS Thousands/µL  --  10 12*  --  17 75*         Results from last 7 days   Lab Units 05/17/22  0519 05/16/22  0516 05/15/22  0807   SODIUM mmol/L 139 136 140   POTASSIUM mmol/L 3 7 3 8 4 5   CHLORIDE mmol/L 108 105 104   CO2 mmol/L 23 23 21   ANION GAP mmol/L 8 8 15*   BUN mg/dL 13 23 29*   CREATININE mg/dL 0 89 1 05 1 71*   EGFR ml/min/1 73sq m 58 48 26   CALCIUM mg/dL 8 4 8 1* 9 4     Results from last 7 days   Lab Units 05/17/22  0519 05/15/22  0807   AST U/L 22 23   ALT U/L 19 31   ALK PHOS U/L 26* 40*   TOTAL PROTEIN g/dL 5 7* 6 9   ALBUMIN g/dL 2 7* 3 7   TOTAL BILIRUBIN mg/dL 0 99 1 01*         Results from last 7 days   Lab Units 05/17/22  0519 05/16/22  0516 05/15/22  0807   GLUCOSE RANDOM mg/dL 111 107 156*         Results from last 7 days   Lab Units 05/15/22  1227 05/15/22  1006 05/15/22  0807   HS TNI 0HR ng/L  --   --  12   HS TNI 2HR ng/L  --  12  --    HSTNI D2 ng/L  --  0  --    HS TNI 4HR ng/L 13  --   --    HSTNI D4 ng/L 1  --   --          Results from last 7 days   Lab Units 05/15/22  0807   PROTIME seconds 14 2   INR  1 15             Results from last 7 days   Lab Units 05/15/22  1548 05/15/22  1227 05/15/22  1006   LACTIC ACID mmol/L 2 0 3 1* 2 6*       Results from last 7 days   Lab Units 05/15/22  0807   LIPASE u/L 38*     Results from last 7 days   Lab Units 05/16/22  1252   CRP mg/L 225 5*   SED RATE mm/hour 18         Results from last 7 days   Lab Units 05/16/22  0820   CLARITY UA  Clear   COLOR UA  Yellow   SPEC GRAV UA  1 015   PH UA  6 0   GLUCOSE UA mg/dl Negative   KETONES UA mg/dl Negative   BLOOD UA  Small*   PROTEIN UA mg/dl Negative   NITRITE UA  Negative   BILIRUBIN UA  Negative   UROBILINOGEN UA E U /dl 0 2   LEUKOCYTES UA  Trace*   WBC UA /hpf 2-4   RBC UA /hpf 2-4   BACTERIA UA /hpf Occasional   EPITHELIAL CELLS WET PREP /hpf Occasional       Medications:   Scheduled Medications:  aspirin, 81 mg, Oral, Daily  atorvastatin, 20 mg, Oral, Daily  dicyclomine, 10 mg, Oral, 4x Daily (AC & HS)  fish oil, 1,000 mg, Oral, Daily  heparin (porcine), 5,000 Units, Subcutaneous, Q8H [facility]  levothyroxine, 50 mcg, Oral, Daily  metroNIDAZOLE, 500 mg, Intravenous, Q8H  oxybutynin, 5 mg, Oral, BID  traZODone, 150 mg, Oral, HS  vancomycin, 125 mg, Oral, Q6H [facility]      Continuous IV Infusions:     PRN Meds:  acetaminophen, 650 mg, Oral, Q6H PRN  oxyCODONE, 2 5 mg, Oral, Q4H PRN  oxyCODONE, 5 mg, Oral, Q4H PRN        Discharge Plan: TBD    Network Utilization Review Department  ATTENTION: Please call with any questions or concerns to [phone] and carefully listen to the prompts so that you are directed to the right person   All voicemails are confidential   Lauro Tubbs all requests for admission clinical reviews, approved or denied determinations and any other requests to dedicated fax number below belonging to the campus where the patient is receiving treatment   List of dedicated fax numbers for the Facilities:  [address] [place] [address] DENIALS (Administrative/Medical Necessity) [phone]   [address] (Maternity/NICU/Pediatrics) [phone]   [address] [place] [address]  [address] [facility] [place] [address] [id number] [place] [address] [address]ydney [address] [place] [place] [address] [phone]

## 2022-05-17 NOTE — ASSESSMENT & PLAN NOTE
· Blood pressure stable at this time  · Can resume lisinopril on discharge   · Monitor per unit protocol

## 2022-05-17 NOTE — PLAN OF CARE
Problem: Potential for Falls  Goal: Patient will remain free of falls  Description: INTERVENTIONS:  - Educate patient/family on patient safety including physical limitations  - Instruct patient to call for assistance with activity   - Consult OT/PT to assist with strengthening/mobility   - Keep Call bell within reach  - Keep bed low and locked with side rails adjusted as appropriate  - Keep care items and personal belongings within reach  - Initiate and maintain comfort rounds  - Make Fall Risk Sign visible to staff  - Offer Toileting every 2 Hours, in advance of need  - Initiate/Maintain bed alarm  - Obtain necessary fall risk management equipment:   - Apply yellow socks and bracelet for high fall risk patients  - Consider moving patient to room near nurses station  Outcome: Progressing     Problem: PAIN - ADULT  Goal: Verbalizes/displays adequate comfort level or baseline comfort level  Description: Interventions:  - Encourage patient to monitor pain and request assistance  - Assess pain using appropriate pain scale  - Administer analgesics based on type and severity of pain and evaluate response  - Implement non-pharmacological measures as appropriate and evaluate response  - Consider cultural and social influences on pain and pain management  - Notify physician/advanced practitioner if interventions unsuccessful or patient reports new pain  Outcome: Progressing     Problem: INFECTION - ADULT  Goal: Absence or prevention of progression during hospitalization  Description: INTERVENTIONS:  - Assess and monitor for signs and symptoms of infection  - Monitor lab/diagnostic results  - Monitor all insertion sites, i e  indwelling lines, tubes, and drains  - Monitor endotracheal if appropriate and nasal secretions for changes in amount and color  - [place] appropriate cooling/warming therapies per order  - Administer medications as ordered  - Instruct and encourage patient and family to use good hand hygiene technique  - Identify and instruct in appropriate isolation precautions for identified infection/condition  Outcome: Progressing  Goal: Absence of fever/infection during neutropenic period  Description: INTERVENTIONS:  - Monitor WBC    Outcome: Progressing     Problem: SAFETY ADULT  Goal: Patient will remain free of falls  Description: INTERVENTIONS:  - Educate patient/family on patient safety including physical limitations  - Instruct patient to call for assistance with activity   - Consult OT/PT to assist with strengthening/mobility   - Keep Call bell within reach  - Keep bed low and locked with side rails adjusted as appropriate  - Keep care items and personal belongings within reach  - Initiate and maintain comfort rounds  - Make Fall Risk Sign visible to staff  - Offer Toileting every 2 Hours, in advance of need  - Initiate/Maintain bed alarm  - Obtain necessary fall risk management equipment:   - Apply yellow socks and bracelet for high fall risk patients  - Consider moving patient to room near nurses station  Outcome: Progressing  Goal: Maintain or return to baseline ADL function  Description: INTERVENTIONS:  -  Assess patient's ability to carry out ADLs; assess patient's baseline for ADL function and identify physical deficits which impact ability to perform ADLs (bathing, care of mouth/teeth, toileting, grooming, dressing, etc )  - Assess/evaluate cause of self-care deficits   - Assess range of motion  - Assess patient's mobility; develop plan if impaired  - Assess patient's need for assistive devices and provide as appropriate  - Encourage maximum independence but intervene and supervise when necessary  - Involve family in performance of ADLs  - Assess for home care needs following discharge   - Consider OT consult to assist with ADL evaluation and planning for discharge  - Provide patient education as appropriate  Outcome: Progressing  Goal: Maintains/Returns to pre admission functional level  Description: INTERVENTIONS:  - Perform BMAT or MOVE assessment daily    - Set and communicate daily mobility goal to care team and patient/family/caregiver  - Collaborate with rehabilitation services on mobility goals if consulted  - Perform Range of Motion 3 times a day  - Reposition patient every 2 hours  - Dangle patient 3 times a day  - Stand patient 3 times a day  - Ambulate patient 3 times a day  - Out of bed to chair 3 times a day   - Out of bed for meals 3 times a day  - Out of bed for toileting  - Record patient progress and toleration of activity level   Outcome: Progressing     Problem: DISCHARGE PLANNING  Goal: Discharge to home or other facility with appropriate resources  Description: INTERVENTIONS:  - Identify barriers to discharge w/patient and caregiver  - Arrange for needed discharge resources and transportation as appropriate  - Identify discharge learning needs (meds, wound care, etc )  - Arrange for interpretive services to assist at discharge as needed  - Refer to Case Management Department for coordinating discharge planning if the patient needs post-hospital services based on physician/advanced practitioner order or complex needs related to functional status, cognitive ability, or social support system  Outcome: Progressing     Problem: Knowledge Deficit  Goal: Patient/family/caregiver demonstrates understanding of disease process, treatment plan, medications, and discharge instructions  Description: Complete learning assessment and assess knowledge base    Interventions:  - Provide teaching at level of understanding  - Provide teaching via preferred learning methods  Outcome: Progressing     Problem: MOBILITY - ADULT  Goal: Maintain or return to baseline ADL function  Description: INTERVENTIONS:  -  Assess patient's ability to carry out ADLs; assess patient's baseline for ADL function and identify physical deficits which impact ability to perform ADLs (bathing, care of mouth/teeth, toileting, grooming, dressing, etc )  - Assess/evaluate cause of self-care deficits   - Assess range of motion  - Assess patient's mobility; develop plan if impaired  - Assess patient's need for assistive devices and provide as appropriate  - Encourage maximum independence but intervene and supervise when necessary  - Involve family in performance of ADLs  - Assess for home care needs following discharge   - Consider OT consult to assist with ADL evaluation and planning for discharge  - Provide patient education as appropriate  Outcome: Progressing  Goal: Maintains/Returns to pre admission functional level  Description: INTERVENTIONS:  - Perform BMAT or MOVE assessment daily    - Set and communicate daily mobility goal to care team and patient/family/caregiver  - Collaborate with rehabilitation services on mobility goals if consulted  - Perform Range of Motion 3 times a day  - Reposition patient every 2 hours    - Dangle patient 3 times a day  - Stand patient 3 times a day  - Ambulate patient 3 times a day  - Out of bed to chair 3 times a day   - Out of bed for meals 3 times a day  - Out of bed for toileting  - Record patient progress and toleration of activity level   Outcome: Progressing

## 2022-05-18 ENCOUNTER — APPOINTMENT (INPATIENT)
Dept: CT IMAGING | Facility: HOSPITAL | Age: 87
DRG: 392 | End: 2022-05-18
Payer: COMMERCIAL

## 2022-05-18 LAB
ANION GAP SERPL CALCULATED.3IONS-SCNC: 12 MMOL/L (ref 4–13)
BASOPHILS # BLD AUTO: 0.05 THOUSANDS/ΜL (ref 0–0.1)
BASOPHILS NFR BLD AUTO: 1 % (ref 0–1)
BUN SERPL-MCNC: 7 MG/DL (ref 5–25)
C DIFF TOX GENS STL QL NAA+PROBE: NEGATIVE
CALCIUM SERPL-MCNC: 8.2 MG/DL (ref 8.3–10.1)
CAMPYLOBACTER DNA SPEC NAA+PROBE: NORMAL
CHLORIDE SERPL-SCNC: 106 MMOL/L (ref 100–108)
CO2 SERPL-SCNC: 18 MMOL/L (ref 21–32)
CREAT SERPL-MCNC: 0.87 MG/DL (ref 0.6–1.3)
EOSINOPHIL # BLD AUTO: 0.13 THOUSAND/ΜL (ref 0–0.61)
EOSINOPHIL NFR BLD AUTO: 2 % (ref 0–6)
ERYTHROCYTE [DISTWIDTH] IN BLOOD BY AUTOMATED COUNT: 13.2 % (ref 11.6–15.1)
GFR SERPL CREATININE-BSD FRML MDRD: 60 ML/MIN/1.73SQ M
GLUCOSE SERPL-MCNC: 168 MG/DL (ref 65–140)
HCT VFR BLD AUTO: 38.6 % (ref 34.8–46.1)
HGB BLD-MCNC: 12.6 G/DL (ref 11.5–15.4)
IMM GRANULOCYTES # BLD AUTO: 0.05 THOUSAND/UL (ref 0–0.2)
IMM GRANULOCYTES NFR BLD AUTO: 1 % (ref 0–2)
LYMPHOCYTES # BLD AUTO: 2.14 THOUSANDS/ΜL (ref 0.6–4.47)
LYMPHOCYTES NFR BLD AUTO: 25 % (ref 14–44)
MCH RBC QN AUTO: 31.5 PG (ref 26.8–34.3)
MCHC RBC AUTO-ENTMCNC: 32.6 G/DL (ref 31.4–37.4)
MCV RBC AUTO: 97 FL (ref 82–98)
MONOCYTES # BLD AUTO: 0.86 THOUSAND/ΜL (ref 0.17–1.22)
MONOCYTES NFR BLD AUTO: 10 % (ref 4–12)
NEUTROPHILS # BLD AUTO: 5.23 THOUSANDS/ΜL (ref 1.85–7.62)
NEUTS SEG NFR BLD AUTO: 61 % (ref 43–75)
NRBC BLD AUTO-RTO: 0 /100 WBCS
PLATELET # BLD AUTO: 239 THOUSANDS/UL (ref 149–390)
PMV BLD AUTO: 9.1 FL (ref 8.9–12.7)
POTASSIUM SERPL-SCNC: 3.6 MMOL/L (ref 3.5–5.3)
RBC # BLD AUTO: 4 MILLION/UL (ref 3.81–5.12)
SALMONELLA DNA SPEC QL NAA+PROBE: NORMAL
SHIGA TOXIN STX GENE SPEC NAA+PROBE: NORMAL
SHIGELLA DNA SPEC QL NAA+PROBE: NORMAL
SODIUM SERPL-SCNC: 136 MMOL/L (ref 136–145)
WBC # BLD AUTO: 8.46 THOUSAND/UL (ref 4.31–10.16)

## 2022-05-18 PROCEDURE — 99232 SBSQ HOSP IP/OBS MODERATE 35: CPT | Performed by: PHYSICIAN ASSISTANT

## 2022-05-18 PROCEDURE — 80048 BASIC METABOLIC PNL TOTAL CA: CPT | Performed by: PHYSICIAN ASSISTANT

## 2022-05-18 PROCEDURE — G1004 CDSM NDSC: HCPCS

## 2022-05-18 PROCEDURE — 74176 CT ABD & PELVIS W/O CONTRAST: CPT

## 2022-05-18 PROCEDURE — 85025 COMPLETE CBC W/AUTO DIFF WBC: CPT | Performed by: PHYSICIAN ASSISTANT

## 2022-05-18 RX ORDER — SACCHAROMYCES BOULARDII 250 MG
250 CAPSULE ORAL 2 TIMES DAILY
Status: DISCONTINUED | OUTPATIENT
Start: 2022-05-18 | End: 2022-05-22 | Stop reason: HOSPADM

## 2022-05-18 RX ORDER — SIMETHICONE 80 MG
80 TABLET,CHEWABLE ORAL
Status: DISCONTINUED | OUTPATIENT
Start: 2022-05-18 | End: 2022-05-22 | Stop reason: HOSPADM

## 2022-05-18 RX ADMIN — HEPARIN SODIUM 5000 UNITS: 5000 INJECTION INTRAVENOUS; SUBCUTANEOUS at 05:33

## 2022-05-18 RX ADMIN — Medication 125 MG: at 17:23

## 2022-05-18 RX ADMIN — METRONIDAZOLE 500 MG: 500 INJECTION, SOLUTION INTRAVENOUS at 15:39

## 2022-05-18 RX ADMIN — Medication 250 MG: at 12:51

## 2022-05-18 RX ADMIN — Medication 125 MG: at 00:31

## 2022-05-18 RX ADMIN — SIMETHICONE 80 MG: 80 TABLET, CHEWABLE ORAL at 17:22

## 2022-05-18 RX ADMIN — DICYCLOMINE HYDROCHLORIDE 10 MG: 20 TABLET ORAL at 22:02

## 2022-05-18 RX ADMIN — Medication 125 MG: at 05:33

## 2022-05-18 RX ADMIN — ATORVASTATIN CALCIUM 20 MG: 20 TABLET, FILM COATED ORAL at 08:22

## 2022-05-18 RX ADMIN — SIMETHICONE 80 MG: 80 TABLET, CHEWABLE ORAL at 22:02

## 2022-05-18 RX ADMIN — SODIUM CHLORIDE 1000 ML: 0.9 INJECTION, SOLUTION INTRAVENOUS at 10:27

## 2022-05-18 RX ADMIN — METRONIDAZOLE 500 MG: 500 INJECTION, SOLUTION INTRAVENOUS at 00:31

## 2022-05-18 RX ADMIN — Medication 125 MG: at 23:29

## 2022-05-18 RX ADMIN — OXYBUTYNIN CHLORIDE 5 MG: 5 TABLET ORAL at 08:22

## 2022-05-18 RX ADMIN — METRONIDAZOLE 500 MG: 500 INJECTION, SOLUTION INTRAVENOUS at 08:23

## 2022-05-18 RX ADMIN — HEPARIN SODIUM 5000 UNITS: 5000 INJECTION INTRAVENOUS; SUBCUTANEOUS at 12:53

## 2022-05-18 RX ADMIN — METRONIDAZOLE 500 MG: 500 INJECTION, SOLUTION INTRAVENOUS at 23:31

## 2022-05-18 RX ADMIN — DICYCLOMINE HYDROCHLORIDE 10 MG: 20 TABLET ORAL at 08:22

## 2022-05-18 RX ADMIN — LEVOTHYROXINE SODIUM 50 MCG: 50 TABLET ORAL at 05:33

## 2022-05-18 RX ADMIN — ASPIRIN 81 MG 81 MG: 81 TABLET ORAL at 08:22

## 2022-05-18 RX ADMIN — TRAZODONE HYDROCHLORIDE 150 MG: 100 TABLET ORAL at 22:02

## 2022-05-18 RX ADMIN — OXYBUTYNIN CHLORIDE 5 MG: 5 TABLET ORAL at 17:22

## 2022-05-18 RX ADMIN — DICYCLOMINE HYDROCHLORIDE 10 MG: 20 TABLET ORAL at 15:39

## 2022-05-18 RX ADMIN — Medication 250 MG: at 17:22

## 2022-05-18 RX ADMIN — HEPARIN SODIUM 5000 UNITS: 5000 INJECTION INTRAVENOUS; SUBCUTANEOUS at 22:02

## 2022-05-18 RX ADMIN — DICYCLOMINE HYDROCHLORIDE 10 MG: 20 TABLET ORAL at 12:51

## 2022-05-18 RX ADMIN — Medication 125 MG: at 12:50

## 2022-05-18 RX ADMIN — OMEGA-3 FATTY ACIDS CAP 1000 MG 1000 MG: 1000 CAP at 08:22

## 2022-05-18 NOTE — ASSESSMENT & PLAN NOTE
· Blood pressure low again  · Monitor per unit protocol   · Will give 1 liter bolus over 4 hours today and monitor for response

## 2022-05-18 NOTE — PLAN OF CARE
Problem: Potential for Falls  Goal: Patient will remain free of falls  Description: INTERVENTIONS:  - Educate patient/family on patient safety including physical limitations  - Instruct patient to call for assistance with activity   - Consult OT/PT to assist with strengthening/mobility   - Keep Call bell within reach  - Keep bed low and locked with side rails adjusted as appropriate  - Keep care items and personal belongings within reach  - Initiate and maintain comfort rounds  - Make Fall Risk Sign visible to staff  - Offer Toileting every  Hours, in advance of need  - Initiate/Maintain alarm  - Obtain necessary fall risk management equipment:   - Apply yellow socks and bracelet for high fall risk patients  - Consider moving patient to room near nurses station  Outcome: Progressing     Problem: PAIN - ADULT  Goal: Verbalizes/displays adequate comfort level or baseline comfort level  Description: Interventions:  - Encourage patient to monitor pain and request assistance  - Assess pain using appropriate pain scale  - Administer analgesics based on type and severity of pain and evaluate response  - Implement non-pharmacological measures as appropriate and evaluate response  - Consider cultural and social influences on pain and pain management  - Notify physician/advanced practitioner if interventions unsuccessful or patient reports new pain  Outcome: Progressing     Problem: INFECTION - ADULT  Goal: Absence or prevention of progression during hospitalization  Description: INTERVENTIONS:  - Assess and monitor for signs and symptoms of infection  - Monitor lab/diagnostic results  - Monitor all insertion sites, i e  indwelling lines, tubes, and drains  - Monitor endotracheal if appropriate and nasal secretions for changes in amount and color  - [place] appropriate cooling/warming therapies per order  - Administer medications as ordered  - Instruct and encourage patient and family to use good hand hygiene technique  - Identify and instruct in appropriate isolation precautions for identified infection/condition  Outcome: Progressing  Goal: Absence of fever/infection during neutropenic period  Description: INTERVENTIONS:  - Monitor WBC    Outcome: Progressing     Problem: SAFETY ADULT  Goal: Patient will remain free of falls  Description: INTERVENTIONS:  - Educate patient/family on patient safety including physical limitations  - Instruct patient to call for assistance with activity   - Consult OT/PT to assist with strengthening/mobility   - Keep Call bell within reach  - Keep bed low and locked with side rails adjusted as appropriate  - Keep care items and personal belongings within reach  - Initiate and maintain comfort rounds  - Make Fall Risk Sign visible to staff  - Offer Toileting every  Hours, in advance of need  - Initiate/Maintain alarm  - Obtain necessary fall risk management equipment:   - Apply yellow socks and bracelet for high fall risk patients  - Consider moving patient to room near nurses station  Outcome: Progressing  Goal: Maintain or return to baseline ADL function  Description: INTERVENTIONS:  -  Assess patient's ability to carry out ADLs; assess patient's baseline for ADL function and identify physical deficits which impact ability to perform ADLs (bathing, care of mouth/teeth, toileting, grooming, dressing, etc )  - Assess/evaluate cause of self-care deficits   - Assess range of motion  - Assess patient's mobility; develop plan if impaired  - Assess patient's need for assistive devices and provide as appropriate  - Encourage maximum independence but intervene and supervise when necessary  - Involve family in performance of ADLs  - Assess for home care needs following discharge   - Consider OT consult to assist with ADL evaluation and planning for discharge  - Provide patient education as appropriate  Outcome: Progressing  Goal: Maintains/Returns to pre admission functional level  Description: INTERVENTIONS:  - Perform BMAT or MOVE assessment daily    - Set and communicate daily mobility goal to care team and patient/family/caregiver  - Collaborate with rehabilitation services on mobility goals if consulted  - Perform Range of Motion  times a day  - Reposition patient every  hours  - Dangle patient  times a day  - Stand patient  times a day  - Ambulate patient  times a day  - Out of bed to chair  times a day   - Out of bed for meals  times a day  - Out of bed for toileting  - Record patient progress and toleration of activity level   Outcome: Progressing     Problem: DISCHARGE PLANNING  Goal: Discharge to home or other facility with appropriate resources  Description: INTERVENTIONS:  - Identify barriers to discharge w/patient and caregiver  - Arrange for needed discharge resources and transportation as appropriate  - Identify discharge learning needs (meds, wound care, etc )  - Arrange for interpretive services to assist at discharge as needed  - Refer to Case Management Department for coordinating discharge planning if the patient needs post-hospital services based on physician/advanced practitioner order or complex needs related to functional status, cognitive ability, or social support system  Outcome: Progressing     Problem: Knowledge Deficit  Goal: Patient/family/caregiver demonstrates understanding of disease process, treatment plan, medications, and discharge instructions  Description: Complete learning assessment and assess knowledge base    Interventions:  - Provide teaching at level of understanding  - Provide teaching via preferred learning methods  Outcome: Progressing     Problem: MOBILITY - ADULT  Goal: Maintain or return to baseline ADL function  Description: INTERVENTIONS:  -  Assess patient's ability to carry out ADLs; assess patient's baseline for ADL function and identify physical deficits which impact ability to perform ADLs (bathing, care of mouth/teeth, toileting, grooming, dressing, etc )  - Assess/evaluate cause of self-care deficits   - Assess range of motion  - Assess patient's mobility; develop plan if impaired  - Assess patient's need for assistive devices and provide as appropriate  - Encourage maximum independence but intervene and supervise when necessary  - Involve family in performance of ADLs  - Assess for home care needs following discharge   - Consider OT consult to assist with ADL evaluation and planning for discharge  - Provide patient education as appropriate  Outcome: Progressing  Goal: Maintains/Returns to pre admission functional level  Description: INTERVENTIONS:  - Perform BMAT or MOVE assessment daily    - Set and communicate daily mobility goal to care team and patient/family/caregiver  - Collaborate with rehabilitation services on mobility goals if consulted  - Perform Range of Motion  times a day  - Reposition patient every  hours    - Dangle patient  times a day  - Stand patient  times a day  - Ambulate patient  times a day  - Out of bed to chair  times a day   - Out of bed for meals  times a day  - Out of bed for toileting  - Record patient progress and toleration of activity level   Outcome: Progressing

## 2022-05-18 NOTE — PROGRESS NOTES
[address] [facility] Progress Note - Candy Argueta 1935, 80 y o  female MRN: [id number]  Unit/Bed#: -01 Encounter: [id number]  Primary Care Provider: Bogdan Park MD   Date and time admitted to hospital: 5/15/2022  7:33 AM    * Colitis  Assessment & Plan  · Patient complaining of abdominal pain that started acutely night prior to admit and also associated diarrhea which has almost been constant since last night  Patient does have history of C diff in the past  · CT abdomen pelvis showing colitis  · Likely etiology C diff infection  · Leukocytosis resolved, lactic acidosis resolved   · Not meeting sepsis criteria   · Follow-up C diff and stool enteric panel  · Advanced diet to full liquids, will hold on further advancement due to continued cramping  · General surgery consulted - no intervention planned  · Continue course of vancomycin, discontinue flagyl on discharge     RANJAN (acute kidney injury) ([facility] [address] )  Assessment & Plan  · Baseline creatinine 0 8-0 9, presented with creatinine 1 71   · Resolved with IV fluids     Stage 3 chronic kidney disease, unspecified whether stage 3a or 3b CKD [facility])  Assessment & Plan  Lab Results   Component Value Date    EGFR 60 05/18/2022    EGFR 58 05/17/2022    EGFR 48 05/16/2022    CREATININE 0 87 05/18/2022    CREATININE 0 89 05/17/2022    CREATININE 1 05 05/16/2022       Hypertension  Assessment & Plan  · Blood pressure low again  · Monitor per unit protocol   · Will give 1 liter bolus over 4 hours today and monitor for response     Hypothyroidism  Assessment & Plan  · Continue levothyroxine    Hyperlipidemia  Assessment & Plan  · Continue statin          VTE Pharmacologic Prophylaxis: VTE Score: 3 Moderate Risk (Score 3-4) - Pharmacological DVT Prophylaxis Ordered: heparin  Patient Centered Rounds: I performed bedside rounds with nursing staff today    Discussions with Specialists or Other Care Team Provider: MARTHA re: dispo     Education and Discussions with Family / Patient: Updated  () at bedside  Time Spent for Care: 20 minutes  More than 50% of total time spent on counseling and coordination of care as described above  Current Length of Stay: 2 day(s)  Current Patient Status: Inpatient   Certification Statement: The patient will continue to require additional inpatient hospital stay due to pending clinical stability of BPs   Discharge Plan: Anticipate discharge later today or tomorrow to home  Code Status: Level 3 - DNAR and DNI    Subjective:   Patient seen this morning, still having some cramping abdominal pain  No worsening overnight  Denies subjective fevers or chills  No nausea or vomiting  Patient expresses frustration, but overall appreciative  Objective:     Vitals:   Temp (24hrs), Av °F (36 7 °C), Min:97 9 °F (36 6 °C), Max:98 1 °F (36 7 °C)    Temp:  [97 9 °F (36 6 °C)-98 1 °F (36 7 °C)] 98 °F (36 7 °C)  HR:  [73-86] 73  Resp:  [16] 16  BP: ()/(58-71) 98/58  SpO2:  [94 %-96 %] 94 %  Body mass index is 25 83 kg/m²  Input and Output Summary (last 24 hours): Intake/Output Summary (Last 24 hours) at 2022 1141  Last data filed at 2022 1300  Gross per 24 hour   Intake 200 ml   Output --   Net 200 ml       Physical Exam:   Physical Exam  Vitals and nursing note reviewed  Constitutional:       General: She is not in acute distress  Appearance: She is obese  She is not ill-appearing or toxic-appearing  Cardiovascular:      Rate and Rhythm: Normal rate and regular rhythm  Pulmonary:      Effort: Pulmonary effort is normal  No respiratory distress  Breath sounds: Normal breath sounds  Abdominal:      General: Bowel sounds are decreased  There is distension  Palpations: Abdomen is soft  Tenderness: There is abdominal tenderness (Overall improved) in the left lower quadrant  There is no guarding  Musculoskeletal:      Right lower leg: No edema        Left lower leg: No edema    Skin:     Coloration: Skin is not pale  Neurological:      Mental Status: She is alert and oriented to person, place, and time  Psychiatric:         Mood and Affect: Mood normal          Behavior: Behavior normal            Additional Data:     Labs:  Results from last 7 days   Lab Units 05/18/22  0950   WBC Thousand/uL 8 46   HEMOGLOBIN g/dL 12 6   HEMATOCRIT % 38 6   PLATELETS Thousands/uL 239   NEUTROS PCT % 61   LYMPHS PCT % 25   MONOS PCT % 10   EOS PCT % 2     Results from last 7 days   Lab Units 05/18/22  0950 05/17/22  0519   SODIUM mmol/L 136 139   POTASSIUM mmol/L 3 6 3 7   CHLORIDE mmol/L 106 108   CO2 mmol/L 18* 23   BUN mg/dL 7 13   CREATININE mg/dL 0 87 0 89   ANION GAP mmol/L 12 8   CALCIUM mg/dL 8 2* 8 4   ALBUMIN g/dL  --  2 7*   TOTAL BILIRUBIN mg/dL  --  0 99   ALK PHOS U/L  --  26*   ALT U/L  --  19   AST U/L  --  22   GLUCOSE RANDOM mg/dL 168* 111     Results from last 7 days   Lab Units 05/15/22  0807   INR  1 15             Results from last 7 days   Lab Units 05/15/22  1548 05/15/22  1227 05/15/22  1006   LACTIC ACID mmol/L 2 0 3 1* 2 6*       Lines/Drains:  Invasive Devices  Report    Peripheral Intravenous Line  Duration           Peripheral IV 05/17/22 Left Antecubital <1 day                      Imaging: No pertinent imaging reviewed      Recent Cultures (last 7 days):   Results from last 7 days   Lab Units 05/17/22  0845   C DIFF TOXIN B BY PCR  Negative       Last 24 Hours Medication List:   Current Facility-Administered Medications   Medication Dose Route Frequency Provider Last Rate    acetaminophen  650 mg Oral Q6H PRN Carroll Gloria, DO      aspirin  81 mg Oral Daily Carroll Gloria, DO      atorvastatin  20 mg Oral Daily Carroll Gloria, DO      dicyclomine  10 mg Oral 4x Daily (AC & HS) Pat Drake PA-C      fish oil  1,000 mg Oral Daily Carroll Gloria, DO      heparin (porcine)  5,000 Units Subcutaneous [facility] Carroll Gloria, DO      levothyroxine  50 mcg Oral Daily Cristel Nancy, DO      metroNIDAZOLE  500 mg Intravenous Q8H Cristel Nancy,  mg (05/18/22 1587)    oxybutynin  5 mg Oral BID Cristel Nancy, DO      oxyCODONE  2 5 mg Oral Q4H PRN Cristel Nancy, DO      oxyCODONE  5 mg Oral Q4H PRN Cristel Nancy, DO      sodium chloride  1,000 mL Intravenous Once Vikki Drake PA-C 1,000 mL (05/18/22 1027)    traZODone  150 mg Oral HS Cristel Nancy, DO      vancomycin  125 mg Oral Q6H [address] Cristel Nancy, DO          Today, Patient Was Seen By: Sandy Burnette PA-C    **Please Note: This note may have been constructed using a voice recognition system  **

## 2022-05-18 NOTE — CASE MANAGEMENT
Case Management Progress Note    Patient name Miguel Angel Montanez  Location [address] 233/-55 MRN [id number]  : 1935 Date 2022       LOS (days): 2  Geometric Mean LOS (GMLOS) (days): 3 10  Days to GMLOS:1 1        OBJECTIVE:        Current admission status: Inpatient  Preferred Pharmacy:   [address] [place], [place] [address] [address] [address]  Phone: [phone] Fax: [address]  [address] [address]  Phone: [phone] Fax: [phone]    [facility] [address]  Phone: [phone] Fax: [phone]    Primary Care Provider: Kiesha Reeves MD    Primary Insurance: Jodi [facility]antig[facility]  Secondary Insurance:     PROGRESS NOTE:  Cm DISCUSSED PT IN MULTIDISCIPLINARY CARE ROUNDS  Moiz Rice COLITIS,IVF IV ATBX, GI CONSULT  NO D/C NEEDS

## 2022-05-18 NOTE — ASSESSMENT & PLAN NOTE
· Patient complaining of abdominal pain that started acutely night prior to admit and also associated diarrhea which has almost been constant since last night    Patient does have history of C diff in the past  · CT abdomen pelvis showing colitis  · Likely etiology C diff infection  · Leukocytosis resolved, lactic acidosis resolved   · Not meeting sepsis criteria   · Follow-up C diff and stool enteric panel  · Advanced diet to full liquids, will hold on further advancement due to continued cramping  · General surgery consulted - no intervention planned  · Continue course of vancomycin, discontinue flagyl on discharge

## 2022-05-18 NOTE — ASSESSMENT & PLAN NOTE
Lab Results   Component Value Date    EGFR 60 05/18/2022    EGFR 58 05/17/2022    EGFR 48 05/16/2022    CREATININE 0 87 05/18/2022    CREATININE 0 89 05/17/2022    CREATININE 1 05 05/16/2022

## 2022-05-18 NOTE — PLAN OF CARE
Problem: INFECTION - ADULT  Goal: Absence or prevention of progression during hospitalization  Description: INTERVENTIONS:  - Assess and monitor for signs and symptoms of infection  - Monitor lab/diagnostic results  - Monitor all insertion sites, i e  indwelling lines, tubes, and drains  - Monitor endotracheal if appropriate and nasal secretions for changes in amount and color  - [place] appropriate cooling/warming therapies per order  - Administer medications as ordered  - Instruct and encourage patient and family to use good hand hygiene technique  - Identify and instruct in appropriate isolation precautions for identified infection/condition  Outcome: Progressing     Problem: DISCHARGE PLANNING  Goal: Discharge to home or other facility with appropriate resources  Description: INTERVENTIONS:  - Identify barriers to discharge w/patient and caregiver  - Arrange for needed discharge resources and transportation as appropriate  - Identify discharge learning needs (meds, wound care, etc )  - Arrange for interpretive services to assist at discharge as needed  - Refer to Case Management Department for coordinating discharge planning if the patient needs post-hospital services based on physician/advanced practitioner order or complex needs related to functional status, cognitive ability, or social support system  Outcome: Progressing     Problem: Knowledge Deficit  Goal: Patient/family/caregiver demonstrates understanding of disease process, treatment plan, medications, and discharge instructions  Description: Complete learning assessment and assess knowledge base    Interventions:  - Provide teaching at level of understanding  - Provide teaching via preferred learning methods  Outcome: Progressing

## 2022-05-19 ENCOUNTER — APPOINTMENT (INPATIENT)
Dept: VASCULAR ULTRASOUND | Facility: HOSPITAL | Age: 87
DRG: 392 | End: 2022-05-19
Payer: COMMERCIAL

## 2022-05-19 PROCEDURE — 93975 VASCULAR STUDY: CPT

## 2022-05-19 PROCEDURE — 99232 SBSQ HOSP IP/OBS MODERATE 35: CPT | Performed by: PHYSICIAN ASSISTANT

## 2022-05-19 PROCEDURE — 93975 VASCULAR STUDY: CPT | Performed by: SURGERY

## 2022-05-19 PROCEDURE — 99223 1ST HOSP IP/OBS HIGH 75: CPT | Performed by: PHYSICIAN ASSISTANT

## 2022-05-19 RX ADMIN — Medication 250 MG: at 08:36

## 2022-05-19 RX ADMIN — Medication 250 MG: at 17:03

## 2022-05-19 RX ADMIN — Medication 125 MG: at 17:03

## 2022-05-19 RX ADMIN — METRONIDAZOLE 500 MG: 500 INJECTION, SOLUTION INTRAVENOUS at 08:31

## 2022-05-19 RX ADMIN — HEPARIN SODIUM 5000 UNITS: 5000 INJECTION INTRAVENOUS; SUBCUTANEOUS at 21:33

## 2022-05-19 RX ADMIN — HEPARIN SODIUM 5000 UNITS: 5000 INJECTION INTRAVENOUS; SUBCUTANEOUS at 06:01

## 2022-05-19 RX ADMIN — DICYCLOMINE HYDROCHLORIDE 10 MG: 20 TABLET ORAL at 06:01

## 2022-05-19 RX ADMIN — Medication 125 MG: at 12:03

## 2022-05-19 RX ADMIN — SIMETHICONE 80 MG: 80 TABLET, CHEWABLE ORAL at 21:33

## 2022-05-19 RX ADMIN — OXYBUTYNIN CHLORIDE 5 MG: 5 TABLET ORAL at 17:03

## 2022-05-19 RX ADMIN — LEVOTHYROXINE SODIUM 50 MCG: 50 TABLET ORAL at 06:01

## 2022-05-19 RX ADMIN — TRAZODONE HYDROCHLORIDE 150 MG: 100 TABLET ORAL at 21:33

## 2022-05-19 RX ADMIN — DICYCLOMINE HYDROCHLORIDE 10 MG: 20 TABLET ORAL at 12:05

## 2022-05-19 RX ADMIN — ATORVASTATIN CALCIUM 20 MG: 20 TABLET, FILM COATED ORAL at 08:35

## 2022-05-19 RX ADMIN — OXYBUTYNIN CHLORIDE 5 MG: 5 TABLET ORAL at 08:36

## 2022-05-19 RX ADMIN — SIMETHICONE 80 MG: 80 TABLET, CHEWABLE ORAL at 17:03

## 2022-05-19 RX ADMIN — OMEGA-3 FATTY ACIDS CAP 1000 MG 1000 MG: 1000 CAP at 08:35

## 2022-05-19 RX ADMIN — SIMETHICONE 80 MG: 80 TABLET, CHEWABLE ORAL at 08:36

## 2022-05-19 RX ADMIN — SIMETHICONE 80 MG: 80 TABLET, CHEWABLE ORAL at 12:05

## 2022-05-19 RX ADMIN — DICYCLOMINE HYDROCHLORIDE 10 MG: 20 TABLET ORAL at 17:03

## 2022-05-19 RX ADMIN — HEPARIN SODIUM 5000 UNITS: 5000 INJECTION INTRAVENOUS; SUBCUTANEOUS at 14:19

## 2022-05-19 RX ADMIN — DICYCLOMINE HYDROCHLORIDE 10 MG: 20 TABLET ORAL at 21:33

## 2022-05-19 RX ADMIN — ASPIRIN 81 MG 81 MG: 81 TABLET ORAL at 08:35

## 2022-05-19 RX ADMIN — Medication 125 MG: at 06:05

## 2022-05-19 RX ADMIN — METRONIDAZOLE 500 MG: 500 INJECTION, SOLUTION INTRAVENOUS at 17:03

## 2022-05-19 NOTE — ASSESSMENT & PLAN NOTE
Patient complaining of abdominal pain that started acutely night prior to admit and also associated non-bloddy diarrhea  PMHx C diff    · CT abdomen pelvis 5/15/22:  Diffuse abnormal appearance of the distal transverse colon, descending colon and sigmoid colon, most compatible with colitis, either infectious or inflammatory   Less likely would be ischemic, given the lack of pneumatosis, however intravenous contrast material not administered     · Leukocytosis resolved, lactic acidosis resolved   · Not meeting sepsis criteria   · General surgery consulted - no intervention planned  · C diff negative, although she responded well to oral vanco/flagyl  · Will consult GI today due to slow resolution of symptoms

## 2022-05-19 NOTE — PROGRESS NOTES
[address] [facility] Progress Note - Anay Monreal 1935, 80 y o  female MRN: [id number]  Unit/Bed#: -[place] Encounter: [id number]  Primary Care Provider: Soco Huang MD   Date and time admitted to hospital: 5/15/2022  7:33 AM    * Colitis  Assessment & Plan  Patient complaining of abdominal pain that started acutely night prior to admit and also associated non-bloddy diarrhea  PMHx C diff  · CT abdomen pelvis 5/15/22:  Diffuse abnormal appearance of the distal transverse colon, descending colon and sigmoid colon, most compatible with colitis, either infectious or inflammatory   Less likely would be ischemic, given the lack of pneumatosis, however intravenous contrast material not administered     · Leukocytosis resolved, lactic acidosis resolved   · Not meeting sepsis criteria   · General surgery consulted - no intervention planned  · C diff negative, although she responded well to oral vanco/flagyl  · Will consult GI today due to slow resolution of symptoms    RANJAN (acute kidney injury) ([facility] )  Assessment & Plan  · Baseline creatinine 0 8-0 9, presented with creatinine 1 71   · Resolved with IV fluids     Stage 3 chronic kidney disease, unspecified whether stage 3a or 3b CKD ([facility] [address] )  Assessment & Plan  Lab Results   Component Value Date    EGFR 60 05/18/2022    EGFR 58 05/17/2022    EGFR 48 05/16/2022    CREATININE 0 87 05/18/2022    CREATININE 0 89 05/17/2022    CREATININE 1 05 05/16/2022       Hypertension  Assessment & Plan  · Monitor per unit protocol, was low and now normalized s/p IV fluids     Hypothyroidism  Assessment & Plan  · Continue levothyroxine    Hyperlipidemia  Assessment & Plan  · Continue statin        VTE Pharmacologic Prophylaxis: VTE Score: 3 Moderate Risk (Score 3-4) - Pharmacological DVT Prophylaxis Ordered: heparin  Patient Centered Rounds: I performed bedside rounds with nursing staff today    Discussions with Specialists or Other Care Team Provider: GI Education and Discussions with Family / Patient: Updated  () at bedside  Time Spent for Care: 20 minutes  More than 50% of total time spent on counseling and coordination of care as described above  Current Length of Stay: 3 day(s)  Current Patient Status: Inpatient   Certification Statement: The patient will continue to require additional inpatient hospital stay due to pending mesenteric duplex, possible flex sig tomorrow  Discharge Plan: Anticipate discharge in 24-48 hrs to home  Code Status: Level 3 - DNAR and DNI    Subjective:   Patient seen this morning, continues to have abdominal distension however reports that she does not feel her belly is that big  Denies any nausea this morning but did have nausea last night  She thought it was because she was hungry  Still having abdominal discomfort, left lower quadrant  Passing lots of gas with minimal bowel  Urinating well  No fevers or chills  No chest pain or shortness of breath  Objective:     Vitals:   Temp (24hrs), Av 8 °F (37 1 °C), Min:97 9 °F (36 6 °C), Max:99 4 °F (37 4 °C)    Temp:  [97 9 °F (36 6 °C)-99 4 °F (37 4 °C)] 99 4 °F (37 4 °C)  HR:  [71-94] 72  Resp:  [18] 18  BP: (124-153)/(64-99) 125/64  SpO2:  [93 %-96 %] 94 %  Body mass index is 25 83 kg/m²  Input and Output Summary (last 24 hours): Intake/Output Summary (Last 24 hours) at 2022 0810  Last data filed at 2022 1537  Gross per 24 hour   Intake 450 ml   Output --   Net 450 ml       Physical Exam:   Physical Exam  Vitals and nursing note reviewed  Constitutional:       General: She is not in acute distress  Appearance: She is obese  She is not toxic-appearing  Cardiovascular:      Rate and Rhythm: Normal rate and regular rhythm  Pulmonary:      Effort: Pulmonary effort is normal       Breath sounds: Normal breath sounds  Abdominal:      General: Bowel sounds are normal  There is distension        Palpations: Abdomen is soft       Tenderness: There is abdominal tenderness (LLQ)  There is no guarding  Skin:     Coloration: Skin is not pale  Neurological:      Mental Status: She is alert and oriented to person, place, and time  Additional Data:     Labs:  Results from last 7 days   Lab Units 05/18/22  0950   WBC Thousand/uL 8 46   HEMOGLOBIN g/dL 12 6   HEMATOCRIT % 38 6   PLATELETS Thousands/uL 239   NEUTROS PCT % 61   LYMPHS PCT % 25   MONOS PCT % 10   EOS PCT % 2     Results from last 7 days   Lab Units 05/18/22  0950 05/17/22  0519   SODIUM mmol/L 136 139   POTASSIUM mmol/L 3 6 3 7   CHLORIDE mmol/L 106 108   CO2 mmol/L 18* 23   BUN mg/dL 7 13   CREATININE mg/dL 0 87 0 89   ANION GAP mmol/L 12 8   CALCIUM mg/dL 8 2* 8 4   ALBUMIN g/dL  --  2 7*   TOTAL BILIRUBIN mg/dL  --  0 99   ALK PHOS U/L  --  26*   ALT U/L  --  19   AST U/L  --  22   GLUCOSE RANDOM mg/dL 168* 111     Results from last 7 days   Lab Units 05/15/22  0807   INR  1 15             Results from last 7 days   Lab Units 05/15/22  1548 05/15/22  1227 05/15/22  1006   LACTIC ACID mmol/L 2 0 3 1* 2 6*       Lines/Drains:  Invasive Devices  Report    Peripheral Intravenous Line  Duration           Peripheral IV 05/17/22 Left Antecubital 1 day                      Imaging: No pertinent imaging reviewed      Recent Cultures (last 7 days):   Results from last 7 days   Lab Units 05/17/22  0845   C DIFF TOXIN B BY PCR  Negative       Last 24 Hours Medication List:   Current Facility-Administered Medications   Medication Dose Route Frequency Provider Last Rate    acetaminophen  650 mg Oral Q6H PRN Alona Laureano, DO      aspirin  81 mg Oral Daily Alona Laureano, DO      atorvastatin  20 mg Oral Daily Alona Laureano, DO      dicyclomine  10 mg Oral 4x Daily (AC & HS) Krupa Acosta PA-C      fish oil  1,000 mg Oral Daily Alona Laureano, DO      heparin (porcine)  5,000 Units Subcutaneous [facility] Alona Laureano, DO      levothyroxine  50 mcg Oral Daily Tammy John DO      metroNIDAZOLE  500 mg Intravenous Q8H Tammy John  mg (05/18/22 1811)    oxybutynin  5 mg Oral BID Tammy John DO      oxyCODONE  2 5 mg Oral Q4H PRN Tammy John DO      oxyCODONE  5 mg Oral Q4H PRN Tammy John DO      saccharomyces boulardii  250 mg Oral BID Vikki Drake PA-C      simethicone  80 mg Oral 4x Daily (with meals and at bedtime) Mckayla Hester PA-C      traZODone  150 mg Oral HS Tammy John DO      vancomycin  125 mg Oral Q6H [facility] Tammy John DO          Today, Patient Was Seen By: Mckayla Hester PA-C    **Please Note: This note may have been constructed using a voice recognition system  **

## 2022-05-19 NOTE — PLAN OF CARE
Problem: Potential for Falls  Goal: Patient will remain free of falls  Description: INTERVENTIONS:  - Educate patient/family on patient safety including physical limitations  - Instruct patient to call for assistance with activity   - Consult OT/PT to assist with strengthening/mobility   - Keep Call bell within reach  - Keep bed low and locked with side rails adjusted as appropriate  - Keep care items and personal belongings within reach  - Initiate and maintain comfort rounds  - Make Fall Risk Sign visible to staff  - Offer Toileting every  Hours, in advance of need  - Initiate/Maintain alarm  - Obtain necessary fall risk management equipment:   - Apply yellow socks and bracelet for high fall risk patients  - Consider moving patient to room near nurses station  Outcome: Progressing     Problem: PAIN - ADULT  Goal: Verbalizes/displays adequate comfort level or baseline comfort level  Description: Interventions:  - Encourage patient to monitor pain and request assistance  - Assess pain using appropriate pain scale  - Administer analgesics based on type and severity of pain and evaluate response  - Implement non-pharmacological measures as appropriate and evaluate response  - Consider cultural and social influences on pain and pain management  - Notify physician/advanced practitioner if interventions unsuccessful or patient reports new pain  Outcome: Progressing     Problem: INFECTION - ADULT  Goal: Absence or prevention of progression during hospitalization  Description: INTERVENTIONS:  - Assess and monitor for signs and symptoms of infection  - Monitor lab/diagnostic results  - Monitor all insertion sites, i e  indwelling lines, tubes, and drains  - Monitor endotracheal if appropriate and nasal secretions for changes in amount and color  - [place] appropriate cooling/warming therapies per order  - Administer medications as ordered  - Instruct and encourage patient and family to use good hand hygiene technique  - Identify and instruct in appropriate isolation precautions for identified infection/condition  Outcome: Progressing  Goal: Absence of fever/infection during neutropenic period  Description: INTERVENTIONS:  - Monitor WBC    Outcome: Progressing     Problem: SAFETY ADULT  Goal: Patient will remain free of falls  Description: INTERVENTIONS:  - Educate patient/family on patient safety including physical limitations  - Instruct patient to call for assistance with activity   - Consult OT/PT to assist with strengthening/mobility   - Keep Call bell within reach  - Keep bed low and locked with side rails adjusted as appropriate  - Keep care items and personal belongings within reach  - Initiate and maintain comfort rounds  - Make Fall Risk Sign visible to staff  - Offer Toileting every  Hours, in advance of need  - Initiate/Maintain alarm  - Obtain necessary fall risk management equipment:   - Apply yellow socks and bracelet for high fall risk patients  - Consider moving patient to room near nurses station  Outcome: Progressing  Goal: Maintain or return to baseline ADL function  Description: INTERVENTIONS:  -  Assess patient's ability to carry out ADLs; assess patient's baseline for ADL function and identify physical deficits which impact ability to perform ADLs (bathing, care of mouth/teeth, toileting, grooming, dressing, etc )  - Assess/evaluate cause of self-care deficits   - Assess range of motion  - Assess patient's mobility; develop plan if impaired  - Assess patient's need for assistive devices and provide as appropriate  - Encourage maximum independence but intervene and supervise when necessary  - Involve family in performance of ADLs  - Assess for home care needs following discharge   - Consider OT consult to assist with ADL evaluation and planning for discharge  - Provide patient education as appropriate  Outcome: Progressing  Goal: Maintains/Returns to pre admission functional level  Description: INTERVENTIONS:  - Perform BMAT or MOVE assessment daily    - Set and communicate daily mobility goal to care team and patient/family/caregiver  - Collaborate with rehabilitation services on mobility goals if consulted  - Perform Range of Motion  times a day  - Reposition patient every  hours  - Dangle patient  times a day  - Stand patient  times a day  - Ambulate patient  times a day  - Out of bed to chair  times a day   - Out of bed for meals  times a day  - Out of bed for toileting  - Record patient progress and toleration of activity level   Outcome: Progressing     Problem: DISCHARGE PLANNING  Goal: Discharge to home or other facility with appropriate resources  Description: INTERVENTIONS:  - Identify barriers to discharge w/patient and caregiver  - Arrange for needed discharge resources and transportation as appropriate  - Identify discharge learning needs (meds, wound care, etc )  - Arrange for interpretive services to assist at discharge as needed  - Refer to Case Management Department for coordinating discharge planning if the patient needs post-hospital services based on physician/advanced practitioner order or complex needs related to functional status, cognitive ability, or social support system  Outcome: Progressing     Problem: Knowledge Deficit  Goal: Patient/family/caregiver demonstrates understanding of disease process, treatment plan, medications, and discharge instructions  Description: Complete learning assessment and assess knowledge base    Interventions:  - Provide teaching at level of understanding  - Provide teaching via preferred learning methods  Outcome: Progressing     Problem: MOBILITY - ADULT  Goal: Maintain or return to baseline ADL function  Description: INTERVENTIONS:  -  Assess patient's ability to carry out ADLs; assess patient's baseline for ADL function and identify physical deficits which impact ability to perform ADLs (bathing, care of mouth/teeth, toileting, grooming, dressing, etc )  - Assess/evaluate cause of self-care deficits   - Assess range of motion  - Assess patient's mobility; develop plan if impaired  - Assess patient's need for assistive devices and provide as appropriate  - Encourage maximum independence but intervene and supervise when necessary  - Involve family in performance of ADLs  - Assess for home care needs following discharge   - Consider OT consult to assist with ADL evaluation and planning for discharge  - Provide patient education as appropriate  Outcome: Progressing  Goal: Maintains/Returns to pre admission functional level  Description: INTERVENTIONS:  - Perform BMAT or MOVE assessment daily    - Set and communicate daily mobility goal to care team and patient/family/caregiver  - Collaborate with rehabilitation services on mobility goals if consulted  - Perform Range of Motion  times a day  - Reposition patient every  hours    - Dangle patient  times a day  - Stand patient  times a day  - Ambulate patient  times a day  - Out of bed to chair  times a day   - Out of bed for meals  times a day  - Out of bed for toileting  - Record patient progress and toleration of activity level   Outcome: Progressing

## 2022-05-19 NOTE — CONSULTS
Consultation - [address] [facility]  Colletta Never 80 y o  female MRN: [id number]  Unit/Bed#: -01 Encounter: [id number]      Inpatient consult to gastroenterology  Consult performed by: Mari Mcdonald PA-C  Consult ordered by: Kenya Major PA-C          Reason for Consult / Principal Problem:  Colitis    HPI: Colletta Never is a 80y o  year old female who presents with a past medical history significant for CKD, hypothyroidism, hypertension, hyperlipidemia, and a history of C diff colitis  Patient is sleep presents the [facility] on this past Sunday with a chief complaint of abdominal pain and diarrhea  Patient does have a history of Clostridium difficile  Patient was started on Flagyl and vancomycin  Patient's C diff is negative  Patient is still reporting abdominal distention, gas, abdominal pain  Patient's C reactive protein on admission was significantly elevated  Patient's white blood cell count was also significantly elevated but has normalized  Patient's CT scan from yesterday shows colitis of the descending and sigmoid colon although improved  Patient's last colonoscopy was many years ago  Patient denies any family history of Crohn's disease or ulcerative colitis  Patient denies any melena or rectal bleeding  Patient's last bowel movement was 2 days ago  Patient is tolerating a modified full liquid diet  REVIEW OF SYSTEMS:     CONSTITUTIONAL: Denies any fever, chills, or rigors  Good appetite, and no recent weight loss  HEENT: No earache or tinnitus  Denies hearing loss or visual disturbances  CARDIOVASCULAR: No chest pain or palpitations  RESPIRATORY: Denies any cough, hemoptysis, shortness of breath or dyspnea on exertion  GASTROINTESTINAL: As noted in the History of Present Illness  GENITOURINARY: No problems with urination  Denies any hematuria or dysuria  NEUROLOGIC: No dizziness or vertigo, denies headaches     MUSCULOSKELETAL: Denies any muscle or joint pain  SKIN: Denies skin rashes or itching  ENDOCRINE: Denies excessive thirst  Denies intolerance to heat or cold  PSYCHOSOCIAL: Denies depression or anxiety  Denies any recent memory loss       Historical Information   Past Medical History:   Diagnosis Date    BCC (basal cell carcinoma)     Benign uterine neoplasm     C  difficile colitis     Disease of thyroid gland     Hyperlipidemia     Hypertension      Past Surgical History:   Procedure Laterality Date    APPENDECTOMY      CATARACT EXTRACTION      Last assessed - 16    MALIGNANT SKIN LESION EXCISION      Face    PARATHYROIDECTOMY      FL WRIST [place] LIG Left 3/23/2021    Procedure: RELEASE LEFT CARPAL TUNNEL ENDOSCOPIC;  Surgeon: Vu Uribe MD;  Location: [facility] OR;  Service: Orthopedics    REPLACEMENT TOTAL KNEE Bilateral     ROTATOR CUFF REPAIR Left     Last assessed - 16    TOTAL ABDOMINAL HYSTERECTOMY      with derrell oopherectomy, Last assessed - 16     Social History   Social History     Substance and Sexual Activity   Alcohol Use Never     Social History     Substance and Sexual Activity   Drug Use No     Social History     Tobacco Use   Smoking Status Former Smoker    Quit date:     Years since quittin 4   Smokeless Tobacco Never Used     Family History   Problem Relation Age of Onset    Heart attack Mother     Substance Abuse Mother         denied    Mental illness Mother         denied    Other Mother         Cardiac Disorder     Cancer Mother         Malignant Neoplasm     Cancer Father         Malignant Neoplasm     Substance Abuse Father         denied    Mental illness Father         denied    Lung cancer Father     Spina bifida Child        Meds/Allergies     Medications Prior to Admission   Medication    aspirin 81 MG tablet    atorvastatin (LIPITOR) 20 mg tablet    levothyroxine 50 mcg tablet    lisinopril (ZESTRIL) 10 mg tablet    oxybutynin (DITROPAN) 5 mg tablet    traZODone (DESYREL) 150 mg tablet    cyanocobalamin (VITAMIN B-12) 1,000 mcg tablet    Elastic Bandages & Supports (B & B Carpal Tunnel Brace) MISC    LORazepam (ATIVAN) 1 mg tablet    Multiple Vitamin (MULTIVITAMIN) tablet    omega-3-acid ethyl esters (LOVAZA) 1 g capsule    Probiotic Product (PROBIOTIC-10 PO)    pyridoxine (VITAMIN B6) 100 mg tablet     Current Facility-Administered Medications   Medication Dose Route Frequency    acetaminophen (TYLENOL) tablet 650 mg  650 mg Oral Q6H PRN    aspirin chewable tablet 81 mg  81 mg Oral Daily    atorvastatin (LIPITOR) tablet 20 mg  20 mg Oral Daily    dicyclomine (BENTYL) tablet 10 mg  10 mg Oral 4x Daily (AC & HS)    fish oil capsule 1,000 mg  1,000 mg Oral Daily    heparin (porcine) subcutaneous injection 5,000 Units  5,000 Units Subcutaneous Q8H [address]    levothyroxine tablet 50 mcg  50 mcg Oral Daily    metroNIDAZOLE (FLAGYL) IVPB (premix) 500 mg 100 mL  500 mg Intravenous Q8H    oxybutynin (DITROPAN) tablet 5 mg  5 mg Oral BID    oxyCODONE (ROXICODONE) IR tablet 2 5 mg  2 5 mg Oral Q4H PRN    oxyCODONE (ROXICODONE) IR tablet 5 mg  5 mg Oral Q4H PRN    saccharomyces boulardii (FLORASTOR) capsule 250 mg  250 mg Oral BID    simethicone (MYLICON) chewable tablet 80 mg  80 mg Oral 4x Daily (with meals and at bedtime)    traZODone (DESYREL) tablet 150 mg  150 mg Oral HS    vancomycin (VANCOCIN) oral solution 125 mg  125 mg Oral Q6H [address]       Allergies   Allergen Reactions    Codeine Sulfate        Objective   Blood pressure 125/64, pulse 72, temperature 99 4 °F (37 4 °C), resp  rate 18, height 5' 7" (1 702 m), weight 74 8 kg (164 lb 14 5 oz), SpO2 94 %      Intake/Output Summary (Last 24 hours) at 5/19/2022 1048  Last data filed at 5/18/2022 1537  Gross per 24 hour   Intake 450 ml   Output --   Net 450 ml         PHYSICAL EXAM:      General Appearance:   Alert, cooperative, no distress, appears stated age    HEENT:   Normocephalic, atraumatic, anicteric      Neck:  Supple, symmetrical, trachea midline, no adenopathy;    thyroid: no enlargement/tenderness/nodules; no carotid  bruit or JVD    Lungs:   Clear to auscultation bilaterally; no rales, rhonchi or wheezing; respirations unlabored    Heart[de-identified]   S1 and S2 normal; regular rate and rhythm; no murmur, rub, or gallop     Abdomen:   Soft, non-tender, non-distended; normal bowel sounds; no masses, no organomegaly    Genitalia:   Deferred    Rectal:   Deferred    Extremities:  No cyanosis, clubbing or edema    Pulses:  2+ and symmetric all extremities    Skin:  Skin color, texture, turgor normal, no rashes or lesions    Lymph nodes:  No palpable cervical, axillary or inguinal lymphadenopathy        Lab Results:   Admission on 05/15/2022   Component Date Value    WBC 05/15/2022 21 33 (A)    RBC 05/15/2022 4 36     Hemoglobin 05/15/2022 13 8     Hematocrit 05/15/2022 40 6     MCV 05/15/2022 93     MCH 05/15/2022 31 7     MCHC 05/15/2022 34 0     RDW 05/15/2022 13 2     MPV 05/15/2022 9 1     Platelets 92/75/0801 249     nRBC 05/15/2022 0     Neutrophils Relative 05/15/2022 83 (A)    Immat GRANS % 05/15/2022 1     Lymphocytes Relative 05/15/2022 9 (A)    Monocytes Relative 05/15/2022 7     Eosinophils Relative 05/15/2022 0     Basophils Relative 05/15/2022 0     Neutrophils Absolute 05/15/2022 17 75 (A)    Immature Grans Absolute 05/15/2022 0 16     Lymphocytes Absolute 05/15/2022 1 84     Monocytes Absolute 05/15/2022 1 53 (A)    Eosinophils Absolute 05/15/2022 0 00     Basophils Absolute 05/15/2022 0 05     Sodium 05/15/2022 140     Potassium 05/15/2022 4 5     Chloride 05/15/2022 104     CO2 05/15/2022 21     ANION GAP 05/15/2022 15 (A)    BUN 05/15/2022 29 (A)    Creatinine 05/15/2022 1 71 (A)    Glucose 05/15/2022 156 (A)    Calcium 05/15/2022 9 4     AST 05/15/2022 23     ALT 05/15/2022 31     Alkaline Phosphatase 05/15/2022 40 (A)    Total Protein 05/15/2022 6 9     Albumin 05/15/2022 3 7     Total Bilirubin 05/15/2022 1 01 (A)    eGFR 05/15/2022 26     Protime 05/15/2022 14 2     INR 05/15/2022 1 15     C difficile toxin by PCR 05/17/2022 Negative     Color, UA 05/16/2022 Yellow     Clarity, UA 05/16/2022 Clear     Specific Gravity, UA 05/16/2022 1 015     pH, UA 05/16/2022 6 0     Leukocytes, UA 05/16/2022 Trace (A)    Nitrite, UA 05/16/2022 Negative     Protein, UA 05/16/2022 Negative     Glucose, UA 05/16/2022 Negative     Ketones, UA 05/16/2022 Negative     Urobilinogen, UA 05/16/2022 0 2     Bilirubin, UA 05/16/2022 Negative     Blood, UA 05/16/2022 Small (A)    Lipase 05/15/2022 38 (A)    hs TnI 0hr 05/15/2022 12     hs TnI 2hr 05/15/2022 12     Delta 2hr hsTnI 05/15/2022 0     Ventricular Rate 05/15/2022 82     Atrial Rate 05/15/2022 82     OK Interval 05/15/2022 160     QRSD Interval 05/15/2022 66     QT Interval 05/15/2022 384     QTC Interval 05/15/2022 448     P Axis 05/15/2022 67     QRS Axis 05/15/2022 -33     T Wave Axis 05/15/2022 104     LACTIC ACID 05/15/2022 2 6 (A)    hs TnI 4hr 05/15/2022 13     Delta 4hr hsTnI 05/15/2022 1     Platelets 04/59/5841 235     MPV 05/15/2022 9 2     Salmonella sp PCR 05/17/2022 None Detected     Shigella sp/Enteroinvasi* 05/17/2022 None Detected     Campylobacter sp (jejuni* 05/17/2022 None Detected     Shiga toxin 1/Shiga toxi* 05/17/2022 None Detected     LACTIC ACID 05/15/2022 3 1 (A)    LACTIC ACID 05/15/2022 2 0     Sodium 05/16/2022 136     Potassium 05/16/2022 3 8     Chloride 05/16/2022 105     CO2 05/16/2022 23     ANION GAP 05/16/2022 8     BUN 05/16/2022 23     Creatinine 05/16/2022 1 05     Glucose 05/16/2022 107     Glucose, Fasting 05/16/2022 107 (A)    Calcium 05/16/2022 8 1 (A)    eGFR 05/16/2022 48     WBC 05/16/2022 14 21 (A)    RBC 05/16/2022 3 52 (A)    Hemoglobin 05/16/2022 11 3 (A)    Hematocrit 05/16/2022 33 0 (A)    MCV 05/16/2022 94     MCH 05/16/2022 32 1     MCHC 05/16/2022 34 2     RDW 05/16/2022 13 6     MPV 05/16/2022 9 4     Platelets 18/35/6943 192     nRBC 05/16/2022 0     Neutrophils Relative 05/16/2022 71     Immat GRANS % 05/16/2022 1     Lymphocytes Relative 05/16/2022 18     Monocytes Relative 05/16/2022 10     Eosinophils Relative 05/16/2022 0     Basophils Relative 05/16/2022 0     Neutrophils Absolute 05/16/2022 10 12 (A)    Immature Grans Absolute 05/16/2022 0 08     Lymphocytes Absolute 05/16/2022 2 50     Monocytes Absolute 05/16/2022 1 45 (A)    Eosinophils Absolute 05/16/2022 0 01     Basophils Absolute 05/16/2022 0 05     RBC, UA 05/16/2022 2-4     WBC, UA 05/16/2022 2-4     Epithelial Cells 05/16/2022 Occasional     Bacteria, UA 05/16/2022 Occasional     Sed Rate 05/16/2022 18     CRP 05/16/2022 225 5 (A)    WBC 05/17/2022 9 22     RBC 05/17/2022 3 62 (A)    Hemoglobin 05/17/2022 11 2 (A)    Hematocrit 05/17/2022 34 2 (A)    MCV 05/17/2022 95     MCH 05/17/2022 30 9     MCHC 05/17/2022 32 7     RDW 05/17/2022 13 4     Platelets 03/89/1436 190     MPV 05/17/2022 9 2     Sodium 05/17/2022 139     Potassium 05/17/2022 3 7     Chloride 05/17/2022 108     CO2 05/17/2022 23     ANION GAP 05/17/2022 8     BUN 05/17/2022 13     Creatinine 05/17/2022 0 89     Glucose 05/17/2022 111     Calcium 05/17/2022 8 4     Corrected Calcium 05/17/2022 9 4     AST 05/17/2022 22     ALT 05/17/2022 19     Alkaline Phosphatase 05/17/2022 26 (A)    Total Protein 05/17/2022 5 7 (A)    Albumin 05/17/2022 2 7 (A)    Total Bilirubin 05/17/2022 0 99     eGFR 05/17/2022 58     WBC 05/18/2022 8 46     RBC 05/18/2022 4 00     Hemoglobin 05/18/2022 12 6     Hematocrit 05/18/2022 38 6     MCV 05/18/2022 97     MCH 05/18/2022 31 5     MCHC 05/18/2022 32 6     RDW 05/18/2022 13 2     MPV 05/18/2022 9 1     Platelets 43/13/1522 239     nRBC 05/18/2022 0     Neutrophils Relative 05/18/2022 61     Immat GRANS % 05/18/2022 1     Lymphocytes Relative 05/18/2022 25     Monocytes Relative 05/18/2022 10     Eosinophils Relative 05/18/2022 2     Basophils Relative 05/18/2022 1     Neutrophils Absolute 05/18/2022 5 23     Immature Grans Absolute 05/18/2022 0 05     Lymphocytes Absolute 05/18/2022 2 14     Monocytes Absolute 05/18/2022 0 86     Eosinophils Absolute 05/18/2022 0 13     Basophils Absolute 05/18/2022 0 05     Sodium 05/18/2022 136     Potassium 05/18/2022 3 6     Chloride 05/18/2022 106     CO2 05/18/2022 18 (A)    ANION GAP 05/18/2022 12     BUN 05/18/2022 7     Creatinine 05/18/2022 0 87     Glucose 05/18/2022 168 (A)    Calcium 05/18/2022 8 2 (A)    eGFR 05/18/2022 60        Imaging Studies: I have personally reviewed pertinent imaging studies  ASSESSMENT & PLAN:  Colitis  Abdominal pain  Abdominal distension  Diarrhea  -Patient's CT AP from 05/18/2022 shows persistent but slightly improved diffuse descending colonic and sigmoid colonic wall thickening   -Patient's C diff is negative   -Patient's CRP on admission was elevated at 229   -Patient's lactic acid on admission was also elevated but has normalized  -Patient's white blood cell count on admission was greater than 21,000 but again has now normalized  -Will check mesenteric Doppler  -Will plan for flexible sigmoidoscopy with biopsies tomorrow     -Will start low-dose Reglan 4 times a day  -Recommend simethicone therapy  -Diet as tolerated  NPO midnight  Patient will be seen examined by Rosa Ruiz PA-C  5/19/2022,10:48 AM

## 2022-05-19 NOTE — PLAN OF CARE
Problem: Potential for Falls  Goal: Patient will remain free of falls  Description: INTERVENTIONS:  - Educate patient/family on patient safety including physical limitations  - Instruct patient to call for assistance with activity   - Consult OT/PT to assist with strengthening/mobility   - Keep Call bell within reach  - Keep bed low and locked with side rails adjusted as appropriate  - Keep care items and personal belongings within reach  - Initiate and maintain comfort rounds  - Make Fall Risk Sign visible to staff  - Offer Toileting every  Hours, in advance of need  - Initiate/Maintain alarm  - Obtain necessary fall risk management equipment:   - Apply yellow socks and bracelet for high fall risk patients  - Consider moving patient to room near nurses station  Outcome: Progressing     Problem: PAIN - ADULT  Goal: Verbalizes/displays adequate comfort level or baseline comfort level  Description: Interventions:  - Encourage patient to monitor pain and request assistance  - Assess pain using appropriate pain scale  - Administer analgesics based on type and severity of pain and evaluate response  - Implement non-pharmacological measures as appropriate and evaluate response  - Consider cultural and social influences on pain and pain management  - Notify physician/advanced practitioner if interventions unsuccessful or patient reports new pain  Outcome: Progressing     Problem: INFECTION - ADULT  Goal: Absence or prevention of progression during hospitalization  Description: INTERVENTIONS:  - Assess and monitor for signs and symptoms of infection  - Monitor lab/diagnostic results  - Monitor all insertion sites, i e  indwelling lines, tubes, and drains  - Monitor endotracheal if appropriate and nasal secretions for changes in amount and color  - [place] appropriate cooling/warming therapies per order  - Administer medications as ordered  - Instruct and encourage patient and family to use good hand hygiene technique  - Identify and instruct in appropriate isolation precautions for identified infection/condition  Outcome: Progressing  Goal: Absence of fever/infection during neutropenic period  Description: INTERVENTIONS:  - Monitor WBC    Outcome: Progressing     Problem: SAFETY ADULT  Goal: Patient will remain free of falls  Description: INTERVENTIONS:  - Educate patient/family on patient safety including physical limitations  - Instruct patient to call for assistance with activity   - Consult OT/PT to assist with strengthening/mobility   - Keep Call bell within reach  - Keep bed low and locked with side rails adjusted as appropriate  - Keep care items and personal belongings within reach  - Initiate and maintain comfort rounds  - Make Fall Risk Sign visible to staff  - Offer Toileting every  Hours, in advance of need  - Initiate/Maintain alarm  - Obtain necessary fall risk management equipment:   - Apply yellow socks and bracelet for high fall risk patients  - Consider moving patient to room near nurses station  Outcome: Progressing  Goal: Maintain or return to baseline ADL function  Description: INTERVENTIONS:  -  Assess patient's ability to carry out ADLs; assess patient's baseline for ADL function and identify physical deficits which impact ability to perform ADLs (bathing, care of mouth/teeth, toileting, grooming, dressing, etc )  - Assess/evaluate cause of self-care deficits   - Assess range of motion  - Assess patient's mobility; develop plan if impaired  - Assess patient's need for assistive devices and provide as appropriate  - Encourage maximum independence but intervene and supervise when necessary  - Involve family in performance of ADLs  - Assess for home care needs following discharge   - Consider OT consult to assist with ADL evaluation and planning for discharge  - Provide patient education as appropriate  Outcome: Progressing  Goal: Maintains/Returns to pre admission functional level  Description: INTERVENTIONS:  - Perform BMAT or MOVE assessment daily    - Set and communicate daily mobility goal to care team and patient/family/caregiver  - Collaborate with rehabilitation services on mobility goals if consulted  - Perform Range of Motion  times a day  - Reposition patient every  hours  - Dangle patient  times a day  - Stand patient  times a day  - Ambulate patient  times a day  - Out of bed to chair  times a day   - Out of bed for meals  times a day  - Out of bed for toileting  - Record patient progress and toleration of activity level   Outcome: Progressing     Problem: DISCHARGE PLANNING  Goal: Discharge to home or other facility with appropriate resources  Description: INTERVENTIONS:  - Identify barriers to discharge w/patient and caregiver  - Arrange for needed discharge resources and transportation as appropriate  - Identify discharge learning needs (meds, wound care, etc )  - Arrange for interpretive services to assist at discharge as needed  - Refer to Case Management Department for coordinating discharge planning if the patient needs post-hospital services based on physician/advanced practitioner order or complex needs related to functional status, cognitive ability, or social support system  Outcome: Progressing     Problem: Knowledge Deficit  Goal: Patient/family/caregiver demonstrates understanding of disease process, treatment plan, medications, and discharge instructions  Description: Complete learning assessment and assess knowledge base    Interventions:  - Provide teaching at level of understanding  - Provide teaching via preferred learning methods  Outcome: Progressing     Problem: MOBILITY - ADULT  Goal: Maintain or return to baseline ADL function  Description: INTERVENTIONS:  -  Assess patient's ability to carry out ADLs; assess patient's baseline for ADL function and identify physical deficits which impact ability to perform ADLs (bathing, care of mouth/teeth, toileting, grooming, dressing, etc )  - Assess/evaluate cause of self-care deficits   - Assess range of motion  - Assess patient's mobility; develop plan if impaired  - Assess patient's need for assistive devices and provide as appropriate  - Encourage maximum independence but intervene and supervise when necessary  - Involve family in performance of ADLs  - Assess for home care needs following discharge   - Consider OT consult to assist with ADL evaluation and planning for discharge  - Provide patient education as appropriate  Outcome: Progressing  Goal: Maintains/Returns to pre admission functional level  Description: INTERVENTIONS:  - Perform BMAT or MOVE assessment daily    - Set and communicate daily mobility goal to care team and patient/family/caregiver  - Collaborate with rehabilitation services on mobility goals if consulted  - Perform Range of Motion  times a day  - Reposition patient every  hours    - Dangle patient times a day  - Stand patient  times a day  - Ambulate patient  times a day  - Out of bed to chair  times a day   - Out of bed for meals  times a day  - Out of bed for toileting  - Record patient progress and toleration of activity level   Outcome: Progressing

## 2022-05-20 ENCOUNTER — ANESTHESIA (INPATIENT)
Dept: GASTROENTEROLOGY | Facility: HOSPITAL | Age: 87
DRG: 392 | End: 2022-05-20
Payer: COMMERCIAL

## 2022-05-20 ENCOUNTER — ANESTHESIA EVENT (INPATIENT)
Dept: GASTROENTEROLOGY | Facility: HOSPITAL | Age: 87
DRG: 392 | End: 2022-05-20
Payer: COMMERCIAL

## 2022-05-20 ENCOUNTER — APPOINTMENT (INPATIENT)
Dept: GASTROENTEROLOGY | Facility: HOSPITAL | Age: 87
DRG: 392 | End: 2022-05-20
Payer: COMMERCIAL

## 2022-05-20 LAB
ALBUMIN SERPL BCP-MCNC: 2.4 G/DL (ref 3.5–5)
ALP SERPL-CCNC: 29 U/L (ref 46–116)
ALT SERPL W P-5'-P-CCNC: 32 U/L (ref 12–78)
ANION GAP SERPL CALCULATED.3IONS-SCNC: 9 MMOL/L (ref 4–13)
AST SERPL W P-5'-P-CCNC: 41 U/L (ref 5–45)
BILIRUB SERPL-MCNC: 0.35 MG/DL (ref 0.2–1)
BUN SERPL-MCNC: 4 MG/DL (ref 5–25)
CALCIUM ALBUM COR SERPL-MCNC: 9.6 MG/DL (ref 8.3–10.1)
CALCIUM SERPL-MCNC: 8.3 MG/DL (ref 8.3–10.1)
CHLORIDE SERPL-SCNC: 108 MMOL/L (ref 100–108)
CO2 SERPL-SCNC: 24 MMOL/L (ref 21–32)
CREAT SERPL-MCNC: 0.81 MG/DL (ref 0.6–1.3)
ERYTHROCYTE [DISTWIDTH] IN BLOOD BY AUTOMATED COUNT: 13.2 % (ref 11.6–15.1)
GFR SERPL CREATININE-BSD FRML MDRD: 65 ML/MIN/1.73SQ M
GLUCOSE SERPL-MCNC: 101 MG/DL (ref 65–140)
HCT VFR BLD AUTO: 32.3 % (ref 34.8–46.1)
HGB BLD-MCNC: 10.9 G/DL (ref 11.5–15.4)
MCH RBC QN AUTO: 31.3 PG (ref 26.8–34.3)
MCHC RBC AUTO-ENTMCNC: 33.7 G/DL (ref 31.4–37.4)
MCV RBC AUTO: 93 FL (ref 82–98)
PLATELET # BLD AUTO: 239 THOUSANDS/UL (ref 149–390)
PMV BLD AUTO: 9 FL (ref 8.9–12.7)
POTASSIUM SERPL-SCNC: 3.3 MMOL/L (ref 3.5–5.3)
PROT SERPL-MCNC: 5.1 G/DL (ref 6.4–8.2)
RBC # BLD AUTO: 3.48 MILLION/UL (ref 3.81–5.12)
SODIUM SERPL-SCNC: 141 MMOL/L (ref 136–145)
WBC # BLD AUTO: 10.54 THOUSAND/UL (ref 4.31–10.16)

## 2022-05-20 PROCEDURE — 88305 TISSUE EXAM BY PATHOLOGIST: CPT | Performed by: PATHOLOGY

## 2022-05-20 PROCEDURE — 87493 C DIFF AMPLIFIED PROBE: CPT | Performed by: INTERNAL MEDICINE

## 2022-05-20 PROCEDURE — 85027 COMPLETE CBC AUTOMATED: CPT | Performed by: PHYSICIAN ASSISTANT

## 2022-05-20 PROCEDURE — 45380 COLONOSCOPY AND BIOPSY: CPT | Performed by: INTERNAL MEDICINE

## 2022-05-20 PROCEDURE — 99233 SBSQ HOSP IP/OBS HIGH 50: CPT | Performed by: NURSE PRACTITIONER

## 2022-05-20 PROCEDURE — 88341 IMHCHEM/IMCYTCHM EA ADD ANTB: CPT | Performed by: PATHOLOGY

## 2022-05-20 PROCEDURE — 80053 COMPREHEN METABOLIC PANEL: CPT | Performed by: PHYSICIAN ASSISTANT

## 2022-05-20 PROCEDURE — 0DBN8ZX EXCISION OF SIGMOID COLON, VIA NATURAL OR ARTIFICIAL OPENING ENDOSCOPIC, DIAGNOSTIC: ICD-10-PCS | Performed by: INTERNAL MEDICINE

## 2022-05-20 PROCEDURE — 97161 PT EVAL LOW COMPLEX 20 MIN: CPT

## 2022-05-20 PROCEDURE — 88342 IMHCHEM/IMCYTCHM 1ST ANTB: CPT | Performed by: PATHOLOGY

## 2022-05-20 RX ORDER — PROPOFOL 10 MG/ML
INJECTION, EMULSION INTRAVENOUS AS NEEDED
Status: DISCONTINUED | OUTPATIENT
Start: 2022-05-20 | End: 2022-05-20

## 2022-05-20 RX ORDER — SODIUM CHLORIDE, SODIUM LACTATE, POTASSIUM CHLORIDE, CALCIUM CHLORIDE 600; 310; 30; 20 MG/100ML; MG/100ML; MG/100ML; MG/100ML
INJECTION, SOLUTION INTRAVENOUS CONTINUOUS PRN
Status: DISCONTINUED | OUTPATIENT
Start: 2022-05-20 | End: 2022-05-20

## 2022-05-20 RX ORDER — LIDOCAINE HYDROCHLORIDE 20 MG/ML
INJECTION, SOLUTION EPIDURAL; INFILTRATION; INTRACAUDAL; PERINEURAL AS NEEDED
Status: DISCONTINUED | OUTPATIENT
Start: 2022-05-20 | End: 2022-05-20

## 2022-05-20 RX ADMIN — PROPOFOL 20 MG: 10 INJECTION, EMULSION INTRAVENOUS at 13:59

## 2022-05-20 RX ADMIN — OXYBUTYNIN CHLORIDE 5 MG: 5 TABLET ORAL at 16:52

## 2022-05-20 RX ADMIN — OXYBUTYNIN CHLORIDE 5 MG: 5 TABLET ORAL at 08:55

## 2022-05-20 RX ADMIN — DICYCLOMINE HYDROCHLORIDE 10 MG: 20 TABLET ORAL at 08:55

## 2022-05-20 RX ADMIN — ATORVASTATIN CALCIUM 20 MG: 20 TABLET, FILM COATED ORAL at 08:55

## 2022-05-20 RX ADMIN — HEPARIN SODIUM 5000 UNITS: 5000 INJECTION INTRAVENOUS; SUBCUTANEOUS at 05:09

## 2022-05-20 RX ADMIN — HEPARIN SODIUM 5000 UNITS: 5000 INJECTION INTRAVENOUS; SUBCUTANEOUS at 14:53

## 2022-05-20 RX ADMIN — Medication 250 MG: at 16:52

## 2022-05-20 RX ADMIN — PROPOFOL 20 MG: 10 INJECTION, EMULSION INTRAVENOUS at 13:43

## 2022-05-20 RX ADMIN — SIMETHICONE 80 MG: 80 TABLET, CHEWABLE ORAL at 16:53

## 2022-05-20 RX ADMIN — HEPARIN SODIUM 5000 UNITS: 5000 INJECTION INTRAVENOUS; SUBCUTANEOUS at 21:25

## 2022-05-20 RX ADMIN — PROPOFOL 20 MG: 10 INJECTION, EMULSION INTRAVENOUS at 13:45

## 2022-05-20 RX ADMIN — SIMETHICONE 80 MG: 80 TABLET, CHEWABLE ORAL at 08:55

## 2022-05-20 RX ADMIN — DICYCLOMINE HYDROCHLORIDE 10 MG: 20 TABLET ORAL at 16:52

## 2022-05-20 RX ADMIN — PROPOFOL 20 MG: 10 INJECTION, EMULSION INTRAVENOUS at 13:48

## 2022-05-20 RX ADMIN — VANCOMYCIN HYDROCHLORIDE 250 MG: 5 INJECTION, POWDER, LYOPHILIZED, FOR SOLUTION INTRAVENOUS at 16:52

## 2022-05-20 RX ADMIN — DICYCLOMINE HYDROCHLORIDE 10 MG: 20 TABLET ORAL at 21:24

## 2022-05-20 RX ADMIN — TRAZODONE HYDROCHLORIDE 150 MG: 100 TABLET ORAL at 21:24

## 2022-05-20 RX ADMIN — PROPOFOL 20 MG: 10 INJECTION, EMULSION INTRAVENOUS at 13:50

## 2022-05-20 RX ADMIN — PROPOFOL 20 MG: 10 INJECTION, EMULSION INTRAVENOUS at 13:57

## 2022-05-20 RX ADMIN — LIDOCAINE HYDROCHLORIDE 40 MG: 20 INJECTION, SOLUTION EPIDURAL; INFILTRATION; INTRACAUDAL at 13:39

## 2022-05-20 RX ADMIN — PROPOFOL 50 MG: 10 INJECTION, EMULSION INTRAVENOUS at 13:39

## 2022-05-20 RX ADMIN — SIMETHICONE 80 MG: 80 TABLET, CHEWABLE ORAL at 21:24

## 2022-05-20 RX ADMIN — PROPOFOL 20 MG: 10 INJECTION, EMULSION INTRAVENOUS at 13:55

## 2022-05-20 RX ADMIN — PROPOFOL 20 MG: 10 INJECTION, EMULSION INTRAVENOUS at 13:53

## 2022-05-20 RX ADMIN — SODIUM CHLORIDE, SODIUM LACTATE, POTASSIUM CHLORIDE, AND CALCIUM CHLORIDE: .6; .31; .03; .02 INJECTION, SOLUTION INTRAVENOUS at 13:30

## 2022-05-20 RX ADMIN — LEVOTHYROXINE SODIUM 50 MCG: 50 TABLET ORAL at 05:09

## 2022-05-20 RX ADMIN — PROPOFOL 30 MG: 10 INJECTION, EMULSION INTRAVENOUS at 13:41

## 2022-05-20 RX ADMIN — ASPIRIN 81 MG 81 MG: 81 TABLET ORAL at 08:55

## 2022-05-20 RX ADMIN — OMEGA-3 FATTY ACIDS CAP 1000 MG 1000 MG: 1000 CAP at 08:55

## 2022-05-20 RX ADMIN — Medication 250 MG: at 08:55

## 2022-05-20 NOTE — ASSESSMENT & PLAN NOTE
Lab Results   Component Value Date    EGFR 65 05/20/2022    EGFR 60 05/18/2022    EGFR 58 05/17/2022    CREATININE 0 81 05/20/2022    CREATININE 0 87 05/18/2022    CREATININE 0 89 05/17/2022   see plan under RANJAN

## 2022-05-20 NOTE — ANESTHESIA POSTPROCEDURE EVALUATION
Post-Op Assessment Note    CV Status:  Stable  Pain Score: 0    Pain management: adequate     Mental Status:  Arousable and sleepy   Hydration Status:  Euvolemic   PONV Controlled:  Controlled   Airway Patency:  Patent      Post Op Vitals Reviewed: Yes      Staff: CRNA         No complications documented      BP   127/60   Temp      Pulse 70   Resp 18   SpO2 95% RA

## 2022-05-20 NOTE — ASSESSMENT & PLAN NOTE
· Baseline creatinine 0 8-0 9, presented with creatinine 1 71   · Resolved with IV fluids   · Continue to monitor

## 2022-05-20 NOTE — PHYSICAL THERAPY NOTE
Physical Therapy Evaluation     Patient's Name: Candida Frances    Admitting Diagnosis  Colitis [K52 9]  Abdominal pain [R10 9]    Problem List  Patient Active Problem List   Diagnosis    Urinary incontinence    Other insomnia    Abdominal pain    RANJAN (acute kidney injury) ([facility] [address] )    Lung nodule < 6cm on CT    Elevated rheumatoid factor    Generalized osteoarthritis    Hyperlipidemia    Hyperparathyroidism ([facility] [address] )    Hypertension    Hypothyroidism    Overactive bladder    Strain of lumbar region    Lumbar disc disease with radiculopathy    Ischial bursitis of right side    Lumbar spondylosis    Trochanteric bursitis of left hip    Trochanteric bursitis of right hip    Piriformis syndrome of left side    Spinal stenosis of lumbar region without neurogenic claudication    Bilateral hand pain    Numbness and tingling in left hand    Screening for diabetes mellitus    Medicare annual wellness visit, subsequent    Primary insomnia    Vestibulopathy of both ears    Stage 3 chronic kidney disease, unspecified whether stage 3a or 3b CKD ([facility] [address] )    Dry mouth    Hoarseness of voice    Dysuria    Hypertriglyceridemia    Colitis     Past Medical History  Past Medical History:   Diagnosis Date    BCC (basal cell carcinoma)     Benign uterine neoplasm     C  difficile colitis     Disease of thyroid gland     Hyperlipidemia     Hypertension      Past Surgical History  Past Surgical History:   Procedure Laterality Date    APPENDECTOMY      CATARACT EXTRACTION      Last assessed - 1/14/16    MALIGNANT SKIN LESION EXCISION      Face    PARATHYROIDECTOMY      DE WRIST ARTHROSCOP,RELEASE Kiah Mood LIG Left 3/23/2021    Procedure: RELEASE LEFT CARPAL TUNNEL ENDOSCOPIC;  Surgeon: Rosy Lancaster MD;  Location: [facility];  Service: Orthopedics    REPLACEMENT TOTAL KNEE Bilateral     ROTATOR CUFF REPAIR Left     Last assessed - 1/14/16    TOTAL ABDOMINAL HYSTERECTOMY      with derrell oopherectomy, Last assessed - 1/14/16 05/20/22 3282   PT Last Visit PT Visit Date 05/20/22   Note Type   Note type Evaluation   Pain Assessment   Pain Assessment Tool 0-10   Pain Score No Pain   Restrictions/Precautions   Weight Bearing Precautions Per Order No   Braces or Orthoses Other (Comment)  (none per patient)   Other Precautions Fall Risk   Home Living   Type of [place] to enter without rails; One level  (2 ALYCE; 1 step from living room)   Bathroom Shower/Tub Tub/shower unit   [employer] Raised   Bathroom Equipment Shower chair   [address] Other (Comment)  (none per patient)   Additional Comments Pt ambulates without an AD  Prior Function   Level of [place] Independent with ADLs and functional mobility   Lives With Spouse   Receives Help From Family   ADL Assistance Independent   IADLs Independent   Falls in the last 6 months 0   Vocational Retired   General   Family/Caregiver Present Yes  (pt's )   Cognition   Overall Cognitive Status WFL   Arousal/Participation Alert   Orientation Level Oriented X4   Memory Within functional limits   Following Commands Follows all commands and directions without difficulty   Comments Pt agreeable to PT  Subjective   Subjective "I walk "   RLE Assessment   RLE Assessment WFL   LLE Assessment   LLE Assessment WFL   Light Touch   RLE Light Touch Grossly intact   LLE Light Touch Grossly intact   Bed Mobility   Additional Comments Pt was received seated in chair in NAD  Transfers   Sit to Stand 6  Modified independent   Additional items Armrests; Increased time required   Stand to Sit 6  Modified independent   Additional items Armrests; Increased time required   Ambulation/Elevation   Gait pattern WNL   Gait Assistance 7  Independent   Assistive Device None   Distance 200 feet   Stair Management Assistance 6  Modified independent   Additional items Verbal cues   Stair Management Technique One rail R;Alternating pattern; Foreward   Number of Stairs 3   Balance Static Sitting Normal   Dynamic Sitting Good   Static Standing Good   Dynamic Standing Good   Ambulatory Good   Endurance Deficit   Endurance Deficit No   Activity Tolerance   Activity Tolerance Patient tolerated treatment well   Assessment   Prognosis Good   Assessment Pt is 80year old female seen for PT evaluation s/p admit to [facility] on 5/15/2022 with Colitis  PT consulted to assess pt's functional mobility and d/c needs  Order placed for PT eval and tx, with up and OOB as tolerated order  Comorbidities affecting pt's physical performance at time of assessment include RANJAN, hyperlipidemia, hypertension, and stage 3 CKD  PTA, pt was independent with all functional mobility without an AD  Pt ambulates household and community distances  Pt resides with her spouse in an one level house with two steps to enter  Personal factors affecting pt at time of IE include stairs to enter home  Please find objective findings from PT assessment regarding body systems outlined above  The following objective measures performed on IE also reveal limitations: Barthel Index: 95/100, Modified [place]: 1 (no significant disability) and AM-PAC 6-Clicks: 14/32  Pt's clinical presentation is currently stable seen in pt's presentation of need for ongoing medical management/monitoring  From PT/mobility standpoint, recommendation at time of d/c would be home with no needs  Pt independent with transfers and functional mobility  No acute PT needs identified  Plan to discontinue PT     Barriers to Discharge None   Plan   PT Frequency Other (Comment)  (discontinue PT)   Recommendation   PT Discharge Recommendation No rehabilitation needs   AM-PAC Basic Mobility Inpatient   Turning in Bed Without Bedrails 4   Lying on Back to Sitting on Edge of Flat Bed 4   Moving Bed to Chair 4   Standing Up From Chair 4   Walk in Room 4   Climb 3-5 Stairs 4   Basic Mobility Inpatient Raw Score 24   Basic Mobility Standardized Score 57 68   Highest Level Of Mobility   JH-HLM Goal 8: Walk 250 feet or more   JH-HLM Achieved 7: Walk 25 feet or more   Modified [place] Scale   Modified Sumit Scale 1   Barthel Index   Feeding 10   Bathing 5   Grooming Score 5   Dressing Score 10   Bladder Score 10   Bowels Score 10   Toilet Use Score 10   Transfers (Bed/Chair) Score 15   Mobility (Level Surface) Score 15   Stairs Score 5   Barthel Index Score 95     PT Evaluation Time: 5937-0006    Sarah Perez, PT, DPT

## 2022-05-20 NOTE — PROGRESS NOTES
[address] [facility]  Progress Note - Ede Jara 1935, 80 y o  female MRN: [id number]  Unit/Bed#: -01 Encounter: [id number]  Primary Care Provider: Shannan Malcolm MD   Date and time admitted to hospital: 5/15/2022  7:33 AM    * Colitis  Assessment & Plan  Patient complaining of abdominal pain that started acutely night prior to admit and also associated non-bloddy diarrhea  PMHx C diff  · CT abdomen pelvis 5/15/22:  Diffuse abnormal appearance of the distal transverse colon, descending colon and sigmoid colon, most compatible with colitis, either infectious or inflammatory   Less likely would be ischemic, given the lack of pneumatosis, however intravenous contrast material not administered     · Leukocytosis resolved, lactic acidosis resolved   · Not meeting sepsis criteria   · General surgery consulted - no intervention planned  · C diff negative, although she responded well to oral vanco/flagyl  · Consulted GI 5/19  · Mesenteric study patent with vessels that were visualized  · GI recommending Flex sigmoidoscopy today     Stage 3 chronic kidney disease, unspecified whether stage 3a or 3b CKD [facility])  Assessment & Plan  Lab Results   Component Value Date    EGFR 65 05/20/2022    EGFR 60 05/18/2022    EGFR 58 05/17/2022    CREATININE 0 81 05/20/2022    CREATININE 0 87 05/18/2022    CREATININE 0 89 05/17/2022   see plan under RANJAN     Hypothyroidism  Assessment & Plan  · Continue levothyroxine    Hypertension  Assessment & Plan  · Monitor per unit protocol, was low and now normalized s/p IV fluids     Hyperlipidemia  Assessment & Plan  · Continue statin    RANJAN (acute kidney injury) ([facility] [address] )  Assessment & Plan  · Baseline creatinine 0 8-0 9, presented with creatinine 1 71   · Resolved with IV fluids   · Continue to monitor           VTE Pharmacologic Prophylaxis: VTE Score: 3 Moderate Risk (Score 3-4) - Pharmacological DVT Prophylaxis Ordered: heparin      Patient Centered Rounds: I performed bedside rounds with nursing staff today  Discussions with Specialists or Other Care Team Provider:  GI note reviewed    Education and Discussions with Family / Patient: Updated  () at bedside  Patient in hospital as well updated patient at his bedside    Time Spent for Care: 30 minutes  More than 50% of total time spent on counseling and coordination of care as described above  Current Length of Stay: 4 day(s)  Current Patient Status: Inpatient   Certification Statement: The patient will continue to require additional inpatient hospital stay due to Ongoing diagnostic evaluation and intervention for colitis  Discharge Plan: Anticipate discharge tomorrow to home  Code Status: Level 3 - DNAR and DNI    Subjective:   Patient reporting some pain understands the need for diagnostic evaluation denies any fevers or chills understands the need to have intervention to undergo the scope has no other questions at this time update given on 's condition had no other questions  Objective:     Vitals:   Temp (24hrs), Av 1 °F (37 3 °C), Min:98 9 °F (37 2 °C), Max:99 6 °F (37 6 °C)    Temp:  [98 9 °F (37 2 °C)-99 6 °F (37 6 °C)] 98 9 °F (37 2 °C)  HR:  [66-80] 66  Resp:  [18] 18  BP: (113-140)/(56-74) 123/68  SpO2:  [92 %-95 %] 92 %  Body mass index is 25 83 kg/m²  Input and Output Summary (last 24 hours):   No intake or output data in the 24 hours ending 22 0855    Physical Exam:   Physical Exam  Vitals and nursing note reviewed  Constitutional:       General: She is not in acute distress  Appearance: She is well-developed  HENT:      Head: Normocephalic and atraumatic  Eyes:      Conjunctiva/sclera: Conjunctivae normal    Cardiovascular:      Rate and Rhythm: Normal rate and regular rhythm  Heart sounds: No murmur heard  Pulmonary:      Effort: Pulmonary effort is normal  No respiratory distress  Breath sounds: Normal breath sounds     Abdominal: Palpations: Abdomen is soft  Tenderness: There is abdominal tenderness in the left lower quadrant  Musculoskeletal:      Cervical back: Neck supple  Skin:     General: Skin is warm and dry  Neurological:      Mental Status: She is alert and oriented to person, place, and time  Mental status is at baseline     Psychiatric:         Mood and Affect: Mood normal          Behavior: Behavior normal           Additional Data:     Labs:  Results from last 7 days   Lab Units 05/20/22  0447 05/18/22  0950   WBC Thousand/uL 10 54* 8 46   HEMOGLOBIN g/dL 10 9* 12 6   HEMATOCRIT % 32 3* 38 6   PLATELETS Thousands/uL 239 239   NEUTROS PCT %  --  61   LYMPHS PCT %  --  25   MONOS PCT %  --  10   EOS PCT %  --  2     Results from last 7 days   Lab Units 05/20/22  0447   SODIUM mmol/L 141   POTASSIUM mmol/L 3 3*   CHLORIDE mmol/L 108   CO2 mmol/L 24   BUN mg/dL 4*   CREATININE mg/dL 0 81   ANION GAP mmol/L 9   CALCIUM mg/dL 8 3   ALBUMIN g/dL 2 4*   TOTAL BILIRUBIN mg/dL 0 35   ALK PHOS U/L 29*   ALT U/L 32   AST U/L 41   GLUCOSE RANDOM mg/dL 101     Results from last 7 days   Lab Units 05/15/22  0807   INR  1 15             Results from last 7 days   Lab Units 05/15/22  1548 05/15/22  1227 05/15/22  1006   LACTIC ACID mmol/L 2 0 3 1* 2 6*       Lines/Drains:  Invasive Devices  Report    Peripheral Intravenous Line  Duration           Peripheral IV 05/19/22 Right Antecubital <1 day                      Imaging: Reviewed radiology reports from this admission including: abdominal/pelvic CT and ultrasound(s)    Recent Cultures (last 7 days):   Results from last 7 days   Lab Units 05/17/22  0845   C DIFF TOXIN B BY PCR  Negative       Last 24 Hours Medication List:   Current Facility-Administered Medications   Medication Dose Route Frequency Provider Last Rate    acetaminophen  650 mg Oral Q6H PRN Hosea Olanta, DO      aspirin  81 mg Oral Daily Hosea Olanta, DO      atorvastatin  20 mg Oral Daily Gerhardt Gores Kristyncprestono,       dicyclomine  10 mg Oral 4x Daily (AC & HS) Sparkle Anglin PA-C      fish oil  1,000 mg Oral Daily Macy Chasten, DO      heparin (porcine)  5,000 Units Subcutaneous [facility] Macy Chasten, DO      levothyroxine  50 mcg Oral Daily Macy Chasten, DO      oxybutynin  5 mg Oral BID Macy Chasten, DO      oxyCODONE  2 5 mg Oral Q4H PRN Macy Chasten, DO      oxyCODONE  5 mg Oral Q4H PRN Macy Chasten, DO      saccharomyces boulardii  250 mg Oral BID Vikki Drake PA-C      simethicone  80 mg Oral 4x Daily (with meals and at bedtime) Sparkle Anglin PA-C      traZODone  150 mg Oral HS Macyyonatan Villagomez DO          Today, Patient Was Seen By: PRIYA Campbell    **Please Note: This note may have been constructed using a voice recognition system  **

## 2022-05-20 NOTE — PLAN OF CARE
Problem: Potential for Falls  Goal: Patient will remain free of falls  Description: INTERVENTIONS:  - Educate patient/family on patient safety including physical limitations  - Instruct patient to call for assistance with activity   - Consult OT/PT to assist with strengthening/mobility   - Keep Call bell within reach  - Keep bed low and locked with side rails adjusted as appropriate  - Keep care items and personal belongings within reach  - Initiate and maintain comfort rounds  - Make Fall Risk Sign visible to staff  - Offer Toileting every  Hours, in advance of need  - Initiate/Maintain alarm  - Obtain necessary fall risk management equipment:   - Apply yellow socks and bracelet for high fall risk patients  - Consider moving patient to room near nurses station  Outcome: Progressing     Problem: PAIN - ADULT  Goal: Verbalizes/displays adequate comfort level or baseline comfort level  Description: Interventions:  - Encourage patient to monitor pain and request assistance  - Assess pain using appropriate pain scale  - Administer analgesics based on type and severity of pain and evaluate response  - Implement non-pharmacological measures as appropriate and evaluate response  - Consider cultural and social influences on pain and pain management  - Notify physician/advanced practitioner if interventions unsuccessful or patient reports new pain  Outcome: Progressing     Problem: INFECTION - ADULT  Goal: Absence or prevention of progression during hospitalization  Description: INTERVENTIONS:  - Assess and monitor for signs and symptoms of infection  - Monitor lab/diagnostic results  - Monitor all insertion sites, i e  indwelling lines, tubes, and drains  - Monitor endotracheal if appropriate and nasal secretions for changes in amount and color  - [place] appropriate cooling/warming therapies per order  - Administer medications as ordered  - Instruct and encourage patient and family to use good hand hygiene technique  - Identify and instruct in appropriate isolation precautions for identified infection/condition  Outcome: Progressing  Goal: Absence of fever/infection during neutropenic period  Description: INTERVENTIONS:  - Monitor WBC    Outcome: Progressing     Problem: SAFETY ADULT  Goal: Patient will remain free of falls  Description: INTERVENTIONS:  - Educate patient/family on patient safety including physical limitations  - Instruct patient to call for assistance with activity   - Consult OT/PT to assist with strengthening/mobility   - Keep Call bell within reach  - Keep bed low and locked with side rails adjusted as appropriate  - Keep care items and personal belongings within reach  - Initiate and maintain comfort rounds  - Make Fall Risk Sign visible to staff  - Offer Toileting every  Hours, in advance of need  - Initiate/Maintain alarm  - Obtain necessary fall risk management equipment:   - Apply yellow socks and bracelet for high fall risk patients  - Consider moving patient to room near nurses station  Outcome: Progressing  Goal: Maintain or return to baseline ADL function  Description: INTERVENTIONS:  -  Assess patient's ability to carry out ADLs; assess patient's baseline for ADL function and identify physical deficits which impact ability to perform ADLs (bathing, care of mouth/teeth, toileting, grooming, dressing, etc )  - Assess/evaluate cause of self-care deficits   - Assess range of motion  - Assess patient's mobility; develop plan if impaired  - Assess patient's need for assistive devices and provide as appropriate  - Encourage maximum independence but intervene and supervise when necessary  - Involve family in performance of ADLs  - Assess for home care needs following discharge   - Consider OT consult to assist with ADL evaluation and planning for discharge  - Provide patient education as appropriate  Outcome: Progressing  Goal: Maintains/Returns to pre admission functional level  Description: INTERVENTIONS:  - Perform BMAT or MOVE assessment daily    - Set and communicate daily mobility goal to care team and patient/family/caregiver  - Collaborate with rehabilitation services on mobility goals if consulted  - Perform Range of Motion  times a day  - Reposition patient every  hours  - Dangle patient  times a day  - Stand patient  times a day  - Ambulate patient  times a day  - Out of bed to chair  times a day   - Out of bed for meals  times a day  - Out of bed for toileting  - Record patient progress and toleration of activity level   Outcome: Progressing     Problem: DISCHARGE PLANNING  Goal: Discharge to home or other facility with appropriate resources  Description: INTERVENTIONS:  - Identify barriers to discharge w/patient and caregiver  - Arrange for needed discharge resources and transportation as appropriate  - Identify discharge learning needs (meds, wound care, etc )  - Arrange for interpretive services to assist at discharge as needed  - Refer to Case Management Department for coordinating discharge planning if the patient needs post-hospital services based on physician/advanced practitioner order or complex needs related to functional status, cognitive ability, or social support system  Outcome: Progressing     Problem: Knowledge Deficit  Goal: Patient/family/caregiver demonstrates understanding of disease process, treatment plan, medications, and discharge instructions  Description: Complete learning assessment and assess knowledge base    Interventions:  - Provide teaching at level of understanding  - Provide teaching via preferred learning methods  Outcome: Progressing     Problem: MOBILITY - ADULT  Goal: Maintain or return to baseline ADL function  Description: INTERVENTIONS:  -  Assess patient's ability to carry out ADLs; assess patient's baseline for ADL function and identify physical deficits which impact ability to perform ADLs (bathing, care of mouth/teeth, toileting, grooming, dressing, etc )  - Assess/evaluate cause of self-care deficits   - Assess range of motion  - Assess patient's mobility; develop plan if impaired  - Assess patient's need for assistive devices and provide as appropriate  - Encourage maximum independence but intervene and supervise when necessary  - Involve family in performance of ADLs  - Assess for home care needs following discharge   - Consider OT consult to assist with ADL evaluation and planning for discharge  - Provide patient education as appropriate  Outcome: Progressing  Goal: Maintains/Returns to pre admission functional level  Description: INTERVENTIONS:  - Perform BMAT or MOVE assessment daily    - Set and communicate daily mobility goal to care team and patient/family/caregiver  - Collaborate with rehabilitation services on mobility goals if consulted  - Perform Range of Motion  times a day  - Reposition patient every  hours    - Dangle patient  times a day  - Stand patient  times a day  - Ambulate patient  times a day  - Out of bed to chair  times a day   - Out of bed for meals  times a day  - Out of bed for toileting  - Record patient progress and toleration of activity level   Outcome: Progressing

## 2022-05-20 NOTE — PLAN OF CARE
Problem: Potential for Falls  Goal: Patient will remain free of falls  Description: INTERVENTIONS:  - Educate patient/family on patient safety including physical limitations  - Instruct patient to call for assistance with activity   - Consult OT/PT to assist with strengthening/mobility   - Keep Call bell within reach  - Keep bed low and locked with side rails adjusted as appropriate  - Keep care items and personal belongings within reach  - Initiate and maintain comfort rounds  - Make Fall Risk Sign visible to staff  - Offer Toileting every  Hours, in advance of need  - Initiate/Maintain alarm  - Obtain necessary fall risk management equipment:   - Apply yellow socks and bracelet for high fall risk patients  - Consider moving patient to room near nurses station  Outcome: Progressing     Problem: PAIN - ADULT  Goal: Verbalizes/displays adequate comfort level or baseline comfort level  Description: Interventions:  - Encourage patient to monitor pain and request assistance  - Assess pain using appropriate pain scale  - Administer analgesics based on type and severity of pain and evaluate response  - Implement non-pharmacological measures as appropriate and evaluate response  - Consider cultural and social influences on pain and pain management  - Notify physician/advanced practitioner if interventions unsuccessful or patient reports new pain  Outcome: Progressing     Problem: INFECTION - ADULT  Goal: Absence or prevention of progression during hospitalization  Description: INTERVENTIONS:  - Assess and monitor for signs and symptoms of infection  - Monitor lab/diagnostic results  - Monitor all insertion sites, i e  indwelling lines, tubes, and drains  - Monitor endotracheal if appropriate and nasal secretions for changes in amount and color  - [place] appropriate cooling/warming therapies per order  - Administer medications as ordered  - Instruct and encourage patient and family to use good hand hygiene technique  - Identify and instruct in appropriate isolation precautions for identified infection/condition  Outcome: Progressing  Goal: Absence of fever/infection during neutropenic period  Description: INTERVENTIONS:  - Monitor WBC    Outcome: Progressing     Problem: SAFETY ADULT  Goal: Patient will remain free of falls  Description: INTERVENTIONS:  - Educate patient/family on patient safety including physical limitations  - Instruct patient to call for assistance with activity   - Consult OT/PT to assist with strengthening/mobility   - Keep Call bell within reach  - Keep bed low and locked with side rails adjusted as appropriate  - Keep care items and personal belongings within reach  - Initiate and maintain comfort rounds  - Make Fall Risk Sign visible to staff  - Offer Toileting every  Hours, in advance of need  - Initiate/Maintain alarm  - Obtain necessary fall risk management equipment:   - Apply yellow socks and bracelet for high fall risk patients  - Consider moving patient to room near nurses station  Outcome: Progressing  Goal: Maintain or return to baseline ADL function  Description: INTERVENTIONS:  -  Assess patient's ability to carry out ADLs; assess patient's baseline for ADL function and identify physical deficits which impact ability to perform ADLs (bathing, care of mouth/teeth, toileting, grooming, dressing, etc )  - Assess/evaluate cause of self-care deficits   - Assess range of motion  - Assess patient's mobility; develop plan if impaired  - Assess patient's need for assistive devices and provide as appropriate  - Encourage maximum independence but intervene and supervise when necessary  - Involve family in performance of ADLs  - Assess for home care needs following discharge   - Consider OT consult to assist with ADL evaluation and planning for discharge  - Provide patient education as appropriate  Outcome: Progressing  Goal: Maintains/Returns to pre admission functional level  Description: INTERVENTIONS:  - Perform BMAT or MOVE assessment daily    - Set and communicate daily mobility goal to care team and patient/family/caregiver  - Collaborate with rehabilitation services on mobility goals if consulted  - Perform Range of Motion  times a day  - Reposition patient every  hours  - Dangle patient  times a day  - Stand patient  times a day  - Ambulate patient  times a day  - Out of bed to chair  times a day   - Out of bed for meals  times a day  - Out of bed for toileting  - Record patient progress and toleration of activity level   Outcome: Progressing     Problem: DISCHARGE PLANNING  Goal: Discharge to home or other facility with appropriate resources  Description: INTERVENTIONS:  - Identify barriers to discharge w/patient and caregiver  - Arrange for needed discharge resources and transportation as appropriate  - Identify discharge learning needs (meds, wound care, etc )  - Arrange for interpretive services to assist at discharge as needed  - Refer to Case Management Department for coordinating discharge planning if the patient needs post-hospital services based on physician/advanced practitioner order or complex needs related to functional status, cognitive ability, or social support system  Outcome: Progressing     Problem: Knowledge Deficit  Goal: Patient/family/caregiver demonstrates understanding of disease process, treatment plan, medications, and discharge instructions  Description: Complete learning assessment and assess knowledge base    Interventions:  - Provide teaching at level of understanding  - Provide teaching via preferred learning methods  Outcome: Progressing     Problem: MOBILITY - ADULT  Goal: Maintain or return to baseline ADL function  Description: INTERVENTIONS:  -  Assess patient's ability to carry out ADLs; assess patient's baseline for ADL function and identify physical deficits which impact ability to perform ADLs (bathing, care of mouth/teeth, toileting, grooming, dressing, etc )  - Assess/evaluate cause of self-care deficits   - Assess range of motion  - Assess patient's mobility; develop plan if impaired  - Assess patient's need for assistive devices and provide as appropriate  - Encourage maximum independence but intervene and supervise when necessary  - Involve family in performance of ADLs  - Assess for home care needs following discharge   - Consider OT consult to assist with ADL evaluation and planning for discharge  - Provide patient education as appropriate  Outcome: Progressing  Goal: Maintains/Returns to pre admission functional level  Description: INTERVENTIONS:  - Perform BMAT or MOVE assessment daily    - Set and communicate daily mobility goal to care team and patient/family/caregiver  - Collaborate with rehabilitation services on mobility goals if consulted  - Perform Range of Motion  times a day  - Reposition patient every  hours    - Dangle patient  times a day  - Stand patient  times a day  - Ambulate patient  times a day  - Out of bed to chair  times a day   - Out of bed for meals times a day  - Out of bed for toileting  - Record patient progress and toleration of activity level   Outcome: Progressing

## 2022-05-20 NOTE — ASSESSMENT & PLAN NOTE
Patient complaining of abdominal pain that started acutely night prior to admit and also associated non-bloddy diarrhea  PMHx C diff    · CT abdomen pelvis 5/15/22:  Diffuse abnormal appearance of the distal transverse colon, descending colon and sigmoid colon, most compatible with colitis, either infectious or inflammatory   Less likely would be ischemic, given the lack of pneumatosis, however intravenous contrast material not administered     · Leukocytosis resolved, lactic acidosis resolved   · Not meeting sepsis criteria   · General surgery consulted - no intervention planned  · C diff negative, although she responded well to oral vanco/flagyl  · Consulted GI 5/19  · Mesenteric study patent with vessels that were visualized  · GI recommending Flex sigmoidoscopy today

## 2022-05-20 NOTE — ANESTHESIA PREPROCEDURE EVALUATION
Procedure:  FLEXIBLE SIGMOIDOSCOPY    Relevant Problems   CARDIO   (+) Hyperlipidemia   (+) Hypertension   (+) Hypertriglyceridemia      ENDO   (+) Hyperparathyroidism (HCC)   (+) Hypothyroidism      /RENAL   (+) RANJAN (acute kidney injury) (HCC)   (+) Stage 3 chronic kidney disease, unspecified whether stage 3a or 3b CKD (HCC)      MUSCULOSKELETAL   (+) Generalized osteoarthritis   (+) Lumbar spondylosis   (+) Piriformis syndrome of left side   (+) Strain of lumbar region      NEURO/PSYCH   (+) Numbness and tingling in left hand      Digestive   (+) Colitis      Nervous and Auditory   (+) Lumbar disc disease with radiculopathy      Other   (+) Abdominal pain   (+) Dysuria   (+) Lung nodule < 6cm on CT   (+) Spinal stenosis of lumbar region without neurogenic claudication   (+) Urinary incontinence        Physical Exam    Airway    Mallampati score: II  TM Distance: >3 FB  Neck ROM: limited     Dental   upper dentures,     Cardiovascular  Rhythm: regular, Rate: normal, Murmur,     Pulmonary  Breath sounds clear to auscultation,     Other Findings        Anesthesia Plan  ASA Score- 3     Anesthesia Type- IV sedation with anesthesia with ASA Monitors  Additional Monitors:   Airway Plan:     Comment: DNR/DNI- does not want aggressive care for a condition that is irreversible  [facility] for procedural related issues          Plan Factors-Exercise tolerance (METS): <4 METS  Chart reviewed  EKG reviewed  Imaging results reviewed  Existing labs reviewed  Patient summary reviewed  Patient is not a current smoker  Induction- intravenous  Postoperative Plan-     Informed Consent- Anesthetic plan and risks discussed with patient  I personally reviewed this patient with the CRNA  Discussed and agreed on the Anesthesia Plan with the CRNA  Vivienne Mcrae

## 2022-05-21 LAB
ANION GAP SERPL CALCULATED.3IONS-SCNC: 10 MMOL/L (ref 4–13)
BUN SERPL-MCNC: 7 MG/DL (ref 5–25)
C DIFF TOX B TCDB STL QL NAA+PROBE: NEGATIVE
CALCIUM SERPL-MCNC: 8.7 MG/DL (ref 8.3–10.1)
CHLORIDE SERPL-SCNC: 107 MMOL/L (ref 100–108)
CO2 SERPL-SCNC: 24 MMOL/L (ref 21–32)
CREAT SERPL-MCNC: 0.79 MG/DL (ref 0.6–1.3)
ERYTHROCYTE [DISTWIDTH] IN BLOOD BY AUTOMATED COUNT: 13.5 % (ref 11.6–15.1)
GFR SERPL CREATININE-BSD FRML MDRD: 67 ML/MIN/1.73SQ M
GLUCOSE SERPL-MCNC: 90 MG/DL (ref 65–140)
HCT VFR BLD AUTO: 34.3 % (ref 34.8–46.1)
HGB BLD-MCNC: 11.4 G/DL (ref 11.5–15.4)
MCH RBC QN AUTO: 31.1 PG (ref 26.8–34.3)
MCHC RBC AUTO-ENTMCNC: 33.2 G/DL (ref 31.4–37.4)
MCV RBC AUTO: 94 FL (ref 82–98)
PLATELET # BLD AUTO: 281 THOUSANDS/UL (ref 149–390)
PMV BLD AUTO: 9.6 FL (ref 8.9–12.7)
POTASSIUM SERPL-SCNC: 3.1 MMOL/L (ref 3.5–5.3)
RBC # BLD AUTO: 3.66 MILLION/UL (ref 3.81–5.12)
SODIUM SERPL-SCNC: 141 MMOL/L (ref 136–145)
WBC # BLD AUTO: 12.87 THOUSAND/UL (ref 4.31–10.16)

## 2022-05-21 PROCEDURE — 99233 SBSQ HOSP IP/OBS HIGH 50: CPT | Performed by: NURSE PRACTITIONER

## 2022-05-21 PROCEDURE — 99232 SBSQ HOSP IP/OBS MODERATE 35: CPT | Performed by: PHYSICIAN ASSISTANT

## 2022-05-21 PROCEDURE — 80048 BASIC METABOLIC PNL TOTAL CA: CPT | Performed by: NURSE PRACTITIONER

## 2022-05-21 PROCEDURE — 85027 COMPLETE CBC AUTOMATED: CPT | Performed by: NURSE PRACTITIONER

## 2022-05-21 PROCEDURE — NC001 PR NO CHARGE: Performed by: PHYSICIAN ASSISTANT

## 2022-05-21 RX ADMIN — DICYCLOMINE HYDROCHLORIDE 10 MG: 20 TABLET ORAL at 11:44

## 2022-05-21 RX ADMIN — VANCOMYCIN HYDROCHLORIDE 250 MG: 5 INJECTION, POWDER, LYOPHILIZED, FOR SOLUTION INTRAVENOUS at 05:22

## 2022-05-21 RX ADMIN — HEPARIN SODIUM 5000 UNITS: 5000 INJECTION INTRAVENOUS; SUBCUTANEOUS at 21:57

## 2022-05-21 RX ADMIN — VANCOMYCIN HYDROCHLORIDE 250 MG: 5 INJECTION, POWDER, LYOPHILIZED, FOR SOLUTION INTRAVENOUS at 17:01

## 2022-05-21 RX ADMIN — TRAZODONE HYDROCHLORIDE 150 MG: 100 TABLET ORAL at 21:56

## 2022-05-21 RX ADMIN — Medication 250 MG: at 08:26

## 2022-05-21 RX ADMIN — SIMETHICONE 80 MG: 80 TABLET, CHEWABLE ORAL at 17:01

## 2022-05-21 RX ADMIN — LEVOTHYROXINE SODIUM 50 MCG: 50 TABLET ORAL at 05:23

## 2022-05-21 RX ADMIN — OXYBUTYNIN CHLORIDE 5 MG: 5 TABLET ORAL at 17:01

## 2022-05-21 RX ADMIN — HEPARIN SODIUM 5000 UNITS: 5000 INJECTION INTRAVENOUS; SUBCUTANEOUS at 14:43

## 2022-05-21 RX ADMIN — DICYCLOMINE HYDROCHLORIDE 10 MG: 20 TABLET ORAL at 17:01

## 2022-05-21 RX ADMIN — OXYBUTYNIN CHLORIDE 5 MG: 5 TABLET ORAL at 08:26

## 2022-05-21 RX ADMIN — OMEGA-3 FATTY ACIDS CAP 1000 MG 1000 MG: 1000 CAP at 08:26

## 2022-05-21 RX ADMIN — VANCOMYCIN HYDROCHLORIDE 250 MG: 5 INJECTION, POWDER, LYOPHILIZED, FOR SOLUTION INTRAVENOUS at 11:44

## 2022-05-21 RX ADMIN — SIMETHICONE 80 MG: 80 TABLET, CHEWABLE ORAL at 21:56

## 2022-05-21 RX ADMIN — ASPIRIN 81 MG 81 MG: 81 TABLET ORAL at 08:26

## 2022-05-21 RX ADMIN — VANCOMYCIN HYDROCHLORIDE 250 MG: 5 INJECTION, POWDER, LYOPHILIZED, FOR SOLUTION INTRAVENOUS at 00:00

## 2022-05-21 RX ADMIN — SIMETHICONE 80 MG: 80 TABLET, CHEWABLE ORAL at 11:44

## 2022-05-21 RX ADMIN — Medication 250 MG: at 17:01

## 2022-05-21 RX ADMIN — ATORVASTATIN CALCIUM 20 MG: 20 TABLET, FILM COATED ORAL at 08:26

## 2022-05-21 RX ADMIN — DICYCLOMINE HYDROCHLORIDE 10 MG: 20 TABLET ORAL at 21:57

## 2022-05-21 RX ADMIN — HEPARIN SODIUM 5000 UNITS: 5000 INJECTION INTRAVENOUS; SUBCUTANEOUS at 05:23

## 2022-05-21 RX ADMIN — SIMETHICONE 80 MG: 80 TABLET, CHEWABLE ORAL at 08:26

## 2022-05-21 RX ADMIN — DICYCLOMINE HYDROCHLORIDE 10 MG: 20 TABLET ORAL at 08:26

## 2022-05-21 NOTE — PLAN OF CARE
Problem: Potential for Falls  Goal: Patient will remain free of falls  Description: INTERVENTIONS:  - Educate patient/family on patient safety including physical limitations  - Instruct patient to call for assistance with activity   - Consult OT/PT to assist with strengthening/mobility   - Keep Call bell within reach  - Keep bed low and locked with side rails adjusted as appropriate  - Keep care items and personal belongings within reach  - Initiate and maintain comfort rounds  - Make Fall Risk Sign visible to staff  - Offer Toileting every  Hours, in advance of need  - Initiate/Maintain alarm  - Obtain necessary fall risk management equipment:   - Apply yellow socks and bracelet for high fall risk patients  - Consider moving patient to room near nurses station  Outcome: Progressing     Problem: PAIN - ADULT  Goal: Verbalizes/displays adequate comfort level or baseline comfort level  Description: Interventions:  - Encourage patient to monitor pain and request assistance  - Assess pain using appropriate pain scale  - Administer analgesics based on type and severity of pain and evaluate response  - Implement non-pharmacological measures as appropriate and evaluate response  - Consider cultural and social influences on pain and pain management  - Notify physician/advanced practitioner if interventions unsuccessful or patient reports new pain  Outcome: Progressing     Problem: INFECTION - ADULT  Goal: Absence or prevention of progression during hospitalization  Description: INTERVENTIONS:  - Assess and monitor for signs and symptoms of infection  - Monitor lab/diagnostic results  - Monitor all insertion sites, i e  indwelling lines, tubes, and drains  - Monitor endotracheal if appropriate and nasal secretions for changes in amount and color  - [place] appropriate cooling/warming therapies per order  - Administer medications as ordered  - Instruct and encourage patient and family to use good hand hygiene technique  - Identify and instruct in appropriate isolation precautions for identified infection/condition  Outcome: Progressing  Goal: Absence of fever/infection during neutropenic period  Description: INTERVENTIONS:  - Monitor WBC    Outcome: Progressing     Problem: SAFETY ADULT  Goal: Patient will remain free of falls  Description: INTERVENTIONS:  - Educate patient/family on patient safety including physical limitations  - Instruct patient to call for assistance with activity   - Consult OT/PT to assist with strengthening/mobility   - Keep Call bell within reach  - Keep bed low and locked with side rails adjusted as appropriate  - Keep care items and personal belongings within reach  - Initiate and maintain comfort rounds  - Make Fall Risk Sign visible to staff  - Offer Toileting every  Hours, in advance of need  - Initiate/Maintain alarm  - Obtain necessary fall risk management equipment:   - Apply yellow socks and bracelet for high fall risk patients  - Consider moving patient to room near nurses station  Outcome: Progressing  Goal: Maintain or return to baseline ADL function  Description: INTERVENTIONS:  -  Assess patient's ability to carry out ADLs; assess patient's baseline for ADL function and identify physical deficits which impact ability to perform ADLs (bathing, care of mouth/teeth, toileting, grooming, dressing, etc )  - Assess/evaluate cause of self-care deficits   - Assess range of motion  - Assess patient's mobility; develop plan if impaired  - Assess patient's need for assistive devices and provide as appropriate  - Encourage maximum independence but intervene and supervise when necessary  - Involve family in performance of ADLs  - Assess for home care needs following discharge   - Consider OT consult to assist with ADL evaluation and planning for discharge  - Provide patient education as appropriate  Outcome: Progressing  Goal: Maintains/Returns to pre admission functional level  Description: INTERVENTIONS:  - Perform BMAT or MOVE assessment daily    - Set and communicate daily mobility goal to care team and patient/family/caregiver  - Collaborate with rehabilitation services on mobility goals if consulted  - Perform Range of Motion  times a day  - Reposition patient every  hours  - Dangle patient  times a day  - Stand patient  times a day  - Ambulate patient  times a day  - Out of bed to chair  times a day   - Out of bed for meals  times a day  - Out of bed for toileting  - Record patient progress and toleration of activity level   Outcome: Progressing     Problem: DISCHARGE PLANNING  Goal: Discharge to home or other facility with appropriate resources  Description: INTERVENTIONS:  - Identify barriers to discharge w/patient and caregiver  - Arrange for needed discharge resources and transportation as appropriate  - Identify discharge learning needs (meds, wound care, etc )  - Arrange for interpretive services to assist at discharge as needed  - Refer to Case Management Department for coordinating discharge planning if the patient needs post-hospital services based on physician/advanced practitioner order or complex needs related to functional status, cognitive ability, or social support system  Outcome: Progressing     Problem: Knowledge Deficit  Goal: Patient/family/caregiver demonstrates understanding of disease process, treatment plan, medications, and discharge instructions  Description: Complete learning assessment and assess knowledge base    Interventions:  - Provide teaching at level of understanding  - Provide teaching via preferred learning methods  Outcome: Progressing     Problem: MOBILITY - ADULT  Goal: Maintain or return to baseline ADL function  Description: INTERVENTIONS:  -  Assess patient's ability to carry out ADLs; assess patient's baseline for ADL function and identify physical deficits which impact ability to perform ADLs (bathing, care of mouth/teeth, toileting, grooming, dressing, etc )  - Assess/evaluate cause of self-care deficits   - Assess range of motion  - Assess patient's mobility; develop plan if impaired  - Assess patient's need for assistive devices and provide as appropriate  - Encourage maximum independence but intervene and supervise when necessary  - Involve family in performance of ADLs  - Assess for home care needs following discharge   - Consider OT consult to assist with ADL evaluation and planning for discharge  - Provide patient education as appropriate  Outcome: Progressing  Goal: Maintains/Returns to pre admission functional level  Description: INTERVENTIONS:  - Perform BMAT or MOVE assessment daily    - Set and communicate daily mobility goal to care team and patient/family/caregiver  - Collaborate with rehabilitation services on mobility goals if consulted  - Perform Range of Motion  times a day  - Reposition patient every  hours    - Dangle patient  times a day  - Stand patient  times a day  - Ambulate patient  times a day  - Out of bed to chair  times a day   - Out of bed for meals times a day  - Out of bed for toileting  - Record patient progress and toleration of activity level   Outcome: Progressing

## 2022-05-21 NOTE — PROGRESS NOTES
[address] [facility]  Progress Note - Magy Garza 1935, 80 y o  female MRN: [id number]  Unit/Bed#: -[facility] Encounter: [id number]  Primary Care Provider: Abimael Coleman MD   Date and time admitted to hospital: 5/15/2022  7:33 AM    * Colitis  Assessment & Plan  Patient complaining of abdominal pain that started acutely night prior to admit and also associated non-bloddy diarrhea  PMHx C diff  · CT abdomen pelvis 5/15/22:  Diffuse abnormal appearance of the distal transverse colon, descending colon and sigmoid colon, most compatible with colitis, either infectious or inflammatory   Less likely would be ischemic, given the lack of pneumatosis, however intravenous contrast material not administered     · Leukocytosis resolved, lactic acidosis resolved   · Not meeting sepsis criteria   · General surgery consulted - no intervention planned  · C diff negative, although she responded well to oral vanco/flagyl  · Consulted GI 5/19  · Mesenteric study patent with vessels that were visualized  · S/p flexsigmoidoscopy 5/20:Colitis involving the sigmoid colon from 50 - 20 cm  The worse inflammation was from 20 cm up to 30 cm, demonstrating possible pseudomembranes and color changes suggesting possible ischemia as well  Multiple biopsies taken      Recommend restart vanco await biopsy results   ·  Repeat C diff testing pending    Stage 3 chronic kidney disease, unspecified whether stage 3a or 3b CKD [facility])  Assessment & Plan  Lab Results   Component Value Date    EGFR 67 05/21/2022    EGFR 65 05/20/2022    EGFR 60 05/18/2022    CREATININE 0 79 05/21/2022    CREATININE 0 81 05/20/2022    CREATININE 0 87 05/18/2022   see plan under RANJAN     Hypothyroidism  Assessment & Plan  · Continue levothyroxine    Hypertension  Assessment & Plan  · Monitor per unit protocol, was low and now normalized s/p IV fluids     Hyperlipidemia  Assessment & Plan  · Continue statin    RANJAN (acute kidney injury) ([facility] [address] )  Assessment & Plan  · Baseline creatinine 0 8-0 9, presented with creatinine 1 71   · Resolved with IV fluids   · Continue to monitor           VTE Pharmacologic Prophylaxis: VTE Score: 3 Moderate Risk (Score 3-4) - Pharmacological DVT Prophylaxis Ordered: heparin  Patient Centered Rounds: I performed bedside rounds with nursing staff today  Discussions with Specialists or Other Care Team Provider:  GI    Education and Discussions with Family / Patient:  updated at his bedside  Time Spent for Care: 30 minutes  More than 50% of total time spent on counseling and coordination of care as described above  Current Length of Stay: 5 day(s)  Current Patient Status: Inpatient   Certification Statement: The patient will continue to require additional inpatient hospital stay due to Colitis  Discharge Plan: Anticipate discharge tomorrow to home  Code Status: Level 3 - DNAR and DNI    Subjective:   Patient reporting occasional right lower quadrant abdominal pain but overall states that has improved brief update given regarding scope findings and GI eyes recommendation patient understands the need to stay until C diff result have been obtained to adequate treatment can be applied if she is positive  Patient understands that if results come back she may be able to go home later today  Objective:     Vitals:   Temp (24hrs), Av 4 °F (36 9 °C), Min:97 6 °F (36 4 °C), Max:99 1 °F (37 3 °C)    Temp:  [97 6 °F (36 4 °C)-99 1 °F (37 3 °C)] 98 4 °F (36 9 °C)  HR:  [61-79] 79  Resp:  [16-23] 16  BP: (109-143)/(51-72) 128/72  SpO2:  [93 %-98 %] 93 %  Body mass index is 25 83 kg/m²  Input and Output Summary (last 24 hours): Intake/Output Summary (Last 24 hours) at 2022 1142  Last data filed at 2022 1400  Gross per 24 hour   Intake 300 ml   Output --   Net 300 ml       Physical Exam:   Physical Exam  Vitals and nursing note reviewed  Constitutional:       General: She is not in acute distress  Appearance: She is well-developed  HENT:      Head: Normocephalic and atraumatic  Eyes:      Conjunctiva/sclera: Conjunctivae normal    Cardiovascular:      Rate and Rhythm: Normal rate and regular rhythm  Heart sounds: No murmur heard  Pulmonary:      Effort: Pulmonary effort is normal  No respiratory distress  Breath sounds: Normal breath sounds  Abdominal:      Palpations: Abdomen is soft  Tenderness: There is abdominal tenderness  Musculoskeletal:      Cervical back: Neck supple  Skin:     General: Skin is warm and dry  Neurological:      General: No focal deficit present  Mental Status: She is alert and oriented to person, place, and time  Psychiatric:         Mood and Affect: Mood normal          Behavior: Behavior normal           Additional Data:     Labs:  Results from last 7 days   Lab Units 05/21/22  0531 05/20/22  0447 05/18/22  0950   WBC Thousand/uL 12 87*   < > 8 46   HEMOGLOBIN g/dL 11 4*   < > 12 6   HEMATOCRIT % 34 3*   < > 38 6   PLATELETS Thousands/uL 281   < > 239   NEUTROS PCT %  --   --  61   LYMPHS PCT %  --   --  25   MONOS PCT %  --   --  10   EOS PCT %  --   --  2    < > = values in this interval not displayed       Results from last 7 days   Lab Units 05/21/22  0531 05/20/22  0447   SODIUM mmol/L 141 141   POTASSIUM mmol/L 3 1* 3 3*   CHLORIDE mmol/L 107 108   CO2 mmol/L 24 24   BUN mg/dL 7 4*   CREATININE mg/dL 0 79 0 81   ANION GAP mmol/L 10 9   CALCIUM mg/dL 8 7 8 3   ALBUMIN g/dL  --  2 4*   TOTAL BILIRUBIN mg/dL  --  0 35   ALK PHOS U/L  --  29*   ALT U/L  --  32   AST U/L  --  41   GLUCOSE RANDOM mg/dL 90 101     Results from last 7 days   Lab Units 05/15/22  0807   INR  1 15             Results from last 7 days   Lab Units 05/15/22  1548 05/15/22  1227 05/15/22  1006   LACTIC ACID mmol/L 2 0 3 1* 2 6*       Lines/Drains:  Invasive Devices  Report    Peripheral Intravenous Line  Duration           Peripheral IV 05/19/22 Right Antecubital 1 day Imaging: No pertinent imaging reviewed  Recent Cultures (last 7 days):   Results from last 7 days   Lab Units 05/17/22  0845   C DIFF TOXIN B BY PCR  Negative       Last 24 Hours Medication List:   Current Facility-Administered Medications   Medication Dose Route Frequency Provider Last Rate    acetaminophen  650 mg Oral Q6H PRN Joceline Limerick, DO      aspirin  81 mg Oral Daily Joceline Limerick, DO      atorvastatin  20 mg Oral Daily Joceline Limerick, DO      dicyclomine  10 mg Oral 4x Daily (AC & HS) Sixto Shearer PA-C      fish oil  1,000 mg Oral Daily Joceline Limerick, DO      heparin (porcine)  5,000 Units Subcutaneous [facility] Joceline Limerick, DO      levothyroxine  50 mcg Oral Daily Joceline Limerick, DO      oxybutynin  5 mg Oral BID Joceline Limerick, DO      oxyCODONE  2 5 mg Oral Q4H PRN Joceline Limerick, DO      oxyCODONE  5 mg Oral Q4H PRN Joceline Limerick, DO      saccharomyces boulardii  250 mg Oral BID Vikki Drake PA-C      simethicone  80 mg Oral 4x Daily (with meals and at bedtime) Sixto Shearer PA-C      traZODone  150 mg Oral HS Joceline Limerick, DO      vancomycin  250 mg Oral Q6H [address] Fiordaliza Hester PA-C          Today, Patient Was Seen By: PRIYA Scott    **Please Note: This note may have been constructed using a voice recognition system  **

## 2022-05-21 NOTE — PLAN OF CARE
Problem: Potential for Falls  Goal: Patient will remain free of falls  Description: INTERVENTIONS:  - Educate patient/family on patient safety including physical limitations  - Instruct patient to call for assistance with activity   - Consult OT/PT to assist with strengthening/mobility   - Keep Call bell within reach  - Keep bed low and locked with side rails adjusted as appropriate  - Keep care items and personal belongings within reach  - Initiate and maintain comfort rounds  - Make Fall Risk Sign visible to staff  - Offer Toileting every  Hours, in advance of need  - Initiate/Maintain alarm  - Obtain necessary fall risk management equipment:   - Apply yellow socks and bracelet for high fall risk patients  - Consider moving patient to room near nurses station  Outcome: Progressing     Problem: PAIN - ADULT  Goal: Verbalizes/displays adequate comfort level or baseline comfort level  Description: Interventions:  - Encourage patient to monitor pain and request assistance  - Assess pain using appropriate pain scale  - Administer analgesics based on type and severity of pain and evaluate response  - Implement non-pharmacological measures as appropriate and evaluate response  - Consider cultural and social influences on pain and pain management  - Notify physician/advanced practitioner if interventions unsuccessful or patient reports new pain  Outcome: Progressing     Problem: INFECTION - ADULT  Goal: Absence or prevention of progression during hospitalization  Description: INTERVENTIONS:  - Assess and monitor for signs and symptoms of infection  - Monitor lab/diagnostic results  - Monitor all insertion sites, i e  indwelling lines, tubes, and drains  - Monitor endotracheal if appropriate and nasal secretions for changes in amount and color  - [place] appropriate cooling/warming therapies per order  - Administer medications as ordered  - Instruct and encourage patient and family to use good hand hygiene technique  - Identify and instruct in appropriate isolation precautions for identified infection/condition  Outcome: Progressing  Goal: Absence of fever/infection during neutropenic period  Description: INTERVENTIONS:  - Monitor WBC    Outcome: Progressing     Problem: SAFETY ADULT  Goal: Patient will remain free of falls  Description: INTERVENTIONS:  - Educate patient/family on patient safety including physical limitations  - Instruct patient to call for assistance with activity   - Consult OT/PT to assist with strengthening/mobility   - Keep Call bell within reach  - Keep bed low and locked with side rails adjusted as appropriate  - Keep care items and personal belongings within reach  - Initiate and maintain comfort rounds  - Make Fall Risk Sign visible to staff  - Offer Toileting every  Hours, in advance of need  - Initiate/Maintain alarm  - Obtain necessary fall risk management equipment:  - Apply yellow socks and bracelet for high fall risk patients  - Consider moving patient to room near nurses station  Outcome: Progressing  Goal: Maintain or return to baseline ADL function  Description: INTERVENTIONS:  -  Assess patient's ability to carry out ADLs; assess patient's baseline for ADL function and identify physical deficits which impact ability to perform ADLs (bathing, care of mouth/teeth, toileting, grooming, dressing, etc )  - Assess/evaluate cause of self-care deficits   - Assess range of motion  - Assess patient's mobility; develop plan if impaired  - Assess patient's need for assistive devices and provide as appropriate  - Encourage maximum independence but intervene and supervise when necessary  - Involve family in performance of ADLs  - Assess for home care needs following discharge   - Consider OT consult to assist with ADL evaluation and planning for discharge  - Provide patient education as appropriate  Outcome: Progressing  Goal: Maintains/Returns to pre admission functional level  Description: INTERVENTIONS:  - Perform BMAT or MOVE assessment daily    - Set and communicate daily mobility goal to care team and patient/family/caregiver  - Collaborate with rehabilitation services on mobility goals if consulted  - Perform Range of Motion  times a day  - Reposition patient every  hours  - Dangle patient  times a day  - Stand patient  times a day  - Ambulate patient  times a day  - Out of bed to chair  times a day   - Out of bed for meals  times a day  - Out of bed for toileting  - Record patient progress and toleration of activity level   Outcome: Progressing     Problem: DISCHARGE PLANNING  Goal: Discharge to home or other facility with appropriate resources  Description: INTERVENTIONS:  - Identify barriers to discharge w/patient and caregiver  - Arrange for needed discharge resources and transportation as appropriate  - Identify discharge learning needs (meds, wound care, etc )  - Arrange for interpretive services to assist at discharge as needed  - Refer to Case Management Department for coordinating discharge planning if the patient needs post-hospital services based on physician/advanced practitioner order or complex needs related to functional status, cognitive ability, or social support system  Outcome: Progressing     Problem: Knowledge Deficit  Goal: Patient/family/caregiver demonstrates understanding of disease process, treatment plan, medications, and discharge instructions  Description: Complete learning assessment and assess knowledge base    Interventions:  - Provide teaching at level of understanding  - Provide teaching via preferred learning methods  Outcome: Progressing     Problem: MOBILITY - ADULT  Goal: Maintain or return to baseline ADL function  Description: INTERVENTIONS:  -  Assess patient's ability to carry out ADLs; assess patient's baseline for ADL function and identify physical deficits which impact ability to perform ADLs (bathing, care of mouth/teeth, toileting, grooming, dressing, etc )  - Assess/evaluate cause of self-care deficits   - Assess range of motion  - Assess patient's mobility; develop plan if impaired  - Assess patient's need for assistive devices and provide as appropriate  - Encourage maximum independence but intervene and supervise when necessary  - Involve family in performance of ADLs  - Assess for home care needs following discharge   - Consider OT consult to assist with ADL evaluation and planning for discharge  - Provide patient education as appropriate  Outcome: Progressing  Goal: Maintains/Returns to pre admission functional level  Description: INTERVENTIONS:  - Perform BMAT or MOVE assessment daily    - Set and communicate daily mobility goal to care team and patient/family/caregiver  - Collaborate with rehabilitation services on mobility goals if consulted  - Perform Range of Motion  times a day  - Reposition patient every  hours    - Dangle patient  times a day  - Stand patient  times a day  - Ambulate patient  times a day  - Out of bed to chair  times a day   - Out of bed for meals  times a day  - Out of bed for toileting  - Record patient progress and toleration of activity level   Outcome: Progressing

## 2022-05-21 NOTE — PROGRESS NOTES
Progress Note  Hiro Stewart 80 y o  female MRN: [id number]  Unit/Bed#: -01 Encounter: [id number]    Subjective:  Patient reports her abdominal pain is significantly less  She is tolerating a diet  No vomiting  No fevers  She reports small amounts of diarrhea and a small amount of bleeding  Objective:     Vitals:   Vitals:    05/21/22 0704   BP: 128/72   Pulse: 79   Resp:    Temp: 98 4 °F (36 9 °C)   SpO2: 93%   ,Body mass index is 25 83 kg/m²  Intake/Output Summary (Last 24 hours) at 5/21/2022 0951  Last data filed at 5/20/2022 1400  Gross per 24 hour   Intake 300 ml   Output --   Net 300 ml       Physical Exam:     General Appearance: Alert, appears stated age and cooperative  Lungs: Clear to auscultation bilaterally, no rales or rhonchi, no labored breathing/accessory muscle use  Heart: Regular rate and rhythm, S1, S2 normal, no murmur, click, rub or gallop  Abdomen: Soft, non-tender, non-distended; bowel sounds normal; no masses or no organomegaly  Extremities: No cyanosis, edema    Invasive Devices  Report    Peripheral Intravenous Line  Duration           Peripheral IV 05/19/22 Right Antecubital 1 day                Lab Results:  No results displayed because visit has over 200 results  Imaging Studies:   I have personally reviewed pertinent imaging studies  CT abdomen pelvis wo contrast    Result Date: 5/18/2022  Narrative: CT ABDOMEN AND PELVIS WITHOUT IV CONTRAST INDICATION:   LLQ abdominal pain re-eval for resolving colitis due to continued abdominal pain  COMPARISON:  None  TECHNIQUE:  CT examination of the abdomen and pelvis was performed without intravenous contrast  This examination was performed without intravenous contrast in the context of the critical nationwide Omnipaque shortage  Axial, sagittal, and coronal 2D reformatted images were created from the source data and submitted for interpretation  Radiation dose length product (DLP) for this visit:  612 mGy-cm     This examination, like all CT scans performed in the [facility], was performed utilizing techniques to minimize radiation dose exposure, including the use of iterative reconstruction and automated exposure control  Enteric contrast was administered  FINDINGS: ABDOMEN LOWER CHEST:  The known nodular tubular density right middle lobe remains unchanged  Trace pleural effusions  Small hiatal hernia  LIVER/BILIARY TREE:  Unremarkable  GALLBLADDER:  There are gallstone(s) within the gallbladder, without pericholecystic inflammatory changes  SPLEEN:  Unremarkable  PANCREAS:  Unremarkable  ADRENAL GLANDS:  Unremarkable  KIDNEYS/URETERS:  Unremarkable  No hydronephrosis  STOMACH AND BOWEL:  Persistent but improved thickening of the descending colon and sigmoid colon  No bowel obstruction bowel pneumatosis  APPENDIX:  No findings to suggest appendicitis  ABDOMINOPELVIC CAVITY:  No ascites  No pneumoperitoneum  No lymphadenopathy  VESSELS:  Unremarkable for patient's age  PELVIS REPRODUCTIVE ORGANS:  Unremarkable for patient's age  URINARY BLADDER:  Unremarkable  ABDOMINAL WALL/INGUINAL REGIONS:  Small uncomplicated fat-containing umbilical hernia  OSSEOUS STRUCTURES:  No acute fracture or destructive osseous lesion  Impression: Persistent but slightly improved diffuse descending colonic and sigmoid colonic thickening  No bowel obstruction or bowel pneumatosis  Workstation performed: [id number]     CT abdomen pelvis wo contrast    Result Date: 5/15/2022  Narrative: CT ABDOMEN AND PELVIS WITHOUT IV CONTRAST INDICATION:   abdominal pain  No additional history was afforded in Epic at time of dictation  The patient's entire past medical history is obtained from [email or web address] technologist notes only, via copy and paste  COMPARISON:  Noncontrast CT abdomen pelvis August 22, 2018 as well as abdominal ultrasound July 13, 2018   TECHNIQUE:  CT examination of the abdomen and pelvis was performed without intravenous contrast  This examination was performed without intravenous contrast in the context of the critical nationwide Omnipaque shortage  Axial, sagittal, and coronal 2D reformatted images were created from the source data and submitted for interpretation  Radiation dose length product (DLP) for this visit:  600 mGy-cm   This examination, like all CT scans performed in the [facility], was performed utilizing techniques to minimize radiation dose exposure, including the use of iterative reconstruction and automated exposure control  Enteric contrast was not administered  FINDINGS: ABDOMEN LOWER CHEST:  Stable noncalcified tubular nodular density right middle lobe (series 2, image 2)  LIVER/BILIARY TREE:  Enlarged with fatty infiltrative changes  GALLBLADDER:  Stable gallbladder calculi  No pericholecystic inflammation to suggest acute cholecystitis  SPLEEN:  Unremarkable  PANCREAS:  Unremarkable  ADRENAL GLANDS:  Unremarkable  KIDNEYS/URETERS:  Unremarkable  No hydronephrosis  STOMACH AND BOWEL:  Evaluation of the GI tract somewhat limited due to lack of oral contrast material  The stomach is relatively decompressed and inadequately evaluated  There is a hiatal hernia  No evidence of small bowel obstruction  The cecum is mobile and lies in the midabdomen, to the right of midline  The colon is segmentally distended with feces, up to the level of the hepatic flexure  Distally, the colon is relatively decompressed  There is mild wall thickening involving the distal transverse colon, descending colon and sigmoid colon, with associated luminal narrowing  Mild amount of pericolonic inflammatory change surrounding the descending colon and sigmoid colon  Small amount of fluid along the left paracolic gutter, extending into the pelvis  Scattered diverticula in the distal sigmoid colon  APPENDIX:  Surgically absent  ABDOMINOPELVIC CAVITY:  No ascites  No pneumoperitoneum  No lymphadenopathy   VESSELS:  Advanced atherosclerotic changes are present throughout the abdominal aorta and its branch vessels  No evidence of aneurysm  PELVIS REPRODUCTIVE ORGANS:  Surgical changes of prior hysterectomy  URINARY BLADDER:  Relatively decompressed and inadequately evaluated  ABDOMINAL WALL/INGUINAL REGIONS:  Small umbilical hernia containing fat  OSSEOUS STRUCTURES:  No acute fracture or destructive osseous lesion  Spinal degenerative changes are noted  This is most pronounced at L2-L3  Impression: 1  Diffuse abnormal appearance of the distal transverse colon, descending colon and sigmoid colon, most compatible with colitis, either infectious or inflammatory  Less likely would be ischemic, given the lack of pneumatosis, however intravenous contrast material not administered  Recommend follow-up GI and/or surgical consultation  2   Cholelithiasis without CT evidence of acute cholecystitis  The study was marked in [facility] for immediate notification  Workstation performed: [id number]     XR abdomen obstruction series    Result Date: 5/16/2022  Narrative: OBSTRUCTION SERIES INDICATION:   Worsening abdominal pain and distention  COMPARISON:  CT dated 5/15/2022 8/22/2018  EXAM PERFORMED/VIEWS:  XR ABDOMEN OBSTRUCTION SERIES FINDINGS: Lung volumes are normal   Blunting of the left costophrenic angle shown to be due to scarring and pleural fat on recent CT  Chronic, linear, platelike scarring at both costophrenic angles  No superimposed acute consolidations on either side  No pneumothorax or layering effusion  Known chronic right middle lobe nodule is not well demonstrated radiographically  Cardiomediastinal silhouette and pulmonary vasculature are unremarkable  No dilated loops of small bowel  Normal stool burden  More gas demonstrated in the previously decompressed left colon when compared to most recent prior  Gas and stool extends the rectum  No free air or suspicious air-fluid levels   No pathologic calcifications or soft tissue masses evident  Osseous structures are unremarkable  Impression: 1  No obstruction, free air, or other acute abdominal findings  2   No acute cardiopulmonary findings  Workstation performed: [id number]     VAS celiac and/or mesenteric duplex; complete study    Result Date: 5/19/2022  Narrative:  [facility] REPORT CLINICAL: Indications: Patient presents with colitis, continued abdominal pain and diarrhea for x1 day  Patient denies a recent weight loss and fear of food  Risk Factors The patient has history of HTN and Hyperlipidemia  FINDINGS:  Unilateral             Impression      PSV  EDV  AP Diam  Sup-Blanche Ao                              93   22      1 9  Celiac                                 142   24           SMA Ostial                             256   15           Prox  SMA                              273   24           Px Inf-Wing Ao                           98   83      1 7  Mid  SMA                               136    9           Dist  SMA                               92    1           Ds Inf-Wing Ao                          113                Splenic                                118   18           MARITO                    Not visualized                     Common Hepatic Artery                   82   10            Segment     Rig       Left                    PSV  EDV  PSV  EDV  Prox Renal  107   23   71   19     CONCLUSION:  Impression  The aorta is patent and normal in caliber  The celiac, splenic and hepatic arteries are patent  The superior mesenteric artery is patent  The inferior mesenteric artery is not visualized  The proximal renal arteries are patent bilaterally    SIGNATURE: Electronically Signed by: Dilia Rush MD, RPVI on 2022-05-19 09:53:32 PM    Flexible Sigmoidoscopy    Result Date: 5/20/2022  Narrative:  [facility] [address] [place] [address] 89 [phone] DATE OF SERVICE: 5/20/22 PHYSICIAN(S): Attending: Aleah Foreman DO Fellow: No Staff Documented INDICATION: Colitis POST-OP DIAGNOSIS: See the impression below  HISTORY: Prior colonoscopy: More than 10 years ago  BOWEL PREPARATION: Enema PREPROCEDURE: Informed consent was obtained for the procedure, including sedation  Risks including but not limited to bleeding, infection, perforation, adverse drug reaction and aspiration were explained in detail  Also explained about less than 100% sensitivity with the exam and other alternatives  The patient was placed in the left lateral decubitus position  DETAILS OF PROCEDURE: Patient was taken to the procedure room where a time out was performed to confirm correct patient and correct procedure  The patient underwent monitored anesthesia care, which was administered by an anesthesia professional  The patient's blood pressure, heart rate, level of consciousness, oxygen and respirations were monitored throughout the procedure  A digital rectal exam was performed  The scope was introduced through the anus and advanced to the descending colon  Retroflexion was performed in the rectum  The patient's estimated blood loss was minimal (<5 mL)  The procedure was not difficult  The patient tolerated the procedure well  There were no apparent complications  ANESTHESIA INFORMATION: ASA: III Anesthesia Type: IV Sedation with Anesthesia MEDICATIONS: No administrations occurring from 1334 to 1408 on 05/20/22 FINDINGS: All observed locations appeared normal, including the rectum  Moderate, generalized erythematous mucosa in the sigmoid colon; performed 4 cold forceps biopsies  There is general erythema of the sigmoid colon from 40-50 cm  Moderate ulcerated mucosa  There is generalized erythema with scattered small ulcers from 30-40 cm  Severe, generalized erythematous, granular and ulcerated mucosa in the sigmoid colon; performed 6 cold forceps biopsies   There is severe inflammation, ulceration, color changes raising the question of some ischemia, and raised lesions, possibly pseudomembranes, in the sigmoid colon from 20-30 cm  Multiple biopsies were taken  Performed forceps biopsies in the sigmoid colon and rectosigmoid  Biopsies were taken at 50 cm, 40 cm, 30 cm, 25 cm, and 20 cm  EVENTS: Procedure Events Event Event Time ENDO SCOPE OUT TIME 5/20/2022  2:04 PM SPECIMENS: ID Type Source Tests Collected by Time Destination 1 : 50cm Tissue Colon TISSUE EXAM Doree Garcia, DO 5/20/2022  1:52 PM  2 : 40cm Tissue Colon TISSUE Randene Curia, DO 5/20/2022  2:04 PM  3 : 30cm Tissue Colon TISSUE EXAM Doree Garcia, DO 5/20/2022  2:04 PM  4 : 25cm Tissue Colon TISSUE EXAM Doree Garcia, DO 5/20/2022  2:04 PM  5 : 20cm Tissue Colon TISSUE EXAM Doree Garcia, DO 5/20/2022  2:04 PM  A :  Stool Colon CLOSTRIDIUM DIFFICILE TOXIN BY PCR [facility],  5/20/2022  2:05 PM  EQUIPMENT: Colonoscope -[id number]     Impression: Colitis involving the sigmoid colon from 50 - 20 cm  The worse inflammation was from 20 cm up to 30 cm, demonstrating possible pseudomembranes and color changes suggesting possible ischemia as well  Multiple biopsies taken  Fluid was also collected to retest for C  Diff  RECOMMENDATION: Await the biopsies and fluid analysis Re-start vancomycin 250 mg qid p o  until the biopsies and stool results return  [facility],  [place], [place]        Assessment & Plan    Sigmoid colitis    - CT Scan A/P 5/18 showed descending and sigmoid colon thickening/colitis  - C diff PCR 5/17 was negative  - Mesenteric doppler 5/19 showed a patent celiac and SMA; MARITO was not visualized  - Flexible sigmoidoscopy yesterday showed sigmoid colitis with possible pseudomembranes suggesting c diff colitis and color changes suggestive of ischemia as well - biopsies were taken as well as stool aspirate for c diff  - Follow up repeat c diff PCR   - Follow up pathology  - On empiric Vancocin 250mg po QID  - Serial abdominal exams, supportive care   Bentyl 10mg po QID   - Monitor patient and labs  - Diet as tolerated  The patient will be seen by Dr Sofie Johnson PA-C  5/21/2022,9:51 AM

## 2022-05-21 NOTE — ASSESSMENT & PLAN NOTE
Patient complaining of abdominal pain that started acutely night prior to admit and also associated non-bloddy diarrhea  PMHx C diff  · CT abdomen pelvis 5/15/22:  Diffuse abnormal appearance of the distal transverse colon, descending colon and sigmoid colon, most compatible with colitis, either infectious or inflammatory   Less likely would be ischemic, given the lack of pneumatosis, however intravenous contrast material not administered     · Leukocytosis resolved, lactic acidosis resolved   · Not meeting sepsis criteria   · General surgery consulted - no intervention planned  · C diff negative, although she responded well to oral vanco/flagyl  · Consulted GI 5/19  · Mesenteric study patent with vessels that were visualized  · S/p flexsigmoidoscopy 5/20:Colitis involving the sigmoid colon from 50 - 20 cm  The worse inflammation was from 20 cm up to 30 cm, demonstrating possible pseudomembranes and color changes suggesting possible ischemia as well  Multiple biopsies taken      Recommend restart vanco await biopsy results   ·  Repeat C diff testing pending

## 2022-05-21 NOTE — ASSESSMENT & PLAN NOTE
Lab Results   Component Value Date    EGFR 67 05/21/2022    EGFR 65 05/20/2022    EGFR 60 05/18/2022    CREATININE 0 79 05/21/2022    CREATININE 0 81 05/20/2022    CREATININE 0 87 05/18/2022   see plan under RANJAN

## 2022-05-22 VITALS
HEIGHT: 67 IN | HEART RATE: 72 BPM | DIASTOLIC BLOOD PRESSURE: 59 MMHG | WEIGHT: 164.9 LBS | OXYGEN SATURATION: 93 % | BODY MASS INDEX: 25.88 KG/M2 | RESPIRATION RATE: 17 BRPM | TEMPERATURE: 98.6 F | SYSTOLIC BLOOD PRESSURE: 117 MMHG

## 2022-05-22 PROCEDURE — NC001 PR NO CHARGE: Performed by: PHYSICIAN ASSISTANT

## 2022-05-22 PROCEDURE — 99239 HOSP IP/OBS DSCHRG MGMT >30: CPT | Performed by: NURSE PRACTITIONER

## 2022-05-22 PROCEDURE — 99232 SBSQ HOSP IP/OBS MODERATE 35: CPT | Performed by: PHYSICIAN ASSISTANT

## 2022-05-22 RX ORDER — DICYCLOMINE HCL 20 MG
10 TABLET ORAL 4 TIMES DAILY PRN
Qty: 20 TABLET | Refills: 0 | Status: SHIPPED | OUTPATIENT
Start: 2022-05-22

## 2022-05-22 RX ORDER — POTASSIUM CHLORIDE 20 MEQ/1
40 TABLET, EXTENDED RELEASE ORAL 2 TIMES DAILY
Status: DISCONTINUED | OUTPATIENT
Start: 2022-05-22 | End: 2022-05-22 | Stop reason: HOSPADM

## 2022-05-22 RX ORDER — VANCOMYCIN HYDROCHLORIDE 250 MG/1
250 CAPSULE ORAL 4 TIMES DAILY
Qty: 32 CAPSULE | Refills: 0 | Status: SHIPPED | OUTPATIENT
Start: 2022-05-22 | End: 2022-05-30

## 2022-05-22 RX ORDER — POTASSIUM CHLORIDE 20 MEQ/1
40 TABLET, EXTENDED RELEASE ORAL 2 TIMES DAILY
Qty: 10 TABLET | Refills: 0 | Status: SHIPPED | OUTPATIENT
Start: 2022-05-22 | End: 2022-05-25

## 2022-05-22 RX ADMIN — ATORVASTATIN CALCIUM 20 MG: 20 TABLET, FILM COATED ORAL at 08:28

## 2022-05-22 RX ADMIN — OMEGA-3 FATTY ACIDS CAP 1000 MG 1000 MG: 1000 CAP at 08:28

## 2022-05-22 RX ADMIN — OXYBUTYNIN CHLORIDE 5 MG: 5 TABLET ORAL at 08:28

## 2022-05-22 RX ADMIN — VANCOMYCIN HYDROCHLORIDE 250 MG: 5 INJECTION, POWDER, LYOPHILIZED, FOR SOLUTION INTRAVENOUS at 11:15

## 2022-05-22 RX ADMIN — LEVOTHYROXINE SODIUM 50 MCG: 50 TABLET ORAL at 05:04

## 2022-05-22 RX ADMIN — VANCOMYCIN HYDROCHLORIDE 250 MG: 5 INJECTION, POWDER, LYOPHILIZED, FOR SOLUTION INTRAVENOUS at 00:00

## 2022-05-22 RX ADMIN — DICYCLOMINE HYDROCHLORIDE 10 MG: 20 TABLET ORAL at 11:15

## 2022-05-22 RX ADMIN — HEPARIN SODIUM 5000 UNITS: 5000 INJECTION INTRAVENOUS; SUBCUTANEOUS at 05:04

## 2022-05-22 RX ADMIN — SIMETHICONE 80 MG: 80 TABLET, CHEWABLE ORAL at 11:15

## 2022-05-22 RX ADMIN — VANCOMYCIN HYDROCHLORIDE 250 MG: 5 INJECTION, POWDER, LYOPHILIZED, FOR SOLUTION INTRAVENOUS at 05:04

## 2022-05-22 RX ADMIN — SIMETHICONE 80 MG: 80 TABLET, CHEWABLE ORAL at 08:28

## 2022-05-22 RX ADMIN — Medication 250 MG: at 08:26

## 2022-05-22 RX ADMIN — DICYCLOMINE HYDROCHLORIDE 10 MG: 20 TABLET ORAL at 08:26

## 2022-05-22 RX ADMIN — ASPIRIN 81 MG 81 MG: 81 TABLET ORAL at 08:28

## 2022-05-22 RX ADMIN — POTASSIUM CHLORIDE 40 MEQ: 1500 TABLET, EXTENDED RELEASE ORAL at 11:14

## 2022-05-22 NOTE — ASSESSMENT & PLAN NOTE
· Baseline creatinine 0 8-0 9, presented with creatinine 1 71   · Resolved with IV fluids   · Remains stable off IVF   · Continue to monitor

## 2022-05-22 NOTE — INCIDENTAL FINDINGS
The following findings require follow up:  Radiographic finding   Findin  Diffuse abnormal appearance of the distal transverse colon, descending colon and sigmoid colon, most compatible with colitis, either infectious or inflammatory  Less likely would be ischemic, given the lack of pneumatosis, however intravenous   contrast material not administered  Recommend follow-up GI and/or surgical consultation      2  Cholelithiasis without CT evidence of acute cholecystitis     Follow up required:  Yes   Follow up should be done within 2-3 week(s)    Please notify the following clinician to assist with the follow up:   Dr Israel Brooks

## 2022-05-22 NOTE — PLAN OF CARE
Problem: PAIN - ADULT  Goal: Verbalizes/displays adequate comfort level or baseline comfort level  Description: Interventions:  - Encourage patient to monitor pain and request assistance  - Assess pain using appropriate pain scale  - Administer analgesics based on type and severity of pain and evaluate response  - Implement non-pharmacological measures as appropriate and evaluate response  - Consider cultural and social influences on pain and pain management  - Notify physician/advanced practitioner if interventions unsuccessful or patient reports new pain  5/22/2022 1002 by Sherry Lane RN  Outcome: Progressing  5/22/2022 1001 by Sherry Lane RN  Outcome: Progressing

## 2022-05-22 NOTE — PROGRESS NOTES
Progress Note  Colletta Never 80 y o  female MRN: [id number]  Unit/Bed#: -01 Encounter: [id number]    Subjective:  Patient reports continued improvement  No nausea or vomiting  Her abdominal pain has improved; she reports she just gets some mild gas discomfort at times  Stools have improved overall as well - she reports some small amounts of soft stool now  No fevers  She would like to go home today  Objective:     Vitals:   Vitals:    05/22/22 0645   BP: 117/59   Pulse: 72   Resp:    Temp:    SpO2: 93%   ,Body mass index is 25 83 kg/m²  No intake or output data in the 24 hours ending 05/22/22 0959    Physical Exam:     General Appearance: Alert, appears stated age and cooperative  Lungs: Clear to auscultation bilaterally, no rales or rhonchi, no labored breathing/accessory muscle use  Heart: Regular rate and rhythm, S1, S2 normal, no murmur, click, rub or gallop  Abdomen: Soft, non-tender, non-distended; bowel sounds normal; no masses or no organomegaly  Extremities: No cyanosis, edema    Invasive Devices  Report    Peripheral Intravenous Line  Duration           Peripheral IV 05/19/22 Right Antecubital 2 days                Lab Results:  No results displayed because visit has over 200 results  Imaging Studies:   I have personally reviewed pertinent imaging studies  CT abdomen pelvis wo contrast    Result Date: 5/18/2022  Narrative: CT ABDOMEN AND PELVIS WITHOUT IV CONTRAST INDICATION:   LLQ abdominal pain re-eval for resolving colitis due to continued abdominal pain  COMPARISON:  None  TECHNIQUE:  CT examination of the abdomen and pelvis was performed without intravenous contrast  This examination was performed without intravenous contrast in the context of the critical nationwide Omnipaque shortage  Axial, sagittal, and coronal 2D reformatted images were created from the source data and submitted for interpretation  Radiation dose length product (DLP) for this visit:  612 mGy-cm     This examination, like all CT scans performed in the [facility], was performed utilizing techniques to minimize radiation dose exposure, including the use of iterative reconstruction and automated exposure control  Enteric contrast was administered  FINDINGS: ABDOMEN LOWER CHEST:  The known nodular tubular density right middle lobe remains unchanged  Trace pleural effusions  Small hiatal hernia  LIVER/BILIARY TREE:  Unremarkable  GALLBLADDER:  There are gallstone(s) within the gallbladder, without pericholecystic inflammatory changes  SPLEEN:  Unremarkable  PANCREAS:  Unremarkable  ADRENAL GLANDS:  Unremarkable  KIDNEYS/URETERS:  Unremarkable  No hydronephrosis  STOMACH AND BOWEL:  Persistent but improved thickening of the descending colon and sigmoid colon  No bowel obstruction bowel pneumatosis  APPENDIX:  No findings to suggest appendicitis  ABDOMINOPELVIC CAVITY:  No ascites  No pneumoperitoneum  No lymphadenopathy  VESSELS:  Unremarkable for patient's age  PELVIS REPRODUCTIVE ORGANS:  Unremarkable for patient's age  URINARY BLADDER:  Unremarkable  ABDOMINAL WALL/INGUINAL REGIONS:  Small uncomplicated fat-containing umbilical hernia  OSSEOUS STRUCTURES:  No acute fracture or destructive osseous lesion  Impression: Persistent but slightly improved diffuse descending colonic and sigmoid colonic thickening  No bowel obstruction or bowel pneumatosis  Workstation performed: [id number]     CT abdomen pelvis wo contrast    Result Date: 5/15/2022  Narrative: CT ABDOMEN AND PELVIS WITHOUT IV CONTRAST INDICATION:   abdominal pain  No additional history was afforded in Epic at time of dictation  The patient's entire past medical history is obtained from [email or web address] technologist notes only, via copy and paste  COMPARISON:  Noncontrast CT abdomen pelvis August 22, 2018 as well as abdominal ultrasound July 13, 2018   TECHNIQUE:  CT examination of the abdomen and pelvis was performed without intravenous contrast  This examination was performed without intravenous contrast in the context of the critical nationwide Omnipaque shortage  Axial, sagittal, and coronal 2D reformatted images were created from the source data and submitted for interpretation  Radiation dose length product (DLP) for this visit:  600 mGy-cm   This examination, like all CT scans performed in the [facility], was performed utilizing techniques to minimize radiation dose exposure, including the use of iterative reconstruction and automated exposure control  Enteric contrast was not administered  FINDINGS: ABDOMEN LOWER CHEST:  Stable noncalcified tubular nodular density right middle lobe (series 2, image 2)  LIVER/BILIARY TREE:  Enlarged with fatty infiltrative changes  GALLBLADDER:  Stable gallbladder calculi  No pericholecystic inflammation to suggest acute cholecystitis  SPLEEN:  Unremarkable  PANCREAS:  Unremarkable  ADRENAL GLANDS:  Unremarkable  KIDNEYS/URETERS:  Unremarkable  No hydronephrosis  STOMACH AND BOWEL:  Evaluation of the GI tract somewhat limited due to lack of oral contrast material  The stomach is relatively decompressed and inadequately evaluated  There is a hiatal hernia  No evidence of small bowel obstruction  The cecum is mobile and lies in the midabdomen, to the right of midline  The colon is segmentally distended with feces, up to the level of the hepatic flexure  Distally, the colon is relatively decompressed  There is mild wall thickening involving the distal transverse colon, descending colon and sigmoid colon, with associated luminal narrowing  Mild amount of pericolonic inflammatory change surrounding the descending colon and sigmoid colon  Small amount of fluid along the left paracolic gutter, extending into the pelvis  Scattered diverticula in the distal sigmoid colon  APPENDIX:  Surgically absent  ABDOMINOPELVIC CAVITY:  No ascites  No pneumoperitoneum  No lymphadenopathy   VESSELS:  Advanced atherosclerotic changes are present throughout the abdominal aorta and its branch vessels  No evidence of aneurysm  PELVIS REPRODUCTIVE ORGANS:  Surgical changes of prior hysterectomy  URINARY BLADDER:  Relatively decompressed and inadequately evaluated  ABDOMINAL WALL/INGUINAL REGIONS:  Small umbilical hernia containing fat  OSSEOUS STRUCTURES:  No acute fracture or destructive osseous lesion  Spinal degenerative changes are noted  This is most pronounced at L2-L3  Impression: 1  Diffuse abnormal appearance of the distal transverse colon, descending colon and sigmoid colon, most compatible with colitis, either infectious or inflammatory  Less likely would be ischemic, given the lack of pneumatosis, however intravenous contrast material not administered  Recommend follow-up GI and/or surgical consultation  2   Cholelithiasis without CT evidence of acute cholecystitis  The study was marked in [facility] for immediate notification  Workstation performed: [id number]     XR abdomen obstruction series    Result Date: 5/16/2022  Narrative: OBSTRUCTION SERIES INDICATION:   Worsening abdominal pain and distention  COMPARISON:  CT dated 5/15/2022 8/22/2018  EXAM PERFORMED/VIEWS:  XR ABDOMEN OBSTRUCTION SERIES FINDINGS: Lung volumes are normal   Blunting of the left costophrenic angle shown to be due to scarring and pleural fat on recent CT  Chronic, linear, platelike scarring at both costophrenic angles  No superimposed acute consolidations on either side  No pneumothorax or layering effusion  Known chronic right middle lobe nodule is not well demonstrated radiographically  Cardiomediastinal silhouette and pulmonary vasculature are unremarkable  No dilated loops of small bowel  Normal stool burden  More gas demonstrated in the previously decompressed left colon when compared to most recent prior  Gas and stool extends the rectum  No free air or suspicious air-fluid levels   No pathologic calcifications or soft tissue masses evident  Osseous structures are unremarkable  Impression: 1  No obstruction, free air, or other acute abdominal findings  2   No acute cardiopulmonary findings  Workstation performed: [id number]     VAS celiac and/or mesenteric duplex; complete study    Result Date: 5/19/2022  Narrative:  [facility] REPORT CLINICAL: Indications: Patient presents with colitis, continued abdominal pain and diarrhea for x1 day  Patient denies a recent weight loss and fear of food  Risk Factors The patient has history of HTN and Hyperlipidemia  FINDINGS:  Unilateral             Impression      PSV  EDV  AP Diam  Sup-Blanche Ao                              93   22      1 9  Celiac                                 142   24           SMA Ostial                             256   15           Prox  SMA                              273   24           Px Inf-Wing Ao                           98   83      1 7  Mid  SMA                               136    9           Dist  SMA                               92    1           Ds Inf-Wing Ao                          113                Splenic                                118   18           MARITO                    Not visualized                     Common Hepatic Artery                   82   10            Segment     Rig       Left                    PSV  EDV  PSV  EDV  Prox Renal  107   23   71   19     CONCLUSION:  Impression  The aorta is patent and normal in caliber  The celiac, splenic and hepatic arteries are patent  The superior mesenteric artery is patent  The inferior mesenteric artery is not visualized  The proximal renal arteries are patent bilaterally    SIGNATURE: Electronically Signed by: Morgan Gaspar MD, RPVI on 2022-05-19 09:53:32 PM    Flexible Sigmoidoscopy    Result Date: 5/20/2022  Narrative:  [id number] [facility] [address] [phone] DATE OF SERVICE: 5/20/22 PHYSICIAN(S): Attending: Nathan Trotter DO Fellow: No Staff Documented INDICATION: Colitis POST-OP DIAGNOSIS: See the impression below  HISTORY: Prior colonoscopy: More than 10 years ago  BOWEL PREPARATION: Enema PREPROCEDURE: Informed consent was obtained for the procedure, including sedation  Risks including but not limited to bleeding, infection, perforation, adverse drug reaction and aspiration were explained in detail  Also explained about less than 100% sensitivity with the exam and other alternatives  The patient was placed in the left lateral decubitus position  DETAILS OF PROCEDURE: Patient was taken to the procedure room where a time out was performed to confirm correct patient and correct procedure  The patient underwent monitored anesthesia care, which was administered by an anesthesia professional  The patient's blood pressure, heart rate, level of consciousness, oxygen and respirations were monitored throughout the procedure  A digital rectal exam was performed  The scope was introduced through the anus and advanced to the descending colon  Retroflexion was performed in the rectum  The patient's estimated blood loss was minimal (<5 mL)  The procedure was not difficult  The patient tolerated the procedure well  There were no apparent complications  ANESTHESIA INFORMATION: ASA: III Anesthesia Type: IV Sedation with Anesthesia MEDICATIONS: No administrations occurring from 1334 to 1408 on 05/20/22 FINDINGS: All observed locations appeared normal, including the rectum  Moderate, generalized erythematous mucosa in the sigmoid colon; performed 4 cold forceps biopsies  There is general erythema of the sigmoid colon from 40-50 cm  Moderate ulcerated mucosa  There is generalized erythema with scattered small ulcers from 30-40 cm  Severe, generalized erythematous, granular and ulcerated mucosa in the sigmoid colon; performed 6 cold forceps biopsies   There is severe inflammation, ulceration, color changes raising the question of some ischemia, and raised lesions, possibly pseudomembranes, in the sigmoid colon from 20-30 cm  Multiple biopsies were taken  Performed forceps biopsies in the sigmoid colon and rectosigmoid  Biopsies were taken at 50 cm, 40 cm, 30 cm, 25 cm, and 20 cm  EVENTS: Procedure Events Event Event Time ENDO SCOPE OUT TIME 5/20/2022  2:04 PM SPECIMENS: ID Type Source Tests Collected by Time Destination 1 : 50cm Tissue Colon TISSUE EXAM Judithann Smaller, DO 5/20/2022  1:52 PM  2 : 40cm Tissue Colon TISSUE Marilyn Comber, DO 5/20/2022  2:04 PM  3 : 30cm Tissue Colon TISSUE EXAM Judithann Smaller, DO 5/20/2022  2:04 PM  4 : 25cm Tissue Colon TISSUE EXAM Judithann Smaller, DO 5/20/2022  2:04 PM  5 : 20cm Tissue Colon TISSUE EXAM Judithann Smaller, DO 5/20/2022  2:04 PM  A :  Stool Colon CLOSTRIDIUM DIFFICILE TOXIN BY PCR [facility] Smaller, DO 5/20/2022  2:05 PM  EQUIPMENT: Colonoscope -[facility]-[id number]     Impression: Colitis involving the sigmoid colon from 50 - 20 cm  The worse inflammation was from 20 cm up to 30 cm, demonstrating possible pseudomembranes and color changes suggesting possible ischemia as well  Multiple biopsies taken  Fluid was also collected to retest for C  Diff  RECOMMENDATION: Await the biopsies and fluid analysis Re-start vancomycin 250 mg qid p o  until the biopsies and stool results return  [facility], DO [place], [place]        Assessment & Plan    Sigmoid colitis     - CT Scan A/P 5/18 showed descending and sigmoid colon thickening/colitis  - C diff PCR x 2 were negative  - Mesenteric doppler 5/19 showed a patent celiac and SMA; MARITO was not visualized  - Flexible sigmoidoscopy 5/20 showed sigmoid colitis with possible pseudomembranes suggesting c diff colitis and color changes suggestive of ischemia as well - biopsies were taken as well as stool aspirate for c diff  - Follow up pathology  - Continue Vancocin 250mg po QID to complete a 10 day course for c diff colitis  - Her symptoms have significantly improved    She is okay for discharge from a GI standpoint  She will need outpatient follow up in the office in a couple of weeks  The patient was seen with Dr Newton Gone  GI will sign off, please contact with any questions/concerns      Marina Henson PA-C  5/22/2022,9:59 AM

## 2022-05-22 NOTE — DISCHARGE SUMMARY
[address] [facility]  Discharge- Abbey Rosa 1935, 80 y o  female MRN: [id number]  Unit/Bed#: -[place] Encounter: [id number]  Primary Care Provider: Cristy Andrews MD   Date and time admitted to hospital: 5/15/2022  7:33 AM    * Colitis  Assessment & Plan  Patient complaining of abdominal pain that started acutely night prior to admit and also associated non-bloddy diarrhea  PMHx C diff  · CT abdomen pelvis 5/15/22:  Diffuse abnormal appearance of the distal transverse colon, descending colon and sigmoid colon, most compatible with colitis, either infectious or inflammatory   Less likely would be ischemic, given the lack of pneumatosis, however intravenous contrast material not administered     · Leukocytosis resolved, lactic acidosis resolved   · Not meeting sepsis criteria   · General surgery consulted - no intervention planned  · C diff negative, although she responded well to oral vanco/flagyl  · Consulted GI 5/19  · Mesenteric study patent with vessels that were visualized  · S/p flexsigmoidoscopy 5/20:Colitis involving the sigmoid colon from 50 - 20 cm  The worse inflammation was from 20 cm up to 30 cm, demonstrating possible pseudomembranes and color changes suggesting possible ischemia as well  Multiple biopsies taken  Recommend restart vanco await biopsy results   ·  Repeat C diff negative  · GI would like to continue vanco until tissue biopsy results complete 10 days total  · Patient stable from there perspective for discharge       Stage 3 chronic kidney disease, unspecified whether stage 3a or 3b CKD [facility])  Assessment & Plan  Lab Results   Component Value Date    EGFR 67 05/21/2022    EGFR 65 05/20/2022    EGFR 60 05/18/2022    CREATININE 0 79 05/21/2022    CREATININE 0 81 05/20/2022    CREATININE 0 87 05/18/2022   see plan under RANJAN     Hypothyroidism  Assessment & Plan  · Continue levothyroxine    Hypertension  Assessment & Plan  · Monitor per unit protocol, was low and now normalized s/p IV fluids     Hyperlipidemia  Assessment & Plan  · Continue statin    RANJAN (acute kidney injury) ([facility] [address] )  Assessment & Plan  · Baseline creatinine 0 8-0 9, presented with creatinine 1 71   · Resolved with IV fluids   · Remains stable off IVF   · Continue to monitor         Medical Problems             Resolved Problems  Date Reviewed: 5/20/2022   None               Discharging Physician / Practitioner: PRIYA Tran  PCP: Va Sorto MD  Admission Date:   Admission Orders (From admission, onward)     Ordered        05/16/22 1421  Inpatient Admission  Once            05/15/22 0935  Place in Observation  Once                      Discharge Date: 05/22/22    Consultations During Hospital Stay:  · Acute surgery  · Gastroenterology    Procedures Performed:   · Flex sigmoid 5/20: Moderate, generalized erythematous mucosa in the sigmoid colon; performed 4 cold forceps biopsies  There is general erythema of the sigmoid colon from 40-50 cm  · Moderate ulcerated mucosa  There is generalized erythema with scattered small ulcers from 30-40 cm  · Severe, generalized erythematous, granular and ulcerated mucosa in the sigmoid colon; performed 6 cold forceps biopsies  There is severe inflammation, ulceration, color changes raising the question of some ischemia, and raised lesions, possibly pseudomembranes, in the sigmoid colon from 20-30 cm  Multiple biopsies were taken  · Performed forceps biopsies in the sigmoid colon and rectosigmoid  Biopsies were taken at 50 cm, 40 cm, 30 cm, 25 cm, and 20 cm  Significant Findings / Test Results:     Colitis  Hypokalemia  Diarrhea    Incidental Findings:   1  Diffuse abnormal appearance of the distal transverse colon, descending colon and sigmoid colon, most compatible with colitis, either infectious or inflammatory  Less likely would be ischemic, given the lack of pneumatosis, however intravenous   contrast material not administered    Recommend follow-up GI and/or surgical consultation      2  Cholelithiasis without CT evidence of acute cholecystitis  Patient aware findings understands the need for outpatient follow-up with Gastroenterology    Test Results Pending at Discharge (will require follow up): · Tissue exam biopsies outpatient follow-up with GI patient is aware     Outpatient Tests Requested:  · Per Gastroenterology    Complications:  Colitis ongoing diarrhea    Reason for Admission:  Colitis    Hospital Course:   Marielena Jiménez is a 80 y o  female patient who originally presented to the hospital on 5/15/2022 due to known history hypothyroidism hyperlipidemia hypertension CKD stage 3 presenting with left lower quadrant abdominal pain and diarrhea  Patient had sudden onset of symptoms prompting her the following day to come to the ED complaints of constant bowel movements of watery foul-smelling stool  Patient reports history of C diff she has tested negative her times twice however there is still concern and GI wants to wait pathology findings  Patient was seen by surgery no interventions from their perspective in signed off GI evaluated recommending a black sigmoid with significant findings of moderate to severe colitis concerns for ischemia took tissue biopsies wanted patient to continue on vancomycin orally for 10 days  Patient had hypokalemia which would be expected in setting GI losses and was placed on supplemental therapy for a few days past discharge as well  Patient overall on day 7 in 2 8 showed significant improvement of her symptoms and was discharged home  Please see above list of diagnoses and related plan for additional information       Condition at Discharge: stable    Discharge Day Visit / Exam:   Subjective:  Patient reports abdominal pain has improved but still remains patient reports significant gas at times and watery bowel movements however they have significantly improved and overall states she feels better and would like to return home  Vitals: Blood Pressure: 117/59 (05/22/22 0645)  Pulse: 72 (05/22/22 0645)  Temperature: 98 6 °F (37 °C) (05/21/22 2216)  Temp Source: Oral (05/21/22 1458)  Respirations: 17 (05/21/22 2216)  Height: 5' 7" (170 2 cm) (05/15/22 1100)  Weight - Scale: 74 8 kg (164 lb 14 5 oz) (05/15/22 1100)  SpO2: 93 % (05/22/22 0645)  Exam:   Physical Exam  Vitals and nursing note reviewed  Constitutional:       General: She is not in acute distress  Appearance: She is well-developed  HENT:      Head: Normocephalic and atraumatic  Eyes:      Conjunctiva/sclera: Conjunctivae normal    Cardiovascular:      Rate and Rhythm: Normal rate and regular rhythm  Heart sounds: No murmur heard  Pulmonary:      Effort: Pulmonary effort is normal  No respiratory distress  Breath sounds: Normal breath sounds  Abdominal:      Palpations: Abdomen is soft  Tenderness: There is no abdominal tenderness  Musculoskeletal:         General: Normal range of motion  Cervical back: Neck supple  Skin:     General: Skin is warm and dry  Neurological:      Mental Status: She is alert and oriented to person, place, and time  Psychiatric:         Mood and Affect: Mood normal          Behavior: Behavior normal           Discussion with Family: Updated  (daughter and daughter in law) at bedside  Discharge instructions/Information to patient and family:   See after visit summary for information provided to patient and family  Provisions for Follow-Up Care:  See after visit summary for information related to follow-up care and any pertinent home health orders  Disposition:   Home    Planned Readmission:       Discharge Statement:  I spent 40 minutes discharging the patient  This time was spent on the day of discharge  I had direct contact with the patient on the day of discharge   Greater than 50% of the total time was spent examining patient, answering all patient questions, arranging and discussing plan of care with patient as well as directly providing post-discharge instructions  Additional time then spent on discharge activities  Discharge Medications:  See after visit summary for reconciled discharge medications provided to patient and/or family        **Please Note: This note may have been constructed using a voice recognition system**

## 2022-05-22 NOTE — PLAN OF CARE
Problem: PAIN - ADULT  Goal: Verbalizes/displays adequate comfort level or baseline comfort level  Description: Interventions:  - Encourage patient to monitor pain and request assistance  - Assess pain using appropriate pain scale  - Administer analgesics based on type and severity of pain and evaluate response  - Implement non-pharmacological measures as appropriate and evaluate response  - Consider cultural and social influences on pain and pain management  - Notify physician/advanced practitioner if interventions unsuccessful or patient reports new pain  5/22/2022 1002 by Jerardo Louis RN  Outcome: Progressing  5/22/2022 1001 by Jerardo Louis RN  Outcome: Progressing

## 2022-05-22 NOTE — ASSESSMENT & PLAN NOTE
Patient complaining of abdominal pain that started acutely night prior to admit and also associated non-bloddy diarrhea  PMHx C diff  · CT abdomen pelvis 5/15/22:  Diffuse abnormal appearance of the distal transverse colon, descending colon and sigmoid colon, most compatible with colitis, either infectious or inflammatory   Less likely would be ischemic, given the lack of pneumatosis, however intravenous contrast material not administered     · Leukocytosis resolved, lactic acidosis resolved   · Not meeting sepsis criteria   · General surgery consulted - no intervention planned  · C diff negative, although she responded well to oral vanco/flagyl  · Consulted GI 5/19  · Mesenteric study patent with vessels that were visualized  · S/p flexsigmoidoscopy 5/20:Colitis involving the sigmoid colon from 50 - 20 cm  The worse inflammation was from 20 cm up to 30 cm, demonstrating possible pseudomembranes and color changes suggesting possible ischemia as well  Multiple biopsies taken  Recommend restart vanco await biopsy results   ·  Repeat C diff negative  · GI would like to continue vanco until tissue biopsy results complete 10 days total  · Patient stable from there perspective for discharge

## 2022-05-23 ENCOUNTER — TELEPHONE (OUTPATIENT)
Dept: GASTROENTEROLOGY | Facility: CLINIC | Age: 87
End: 2022-05-23

## 2022-05-23 ENCOUNTER — TRANSITIONAL CARE MANAGEMENT (OUTPATIENT)
Dept: FAMILY MEDICINE CLINIC | Facility: CLINIC | Age: 87
End: 2022-05-23

## 2022-05-23 NOTE — TELEPHONE ENCOUNTER
Ester patient - Patient called to schedule appt after hospital D/C and your first opening is toward the end of June  I asked if I could schedule her with a PA  So it could be sooner and she wasn't sure  Please advise so I can get her scheduled    Thx

## 2022-05-23 NOTE — TELEPHONE ENCOUNTER
Dr Davidson Saldivar pt  Spoke to patient to schedule visit from referral  She only has medication for 5-8 days,discharged yesterday   is coming home today from hospital on oxygen  F/U visit is 6/29   What should she do or look for until then

## 2022-05-23 NOTE — UTILIZATION REVIEW
Notification of Discharge   This is a Notification of Discharge from our facility [address]  Please be advised that this patient has been discharge from our facility  Below you will find the admission and discharge date and time including the patients disposition  UTILIZATION REVIEW CONTACT:  Vivian Dang  Utilization   Network Utilization Review Department  Phone: [phone] x carefully listen to the prompts  All voicemails are confidential   Email: [email or web address]     PHYSICIAN ADVISORY SERVICES:  FOR RJIW-BH-HEMZ REVIEW - MEDICAL NECESSITY DENIAL  Phone: [phone]  Fax: [phone]  Email: [email or web address]     PRESENTATION DATE: 5/15/2022  7:33 AM  OBERVATION ADMISSION DATE: 05/15/22  INPATIENT ADMISSION DATE: 5/16/22  2:21 PM   DISCHARGE DATE: 5/22/2022  1:03 PM  DISPOSITION: Home/Self Care Home/Self Care      IMPORTANT INFORMATION:  Send all requests for admission clinical reviews, approved or denied determinations and any other requests to dedicated fax number below belonging to the campus where the patient is receiving treatment   List of dedicated fax numbers:  [address] [place] [address] DENIALS (Administrative/Medical Necessity) [phone]   [address] [place] [address] (Maternity/NICU/Pediatrics) [phone]   Kenji Eric [phone]   [address] [facility] [phone]   [address] [place] [address] [phone]   [address] [place] [address] [address] [place] [address],[address]Th [address] [phone]   [facility]  [phone]   [address] [place] [address], [facility]  [address] [facility] [address] [facility] [phone]

## 2022-05-25 ENCOUNTER — TELEPHONE (OUTPATIENT)
Dept: GASTROENTEROLOGY | Facility: CLINIC | Age: 87
End: 2022-05-25

## 2022-05-25 NOTE — TELEPHONE ENCOUNTER
Patient is out of klor con  She is asking if she should continue with it  Please advise   Thank you!

## 2022-05-26 ENCOUNTER — TELEPHONE (OUTPATIENT)
Dept: FAMILY MEDICINE CLINIC | Facility: CLINIC | Age: 87
End: 2022-05-26

## 2022-05-26 DIAGNOSIS — Z92.89 HISTORY OF RECENT HOSPITALIZATION: Primary | ICD-10-CM

## 2022-05-26 NOTE — TELEPHONE ENCOUNTER
Nurse called from [address] [place] [address] and stated that she was there to visit patient's  but while she was there she took notice to patient not doing well recently D/C from hospital as well and wondering if a home health referral can be placed for nursing and PT         Referral placed and faxed

## 2022-05-26 NOTE — TELEPHONE ENCOUNTER
Called and spoke to patient   Told patient that as per vijay she needs to go to her PCP for renewal of klor con

## 2022-05-26 NOTE — TELEPHONE ENCOUNTER
Patient has an appt next week  Home health would probably need office notes from most recent appt    If symptoms worsen recommend going to the ER

## 2022-06-01 ENCOUNTER — TELEPHONE (OUTPATIENT)
Dept: GASTROENTEROLOGY | Facility: CLINIC | Age: 87
End: 2022-06-01

## 2022-06-01 ENCOUNTER — TELEMEDICINE (OUTPATIENT)
Dept: FAMILY MEDICINE CLINIC | Facility: CLINIC | Age: 87
End: 2022-06-01
Payer: COMMERCIAL

## 2022-06-01 ENCOUNTER — NURSE TRIAGE (OUTPATIENT)
Dept: OTHER | Facility: OTHER | Age: 87
End: 2022-06-01

## 2022-06-01 DIAGNOSIS — R19.7 DIARRHEA, UNSPECIFIED TYPE: ICD-10-CM

## 2022-06-01 DIAGNOSIS — U07.1 COVID-19: Primary | ICD-10-CM

## 2022-06-01 DIAGNOSIS — R19.7 DIARRHEA, UNSPECIFIED TYPE: Primary | ICD-10-CM

## 2022-06-01 PROCEDURE — 99441 PR PHYS/QHP TELEPHONE EVALUATION 5-10 MIN: CPT | Performed by: PHYSICIAN ASSISTANT

## 2022-06-01 RX ORDER — MONTELUKAST SODIUM 4 MG/1
1 TABLET, CHEWABLE ORAL 2 TIMES DAILY
Qty: 60 TABLET | Refills: 1 | Status: SHIPPED | OUTPATIENT
Start: 2022-06-01 | End: 2022-06-07 | Stop reason: SDUPTHER

## 2022-06-01 NOTE — TELEPHONE ENCOUNTER
Returned patient's phone call and she states that her son already went to [facility] to  the medication but she wanted it sent to [facility] in [place]  Advised patient to call back if she has any other issues

## 2022-06-01 NOTE — TELEPHONE ENCOUNTER
Reason for Disposition   Caller has medicine question only, adult not sick, AND triager answers question    Answer Assessment - Initial Assessment Questions  1  NAME of MEDICATION: "What medicine are you calling about?"      Colestipol     2  QUESTION: "What is your question?" (e g , medication refill, side effect)      Was sent to wrong pharmacy and wanted it sent to [facility] [place]     3  PRESCRIBING HCP: "Who prescribed it?" Reason: if prescribed by specialist, call should be referred to that group        Gastroenterology    Protocols used: MEDICATION QUESTION CALL-ADULT-

## 2022-06-01 NOTE — TELEPHONE ENCOUNTER
I spoke with patient  Diarrhea could be related to COVID at this point  Called in Colestid for her to take twice a day  Advised her to call back if diarrhea becomes worse

## 2022-06-01 NOTE — TELEPHONE ENCOUNTER
It seems she should have completed at least 10 days of vancomycin which would have been appropriate  She does not need a refill at this time  If she starts to have severe diarrhea she should call back and be retested  She should maintain on probiotics

## 2022-06-01 NOTE — TELEPHONE ENCOUNTER
Regarding: Medication issue  ----- Message from Burgess Tamayo sent at 6/1/2022  5:25 PM EDT -----  Pt called, " I was supposed to have a medication sent to my pharmacy, but when my son went to  the medication it was not there " Medication information was verified and resulted that medication was sent to wrong pharmacy  Pt would like to know if it's possible for medication to be transferred to correct pharmacy

## 2022-06-01 NOTE — TELEPHONE ENCOUNTER
Dr Andrew Coleman pt  Patient called left message,   Returned her call, she is out of Vancomycin CHI 250mg  She wants to know if she should still take this medication or not? [facility] [place] to refill if she does  She is Covid positive, she wants to know should she continue GI meds having Covid?   Please advise

## 2022-06-01 NOTE — PROGRESS NOTES
COVID-19 Outpatient Progress Note    Assessment/Plan:    Problem List Items Addressed This Visit    None     Visit Diagnoses     COVID-19    -  Primary         Disposition:     Patient has COVID-19 infection  Based off CDC guidelines, they were recommended to isolate for 5 days from the date of the positive test  If they remain asymptomatic, isolation may be ended followed by 5 days of wearing a mask when around othes to minimize risk of infecting others  If they have a fever, continue to stay home until fever resolves for at least 24 hours  Sx/covid day 1, largely asymptomatic other than fatigue mostly if "doing too much"  Discussed isolation protocol, sx/O2 monitoring, supportive care and follow up prn     I have spent 10 minutes directly with the patient  Encounter provider Bennett Szymanski PA-C    Provider located at [address]  [address] [place] [phone]    Recent Visits  Date Type Provider Dept   05/26/22 Telephone ROBERT Espinosa [facility]   Showing recent visits within past 7 days and meeting all other requirements  Today's Visits  Date Type Provider Dept   06/01/22 Telemedicine FELISA Moralez [facility]   Showing today's visits and meeting all other requirements  Future Appointments  No visits were found meeting these conditions  Showing future appointments within next 150 days and meeting all other requirements     This virtual check-in was done via telephone and she agrees to proceed  Patient agrees to participate in a virtual check in via telephone or video visit instead of presenting to the office to address urgent/immediate medical needs  Patient is aware this is a billable service  After connecting through Telephone, the patient was identified by name and date of birth  Jd Duval was informed that this was a telemedicine visit and that the exam was being conducted confidentially over secure lines  My office door was closed  No one else was in the room  Padmini Bauer acknowledged consent and understanding of privacy and security of the telemedicine visit  I informed the patient that I have reviewed her record in Epic and presented the opportunity for her to ask any questions regarding the visit today  The patient agreed to participate  It was my intent to perform this visit via video technology but the patient was not able to do a video connection so the visit was completed via audio telephone only  Verification of patient location:  Patient is located in the following state in which I hold an active license: [place]    Subjective:   Padmini Bauer is a 80 y o  female who has been screened for COVID-19  Symptom change since last report: unchanged  Patient is currently asymptomatic  Patient's symptoms include fatigue  Patient denies fever, chills, malaise, congestion, rhinorrhea, sore throat, anosmia, loss of taste, cough, shortness of breath, chest tightness, abdominal pain, nausea, vomiting, diarrhea, myalgias and headaches  - Date of symptom onset: 5/31/2022  - Date of positive COVID-19 test: 5/31/2022  Type of test: Home antigen  Patient with typical symptoms of COVID-19 and they attest that they were positive on home rapid antigen testing  Image of positive result is not able to be uploaded into their chart  COVID-19 vaccination status: Fully vaccinated with booster    Heidy Ocasio has been staying home and has isolated themselves in her home  She is taking care to not share personal items and is cleaning all surfaces that are touched often, like counters, tabletops, and doorknobs using household cleaning sprays or wipes  She is wearing a mask when she leaves her room  Sx/covid day 1,  was positive for covid so pt tested and was positive  Just feels fatigued mostly if overly active, otherwise no acute sx  Afebrile       No results found for: [address] [facility], [address] [facility], [address] [facility] [address] [facility], [facility] 116, [facility], [facility]  Past Medical History:   Diagnosis Date    BCC (basal cell carcinoma)     Benign uterine neoplasm     C  difficile colitis     Disease of thyroid gland     Hyperlipidemia     Hypertension      Past Surgical History:   Procedure Laterality Date    APPENDECTOMY      CATARACT EXTRACTION      Last assessed - 1/14/16    MALIGNANT SKIN LESION EXCISION      Face    PARATHYROIDECTOMY      IN WRIST Andrew Ax LIG Left 3/23/2021    Procedure: RELEASE LEFT CARPAL TUNNEL ENDOSCOPIC;  Surgeon: Christopher Jung MD;  Location: [facility];  Service: Orthopedics    REPLACEMENT TOTAL KNEE Bilateral     ROTATOR CUFF REPAIR Left     Last assessed - 1/14/16    TOTAL ABDOMINAL HYSTERECTOMY      with derrell oopherectomy, Last assessed - 1/14/16     Current Outpatient Medications   Medication Sig Dispense Refill    aspirin 81 MG tablet Take by mouth      atorvastatin (LIPITOR) 20 mg tablet Take 1 tablet (20 mg total) by mouth daily 90 tablet 1    cyanocobalamin (VITAMIN B-12) 1,000 mcg tablet Take by mouth      dicyclomine (BENTYL) 20 mg tablet Take 0 5 tablets (10 mg total) by mouth as needed in the morning and 0 5 tablets (10 mg total) as needed at noon and 0 5 tablets (10 mg total) as needed in the evening and 0 5 tablets (10 mg total) as needed before bedtime (abdominal pain)   20 tablet 0    Elastic Bandages & Supports (B & B Carpal Tunnel Brace) MISC Use daily at bedtime 2 each 1    levothyroxine 50 mcg tablet Take 1 tablet (50 mcg total) by mouth daily 90 tablet 0    lisinopril (ZESTRIL) 10 mg tablet Take 1 tablet (10 mg total) by mouth daily 90 tablet 1    Multiple Vitamin (MULTIVITAMIN) tablet Take 1 tablet by mouth daily      omega-3-acid ethyl esters (LOVAZA) 1 g capsule Take 2 capsules by mouth daily        oxybutynin (DITROPAN) 5 mg tablet Take 1 tablet (5 mg total) by mouth 2 (two) times a day 180 tablet 1    potassium chloride (K-DUR,KLOR-CON) 20 mEq tablet Take 2 tablets (40 mEq total) by mouth in the morning and 2 tablets (40 mEq total) in the evening  Do all this for 5 doses  10 tablet 0    Probiotic Product (PROBIOTIC-10 PO) Take by mouth daily      pyridoxine (VITAMIN B6) 100 mg tablet Take 100 mg by mouth daily      traZODone (DESYREL) 150 mg tablet Take 1 tablet (150 mg total) by mouth daily at bedtime 90 tablet 3     No current facility-administered medications for this visit  Allergies   Allergen Reactions    Codeine Sulfate        Review of Systems   Constitutional: Positive for fatigue  Negative for chills and fever  HENT: Negative for congestion, rhinorrhea and sore throat  Respiratory: Negative for cough, chest tightness and shortness of breath  Gastrointestinal: Negative for abdominal pain, diarrhea, nausea and vomiting  Musculoskeletal: Negative for myalgias  Neurological: Negative for headaches  Objective: There were no vitals filed for this visit  Physical Exam  Vitals and nursing note reviewed  Constitutional:       General: She is not in acute distress  Pulmonary:      [place]: Pulmonary [place] is normal  No respiratory distress  Neurological:      Mental Status: She is alert and oriented to person, place, and time  VIRTUAL VISIT DISCLAIMER    Rose Marie Lopez verbally agrees to participate in [employer]  Pt is aware that [employer] could be limited without vital signs or the ability to perform a full hands-on physical Nancy Glance understands she or the provider may request at any time to terminate the video visit and request the patient to seek care or treatment in person

## 2022-06-01 NOTE — TELEPHONE ENCOUNTER
Called pt and advised  Pt said she is out of both her meds  Pt said when she eats she gets crampy, passes gas and then leaks diarrhea but it is not severe  Pt said she tested positivie for Covid and her  is in hospital with Covid  Pt is concerned  Told pt will pass message to provider

## 2022-06-06 ENCOUNTER — TELEPHONE (OUTPATIENT)
Dept: GASTROENTEROLOGY | Facility: CLINIC | Age: 87
End: 2022-06-06

## 2022-06-06 DIAGNOSIS — R19.7 DIARRHEA, UNSPECIFIED TYPE: ICD-10-CM

## 2022-06-06 NOTE — TELEPHONE ENCOUNTER
Maria E patient - In the morning patient constant diarrhea after meds and then eating  Later on in the day patient gets  crampy and gets gassy  Please RTRN call to [phone]    Thx

## 2022-06-07 RX ORDER — MONTELUKAST SODIUM 4 MG/1
1 TABLET, CHEWABLE ORAL 4 TIMES DAILY
Qty: 60 TABLET | Refills: 1 | Status: SHIPPED | OUTPATIENT
Start: 2022-06-07

## 2022-06-08 ENCOUNTER — TELEPHONE (OUTPATIENT)
Dept: GASTROENTEROLOGY | Facility: CLINIC | Age: 87
End: 2022-06-08

## 2022-06-08 NOTE — TELEPHONE ENCOUNTER
[facility] with test results as per Provider

## 2022-06-08 NOTE — TELEPHONE ENCOUNTER
----- Message from Israel Brooks DO sent at 6/8/2022  1:29 PM EDT -----  Please call the patient with the biopsy results  Please call the patient with the biopsy results  The biopsy show acute colitis throughout the sigmoid colon  There is no evidence of cancer

## 2022-06-10 NOTE — TELEPHONE ENCOUNTER
Patient has an appointment with Dr Maximiliano Lott for 6/29 & patient is asking if she still needs to keep that appointment? Please advise  Thank you!

## 2022-06-29 ENCOUNTER — OFFICE VISIT (OUTPATIENT)
Dept: GASTROENTEROLOGY | Facility: CLINIC | Age: 87
End: 2022-06-29
Payer: COMMERCIAL

## 2022-06-29 VITALS
HEIGHT: 67 IN | SYSTOLIC BLOOD PRESSURE: 116 MMHG | BODY MASS INDEX: 24.48 KG/M2 | DIASTOLIC BLOOD PRESSURE: 60 MMHG | WEIGHT: 156 LBS | OXYGEN SATURATION: 97 % | HEART RATE: 68 BPM

## 2022-06-29 DIAGNOSIS — K52.9 COLITIS: ICD-10-CM

## 2022-06-29 PROCEDURE — 1160F RVW MEDS BY RX/DR IN RCRD: CPT | Performed by: INTERNAL MEDICINE

## 2022-06-29 PROCEDURE — 99214 OFFICE O/P EST MOD 30 MIN: CPT | Performed by: INTERNAL MEDICINE

## 2022-06-29 NOTE — PROGRESS NOTES
[facility]  Padmini Bauer 80 y o  female MRN: [id number]  Encounter: [id number]          ASSESSMENT AND PLAN:      1  Colitis  - Ambulatory referral to Gastroenterology    No additional medical therapy is medically necessary at this time    No additional diagnostic testing is warranted at this time    Follow-up as needed    ______________________________________________________________________    SUBJECTIVE:  Liat King comes the office today for routine follow-up  She states that her diarrhea has completely resolved  She states the rectal bleeding has completely resolved  Her last episode of diarrhea was more than 2 weeks ago  She denies any nausea, vomiting, heartburn, dysphagia, odynophagia, abdominal pain, bloating, gaseousness  She states that she stopped any the medication that she was previously taking  There are no new symptoms  REVIEW OF SYSTEMS IS OTHERWISE NEGATIVE        Historical Information   Past Medical History:   Diagnosis Date    BCC (basal cell carcinoma)     Benign uterine neoplasm     C  difficile colitis     Disease of thyroid gland     Hyperlipidemia     Hypertension      Past Surgical History:   Procedure Laterality Date    APPENDECTOMY      CATARACT EXTRACTION      Last assessed - 1/14/16    MALIGNANT SKIN LESION EXCISION      Face    PARATHYROIDECTOMY      DE WRIST Juan Sole LIG Left 3/23/2021    Procedure: RELEASE LEFT CARPAL TUNNEL ENDOSCOPIC;  Surgeon: Yolis Holm MD;  Location: [facility];  Service: Orthopedics    REPLACEMENT TOTAL KNEE Bilateral     ROTATOR CUFF REPAIR Left     Last assessed - 1/14/16    TOTAL ABDOMINAL HYSTERECTOMY      with derrell oopherectomy, Last assessed - 1/14/16     Social History   Social History     Substance and Sexual Activity   Alcohol Use Never     Social History     Substance and Sexual Activity   Drug Use No     Social History     Tobacco Use   Smoking Status Former Smoker    Quit date:     Years since quittin 5   Smokeless Tobacco Never Used     Family History   Problem Relation Age of Onset    Heart attack Mother     Substance Abuse Mother         denied    Mental illness Mother         denied    Other Mother         Cardiac Disorder     Cancer Mother         Malignant Neoplasm     Cancer Father         Malignant Neoplasm     Substance Abuse Father         denied    Mental illness Father         denied    Lung cancer Father     Spina bifida Child        Meds/Allergies       Current Outpatient Medications:     aspirin 81 MG tablet    atorvastatin (LIPITOR) 20 mg tablet    colestipol (COLESTID) 1 g tablet    cyanocobalamin (VITAMIN B-12) 1,000 mcg tablet    dicyclomine (BENTYL) 20 mg tablet    Elastic Bandages & Supports (B & B Carpal Tunnel Brace) MISC    levothyroxine 50 mcg tablet    lisinopril (ZESTRIL) 10 mg tablet    Multiple Vitamin (MULTIVITAMIN) tablet    omega-3-acid ethyl esters (LOVAZA) 1 g capsule    oxybutynin (DITROPAN) 5 mg tablet    Probiotic Product (PROBIOTIC-10 PO)    pyridoxine (VITAMIN B6) 100 mg tablet    traZODone (DESYREL) 150 mg tablet    potassium chloride (K-DUR,KLOR-CON) 20 mEq tablet    Allergies   Allergen Reactions    Codeine Confusion    Codeine Sulfate            Objective     Blood pressure 116/60, pulse 68, height 5' 7" (1 702 m), weight 70 8 kg (156 lb), SpO2 97 %  Body mass index is 24 43 kg/m²  PHYSICAL EXAM:      General Appearance:   Alert, cooperative, no distress   HEENT:   Normocephalic, atraumatic, anicteric      Neck:  Supple, symmetrical, trachea midline   Lungs:   Clear to auscultation bilaterally; no rales, rhonchi or wheezing; respirations unlabored    Heart[de-identified]   Regular rate and rhythm; no murmur, rub, or gallop     Abdomen:   Soft, non-tender, non-distended; normal bowel sounds; no masses, no organomegaly    Genitalia:   Deferred    Rectal:   Deferred    Extremities:  No cyanosis, clubbing or edema    Pulses:  2+ and symmetric    Skin:  No jaundice, rashes, or lesions    Lymph nodes:  No palpable cervical lymphadenopathy        Lab Results:   No visits with results within 1 Day(s) from this visit  Latest known visit with results is:   No results displayed because visit has over 200 results  Radiology Results:   No results found

## 2022-07-13 ENCOUNTER — APPOINTMENT (OUTPATIENT)
Dept: LAB | Facility: CLINIC | Age: 87
End: 2022-07-13
Payer: COMMERCIAL

## 2022-07-13 ENCOUNTER — OFFICE VISIT (OUTPATIENT)
Dept: FAMILY MEDICINE CLINIC | Facility: CLINIC | Age: 87
End: 2022-07-13
Payer: COMMERCIAL

## 2022-07-13 VITALS
HEART RATE: 55 BPM | BODY MASS INDEX: 24.8 KG/M2 | TEMPERATURE: 98.3 F | HEIGHT: 67 IN | WEIGHT: 158 LBS | DIASTOLIC BLOOD PRESSURE: 62 MMHG | OXYGEN SATURATION: 97 % | SYSTOLIC BLOOD PRESSURE: 118 MMHG

## 2022-07-13 DIAGNOSIS — E87.6 HYPOKALEMIA: ICD-10-CM

## 2022-07-13 DIAGNOSIS — R42 DIZZINESS: ICD-10-CM

## 2022-07-13 DIAGNOSIS — F43.21 GRIEF REACTION: ICD-10-CM

## 2022-07-13 DIAGNOSIS — D64.9 ANEMIA, UNSPECIFIED TYPE: ICD-10-CM

## 2022-07-13 DIAGNOSIS — R25.2 MUSCLE CRAMPS: ICD-10-CM

## 2022-07-13 DIAGNOSIS — K52.9 COLITIS: Primary | ICD-10-CM

## 2022-07-13 DIAGNOSIS — M54.32 SCIATICA OF LEFT SIDE: ICD-10-CM

## 2022-07-13 PROBLEM — F43.20 GRIEF REACTION: Status: ACTIVE | Noted: 2022-07-13

## 2022-07-13 LAB
ANION GAP SERPL CALCULATED.3IONS-SCNC: 7 MMOL/L (ref 4–13)
BASOPHILS # BLD MANUAL: 0.11 THOUSAND/UL (ref 0–0.1)
BASOPHILS NFR MAR MANUAL: 1 % (ref 0–1)
BUN SERPL-MCNC: 20 MG/DL (ref 5–25)
CALCIUM SERPL-MCNC: 10.2 MG/DL (ref 8.3–10.1)
CHLORIDE SERPL-SCNC: 107 MMOL/L (ref 100–108)
CO2 SERPL-SCNC: 23 MMOL/L (ref 21–32)
CREAT SERPL-MCNC: 1.08 MG/DL (ref 0.6–1.3)
EOSINOPHIL # BLD MANUAL: 0.11 THOUSAND/UL (ref 0–0.4)
EOSINOPHIL NFR BLD MANUAL: 1 % (ref 0–6)
ERYTHROCYTE [DISTWIDTH] IN BLOOD BY AUTOMATED COUNT: 13.8 % (ref 11.6–15.1)
GFR SERPL CREATININE-BSD FRML MDRD: 46 ML/MIN/1.73SQ M
GLUCOSE P FAST SERPL-MCNC: 104 MG/DL (ref 65–99)
HCT VFR BLD AUTO: 40.2 % (ref 34.8–46.1)
HGB BLD-MCNC: 13.1 G/DL (ref 11.5–15.4)
LYMPHOCYTES # BLD AUTO: 46 % (ref 14–44)
LYMPHOCYTES # BLD AUTO: 5.05 THOUSAND/UL (ref 0.6–4.47)
MCH RBC QN AUTO: 30.9 PG (ref 26.8–34.3)
MCHC RBC AUTO-ENTMCNC: 32.6 G/DL (ref 31.4–37.4)
MCV RBC AUTO: 95 FL (ref 82–98)
MONOCYTES # BLD AUTO: 0.44 THOUSAND/UL (ref 0–1.22)
MONOCYTES NFR BLD: 4 % (ref 4–12)
NEUTROPHILS # BLD MANUAL: 4.94 THOUSAND/UL (ref 1.85–7.62)
NEUTS SEG NFR BLD AUTO: 45 % (ref 43–75)
PLATELET # BLD AUTO: 301 THOUSANDS/UL (ref 149–390)
PLATELET BLD QL SMEAR: ADEQUATE
PMV BLD AUTO: 10.3 FL (ref 8.9–12.7)
POTASSIUM SERPL-SCNC: 4.1 MMOL/L (ref 3.5–5.3)
RBC # BLD AUTO: 4.24 MILLION/UL (ref 3.81–5.12)
SMUDGE CELLS BLD QL SMEAR: PRESENT
SODIUM SERPL-SCNC: 137 MMOL/L (ref 136–145)
TSH SERPL DL<=0.05 MIU/L-ACNC: 2.14 UIU/ML (ref 0.45–4.5)
VARIANT LYMPHS # BLD AUTO: 3 %
WBC # BLD AUTO: 10.97 THOUSAND/UL (ref 4.31–10.16)

## 2022-07-13 PROCEDURE — 99214 OFFICE O/P EST MOD 30 MIN: CPT | Performed by: FAMILY MEDICINE

## 2022-07-13 PROCEDURE — 1160F RVW MEDS BY RX/DR IN RCRD: CPT | Performed by: FAMILY MEDICINE

## 2022-07-13 PROCEDURE — 85007 BL SMEAR W/DIFF WBC COUNT: CPT

## 2022-07-13 PROCEDURE — 85027 COMPLETE CBC AUTOMATED: CPT

## 2022-07-13 PROCEDURE — 36415 COLL VENOUS BLD VENIPUNCTURE: CPT

## 2022-07-13 PROCEDURE — 84443 ASSAY THYROID STIM HORMONE: CPT

## 2022-07-13 PROCEDURE — 80048 BASIC METABOLIC PNL TOTAL CA: CPT

## 2022-07-13 RX ORDER — MECLIZINE HYDROCHLORIDE 25 MG/1
25 TABLET ORAL EVERY 8 HOURS PRN
Qty: 30 TABLET | Refills: 2 | Status: SHIPPED | OUTPATIENT
Start: 2022-07-13

## 2022-07-13 NOTE — PROGRESS NOTES
Assessment/Plan:    No problem-specific Assessment & Plan notes found for this encounter  Diagnoses and all orders for this visit:    Colitis  Symptoms resolved    Grief reaction  Offered therapy however patient is not interested    Hypokalemia  -     Basic metabolic panel; Future    Anemia, unspecified type  -     CBC and differential; Future    Dizziness  -     TSH, 3rd generation with Free T4 reflex; Future  -     meclizine (ANTIVERT) 25 mg tablet; Take 1 tablet (25 mg total) by mouth every 8 (eight) hours as needed for dizziness    Muscle cramps  Related to hypokalemia    Sciatica of left side  Referred to pain management      Follow up in 1 month or as needed        Subjective:      Patient ID: Donya Frank is a 80 y o  female  Patient is here for a follow up  She was hospitalized at [address] [place] [address] from 05/15 to 05/22 due to colitis  No evidence of C diff noted received IV abx which improved her symptoms thought to be due to ischemic colitis  She denies any symptoms at this time  She lost her  of 76 years 1 month has her moments of sadness however overall coping with recent death,  Has muscle cramps different muscles has a Hx of hypokalemia and anemia in the past   Also has left gluteal pain that goes down her left leg  Also she has been having dizzy episodes when changing body posiiton       The following portions of the patient's history were reviewed and updated as appropriate:   She  has a past medical history of BCC (basal cell carcinoma), Benign uterine neoplasm, C  difficile colitis, Disease of thyroid gland, Hyperlipidemia, and Hypertension    She   Patient Active Problem List    Diagnosis Date Noted    Grief reaction 07/13/2022    Hypokalemia 07/13/2022    Dizziness 07/13/2022    Muscle cramps 07/13/2022    Sciatica of left side 07/13/2022    Colitis 05/15/2022    Hypertriglyceridemia 03/23/2022    Dysuria 01/11/2022    Stage 3 chronic kidney disease, unspecified whether stage 3a or 3b CKD ([facility] ) 11/11/2021    Dry mouth 11/11/2021    Hoarseness of voice 11/11/2021    Bilateral hand pain 01/22/2021    Numbness and tingling in left hand 01/22/2021    Screening for diabetes mellitus 01/22/2021    Medicare annual wellness visit, subsequent 01/22/2021    Primary insomnia 01/22/2021    Vestibulopathy of both ears 01/22/2021    Spinal stenosis of lumbar region without neurogenic claudication 08/03/2020    Piriformis syndrome of left side 07/15/2020    Trochanteric bursitis of left hip 07/07/2020    Trochanteric bursitis of right hip 07/07/2020    Lumbar spondylosis 01/20/2020    Ischial bursitis of right side     Lumbar disc disease with radiculopathy     Strain of lumbar region 03/18/2019    Lung nodule < 6cm on CT 09/07/2018    Abdominal pain 08/23/2018    RANJAN (acute kidney injury) ([facility] ) 08/23/2018    Urinary incontinence 03/19/2018    Other insomnia 03/19/2018    Elevated rheumatoid factor 10/10/2016    Hyperlipidemia 06/01/2016    Overactive bladder 01/28/2016    Generalized osteoarthritis 01/14/2016    Hyperparathyroidism ([facility] ) 01/14/2016    Hypothyroidism 01/14/2016    Hypertension 01/04/2016     She  has a past surgical history that includes Cataract extraction; Malignant skin lesion excision; Replacement total knee (Bilateral); Parathyroidectomy; Total abdominal hysterectomy; Rotator cuff repair (Left); Appendectomy; and pr wrist arthroscop,release xvers lig (Left, 3/23/2021)  Her family history includes Cancer in her father and mother; Heart attack in her mother; Lung cancer in her father; Mental illness in her father and mother; Other in her mother; Spina bifida in her child; Substance Abuse in her father and mother  She  reports that she quit smoking about 64 years ago  She has never used smokeless tobacco  She reports that she does not drink alcohol and does not use drugs    Current Outpatient Medications   Medication Sig Dispense Refill    aspirin 81 MG tablet Take by mouth      atorvastatin (LIPITOR) 20 mg tablet Take 1 tablet (20 mg total) by mouth daily 90 tablet 1    colestipol (COLESTID) 1 g tablet Take 1 tablet (1 g total) by mouth 4 (four) times a day 60 tablet 1    cyanocobalamin (VITAMIN B-12) 1,000 mcg tablet Take by mouth      dicyclomine (BENTYL) 20 mg tablet Take 0 5 tablets (10 mg total) by mouth as needed in the morning and 0 5 tablets (10 mg total) as needed at noon and 0 5 tablets (10 mg total) as needed in the evening and 0 5 tablets (10 mg total) as needed before bedtime (abdominal pain)  20 tablet 0    Elastic Bandages & Supports (B & B Carpal Tunnel Brace) MISC Use daily at bedtime 2 each 1    levothyroxine 50 mcg tablet Take 1 tablet (50 mcg total) by mouth daily 90 tablet 0    lisinopril (ZESTRIL) 10 mg tablet Take 1 tablet (10 mg total) by mouth daily 90 tablet 1    meclizine (ANTIVERT) 25 mg tablet Take 1 tablet (25 mg total) by mouth every 8 (eight) hours as needed for dizziness 30 tablet 2    Multiple Vitamin (MULTIVITAMIN) tablet Take 1 tablet by mouth daily      omega-3-acid ethyl esters (LOVAZA) 1 g capsule Take 2 capsules by mouth daily        oxybutynin (DITROPAN) 5 mg tablet Take 1 tablet (5 mg total) by mouth 2 (two) times a day 180 tablet 1    Probiotic Product (PROBIOTIC-10 PO) Take by mouth daily      pyridoxine (VITAMIN B6) 100 mg tablet Take 100 mg by mouth daily      traZODone (DESYREL) 150 mg tablet Take 1 tablet (150 mg total) by mouth daily at bedtime 90 tablet 3    potassium chloride (K-DUR,KLOR-CON) 20 mEq tablet Take 2 tablets (40 mEq total) by mouth in the morning and 2 tablets (40 mEq total) in the evening  Do all this for 5 doses  10 tablet 0     No current facility-administered medications for this visit       Current Outpatient Medications on File Prior to Visit   Medication Sig    aspirin 81 MG tablet Take by mouth    atorvastatin (LIPITOR) 20 mg tablet Take 1 tablet (20 mg total) by mouth daily    colestipol (COLESTID) 1 g tablet Take 1 tablet (1 g total) by mouth 4 (four) times a day    cyanocobalamin (VITAMIN B-12) 1,000 mcg tablet Take by mouth    dicyclomine (BENTYL) 20 mg tablet Take 0 5 tablets (10 mg total) by mouth as needed in the morning and 0 5 tablets (10 mg total) as needed at noon and 0 5 tablets (10 mg total) as needed in the evening and 0 5 tablets (10 mg total) as needed before bedtime (abdominal pain)   Elastic Bandages & Supports (B & B Carpal Tunnel Brace) MISC Use daily at bedtime    levothyroxine 50 mcg tablet Take 1 tablet (50 mcg total) by mouth daily    lisinopril (ZESTRIL) 10 mg tablet Take 1 tablet (10 mg total) by mouth daily    Multiple Vitamin (MULTIVITAMIN) tablet Take 1 tablet by mouth daily    omega-3-acid ethyl esters (LOVAZA) 1 g capsule Take 2 capsules by mouth daily      oxybutynin (DITROPAN) 5 mg tablet Take 1 tablet (5 mg total) by mouth 2 (two) times a day    Probiotic Product (PROBIOTIC-10 PO) Take by mouth daily    pyridoxine (VITAMIN B6) 100 mg tablet Take 100 mg by mouth daily    traZODone (DESYREL) 150 mg tablet Take 1 tablet (150 mg total) by mouth daily at bedtime    potassium chloride (K-DUR,KLOR-CON) 20 mEq tablet Take 2 tablets (40 mEq total) by mouth in the morning and 2 tablets (40 mEq total) in the evening  Do all this for 5 doses  No current facility-administered medications on file prior to visit  She is allergic to codeine and codeine sulfate       Review of Systems   Constitutional: Negative for activity change, appetite change, fatigue and fever  HENT: Negative for congestion and ear discharge  Respiratory: Negative for cough and shortness of breath  Cardiovascular: Negative for chest pain and palpitations  Gastrointestinal: Negative for diarrhea and nausea  Musculoskeletal: Positive for myalgias  Negative for arthralgias and back pain  Skin: Negative for color change and rash     Neurological: Positive for dizziness  Negative for headaches  Psychiatric/Behavioral: Negative for agitation and behavioral problems  Objective:      /62   Pulse 55   Temp 98 3 °F (36 8 °C)   Ht 5' 7" (1 702 m)   Wt 71 7 kg (158 lb)   SpO2 97%   BMI 24 75 kg/m²          Physical Exam  Constitutional:       General: She is not in acute distress  Appearance: She is well-developed  She is not diaphoretic  HENT:      Head: Normocephalic and atraumatic  Nose: Nose normal    Eyes:      Conjunctiva/sclera: Conjunctivae normal       Pupils: Pupils are equal, round, and reactive to light  Cardiovascular:      Rate and Rhythm: Normal rate and regular rhythm  Heart sounds: Normal heart sounds  No murmur heard  Pulmonary:      Effort: Pulmonary effort is normal  No respiratory distress  Breath sounds: Normal breath sounds  No wheezing  Abdominal:      General: Bowel sounds are normal  There is no distension  Palpations: Abdomen is soft  Tenderness: There is no abdominal tenderness  Skin:     General: Skin is warm and dry  Findings: No erythema or rash  Neurological:      Mental Status: She is alert and oriented to person, place, and time

## 2022-07-20 ENCOUNTER — EVALUATION (OUTPATIENT)
Dept: PHYSICAL THERAPY | Facility: CLINIC | Age: 87
End: 2022-07-20
Payer: COMMERCIAL

## 2022-07-20 DIAGNOSIS — R42 VERTIGO: ICD-10-CM

## 2022-07-20 DIAGNOSIS — R42 DIZZINESS: Primary | ICD-10-CM

## 2022-07-20 PROCEDURE — 97161 PT EVAL LOW COMPLEX 20 MIN: CPT

## 2022-07-20 PROCEDURE — 97110 THERAPEUTIC EXERCISES: CPT

## 2022-07-20 NOTE — PROGRESS NOTES
PT Evaluation  and PT Discharge    Today's date: 2022  Patient name: Silvana Walsh  : 1935  MRN: [id number]  Referring provider: Massiel Tang MD  Dx:   Encounter Diagnosis     ICD-10-CM    1  Dizziness  R42    2  Vertigo  R42                   Assessment  Assessment details: Silvana Walsh is a 80 y o  female presenting to physical therapy for an initial evaluation with a MD referral of dizziness, vertigo  The patient's VOMS screening and cervical ROM were all WNL and did not create symptoms for the patient  Positional testing for horizontal and vertical canal BPPV were also negative for nystagmus and symptoms  The patient states that her dizziness only happens on occasion and has not happened recently  She was educated that she may have had BPPV that cleared spontaneously or that her symptoms were due to orthostatic hypotension  She was advised that she may return if her symptoms return or worsen for another evaluation; verbalized understanding the above information  Other impairment: Dizziness, difficulty driving  Understanding of Dx/Px/POC: good   Prognosis: good    Goals  No goals to establish as patient was evaluated and DC'd at same visit  Plan  Patient would benefit from: PT eval  Duration in visits: 1  Plan of Care beginning date: 2022  Plan of Care expiration date: 2022  Treatment plan discussed with: patient        Subjective Evaluation    History of Present Illness  Mechanism of injury: Chelly Barron presents to therapy stating that 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 and notes that she would push herself to do things around the house and notes that she would feel dizzy following this  She reports that once in a while when she sits up in the middle of the night to go to the bathroom, she gets dizzy  She reports that she sits there for a few minutes and the dizziness subsides and she is able to get up and go to the bathroom without issue   States "as fast as it comes on, is as fast as it goes away"  Denies experiencing dizziness when rolling over at night, reading a book, watching television, turning head quickly, or when driving  Denies diplopia, dysphagia, dysarthria, drop attacks, ataxia  Notes occasional fogginess in her vision but states that this doesn't bother her  She was prescribed meclizine by her PCP, however hasn't taken it since she hasn't been getting dizzy  States that her biggest complaint is left sided gluteal pain which she believes "is my sciatica" ongoing since May  Notes that years ago she had an injection for this and hasn't had any pain until recently  States that it bothers her at night when sitting down watching TV and when walking  She reports that she would like to go back to the facility that she was at previously to have another injection  Pain  Current pain ratin  At best pain ratin  At worst pain ratin  Location: L gluteal pain   Quality: sharp  Relieving factors: change in position  Aggravating factors: sitting and walking    Social Support  Steps to enter house: yes  Stairs in house: no   L[facility] in: [facility]  Lives with: alone    Employment status: not working  Treatments  Previous treatment: injection treatment  Current treatment: physical therapy  Patient Goals  Patient goal: make sure i am better         Objective     Concurrent Complaints  Negative for nausea/motion sickness, tinnitus, visual change, hearing loss, memory loss and poor concentration    Active Range of Motion   Cervical/Thoracic Spine       Cervical    Flexion: Neck active flexion: WFL  Extension: Neck active extension: WFL  Left lateral flexion: Neck active lateral bend left: WFL  Right lateral flexion: Neck active lateral bend right: WFL  Left rotation: Neck active rotation left: WFL  Right rotation: Neck active rotation right: WFL             Neuro Exam:     Positional testing   Chichi-Hallpike   Left posterior canal: WNL  Right posterior canal: WNL  Roll test   Left horizontal canal: WNL  Right horizontal canal: WNL        Vestibular/Ocular-Motor Screening (VOMS):      Headache Dizziness Nausea Fogginess Comments   Baseline symptoms 0/10 0/10 0/10 0/10    Smooth pursuits 0/10 0/10 0/10 0/10    Saccades - horizontal 0/10 0/10 0/10 0/10    Saccades - vertical 0/10 0/10 0/10 0/10    Convergence (near point) 0/10 0/10 0/10 0/10    VOR- horizontal 0/10 0/10 0/10 0/10    VOR- vertical 0/10 0/10 0/10 0/10    Visual Motion Sensitivity Test 0/10 0/10 0/10 0/10      Comments:          Precautions: HTN, B/L TKA      Manuals                                                                 Neuro Re-Ed                                                                                                        Ther Ex             Pt education BPPV, orthostatic hypotension, anatomy, physiology                                                                                                       Ther Activity                                       Gait Training                                       Modalities

## 2022-08-08 DIAGNOSIS — E78.00 PURE HYPERCHOLESTEROLEMIA: ICD-10-CM

## 2022-08-08 DIAGNOSIS — I10 ESSENTIAL HYPERTENSION: ICD-10-CM

## 2022-08-08 DIAGNOSIS — E03.9 HYPOTHYROIDISM, UNSPECIFIED TYPE: ICD-10-CM

## 2022-08-08 DIAGNOSIS — R32 URINARY INCONTINENCE, UNSPECIFIED TYPE: ICD-10-CM

## 2022-08-08 DIAGNOSIS — F51.01 PRIMARY INSOMNIA: ICD-10-CM

## 2022-08-08 RX ORDER — LEVOTHYROXINE SODIUM 0.05 MG/1
50 TABLET ORAL DAILY
Qty: 90 TABLET | Refills: 1 | Status: SHIPPED | OUTPATIENT
Start: 2022-08-08

## 2022-08-08 RX ORDER — OXYBUTYNIN CHLORIDE 5 MG/1
5 TABLET ORAL 2 TIMES DAILY
Qty: 180 TABLET | Refills: 1 | Status: SHIPPED | OUTPATIENT
Start: 2022-08-08

## 2022-08-08 RX ORDER — TRAZODONE HYDROCHLORIDE 150 MG/1
150 TABLET ORAL
Qty: 90 TABLET | Refills: 1 | Status: SHIPPED | OUTPATIENT
Start: 2022-08-08 | End: 2022-08-30

## 2022-08-08 RX ORDER — LISINOPRIL 10 MG/1
10 TABLET ORAL DAILY
Qty: 90 TABLET | Refills: 1 | Status: SHIPPED | OUTPATIENT
Start: 2022-08-08

## 2022-08-08 RX ORDER — ATORVASTATIN CALCIUM 20 MG/1
20 TABLET, FILM COATED ORAL DAILY
Qty: 90 TABLET | Refills: 1 | Status: SHIPPED | OUTPATIENT
Start: 2022-08-08

## 2022-08-30 ENCOUNTER — TELEPHONE (OUTPATIENT)
Dept: FAMILY MEDICINE CLINIC | Facility: CLINIC | Age: 87
End: 2022-08-30

## 2022-08-30 DIAGNOSIS — F51.01 PRIMARY INSOMNIA: ICD-10-CM

## 2022-08-30 RX ORDER — TRAZODONE HYDROCHLORIDE 100 MG/1
200 TABLET ORAL
Qty: 60 TABLET | Refills: 2 | Status: SHIPPED | OUTPATIENT
Start: 2022-08-30 | End: 2022-09-29

## 2022-08-30 NOTE — TELEPHONE ENCOUNTER
Pt says 150mg trazodone is not effective anymore  Pt takes it at 9pm but wakes up at 3-4am and wide awake  Wants to know if she can increase the dosage or if you have any recommendations

## 2022-10-12 PROBLEM — Z00.00 MEDICARE ANNUAL WELLNESS VISIT, SUBSEQUENT: Status: RESOLVED | Noted: 2021-01-22 | Resolved: 2022-10-12

## 2022-10-12 PROBLEM — Z13.1 SCREENING FOR DIABETES MELLITUS: Status: RESOLVED | Noted: 2021-01-22 | Resolved: 2022-10-12

## 2022-11-09 DIAGNOSIS — E03.9 HYPOTHYROIDISM, UNSPECIFIED TYPE: ICD-10-CM

## 2022-11-09 RX ORDER — LEVOTHYROXINE SODIUM 0.05 MG/1
TABLET ORAL
Qty: 90 TABLET | Refills: 1 | Status: SHIPPED | OUTPATIENT
Start: 2022-11-09

## 2022-11-19 DIAGNOSIS — F51.01 PRIMARY INSOMNIA: ICD-10-CM

## 2022-11-21 RX ORDER — TRAZODONE HYDROCHLORIDE 100 MG/1
TABLET ORAL
Qty: 60 TABLET | Refills: 2 | Status: SHIPPED | OUTPATIENT
Start: 2022-11-21

## 2023-01-27 DIAGNOSIS — R32 URINARY INCONTINENCE, UNSPECIFIED TYPE: ICD-10-CM

## 2023-01-27 DIAGNOSIS — E78.00 PURE HYPERCHOLESTEROLEMIA: ICD-10-CM

## 2023-01-27 DIAGNOSIS — I10 ESSENTIAL HYPERTENSION: ICD-10-CM

## 2023-01-27 RX ORDER — LISINOPRIL 10 MG/1
10 TABLET ORAL DAILY
Qty: 90 TABLET | Refills: 1 | Status: SHIPPED | OUTPATIENT
Start: 2023-01-27

## 2023-01-27 RX ORDER — OXYBUTYNIN CHLORIDE 5 MG/1
5 TABLET ORAL 2 TIMES DAILY
Qty: 180 TABLET | Refills: 1 | Status: SHIPPED | OUTPATIENT
Start: 2023-01-27

## 2023-01-27 RX ORDER — ATORVASTATIN CALCIUM 20 MG/1
20 TABLET, FILM COATED ORAL DAILY
Qty: 90 TABLET | Refills: 1 | Status: SHIPPED | OUTPATIENT
Start: 2023-01-27

## 2023-02-16 ENCOUNTER — RA CDI HCC (OUTPATIENT)
Dept: OTHER | Facility: HOSPITAL | Age: 88
End: 2023-02-16

## 2023-02-16 NOTE — PROGRESS NOTES
[facility]  coding opportunities       Chart reviewed, no opportunity found:   [address]        Patients Insurance     Medicare Insurance: [employer]

## 2023-02-22 ENCOUNTER — OFFICE VISIT (OUTPATIENT)
Dept: FAMILY MEDICINE CLINIC | Facility: CLINIC | Age: 88
End: 2023-02-22

## 2023-02-22 VITALS
TEMPERATURE: 96.4 F | OXYGEN SATURATION: 96 % | SYSTOLIC BLOOD PRESSURE: 132 MMHG | BODY MASS INDEX: 26.06 KG/M2 | DIASTOLIC BLOOD PRESSURE: 70 MMHG | HEIGHT: 67 IN | HEART RATE: 68 BPM | WEIGHT: 166 LBS

## 2023-02-22 DIAGNOSIS — R01.1 SYSTOLIC MURMUR: ICD-10-CM

## 2023-02-22 DIAGNOSIS — I10 PRIMARY HYPERTENSION: ICD-10-CM

## 2023-02-22 DIAGNOSIS — E21.3 HYPERPARATHYROIDISM (HCC): ICD-10-CM

## 2023-02-22 DIAGNOSIS — N18.30 STAGE 3 CHRONIC KIDNEY DISEASE, UNSPECIFIED WHETHER STAGE 3A OR 3B CKD (HCC): ICD-10-CM

## 2023-02-22 DIAGNOSIS — E78.01 FAMILIAL HYPERCHOLESTEROLEMIA: ICD-10-CM

## 2023-02-22 DIAGNOSIS — G56.01 RIGHT CARPAL TUNNEL SYNDROME: Primary | ICD-10-CM

## 2023-02-22 RX ORDER — IBUPROFEN 600 MG/1
TABLET ORAL 3 TIMES DAILY
COMMUNITY

## 2023-02-22 RX ORDER — VANCOMYCIN HYDROCHLORIDE 250 MG/1
CAPSULE ORAL
COMMUNITY

## 2023-02-22 RX ORDER — AMOXICILLIN AND CLAVULANATE POTASSIUM 875; 125 MG/1; MG/1
TABLET, FILM COATED ORAL
COMMUNITY

## 2023-02-22 RX ORDER — LORAZEPAM 1 MG/1
TABLET ORAL
COMMUNITY

## 2023-02-22 NOTE — PROGRESS NOTES
Mook Chung 1935 female MRN: [id number]        ASSESSMENT/PLAN  Problem List Items Addressed This Visit        Endocrine    Hyperparathyroidism [facility])       Cardiovascular and Mediastinum    Hypertension       Genitourinary    Stage 3 chronic kidney disease, unspecified whether stage 3a or 3b CKD ([facility] )       Other    Hyperlipidemia   Other Visit Diagnoses     Right carpal tunnel syndrome    -  Primary    Systolic murmur        Relevant Orders    Echo complete w/ contrast if indicated      preop labs/EKG reviewed and are unremarkable  Fine murmur on exam apparently chronic, otherwise normal exam  Seek echo after surgery, no cardiac complaints  VSS  Cleared for surgery at this time without further workup  SUBJECTIVE  CC: Pre-op Exam and Nail Problem (On left toe, Nail removed and it's painful)      HPI:  Mook Chung is a 80 y o  female with R carpal tunnel syndrome who is planning to undergo R carpal tunnel release under choice by Dr Jeni Henry MD on 3/3/2023  Patient has not had complications with anesthesia in the past   Functional status: independent     Pt has hx of HTN, HLD, hyperparathyroid, CKD 3  Pre op labs reviewed and overall stable  Normal blood counts and coags  Renal function at baseline  No acute concerns  No cardiac complaints  On ASA, no other thinners  No recent fevers/infections  Pre-op EKG was normal  /70  Review of Systems   Constitutional: Negative for chills, fatigue and fever  HENT: Negative for congestion, ear pain, hearing loss, nosebleeds, postnasal drip, rhinorrhea, sinus pressure, sinus pain, sneezing and sore throat  Eyes: Negative for pain, discharge, itching and visual disturbance  Respiratory: Negative for cough, chest tightness, shortness of breath and wheezing  Cardiovascular: Negative for chest pain, palpitations and leg swelling  Gastrointestinal: Negative for abdominal pain, blood in stool, constipation, diarrhea, nausea and vomiting  Genitourinary: Negative for frequency and urgency  Neurological: Positive for weakness and numbness  Negative for dizziness and light-headedness           Historical Information   The patient history was reviewed as follows:    Past Medical History:   Diagnosis Date   • BCC (basal cell carcinoma)    • Benign uterine neoplasm    • C  difficile colitis    • Disease of thyroid gland    • Hyperlipidemia    • Hypertension      Past Surgical History:   Procedure Laterality Date   • APPENDECTOMY     • CATARACT EXTRACTION      Last assessed - 1/14/16   • MALIGNANT SKIN LESION EXCISION      Face   • PARATHYROIDECTOMY     • OR NDSC WRST SURG W/RLS TRANSVRS CARPL LIGM Left 3/23/2021    Procedure: RELEASE LEFT CARPAL TUNNEL ENDOSCOPIC;  Surgeon: Adriana Baugh MD;  Location: [facility];  Service: Orthopedics   • REPLACEMENT TOTAL KNEE Bilateral    • ROTATOR CUFF REPAIR Left     Last assessed - 1/14/16   • TOTAL ABDOMINAL HYSTERECTOMY      with derrell oopherectomy, Last assessed - 1/14/16     Family History   Problem Relation Age of Onset   • Heart attack Mother    • Substance Abuse Mother         denied   • Mental illness Mother         denied   • Other Mother         Cardiac Disorder    • Cancer Mother         Malignant Neoplasm    • Cancer Father         Malignant Neoplasm    • Substance Abuse Father         denied   • Mental illness Father         denied   • Lung cancer Father    • Spina bifida Child       Social History       Medications:     Current Outpatient Medications:   •  amoxicillin-clavulanate (AUGMENTIN) 875-125 mg per tablet, amoxicillin 875 mg-potassium clavulanate 125 mg tablet  take 1 tablet by mouth every 12 hours for 7 days, Disp: , Rfl:   •  aspirin 81 MG tablet, Take by mouth, Disp: , Rfl:   •  atorvastatin (LIPITOR) 20 mg tablet, Take 1 tablet (20 mg total) by mouth daily, Disp: 90 tablet, Rfl: 1  •  colestipol (COLESTID) 1 g tablet, Take 1 tablet (1 g total) by mouth 4 (four) times a day, Disp: 60 tablet, Rfl: 1  •  cyanocobalamin (VITAMIN B-12) 1,000 mcg tablet, Take by mouth, Disp: , Rfl:   •  Elastic Bandages & Supports (B & B Carpal Tunnel Brace) MISC, Use daily at bedtime, Disp: 2 each, Rfl: 1  •  ibuprofen (MOTRIN) 600 mg tablet, 3 (three) times a day, Disp: , Rfl:   •  levothyroxine 50 mcg tablet, take 1 tablet by mouth once daily, Disp: 90 tablet, Rfl: 1  •  lisinopril (ZESTRIL) 10 mg tablet, Take 1 tablet (10 mg total) by mouth daily, Disp: 90 tablet, Rfl: 1  •  LORazepam (ATIVAN) 1 mg tablet, lorazepam 1 mg tablet, Disp: , Rfl:   •  meclizine (ANTIVERT) 25 mg tablet, Take 1 tablet (25 mg total) by mouth every 8 (eight) hours as needed for dizziness, Disp: 30 tablet, Rfl: 2  •  Multiple Vitamin (MULTIVITAMIN) tablet, Take 1 tablet by mouth daily, Disp: , Rfl:   •  omega-3-acid ethyl esters (LOVAZA) 1 g capsule, Take 2 capsules by mouth daily  , Disp: , Rfl:   •  oxybutynin (DITROPAN) 5 mg tablet, Take 1 tablet (5 mg total) by mouth 2 (two) times a day, Disp: 180 tablet, Rfl: 1  •  potassium chloride (K-DUR,KLOR-CON) 20 mEq tablet, Take 2 tablets (40 mEq total) by mouth in the morning and 2 tablets (40 mEq total) in the evening  Do all this for 5 doses  , Disp: 10 tablet, Rfl: 0  •  Probiotic Product (PROBIOTIC-10 PO), Take by mouth daily, Disp: , Rfl:   •  pyridoxine (VITAMIN B6) 100 mg tablet, Take 100 mg by mouth daily, Disp: , Rfl:   •  traZODone (DESYREL) 100 mg tablet, take 2 tablets by mouth daily at bedtime, Disp: 60 tablet, Rfl: 2  •  vancomycin (VANCOCIN) 250 MG capsule, vancomycin 250 mg capsule  PLEASE SEE ATTACHED FOR DETAILED DIRECTIONS, Disp: , Rfl:   Allergies   Allergen Reactions   • Codeine Confusion   • Codeine Sulfate        OBJECTIVE    Vitals:   Vitals:    02/22/23 1049   BP: 132/70   BP Location: Left arm   Patient Position: Sitting   Cuff Size: Adult   Pulse: 68   Temp: (!) 96 4 °F (35 8 °C)   SpO2: 96%   Weight: 75 3 kg (166 lb)   Height: 5' 7" (1 702 m)           Physical Exam  Vitals and nursing note reviewed  Constitutional:       General: She is not in acute distress  Appearance: Normal appearance  HENT:      Head: Normocephalic and atraumatic  Nose: Nose normal    Eyes:      Pupils: Pupils are equal, round, and reactive to light  Cardiovascular:      Rate and Rhythm: Normal rate and regular rhythm  Heart sounds: Murmur (mild, RUSB, shares presents "since childhood") heard  Pulmonary:      Effort: Pulmonary effort is normal  No respiratory distress  Breath sounds: Normal breath sounds  No wheezing or rhonchi  Musculoskeletal:         General: Normal range of motion  Cervical back: Normal range of motion and neck supple  Skin:     General: Skin is warm and dry  Neurological:      Mental Status: She is alert and oriented to person, place, and time  Psychiatric:         Mood and Affect: Mood and affect normal           High Risk Surgery: no  CAD: no  CHF: no  CVD: no  DM2 on insulin: no  Cr>2: no        Jose Biggs is undergoing a Minimal risk surgery  She is at [address] for major adverse cardiac event (MACE)  She may proceed with surgery as planned without further workup        Emelia Perkins PA-C  [facility]   2/22/2023  11:14 AM

## 2023-04-11 PROBLEM — M25.541 ARTHRALGIA OF BOTH HANDS: Status: ACTIVE | Noted: 2023-04-11

## 2023-04-11 PROBLEM — R53.83 OTHER FATIGUE: Status: ACTIVE | Noted: 2023-04-11

## 2023-04-11 PROBLEM — M25.542 ARTHRALGIA OF BOTH HANDS: Status: ACTIVE | Noted: 2023-04-11

## 2023-04-26 DIAGNOSIS — E03.9 HYPOTHYROIDISM, UNSPECIFIED TYPE: ICD-10-CM

## 2023-04-26 DIAGNOSIS — R32 URINARY INCONTINENCE, UNSPECIFIED TYPE: ICD-10-CM

## 2023-04-26 DIAGNOSIS — E78.00 PURE HYPERCHOLESTEROLEMIA: ICD-10-CM

## 2023-04-26 DIAGNOSIS — I10 ESSENTIAL HYPERTENSION: ICD-10-CM

## 2023-04-27 RX ORDER — LEVOTHYROXINE SODIUM 50 MCG
TABLET ORAL
Qty: 90 TABLET | Refills: 1 | Status: SHIPPED | OUTPATIENT
Start: 2023-04-27

## 2023-04-27 RX ORDER — OXYBUTYNIN CHLORIDE 5 MG/1
TABLET ORAL
Qty: 180 TABLET | Refills: 1 | Status: SHIPPED | OUTPATIENT
Start: 2023-04-27

## 2023-04-27 RX ORDER — LISINOPRIL 10 MG/1
TABLET ORAL
Qty: 90 TABLET | Refills: 1 | Status: SHIPPED | OUTPATIENT
Start: 2023-04-27

## 2023-04-27 RX ORDER — ATORVASTATIN CALCIUM 20 MG/1
TABLET, FILM COATED ORAL
Qty: 90 TABLET | Refills: 1 | Status: SHIPPED | OUTPATIENT
Start: 2023-04-27

## 2023-05-19 ENCOUNTER — APPOINTMENT (OUTPATIENT)
Dept: RADIOLOGY | Facility: CLINIC | Age: 88
End: 2023-05-19

## 2023-05-19 ENCOUNTER — OFFICE VISIT (OUTPATIENT)
Dept: FAMILY MEDICINE CLINIC | Facility: CLINIC | Age: 88
End: 2023-05-19

## 2023-05-19 VITALS
OXYGEN SATURATION: 98 % | BODY MASS INDEX: 25.58 KG/M2 | HEART RATE: 84 BPM | TEMPERATURE: 96.5 F | HEIGHT: 67 IN | WEIGHT: 163 LBS | DIASTOLIC BLOOD PRESSURE: 78 MMHG | SYSTOLIC BLOOD PRESSURE: 116 MMHG

## 2023-05-19 DIAGNOSIS — R05.8 PRODUCTIVE COUGH: ICD-10-CM

## 2023-05-19 DIAGNOSIS — R05.8 PRODUCTIVE COUGH: Primary | ICD-10-CM

## 2023-05-19 DIAGNOSIS — Z11.59 SCREENING FOR VIRAL DISEASE: ICD-10-CM

## 2023-05-19 PROBLEM — R68.2 DRY MOUTH: Status: RESOLVED | Noted: 2021-11-11 | Resolved: 2023-05-19

## 2023-05-19 PROBLEM — R30.0 DYSURIA: Status: RESOLVED | Noted: 2022-01-11 | Resolved: 2023-05-19

## 2023-05-19 PROBLEM — N17.9 AKI (ACUTE KIDNEY INJURY) (HCC): Status: RESOLVED | Noted: 2018-08-23 | Resolved: 2023-05-19

## 2023-05-19 PROBLEM — M79.642 BILATERAL HAND PAIN: Status: RESOLVED | Noted: 2021-01-22 | Resolved: 2023-05-19

## 2023-05-19 PROBLEM — R10.9 ABDOMINAL PAIN: Status: RESOLVED | Noted: 2018-08-23 | Resolved: 2023-05-19

## 2023-05-19 PROBLEM — M25.541 ARTHRALGIA OF BOTH HANDS: Status: RESOLVED | Noted: 2023-04-11 | Resolved: 2023-05-19

## 2023-05-19 PROBLEM — R53.83 OTHER FATIGUE: Status: RESOLVED | Noted: 2023-04-11 | Resolved: 2023-05-19

## 2023-05-19 PROBLEM — M79.641 BILATERAL HAND PAIN: Status: RESOLVED | Noted: 2021-01-22 | Resolved: 2023-05-19

## 2023-05-19 PROBLEM — S39.012A STRAIN OF LUMBAR REGION: Status: RESOLVED | Noted: 2019-03-18 | Resolved: 2023-05-19

## 2023-05-19 PROBLEM — K52.9 COLITIS: Status: RESOLVED | Noted: 2022-05-15 | Resolved: 2023-05-19

## 2023-05-19 PROBLEM — G47.09 OTHER INSOMNIA: Status: RESOLVED | Noted: 2018-03-19 | Resolved: 2023-05-19

## 2023-05-19 PROBLEM — M25.542 ARTHRALGIA OF BOTH HANDS: Status: RESOLVED | Noted: 2023-04-11 | Resolved: 2023-05-19

## 2023-05-19 PROBLEM — M54.32 SCIATICA OF LEFT SIDE: Status: RESOLVED | Noted: 2022-07-13 | Resolved: 2023-05-19

## 2023-05-19 PROBLEM — R42 DIZZINESS: Status: RESOLVED | Noted: 2022-07-13 | Resolved: 2023-05-19

## 2023-05-19 PROBLEM — E87.6 HYPOKALEMIA: Status: RESOLVED | Noted: 2022-07-13 | Resolved: 2023-05-19

## 2023-05-19 PROBLEM — G57.02 PIRIFORMIS SYNDROME OF LEFT SIDE: Status: RESOLVED | Noted: 2020-07-15 | Resolved: 2023-05-19

## 2023-05-19 LAB
SARS-COV-2 AG UPPER RESP QL IA: NEGATIVE
VALID CONTROL: NORMAL

## 2023-05-19 RX ORDER — BENZONATATE 200 MG/1
200 CAPSULE ORAL 3 TIMES DAILY PRN
Qty: 20 CAPSULE | Refills: 2 | Status: SHIPPED | OUTPATIENT
Start: 2023-05-19

## 2023-05-19 RX ORDER — AMOXICILLIN AND CLAVULANATE POTASSIUM 875; 125 MG/1; MG/1
1 TABLET, FILM COATED ORAL EVERY 12 HOURS SCHEDULED
Qty: 14 TABLET | Refills: 0 | Status: SHIPPED | OUTPATIENT
Start: 2023-05-19 | End: 2023-05-26

## 2023-05-19 RX ORDER — FLUTICASONE PROPIONATE 50 MCG
1 SPRAY, SUSPENSION (ML) NASAL DAILY
Qty: 11.1 ML | Refills: 1 | Status: SHIPPED | OUTPATIENT
Start: 2023-05-19

## 2023-05-19 NOTE — PROGRESS NOTES
Name: Wanda Cheney      : 1935      MRN: [id number]  Encounter Provider: Cheryl Lazcano MD  Encounter Date: 2023   Encounter department: [address] [place] [address]     1  Productive cough  After discussing risks and benefits of medication along with side effects will start the following:  -     amoxicillin-clavulanate (Augmentin) 875-125 mg per tablet; Take 1 tablet by mouth every 12 (twelve) hours for 7 days  -     benzonatate (TESSALON) 200 MG capsule; Take 1 capsule (200 mg total) by mouth 3 (three) times a day as needed for cough  -     XR chest pa & lateral; Future; Expected date: 2023  -     fluticasone (FLONASE) 50 mcg/act nasal spray; 1 spray into each nostril daily    Follow up as needed       Subjective     Patient is here due to a cough non productive in nature that has been occurring for the past few days  She denies any fevers or SOB  Her symptoms are associated with headaches  denies any chest pain, no sore throat or earache  Review of Systems   Constitutional: Negative for activity change, appetite change, fatigue and fever  HENT: Negative for congestion and ear discharge  Respiratory: Positive for cough  Negative for shortness of breath  Cardiovascular: Negative for chest pain and palpitations  Gastrointestinal: Negative for diarrhea and nausea  Musculoskeletal: Negative for arthralgias and back pain  Skin: Negative for color change and rash  Neurological: Negative for dizziness and headaches  Psychiatric/Behavioral: Negative for agitation and behavioral problems         Past Medical History:   Diagnosis Date   • BCC (basal cell carcinoma)    • Benign uterine neoplasm    • C  difficile colitis    • Disease of thyroid gland    • Hyperlipidemia    • Hypertension      Past Surgical History:   Procedure Laterality Date   • APPENDECTOMY     • CATARACT EXTRACTION      Last assessed - 16   • MALIGNANT SKIN LESION EXCISION      Face   • PARATHYROIDECTOMY     • [place] NDSC WRST SURG W/RLS TRANSVRS CARPL LIGM Left 3/23/2021    Procedure: RELEASE LEFT CARPAL TUNNEL ENDOSCOPIC;  Surgeon: Mona Ramires MD;  Location: [facility];  Service: Orthopedics   • REPLACEMENT TOTAL KNEE Bilateral    • ROTATOR CUFF REPAIR Left     Last assessed - 16   • TOTAL ABDOMINAL HYSTERECTOMY      with derrell oopherectomy, Last assessed - 16     Family History   Problem Relation Age of Onset   • Heart attack Mother    • Substance Abuse Mother         denied   • Mental illness Mother         denied   • Other Mother         Cardiac Disorder    • Cancer Mother         Malignant Neoplasm    • Cancer Father         Malignant Neoplasm    • Substance Abuse Father         denied   • Mental illness Father         denied   • Lung cancer Father    • Spina bifida Child      Social History     Socioeconomic History   • Marital status: /Civil Union     Spouse name: None   • Number of children: 3   • Years of education: None   • Highest education level: None   Occupational History   • Occupation:      Comment: Retired    Tobacco Use   • Smoking status: Former     Types: Cigarettes     Quit date:      Years since quittin 4   • Smokeless tobacco: Never   Vaping Use   • Vaping Use: Never used   Substance and Sexual Activity   • Alcohol use: Never   • Drug use: No   • Sexual activity: None   Other Topics Concern   • None   Social History Narrative    Lives with spouse     Social Determinants of Health     Financial Resource Strain: Low Risk    • Difficulty of Paying Living Expenses: Not hard at all   Food Insecurity: Not on file   Transportation Needs: No Transportation Needs   • Lack of Transportation (Medical): No   • Lack of Transportation (Non-Medical):  No   Physical Activity: Not on file   Stress: Not on file   Social Connections: Not on file   Intimate Partner Violence: Not on file   Housing Stability: Not on file     Current Outpatient Medications on File Prior to Visit   Medication Sig   • [DISCONTINUED] traMADol (Ultram) 50 mg tablet Take 1 tablet (50 mg total) by mouth every 6 (six) hours as needed for moderate pain   • aspirin 81 MG tablet Take by mouth   • atorvastatin (LIPITOR) 20 mg tablet take 1 tablet by mouth once daily   • cholecalciferol (VITAMIN D3) 1,000 units tablet Take 1,000 Units by mouth daily   • cyanocobalamin (VITAMIN B-12) 1,000 mcg tablet Take by mouth   • Elastic Bandages & Supports (B & B Carpal Tunnel Brace) MISC Use daily at bedtime   • lisinopril (ZESTRIL) 10 mg tablet take 1 tablet by mouth once daily   • LORazepam (ATIVAN) 1 mg tablet Take 1 tablet (1 mg total) by mouth daily at bedtime   • meclizine (ANTIVERT) 25 mg tablet Take 1 tablet (25 mg total) by mouth every 8 (eight) hours as needed for dizziness   • Multiple Vitamin (MULTIVITAMIN) tablet Take 1 tablet by mouth daily   • omega-3-acid ethyl esters (LOVAZA) 1 g capsule Take 2 capsules by mouth daily     • oxybutynin (DITROPAN) 5 mg tablet take 1 tablet by mouth twice a day   • Probiotic Product (PROBIOTIC-10 PO) Take by mouth daily   • pyridoxine (VITAMIN B6) 100 mg tablet Take 100 mg by mouth daily   • Synthroid 50 MCG tablet take 1 tablet by mouth once daily   • vancomycin (VANCOCIN) 250 MG capsule vancomycin 250 mg capsule   PLEASE SEE ATTACHED FOR DETAILED DIRECTIONS (Patient not taking: Reported on 4/11/2023)   • [DISCONTINUED] amoxicillin-clavulanate (AUGMENTIN) 875-125 mg per tablet amoxicillin 875 mg-potassium clavulanate 125 mg tablet   take 1 tablet by mouth every 12 hours for 7 days (Patient not taking: Reported on 4/11/2023)   • [DISCONTINUED] colestipol (COLESTID) 1 g tablet Take 1 tablet (1 g total) by mouth 4 (four) times a day (Patient not taking: Reported on 4/11/2023)   • [DISCONTINUED] ibuprofen (MOTRIN) 600 mg tablet 3 (three) times a day   • [DISCONTINUED] potassium chloride (K-DUR,KLOR-CON) 20 mEq tablet Take 2 tablets (40 mEq total) by mouth in the morning "and 2 tablets (40 mEq total) in the evening  Do all this for 5 doses  • [DISCONTINUED] traZODone (DESYREL) 100 mg tablet take 2 tablets by mouth at bedtime     Allergies   Allergen Reactions   • Codeine Confusion   • Codeine Sulfate      Immunization History   Administered Date(s) Administered   • COVID-19 MODERNA VACC 0 25 ML IM BOOSTER 10/27/2021   • COVID-19 MODERNA VACC 0 5 ML IM 01/27/2021, 02/23/2021, 10/27/2021, 04/12/2022   • COVID-19 Moderna Vac BIVALENT 12 Yr+ IM (BOOSTER ONLY) 0 5 ML 09/07/2022   • INFLUENZA 09/26/2016, 09/29/2017, 08/27/2020, 09/03/2021, 09/20/2022   • Influenza Split High Dose Preservative Free IM 09/26/2016, 09/29/2017   • Influenza, high dose seasonal 0 7 mL 09/07/2018, 09/16/2019   • Pneumococcal Conjugate 13-Valent 01/14/2016   • Pneumococcal Polysaccharide PPV23 05/04/2017   • Zoster Vaccine Recombinant 08/13/2022, 11/14/2022       Objective     /78 (BP Location: Left arm, Patient Position: Sitting, Cuff Size: Large)   Pulse 84   Temp (!) 96 5 °F (35 8 °C)   Ht 5' 7\" (1 702 m)   Wt 73 9 kg (163 lb)   SpO2 98%   BMI 25 53 kg/m²     Physical Exam  Constitutional:       General: She is not in acute distress  Appearance: She is well-developed  She is not diaphoretic  HENT:      Head: Normocephalic and atraumatic  Nose: Nose normal    Eyes:      Conjunctiva/sclera: Conjunctivae normal       Pupils: Pupils are equal, round, and reactive to light  Cardiovascular:      Rate and Rhythm: Normal rate and regular rhythm  Heart sounds: Normal heart sounds  No murmur heard  Pulmonary:      Effort: Pulmonary effort is normal  No respiratory distress  Breath sounds: Normal breath sounds  No wheezing  Abdominal:      General: Bowel sounds are normal  There is no distension  Palpations: Abdomen is soft  Tenderness: There is no abdominal tenderness  Skin:     General: Skin is warm and dry  Findings: No erythema or rash     Neurological:      " Mental Status: She is alert and oriented to person, place, and time         Newton Hopkins MD

## 2023-06-10 PROBLEM — Z00.00 MEDICARE ANNUAL WELLNESS VISIT, SUBSEQUENT: Status: RESOLVED | Noted: 2021-01-22 | Resolved: 2023-06-10

## 2023-06-16 DIAGNOSIS — G47.09 OTHER INSOMNIA: ICD-10-CM

## 2023-06-17 RX ORDER — LORAZEPAM 1 MG/1
TABLET ORAL
Qty: 30 TABLET | Refills: 0 | Status: SHIPPED | OUTPATIENT
Start: 2023-06-17

## 2023-07-11 ENCOUNTER — APPOINTMENT (OUTPATIENT)
Dept: LAB | Facility: CLINIC | Age: 88
End: 2023-07-11
Payer: COMMERCIAL

## 2023-07-11 DIAGNOSIS — D72.829 LEUKOCYTOSIS, UNSPECIFIED TYPE: ICD-10-CM

## 2023-07-11 DIAGNOSIS — E83.52 HYPERCALCEMIA: ICD-10-CM

## 2023-07-11 LAB
25(OH)D3 SERPL-MCNC: 46.5 NG/ML (ref 30–100)
BASOPHILS # BLD AUTO: 0.06 THOUSANDS/ÂΜL (ref 0–0.1)
BASOPHILS NFR BLD AUTO: 1 % (ref 0–1)
CA-I BLD-SCNC: 1.26 MMOL/L (ref 1.12–1.32)
EOSINOPHIL # BLD AUTO: 0.15 THOUSAND/ÂΜL (ref 0–0.61)
EOSINOPHIL NFR BLD AUTO: 1 % (ref 0–6)
ERYTHROCYTE [DISTWIDTH] IN BLOOD BY AUTOMATED COUNT: 13.3 % (ref 11.6–15.1)
HCT VFR BLD AUTO: 43.4 % (ref 34.8–46.1)
HGB BLD-MCNC: 14.1 G/DL (ref 11.5–15.4)
IMM GRANULOCYTES # BLD AUTO: 0.03 THOUSAND/UL (ref 0–0.2)
IMM GRANULOCYTES NFR BLD AUTO: 0 % (ref 0–2)
LYMPHOCYTES # BLD AUTO: 5.04 THOUSANDS/ÂΜL (ref 0.6–4.47)
LYMPHOCYTES NFR BLD AUTO: 47 % (ref 14–44)
MCH RBC QN AUTO: 30.8 PG (ref 26.8–34.3)
MCHC RBC AUTO-ENTMCNC: 32.5 G/DL (ref 31.4–37.4)
MCV RBC AUTO: 95 FL (ref 82–98)
MONOCYTES # BLD AUTO: 0.91 THOUSAND/ÂΜL (ref 0.17–1.22)
MONOCYTES NFR BLD AUTO: 9 % (ref 4–12)
NEUTROPHILS # BLD AUTO: 4.51 THOUSANDS/ÂΜL (ref 1.85–7.62)
NEUTS SEG NFR BLD AUTO: 42 % (ref 43–75)
NRBC BLD AUTO-RTO: 0 /100 WBCS
PLATELET # BLD AUTO: 267 THOUSANDS/UL (ref 149–390)
PMV BLD AUTO: 11.2 FL (ref 8.9–12.7)
PTH-INTACT SERPL-MCNC: 18.9 PG/ML (ref 12–88)
RBC # BLD AUTO: 4.58 MILLION/UL (ref 3.81–5.12)
WBC # BLD AUTO: 10.7 THOUSAND/UL (ref 4.31–10.16)

## 2023-07-11 PROCEDURE — 36415 COLL VENOUS BLD VENIPUNCTURE: CPT

## 2023-07-11 PROCEDURE — 82330 ASSAY OF CALCIUM: CPT

## 2023-07-11 PROCEDURE — 83970 ASSAY OF PARATHORMONE: CPT

## 2023-07-11 PROCEDURE — 85025 COMPLETE CBC W/AUTO DIFF WBC: CPT

## 2023-07-11 PROCEDURE — 82306 VITAMIN D 25 HYDROXY: CPT

## 2023-07-18 ENCOUNTER — OFFICE VISIT (OUTPATIENT)
Dept: FAMILY MEDICINE CLINIC | Facility: CLINIC | Age: 88
End: 2023-07-18
Payer: COMMERCIAL

## 2023-07-18 VITALS
TEMPERATURE: 97.9 F | WEIGHT: 163.4 LBS | DIASTOLIC BLOOD PRESSURE: 84 MMHG | BODY MASS INDEX: 25.65 KG/M2 | HEIGHT: 67 IN | HEART RATE: 78 BPM | SYSTOLIC BLOOD PRESSURE: 128 MMHG | OXYGEN SATURATION: 98 %

## 2023-07-18 DIAGNOSIS — D72.829 LEUKOCYTOSIS, UNSPECIFIED TYPE: Primary | ICD-10-CM

## 2023-07-18 DIAGNOSIS — G47.09 OTHER INSOMNIA: ICD-10-CM

## 2023-07-18 DIAGNOSIS — R53.83 OTHER FATIGUE: ICD-10-CM

## 2023-07-18 PROBLEM — R20.2 NUMBNESS AND TINGLING IN LEFT HAND: Status: RESOLVED | Noted: 2021-01-22 | Resolved: 2023-07-18

## 2023-07-18 PROBLEM — R20.0 NUMBNESS AND TINGLING IN LEFT HAND: Status: RESOLVED | Noted: 2021-01-22 | Resolved: 2023-07-18

## 2023-07-18 PROBLEM — R05.8 PRODUCTIVE COUGH: Status: RESOLVED | Noted: 2023-05-19 | Resolved: 2023-07-18

## 2023-07-18 PROCEDURE — 99213 OFFICE O/P EST LOW 20 MIN: CPT | Performed by: FAMILY MEDICINE

## 2023-07-18 RX ORDER — LORAZEPAM 1 MG/1
1 TABLET ORAL
Qty: 90 TABLET | Refills: 0 | Status: SHIPPED | OUTPATIENT
Start: 2023-07-18

## 2023-07-18 RX ORDER — TRAZODONE HYDROCHLORIDE 100 MG/1
TABLET ORAL
Qty: 180 TABLET | OUTPATIENT
Start: 2023-07-18

## 2023-07-18 RX ORDER — TRAMADOL HYDROCHLORIDE 50 MG/1
50 TABLET ORAL EVERY 6 HOURS PRN
COMMUNITY

## 2023-07-18 NOTE — PROGRESS NOTES
Name: Lorn Dubin      : 1935      MRN: [id number]  Encounter Provider: Mackenzie Ryder MD  Encounter Date: 2023   Encounter department: [address] [place] [address]     1. Leukocytosis, unspecified type  Recommend if she develops fever, weight loss or chills to follow up with Heme/Onc does not want to do that at this time  -     CBC and differential; Future; Expected date: 10/18/2023    2. Other insomnia  Continue lorazepam  -     LORazepam (ATIVAN) 1 mg tablet; Take 1 tablet (1 mg total) by mouth daily at bedtime    3. Other fatigue  Multifactorial see above    F/U in 6 months         Subjective     Patient is here for a follow up has elevated wbc count with associated elevated lymphocytosis mild. She denies any weight loss, fevers, chills. She also has insomnia lorazepam working ok however takes a long time to work at times. She states that she feels tired all the time. Review of Systems   Constitutional: Positive for fatigue. Negative for activity change, appetite change and fever. HENT: Negative for congestion and ear discharge. Respiratory: Negative for cough and shortness of breath. Cardiovascular: Negative for chest pain and palpitations. Gastrointestinal: Negative for diarrhea and nausea. Musculoskeletal: Negative for arthralgias and back pain. Skin: Negative for color change and rash. Neurological: Negative for dizziness and headaches. Psychiatric/Behavioral: Positive for sleep disturbance. Negative for agitation and behavioral problems.        Past Medical History:   Diagnosis Date   • BCC (basal cell carcinoma)    • Benign uterine neoplasm    • C. difficile colitis    • Disease of thyroid gland    • Hyperlipidemia    • Hypertension      Past Surgical History:   Procedure Laterality Date   • APPENDECTOMY     • CATARACT EXTRACTION      Last assessed - 16   • MALIGNANT SKIN LESION EXCISION      Face   • PARATHYROIDECTOMY     • [place] NDSC WRST SURG W/RLS TRANSVRS CARPL LIGM Left 3/23/2021    Procedure: RELEASE LEFT CARPAL TUNNEL ENDOSCOPIC;  Surgeon: Aron Pepper MD;  Location: [facility];  Service: Orthopedics   • REPLACEMENT TOTAL KNEE Bilateral    • ROTATOR CUFF REPAIR Left     Last assessed - 16   • TOTAL ABDOMINAL HYSTERECTOMY      with derrell oopherectomy, Last assessed - 16     Family History   Problem Relation Age of Onset   • Heart attack Mother    • Substance Abuse Mother         denied   • Mental illness Mother         denied   • Other Mother         Cardiac Disorder    • Cancer Mother         Malignant Neoplasm    • Cancer Father         Malignant Neoplasm    • Substance Abuse Father         denied   • Mental illness Father         denied   • Lung cancer Father    • Spina bifida Child      Social History     Socioeconomic History   • Marital status: /Civil Union     Spouse name: None   • Number of children: 3   • Years of education: None   • Highest education level: None   Occupational History   • Occupation:      Comment: Retired    Tobacco Use   • Smoking status: Former     Types: Cigarettes     Quit date:      Years since quittin.5   • Smokeless tobacco: Never   Vaping Use   • Vaping Use: Never used   Substance and Sexual Activity   • Alcohol use: Never   • Drug use: No   • Sexual activity: None   Other Topics Concern   • None   Social History Narrative    Lives with spouse     Social Determinants of Health     Financial Resource Strain: Low Risk  (2023)    Overall Financial Resource Strain (CARDIA)    • Difficulty of Paying Living Expenses: Not hard at all   Food Insecurity: No Food Insecurity (2022)    Hunger Vital Sign    • Worried About Running Out of Food in the Last Year: Never true    • Ran Out of Food in the Last Year: Never true   Transportation Needs: No Transportation Needs (2023)    PRAPARE - Transportation    • Lack of Transportation (Medical):  No    • Lack of Transportation (Non-Medical):  No   Physical Activity: Not on file   Stress: Not on file   Social Connections: Not on file   Intimate Partner Violence: Not on file   Housing Stability: Low Risk  (5/16/2022)    Housing Stability Vital Sign    • Unable to Pay for Housing in the Last Year: No    • Number of Places Lived in the Last Year: 1    • Unstable Housing in the Last Year: No     Current Outpatient Medications on File Prior to Visit   Medication Sig   • aspirin 81 MG tablet Take by mouth   • atorvastatin (LIPITOR) 20 mg tablet take 1 tablet by mouth once daily   • cholecalciferol (VITAMIN D3) 1,000 units tablet Take 1,000 Units by mouth daily   • cyanocobalamin (VITAMIN B-12) 1,000 mcg tablet Take by mouth   • Elastic Bandages & Supports (B & B Carpal Tunnel Brace) MISC Use daily at bedtime   • fluticasone (FLONASE) 50 mcg/act nasal spray 1 spray into each nostril daily   • lisinopril (ZESTRIL) 10 mg tablet take 1 tablet by mouth once daily   • meclizine (ANTIVERT) 25 mg tablet Take 1 tablet (25 mg total) by mouth every 8 (eight) hours as needed for dizziness   • Multiple Vitamin (MULTIVITAMIN) tablet Take 1 tablet by mouth daily   • omega-3-acid ethyl esters (LOVAZA) 1 g capsule Take 2 capsules by mouth daily     • oxybutynin (DITROPAN) 5 mg tablet take 1 tablet by mouth twice a day   • Probiotic Product (PROBIOTIC-10 PO) Take by mouth daily   • pyridoxine (VITAMIN B6) 100 mg tablet Take 100 mg by mouth daily   • Synthroid 50 MCG tablet take 1 tablet by mouth once daily   • traMADol (ULTRAM) 50 mg tablet Take 50 mg by mouth every 6 (six) hours as needed for moderate pain   • [DISCONTINUED] LORazepam (ATIVAN) 1 mg tablet take 1 tablet by mouth daily at bedtime   • benzonatate (TESSALON) 200 MG capsule Take 1 capsule (200 mg total) by mouth 3 (three) times a day as needed for cough (Patient not taking: Reported on 7/18/2023)   • vancomycin (VANCOCIN) 250 MG capsule vancomycin 250 mg capsule   PLEASE SEE ATTACHED FOR DETAILED DIRECTIONS (Patient not taking: Reported on 4/11/2023)     Allergies   Allergen Reactions   • Codeine Confusion   • Codeine Sulfate      Immunization History   Administered Date(s) Administered   • COVID-19 MODERNA VACC 0.25 ML IM BOOSTER 10/27/2021   • COVID-19 MODERNA VACC 0.5 ML IM 01/27/2021, 02/23/2021, 10/27/2021, 04/12/2022   • COVID-19 Moderna Vac BIVALENT 12 Yr+ IM (BOOSTER ONLY) 0.5 ML 09/07/2022   • INFLUENZA 09/26/2016, 09/29/2017, 08/27/2020, 09/03/2021, 09/20/2022   • Influenza Split High Dose Preservative Free IM 09/26/2016, 09/29/2017   • Influenza, high dose seasonal 0.7 mL 09/07/2018, 09/16/2019   • Pneumococcal Conjugate 13-Valent 01/14/2016   • Pneumococcal Polysaccharide PPV23 05/04/2017   • Zoster Vaccine Recombinant 08/13/2022, 11/14/2022       Objective     /84 (BP Location: Left arm, Patient Position: Sitting)   Pulse 78   Temp 97.9 °F (36.6 °C) (Temporal)   Ht 5' 7" (1.702 m)   Wt 74.1 kg (163 lb 6.4 oz)   SpO2 98%   BMI 25.59 kg/m²     Physical Exam  Constitutional:       General: She is not in acute distress. Appearance: She is well-developed. She is not diaphoretic. HENT:      Head: Normocephalic and atraumatic. Nose: Nose normal.   Eyes:      Conjunctiva/sclera: Conjunctivae normal.      Pupils: Pupils are equal, round, and reactive to light. Cardiovascular:      Rate and Rhythm: Normal rate and regular rhythm. Heart sounds: Normal heart sounds. No murmur heard. Pulmonary:      Effort: Pulmonary effort is normal. No respiratory distress. Breath sounds: Normal breath sounds. No wheezing. Abdominal:      General: Bowel sounds are normal. There is no distension. Palpations: Abdomen is soft. Tenderness: There is no abdominal tenderness. Skin:     General: Skin is warm and dry. Findings: No erythema or rash. Neurological:      Mental Status: She is alert and oriented to person, place, and time.        Dex North MD

## 2023-10-17 ENCOUNTER — APPOINTMENT (OUTPATIENT)
Dept: LAB | Facility: CLINIC | Age: 88
End: 2023-10-17
Payer: COMMERCIAL

## 2023-10-17 DIAGNOSIS — D72.829 LEUKOCYTOSIS, UNSPECIFIED TYPE: ICD-10-CM

## 2023-10-17 LAB
BASOPHILS # BLD AUTO: 0.07 THOUSANDS/ÂΜL (ref 0–0.1)
BASOPHILS NFR BLD AUTO: 1 % (ref 0–1)
EOSINOPHIL # BLD AUTO: 0.2 THOUSAND/ÂΜL (ref 0–0.61)
EOSINOPHIL NFR BLD AUTO: 2 % (ref 0–6)
ERYTHROCYTE [DISTWIDTH] IN BLOOD BY AUTOMATED COUNT: 13.2 % (ref 11.6–15.1)
HCT VFR BLD AUTO: 43 % (ref 34.8–46.1)
HGB BLD-MCNC: 14 G/DL (ref 11.5–15.4)
IMM GRANULOCYTES # BLD AUTO: 0.04 THOUSAND/UL (ref 0–0.2)
IMM GRANULOCYTES NFR BLD AUTO: 0 % (ref 0–2)
LYMPHOCYTES # BLD AUTO: 4.78 THOUSANDS/ÂΜL (ref 0.6–4.47)
LYMPHOCYTES NFR BLD AUTO: 45 % (ref 14–44)
MCH RBC QN AUTO: 30.4 PG (ref 26.8–34.3)
MCHC RBC AUTO-ENTMCNC: 32.6 G/DL (ref 31.4–37.4)
MCV RBC AUTO: 94 FL (ref 82–98)
MONOCYTES # BLD AUTO: 1.02 THOUSAND/ÂΜL (ref 0.17–1.22)
MONOCYTES NFR BLD AUTO: 10 % (ref 4–12)
NEUTROPHILS # BLD AUTO: 4.4 THOUSANDS/ÂΜL (ref 1.85–7.62)
NEUTS SEG NFR BLD AUTO: 42 % (ref 43–75)
NRBC BLD AUTO-RTO: 0 /100 WBCS
PLATELET # BLD AUTO: 235 THOUSANDS/UL (ref 149–390)
PMV BLD AUTO: 10.7 FL (ref 8.9–12.7)
RBC # BLD AUTO: 4.6 MILLION/UL (ref 3.81–5.12)
WBC # BLD AUTO: 10.51 THOUSAND/UL (ref 4.31–10.16)

## 2023-10-17 PROCEDURE — 36415 COLL VENOUS BLD VENIPUNCTURE: CPT

## 2023-10-17 PROCEDURE — 85025 COMPLETE CBC W/AUTO DIFF WBC: CPT

## 2023-10-23 DIAGNOSIS — G47.09 OTHER INSOMNIA: ICD-10-CM

## 2023-10-23 DIAGNOSIS — R42 DIZZINESS: ICD-10-CM

## 2023-10-24 RX ORDER — LORAZEPAM 1 MG/1
1 TABLET ORAL
Qty: 90 TABLET | Refills: 0 | Status: SHIPPED | OUTPATIENT
Start: 2023-10-24

## 2023-10-24 RX ORDER — MECLIZINE HYDROCHLORIDE 25 MG/1
25 TABLET ORAL EVERY 8 HOURS PRN
Qty: 30 TABLET | Refills: 2 | Status: SHIPPED | OUTPATIENT
Start: 2023-10-24

## 2023-12-03 DIAGNOSIS — E03.9 HYPOTHYROIDISM, UNSPECIFIED TYPE: ICD-10-CM

## 2023-12-04 RX ORDER — LEVOTHYROXINE SODIUM 0.05 MG/1
50 TABLET ORAL DAILY
Qty: 90 TABLET | Refills: 1 | Status: SHIPPED | OUTPATIENT
Start: 2023-12-04

## 2023-12-13 NOTE — RESULT NOTES
Verified Results  (1) RHEUMATOID FACTOR SCREEN [id number] 10:42AM Rush Foster Order Number: [id number]     Test Name Result Flag Reference   RHEUMATOID FACTOR Positive A Negative   See RF Quantitation   RF QUANTITATION 10 IU/mL A (none)     (1) SED RATE [id number] 10:42AM Rush Foster Order Number: [id number]     Test Name Result Flag Reference   SED RATE 3 mm/hour  0-20
spouse

## 2023-12-15 ENCOUNTER — TELEPHONE (OUTPATIENT)
Dept: FAMILY MEDICINE CLINIC | Facility: CLINIC | Age: 88
End: 2023-12-15

## 2024-01-11 DIAGNOSIS — I10 ESSENTIAL HYPERTENSION: ICD-10-CM

## 2024-01-11 DIAGNOSIS — E78.00 PURE HYPERCHOLESTEROLEMIA: ICD-10-CM

## 2024-01-11 DIAGNOSIS — R32 URINARY INCONTINENCE, UNSPECIFIED TYPE: ICD-10-CM

## 2024-01-11 RX ORDER — LISINOPRIL 10 MG/1
TABLET ORAL
Qty: 90 TABLET | Refills: 1 | Status: SHIPPED | OUTPATIENT
Start: 2024-01-11

## 2024-01-11 RX ORDER — ATORVASTATIN CALCIUM 20 MG/1
TABLET, FILM COATED ORAL
Qty: 90 TABLET | Refills: 1 | Status: SHIPPED | OUTPATIENT
Start: 2024-01-11

## 2024-01-11 RX ORDER — OXYBUTYNIN CHLORIDE 5 MG/1
TABLET ORAL
Qty: 180 TABLET | Refills: 1 | Status: SHIPPED | OUTPATIENT
Start: 2024-01-11

## 2024-02-09 ENCOUNTER — LAB (OUTPATIENT)
Dept: LAB | Facility: CLINIC | Age: 89
End: 2024-02-09
Payer: COMMERCIAL

## 2024-02-09 ENCOUNTER — OFFICE VISIT (OUTPATIENT)
Dept: FAMILY MEDICINE CLINIC | Facility: CLINIC | Age: 89
End: 2024-02-09
Payer: COMMERCIAL

## 2024-02-09 VITALS
WEIGHT: 159 LBS | BODY MASS INDEX: 24.96 KG/M2 | TEMPERATURE: 97.1 F | OXYGEN SATURATION: 94 % | DIASTOLIC BLOOD PRESSURE: 60 MMHG | SYSTOLIC BLOOD PRESSURE: 108 MMHG | HEART RATE: 70 BPM | HEIGHT: 67 IN

## 2024-02-09 DIAGNOSIS — D72.829 LEUKOCYTOSIS, UNSPECIFIED TYPE: ICD-10-CM

## 2024-02-09 DIAGNOSIS — E83.52 HYPERCALCEMIA: ICD-10-CM

## 2024-02-09 DIAGNOSIS — M46.1 SACROILIITIS (HCC): ICD-10-CM

## 2024-02-09 DIAGNOSIS — E03.9 HYPOTHYROIDISM, UNSPECIFIED TYPE: ICD-10-CM

## 2024-02-09 DIAGNOSIS — Z13.1 SCREENING FOR DIABETES MELLITUS: ICD-10-CM

## 2024-02-09 DIAGNOSIS — N18.30 STAGE 3 CHRONIC KIDNEY DISEASE, UNSPECIFIED WHETHER STAGE 3A OR 3B CKD (HCC): ICD-10-CM

## 2024-02-09 DIAGNOSIS — E21.3 HYPERPARATHYROIDISM (HCC): ICD-10-CM

## 2024-02-09 DIAGNOSIS — G47.09 OTHER INSOMNIA: Primary | ICD-10-CM

## 2024-02-09 PROBLEM — R53.83 OTHER FATIGUE: Status: RESOLVED | Noted: 2023-04-11 | Resolved: 2024-02-09

## 2024-02-09 LAB
ALBUMIN SERPL BCP-MCNC: 4.3 G/DL (ref 3.5–5)
ALP SERPL-CCNC: 35 U/L (ref 34–104)
ALT SERPL W P-5'-P-CCNC: 42 U/L (ref 7–52)
ANION GAP SERPL CALCULATED.3IONS-SCNC: 9 MMOL/L
AST SERPL W P-5'-P-CCNC: 30 U/L (ref 13–39)
BILIRUB SERPL-MCNC: 0.5 MG/DL (ref 0.2–1)
BUN SERPL-MCNC: 27 MG/DL (ref 5–25)
CA-I BLD-SCNC: 1.28 MMOL/L (ref 1.12–1.32)
CALCIUM SERPL-MCNC: 9.9 MG/DL (ref 8.4–10.2)
CHLORIDE SERPL-SCNC: 104 MMOL/L (ref 96–108)
CO2 SERPL-SCNC: 25 MMOL/L (ref 21–32)
CREAT SERPL-MCNC: 0.82 MG/DL (ref 0.6–1.3)
CRP SERPL QL: <1 MG/L
GFR SERPL CREATININE-BSD FRML MDRD: 64 ML/MIN/1.73SQ M
GLUCOSE SERPL-MCNC: 94 MG/DL (ref 65–140)
POTASSIUM SERPL-SCNC: 4.3 MMOL/L (ref 3.5–5.3)
PROT SERPL-MCNC: 6.8 G/DL (ref 6.4–8.4)
PTH-INTACT SERPL-MCNC: 21.1 PG/ML (ref 12–88)
SODIUM SERPL-SCNC: 138 MMOL/L (ref 135–147)

## 2024-02-09 PROCEDURE — 82330 ASSAY OF CALCIUM: CPT

## 2024-02-09 PROCEDURE — 86140 C-REACTIVE PROTEIN: CPT

## 2024-02-09 PROCEDURE — 99214 OFFICE O/P EST MOD 30 MIN: CPT | Performed by: FAMILY MEDICINE

## 2024-02-09 PROCEDURE — 83970 ASSAY OF PARATHORMONE: CPT

## 2024-02-09 PROCEDURE — 80053 COMPREHEN METABOLIC PANEL: CPT

## 2024-02-09 PROCEDURE — 36415 COLL VENOUS BLD VENIPUNCTURE: CPT

## 2024-02-09 RX ORDER — ZOLPIDEM TARTRATE 5 MG/1
5 TABLET ORAL
Qty: 30 TABLET | Refills: 0 | Status: SHIPPED | OUTPATIENT
Start: 2024-02-09

## 2024-02-09 NOTE — PROGRESS NOTES
Name: Leyla Garnett      : 1935      MRN: [id number]  Encounter Provider: Olu Mullen MD  Encounter Date: 2024   Encounter department: [facility]    Assessment & Plan     1. Other insomnia  After discussing risks and benefits of medication along with side effects will start the following:   -     zolpidem (AMBIEN) 5 mg tablet; Take 1 tablet (5 mg total) by mouth daily at bedtime as needed for sleep    2. Sacroiliitis (HCC)  -     C-reactive protein; Future  No interested in pain management referral    3. Hyperparathyroidism (HCC)  No symptoms    4. Stage 3 chronic kidney disease, unspecified whether stage 3a or 3b CKD (HCC)  Limit NSAIDS    5. Hypothyroidism, unspecified type  -     TSH, 3rd generation with Free T4 reflex; Future; Expected date: 2024    6. Leukocytosis, unspecified type  -     CBC and differential; Future; Expected date: 2024  -     C-reactive protein; Future    7. Screening for diabetes mellitus  -     Comprehensive metabolic panel; Future    8. Hypercalcemia  -     Calcium, ionized; Future  -     PTH, intact; Future    F/U in 3 months       Subjective     Patient is here to follow up for insomnia. Has tried lorazepam and doxepin for sleep however neither has worked. She takes frequent naps during the day.  Has a Hx of sacroiliitis has chronic daily pain aleve helps. Has CKD stage denies any urinary symptoms.  Has hyperparathyroidism and hypercalcemia.      Review of Systems   Constitutional:  Negative for activity change, appetite change, fatigue and fever.   HENT:  Negative for congestion and ear discharge.    Respiratory:  Negative for cough and shortness of breath.    Cardiovascular:  Negative for chest pain and palpitations.   Gastrointestinal:  Negative for diarrhea and nausea.   Musculoskeletal:  Negative for arthralgias and back pain.   Skin:  Negative for color change and rash.   Neurological:  Negative for dizziness and headaches.    Psychiatric/Behavioral:  Negative for agitation and behavioral problems.        Past Medical History:   Diagnosis Date    BCC (basal cell carcinoma)     Benign uterine neoplasm     C. difficile colitis     Disease of thyroid gland     Hyperlipidemia     Hypertension      Past Surgical History:   Procedure Laterality Date    APPENDECTOMY      CATARACT EXTRACTION      Last assessed - 16    MALIGNANT SKIN LESION EXCISION      Face    PARATHYROIDECTOMY      CT [facility] WRST SURG W/RLS TRANSVRS CARPL LIGM Left 3/23/2021    Procedure: RELEASE LEFT CARPAL TUNNEL ENDOSCOPIC;  Surgeon: Finesse Read MD;  Location: [facility];  Service: Orthopedics    REPLACEMENT TOTAL KNEE Bilateral     ROTATOR CUFF REPAIR Left     Last assessed - 16    TOTAL ABDOMINAL HYSTERECTOMY      with derrell oopherectomy, Last assessed - 16     Family History   Problem Relation Age of Onset    Heart attack Mother     Substance Abuse Mother         denied    Mental illness Mother         denied    Other Mother         Cardiac Disorder     Cancer Mother         Malignant Neoplasm     Cancer Father         Malignant Neoplasm     Substance Abuse Father         denied    Mental illness Father         denied    Lung cancer Father     Spina bifida Child      Social History     Socioeconomic History    Marital status: /Civil Union     Spouse name: None    Number of children: 3    Years of education: None    Highest education level: None   Occupational History    Occupation:      Comment: Retired    Tobacco Use    Smoking status: Former     Current packs/day: 0.00     Types: Cigarettes     Quit date:      Years since quittin.1    Smokeless tobacco: Never   Vaping Use    Vaping status: Never Used   Substance and Sexual Activity    Alcohol use: Never    Drug use: No    Sexual activity: None   Other Topics Concern    None   Social History Narrative    Lives with spouse     Social Determinants of Health     Financial Resource  Strain: Low Risk  (4/11/2023)    Overall Financial Resource Strain (CARDIA)     Difficulty of Paying Living Expenses: Not hard at all   Food Insecurity: No Food Insecurity (5/16/2022)    Hunger Vital Sign     Worried About Running Out of Food in the Last Year: Never true     Ran Out of Food in the Last Year: Never true   Transportation Needs: No Transportation Needs (4/11/2023)    PRAPARE - Transportation     Lack of Transportation (Medical): No     Lack of Transportation (Non-Medical): No   Physical Activity: Not on file   Stress: Not on file   Social Connections: Not on file   Intimate Partner Violence: Not on file   Housing Stability: Low Risk  (5/16/2022)    Housing Stability Vital Sign     Unable to Pay for Housing in the Last Year: No     Number of Places Lived in the Last Year: 1     Unstable Housing in the Last Year: No     Current Outpatient Medications on File Prior to Visit   Medication Sig    aspirin 81 MG tablet Take by mouth    atorvastatin (LIPITOR) 20 mg tablet take 1 tablet by mouth once daily    cholecalciferol (VITAMIN D3) 1,000 units tablet Take 1,000 Units by mouth daily    cyanocobalamin (VITAMIN B-12) 1,000 mcg tablet Take by mouth    Elastic Bandages & Supports (B & B Carpal Tunnel Brace) MISC Use daily at bedtime    fluticasone (FLONASE) 50 mcg/act nasal spray 1 spray into each nostril daily    levothyroxine 50 mcg tablet take 1 tablet by mouth once daily    lisinopril (ZESTRIL) 10 mg tablet take 1 tablet by mouth once daily    meclizine (ANTIVERT) 25 mg tablet take 1 tablet by mouth every 8 hours if needed for dizziness    Multiple Vitamin (MULTIVITAMIN) tablet Take 1 tablet by mouth daily    omega-3-acid ethyl esters (LOVAZA) 1 g capsule Take 2 capsules by mouth daily      oxybutynin (DITROPAN) 5 mg tablet take 1 tablet by mouth twice a day    Probiotic Product (PROBIOTIC-10 PO) Take by mouth daily    pyridoxine (VITAMIN B6) 100 mg tablet Take 100 mg by mouth daily    traMADol (ULTRAM) 50  "mg tablet Take 50 mg by mouth every 6 (six) hours as needed for moderate pain    [DISCONTINUED] benzonatate (TESSALON) 200 MG capsule Take 1 capsule (200 mg total) by mouth 3 (three) times a day as needed for cough (Patient not taking: Reported on 7/18/2023)    [DISCONTINUED] LORazepam (ATIVAN) 1 mg tablet take 1 tablet by mouth at bedtime    [DISCONTINUED] vancomycin (VANCOCIN) 250 MG capsule vancomycin 250 mg capsule   PLEASE SEE ATTACHED FOR DETAILED DIRECTIONS (Patient not taking: Reported on 4/11/2023)     Allergies   Allergen Reactions    Codeine Confusion    Codeine Sulfate      Immunization History   Administered Date(s) Administered    COVID-19 MODERNA VACC 0.25 ML IM BOOSTER 10/27/2021    COVID-19 MODERNA VACC 0.5 ML IM 01/27/2021, 02/23/2021, 10/27/2021, 04/12/2022    COVID-19 Moderna Vac BIVALENT 12 Yr+ IM 0.5 ML 09/07/2022    COVID-19 Moderna mRNA Vaccine 12 Yr+ 50 mcg/0.5 mL (Spikevax) 10/04/2023    INFLUENZA 09/26/2016, 09/29/2017, 08/27/2020, 09/03/2021, 09/20/2022    Influenza Split High Dose Preservative Free IM 09/26/2016, 09/29/2017    Influenza, high dose seasonal 0.7 mL 09/07/2018, 09/16/2019    Pneumococcal Conjugate 13-Valent 01/14/2016    Pneumococcal Polysaccharide PPV23 05/04/2017    Zoster Vaccine Recombinant 08/13/2022, 11/14/2022       Objective     /60 (BP Location: Left arm, Patient Position: Sitting, Cuff Size: Large)   Pulse 70   Temp (!) 97.1 °F (36.2 °C)   Ht 5' 7\" (1.702 m)   Wt 72.1 kg (159 lb)   SpO2 94%   BMI 24.90 kg/m²     Physical Exam  Constitutional:       General: She is not in acute distress.     Appearance: She is well-developed. She is not diaphoretic.   HENT:      Head: Normocephalic and atraumatic.      Nose: Nose normal.   Eyes:      Conjunctiva/sclera: Conjunctivae normal.      Pupils: Pupils are equal, round, and reactive to light.   Cardiovascular:      Rate and Rhythm: Normal rate and regular rhythm.      Heart sounds: Normal heart sounds. No murmur " heard.  Pulmonary:      Effort: Pulmonary effort is normal. No respiratory distress.      Breath sounds: Normal breath sounds. No wheezing.   Abdominal:      General: Bowel sounds are normal. There is no distension.      Palpations: Abdomen is soft.      Tenderness: There is no abdominal tenderness.   Skin:     General: Skin is warm and dry.      Findings: No erythema or rash.   Neurological:      Mental Status: She is alert and oriented to person, place, and time.      Coordination: Coordination normal.       Olu Mullen MD

## 2024-03-05 DIAGNOSIS — E03.9 HYPOTHYROIDISM, UNSPECIFIED TYPE: ICD-10-CM

## 2024-03-05 DIAGNOSIS — G47.09 OTHER INSOMNIA: Primary | ICD-10-CM

## 2024-03-05 RX ORDER — TRAZODONE HYDROCHLORIDE 100 MG/1
200 TABLET ORAL
Qty: 60 TABLET | Refills: 3 | Status: SHIPPED | OUTPATIENT
Start: 2024-03-05

## 2024-03-05 RX ORDER — LEVOTHYROXINE SODIUM 0.05 MG/1
50 TABLET ORAL DAILY
Qty: 90 TABLET | Refills: 1 | Status: SHIPPED | OUTPATIENT
Start: 2024-03-05

## 2024-03-05 NOTE — TELEPHONE ENCOUNTER
Pt also requesting refill on trazodone 100mg 2 tabs HS. She does not want to take zolpidem anymore as she does not like the side effects.

## 2024-03-06 DIAGNOSIS — E03.9 HYPOTHYROIDISM, UNSPECIFIED TYPE: Primary | ICD-10-CM

## 2024-03-07 ENCOUNTER — APPOINTMENT (OUTPATIENT)
Dept: LAB | Facility: CLINIC | Age: 89
End: 2024-03-07
Payer: COMMERCIAL

## 2024-03-07 DIAGNOSIS — E03.9 HYPOTHYROIDISM, UNSPECIFIED TYPE: ICD-10-CM

## 2024-03-07 LAB — TSH SERPL DL<=0.05 MIU/L-ACNC: 2.08 UIU/ML (ref 0.45–4.5)

## 2024-03-07 PROCEDURE — 84443 ASSAY THYROID STIM HORMONE: CPT

## 2024-03-27 ENCOUNTER — OFFICE VISIT (OUTPATIENT)
Dept: FAMILY MEDICINE CLINIC | Facility: CLINIC | Age: 89
End: 2024-03-27
Payer: COMMERCIAL

## 2024-03-27 VITALS
DIASTOLIC BLOOD PRESSURE: 56 MMHG | HEIGHT: 67 IN | OXYGEN SATURATION: 97 % | SYSTOLIC BLOOD PRESSURE: 106 MMHG | HEART RATE: 71 BPM | BODY MASS INDEX: 25.74 KG/M2 | WEIGHT: 164 LBS | TEMPERATURE: 97.5 F

## 2024-03-27 DIAGNOSIS — E78.01 FAMILIAL HYPERCHOLESTEROLEMIA: ICD-10-CM

## 2024-03-27 DIAGNOSIS — I73.9 PAD (PERIPHERAL ARTERY DISEASE) (HCC): ICD-10-CM

## 2024-03-27 DIAGNOSIS — M51.16 LUMBAR DISC DISEASE WITH RADICULOPATHY: Primary | ICD-10-CM

## 2024-03-27 DIAGNOSIS — R91.1 INCIDENTAL LUNG NODULE, GREATER THAN OR EQUAL TO 8MM: ICD-10-CM

## 2024-03-27 PROCEDURE — 99214 OFFICE O/P EST MOD 30 MIN: CPT | Performed by: PHYSICIAN ASSISTANT

## 2024-03-27 PROCEDURE — G2211 COMPLEX E/M VISIT ADD ON: HCPCS | Performed by: PHYSICIAN ASSISTANT

## 2024-03-27 RX ORDER — NAPROXEN 500 MG/1
500 TABLET ORAL 2 TIMES DAILY PRN
Qty: 60 TABLET | Refills: 1 | Status: SHIPPED | OUTPATIENT
Start: 2024-03-27 | End: 2024-05-26

## 2024-03-27 NOTE — PROGRESS NOTES
Name: Leyla Garnett      : 1935      MRN: [id number]  Encounter Provider: Anatoliy Hernández PA-C  Encounter Date: 3/27/2024   Encounter department: [facility]    Assessment & Plan     1. Lumbar disc disease with radiculopathy  -     naproxen (Naprosyn) 500 mg tablet; Take 1 tablet (500 mg total) by mouth 2 (two) times a day as needed for mild pain or moderate pain  -     MRI lumbar spine wo contrast; Future; Expected date: 2024  -     Ambulatory referral to Spine & Pain Management; Future    2. PAD (peripheral artery disease) (HCC)  -     VAS ARTERIAL DUPLEX- LOWER LIMB BILATERAL; Future; Expected date: 2024  -     Lipid Panel with Direct LDL reflex; Future    3. Incidental lung nodule, greater than or equal to 8mm  -     CT lung nodule follow-up; Future; Expected date: 2024    4. Familial hypercholesterolemia  -     Lipid Panel with Direct LDL reflex; Future    Pt declines PT. Begin prn naproxen, continue prn tramdol. Refer back to spine/pain. Update MRI, last in 2019.   Seek duplex arterial study, continue ASA, statin, daily walking. Check lipids. No claudication  Follow up lung nodule  Follow up prn       Subjective     Pt presents for follow up    LBP: last MRI with multilevel degerative changes and disc disease. Pt with bilat radicular sx. Pain into glutes. Previously followed with pain management. Takes advil and sparingly takes tramdol.   PAD: insurance screening LIZ with RLE 0.58, LLE normal. No claudication  Lung nodule, 9mm, RML, last imaged in 2018. No cough, chest pain, SOB      Review of Systems   Constitutional:  Negative for chills, fatigue and fever.   HENT:  Negative for congestion, ear pain, hearing loss, nosebleeds, postnasal drip, rhinorrhea, sinus pressure, sinus pain, sneezing and sore throat.    Eyes:  Negative for pain, discharge, itching and visual disturbance.   Respiratory:  Negative for cough, chest tightness, shortness of breath and wheezing.     Cardiovascular:  Negative for chest pain, palpitations and leg swelling.   Gastrointestinal:  Negative for abdominal pain, blood in stool, constipation, diarrhea, nausea and vomiting.   Genitourinary:  Negative for frequency and urgency.   Musculoskeletal:  Positive for back pain.   Neurological:  Negative for dizziness, light-headedness and numbness.       Past Medical History:   Diagnosis Date   • BCC (basal cell carcinoma)    • Benign uterine neoplasm    • C. difficile colitis    • Disease of thyroid gland    • Hyperlipidemia    • Hypertension      Past Surgical History:   Procedure Laterality Date   • APPENDECTOMY     • CATARACT EXTRACTION      Last assessed - 1/14/16   • MALIGNANT SKIN LESION EXCISION      Face   • PARATHYROIDECTOMY     • OR [facility]SC WRST SURG W/RLS TRANSVRS CARPL LIGM Left 3/23/2021    Procedure: RELEASE LEFT CARPAL TUNNEL ENDOSCOPIC;  Surgeon: Finesse Read MD;  Location: [facility] OR;  Service: Orthopedics   • REPLACEMENT TOTAL KNEE Bilateral    • ROTATOR CUFF REPAIR Left     Last assessed - 1/14/16   • TOTAL ABDOMINAL HYSTERECTOMY      with derrell oopherectomy, Last assessed - 1/14/16     Family History   Problem Relation Age of Onset   • Heart attack Mother    • Substance Abuse Mother         denied   • Mental illness Mother         denied   • Other Mother         Cardiac Disorder    • Cancer Mother         Malignant Neoplasm    • Cancer Father         Malignant Neoplasm    • Substance Abuse Father         denied   • Mental illness Father         denied   • Lung cancer Father    • Spina bifida Child      Social History     Socioeconomic History   • Marital status: /Civil Union     Spouse name: None   • Number of children: 3   • Years of education: None   • Highest education level: None   Occupational History   • Occupation:      Comment: Retired    Tobacco Use   • Smoking status: Former     Current packs/day: 0.00     Types: Cigarettes     Quit date: 1958     Years since  quittin.2   • Smokeless tobacco: Never   Vaping Use   • Vaping status: Never Used   Substance and Sexual Activity   • Alcohol use: Never   • Drug use: No   • Sexual activity: None   Other Topics Concern   • None   Social History Narrative    Lives with spouse     Social Determinants of Health     Financial Resource Strain: Low Risk  (2023)    Overall Financial Resource Strain (CARDIA)    • Difficulty of Paying Living Expenses: Not hard at all   Food Insecurity: No Food Insecurity (2022)    Hunger Vital Sign    • Worried About Running Out of Food in the Last Year: Never true    • Ran Out of Food in the Last Year: Never true   Transportation Needs: No Transportation Needs (2023)    PRAPARE - Transportation    • Lack of Transportation (Medical): No    • Lack of Transportation (Non-Medical): No   Physical Activity: Not on file   Stress: Not on file   Social Connections: Not on file   Intimate Partner Violence: Not on file   Housing Stability: Low Risk  (2022)    Housing Stability Vital Sign    • Unable to Pay for Housing in the Last Year: No    • Number of Places Lived in the Last Year: 1    • Unstable Housing in the Last Year: No     Current Outpatient Medications on File Prior to Visit   Medication Sig   • aspirin 81 MG tablet Take by mouth   • atorvastatin (LIPITOR) 20 mg tablet take 1 tablet by mouth once daily   • cholecalciferol (VITAMIN D3) 1,000 units tablet Take 1,000 Units by mouth daily   • cyanocobalamin (VITAMIN B-12) 1,000 mcg tablet Take by mouth   • Elastic Bandages & Supports (B & B Carpal Tunnel Brace) MISC Use daily at bedtime   • levothyroxine 50 mcg tablet Take 1 tablet (50 mcg total) by mouth daily   • lisinopril (ZESTRIL) 10 mg tablet take 1 tablet by mouth once daily   • meclizine (ANTIVERT) 25 mg tablet take 1 tablet by mouth every 8 hours if needed for dizziness   • Multiple Vitamin (MULTIVITAMIN) tablet Take 1 tablet by mouth daily   • omega-3-acid ethyl esters  "(LOVAZA) 1 g capsule Take 2 capsules by mouth daily     • oxybutynin (DITROPAN) 5 mg tablet take 1 tablet by mouth twice a day   • Probiotic Product (PROBIOTIC-10 PO) Take by mouth daily   • pyridoxine (VITAMIN B6) 100 mg tablet Take 100 mg by mouth daily   • traMADol (ULTRAM) 50 mg tablet Take 50 mg by mouth every 6 (six) hours as needed for moderate pain   • traZODone (DESYREL) 100 mg tablet Take 2 tablets (200 mg total) by mouth daily at bedtime   • [DISCONTINUED] fluticasone (FLONASE) 50 mcg/act nasal spray 1 spray into each nostril daily     Allergies   Allergen Reactions   • Codeine Confusion   • Codeine Sulfate      Immunization History   Administered Date(s) Administered   • COVID-19 MODERNA VACC 0.25 ML IM BOOSTER 10/27/2021   • COVID-19 MODERNA VACC 0.5 ML IM 01/27/2021, 02/23/2021, 10/27/2021, 04/12/2022   • COVID-19 Moderna Vac BIVALENT 12 Yr+ IM 0.5 ML 09/07/2022   • COVID-19 Moderna mRNA Vaccine 12 Yr+ 50 mcg/0.5 mL (Spikevax) 10/04/2023   • INFLUENZA 09/26/2016, 09/29/2017, 08/27/2020, 09/03/2021, 09/20/2022   • Influenza Split High Dose Preservative Free IM 09/26/2016, 09/29/2017   • Influenza, high dose seasonal 0.7 mL 09/07/2018, 09/16/2019   • Pneumococcal Conjugate 13-Valent 01/14/2016   • Pneumococcal Polysaccharide PPV23 05/04/2017   • Zoster Vaccine Recombinant 08/13/2022, 11/14/2022       Objective     /56 (BP Location: Left arm, Patient Position: Sitting, Cuff Size: Adult)   Pulse 71   Temp 97.5 °F (36.4 °C)   Ht 5' 7\" (1.702 m)   Wt 74.4 kg (164 lb)   SpO2 97%   BMI 25.69 kg/m²     Physical Exam  Vitals and nursing note reviewed.   Constitutional:       Appearance: Normal appearance.   Cardiovascular:      Pulses: Normal pulses.   Pulmonary:      Effort: Pulmonary effort is normal. No respiratory distress.   Musculoskeletal:      Cervical back: Normal range of motion.      Lumbar back: No tenderness. Decreased range of motion.      Right lower leg: No edema.      Left lower leg: " No edema.   Skin:     Coloration: Skin is not pale.      Findings: No erythema.   Neurological:      Mental Status: She is alert and oriented to person, place, and time.      Sensory: No sensory deficit.      Motor: No weakness.       Anatoliy Hernández PA-C

## 2024-04-03 PROBLEM — I12.9 HYPERTENSIVE CKD (CHRONIC KIDNEY DISEASE): Status: ACTIVE | Noted: 2024-04-03

## 2024-04-05 ENCOUNTER — HOSPITAL ENCOUNTER (OUTPATIENT)
Dept: NON INVASIVE DIAGNOSTICS | Facility: HOSPITAL | Age: 89
Discharge: HOME | End: 2024-04-05
Payer: COMMERCIAL

## 2024-04-05 DIAGNOSIS — I73.9 PAD (PERIPHERAL ARTERY DISEASE) (HCC): ICD-10-CM

## 2024-04-05 PROCEDURE — 93923 UPR/LXTR ART STDY 3+ LVLS: CPT

## 2024-04-05 PROCEDURE — 93925 LOWER EXTREMITY STUDY: CPT

## 2024-04-05 PROCEDURE — 93925 LOWER EXTREMITY STUDY: CPT | Performed by: SURGERY

## 2024-04-05 PROCEDURE — 93922 UPR/L XTREMITY ART 2 LEVELS: CPT | Performed by: SURGERY

## 2024-04-08 ENCOUNTER — APPOINTMENT (OUTPATIENT)
Dept: LAB | Facility: CLINIC | Age: 89
End: 2024-04-08
Payer: COMMERCIAL

## 2024-04-08 DIAGNOSIS — E78.01 FAMILIAL HYPERCHOLESTEROLEMIA: ICD-10-CM

## 2024-04-08 DIAGNOSIS — I73.9 PAD (PERIPHERAL ARTERY DISEASE) (HCC): ICD-10-CM

## 2024-04-08 LAB
CHOLEST SERPL-MCNC: 192 MG/DL
HDLC SERPL-MCNC: 47 MG/DL
LDLC SERPL CALC-MCNC: 78 MG/DL (ref 0–100)
TRIGL SERPL-MCNC: 334 MG/DL

## 2024-04-08 PROCEDURE — 80061 LIPID PANEL: CPT

## 2024-04-08 PROCEDURE — 36415 COLL VENOUS BLD VENIPUNCTURE: CPT

## 2024-04-16 ENCOUNTER — HOSPITAL ENCOUNTER (OUTPATIENT)
Dept: MRI IMAGING | Facility: HOSPITAL | Age: 89
Discharge: HOME | End: 2024-04-16
Payer: COMMERCIAL

## 2024-04-16 ENCOUNTER — HOSPITAL ENCOUNTER (OUTPATIENT)
Dept: CT IMAGING | Facility: HOSPITAL | Age: 89
Discharge: HOME | End: 2024-04-16
Payer: COMMERCIAL

## 2024-04-16 DIAGNOSIS — M51.16 LUMBAR DISC DISEASE WITH RADICULOPATHY: ICD-10-CM

## 2024-04-16 DIAGNOSIS — R91.1 INCIDENTAL LUNG NODULE, GREATER THAN OR EQUAL TO 8MM: ICD-10-CM

## 2024-04-16 PROCEDURE — 71250 CT THORAX DX C-: CPT

## 2024-04-16 PROCEDURE — 72148 MRI LUMBAR SPINE W/O DYE: CPT

## 2024-04-23 ENCOUNTER — TELEPHONE (OUTPATIENT)
Age: 89
End: 2024-04-23

## 2024-04-23 NOTE — TELEPHONE ENCOUNTER
Patient called to follow up on CT and MRI results. I informed patient that Dr. Mullen has not received and reviewed the results yet. Patient would like a phone call when the results have been reviewed.

## 2024-05-09 ENCOUNTER — TELEPHONE (OUTPATIENT)
Dept: PAIN MEDICINE | Facility: CLINIC | Age: 89
End: 2024-05-09

## 2024-05-10 NOTE — PROGRESS NOTES
Assessment:  No diagnosis found.    Plan:  No orders of the defined types were placed in this encounter.      No orders of the defined types were placed in this encounter.      My impressions and treatment recommendations were discussed in detail with the patient, who verbalized understanding and had no further questions.    ***    {Oral Swab Statement:66112}    {Opioid Statement:24275}    {UDS Statement:04913}    {PDMP Statement:98958}    {Pain Management Procedure Statement:26801}    Follow-up is planned in *** time or sooner as warranted.  Discharge instructions were provided. I personally saw and examined the patient and I agree with the above discussed plan of care.    History of Present Illness:    Leyla Garnett is a 88 y.o. female who presents to [facility] for initial evaluation of the above stated pain complaints. The patient has a past medical and chronic pain history as outlined in the assessment section. {He/she (caps):66889} was referred by Anatoliy Hernández PA-C  [address]  [place] [address] ***.    ***    Review of Systems:    Review of Systems        Past Medical History:   Diagnosis Date    BCC (basal cell carcinoma)     Benign uterine neoplasm     C. difficile colitis     Disease of thyroid gland     Hyperlipidemia     Hypertension        Past Surgical History:   Procedure Laterality Date    APPENDECTOMY      CATARACT EXTRACTION      Last assessed - 1/14/16    MALIGNANT SKIN LESION EXCISION      Face    PARATHYROIDECTOMY      [place] NDSC WRST SURG W/RLS TRANSVRS CARPL LIGM Left 3/23/2021    Procedure: RELEASE LEFT CARPAL TUNNEL ENDOSCOPIC;  Surgeon: Finesse Read MD;  Location: [facility];  Service: Orthopedics    REPLACEMENT TOTAL KNEE Bilateral     ROTATOR CUFF REPAIR Left     Last assessed - 1/14/16    TOTAL ABDOMINAL HYSTERECTOMY      with derrell oopherectomy, Last assessed - 1/14/16       Family History   Problem Relation Age of Onset    Heart attack Mother      Substance Abuse Mother         denied    Mental illness Mother         denied    Other Mother         Cardiac Disorder     Cancer Mother         Malignant Neoplasm     Cancer Father         Malignant Neoplasm     Substance Abuse Father         denied    Mental illness Father         denied    Lung cancer Father     Spina bifida Child        Social History     Occupational History    Occupation:      Comment: Retired    Tobacco Use    Smoking status: Former     Current packs/day: 0.00     Types: Cigarettes     Quit date:      Years since quittin.4    Smokeless tobacco: Never   Vaping Use    Vaping status: Never Used   Substance and Sexual Activity    Alcohol use: Never    Drug use: No    Sexual activity: Not on file         Current Outpatient Medications:     aspirin 81 MG tablet, Take by mouth, Disp: , Rfl:     atorvastatin (LIPITOR) 20 mg tablet, take 1 tablet by mouth once daily, Disp: 90 tablet, Rfl: 1    cholecalciferol (VITAMIN D3) 1,000 units tablet, Take 1,000 Units by mouth daily, Disp: , Rfl:     cyanocobalamin (VITAMIN B-12) 1,000 mcg tablet, Take by mouth, Disp: , Rfl:     Elastic Bandages & Supports (B & B Carpal Tunnel Brace) MISC, Use daily at bedtime, Disp: 2 each, Rfl: 1    levothyroxine 50 mcg tablet, Take 1 tablet (50 mcg total) by mouth daily, Disp: 90 tablet, Rfl: 1    lisinopril (ZESTRIL) 10 mg tablet, take 1 tablet by mouth once daily, Disp: 90 tablet, Rfl: 1    meclizine (ANTIVERT) 25 mg tablet, take 1 tablet by mouth every 8 hours if needed for dizziness, Disp: 30 tablet, Rfl: 2    Multiple Vitamin (MULTIVITAMIN) tablet, Take 1 tablet by mouth daily, Disp: , Rfl:     naproxen (Naprosyn) 500 mg tablet, Take 1 tablet (500 mg total) by mouth 2 (two) times a day as needed for mild pain or moderate pain, Disp: 60 tablet, Rfl: 1    omega-3-acid ethyl esters (LOVAZA) 1 g capsule, Take 2 capsules by mouth daily  , Disp: , Rfl:     oxybutynin (DITROPAN) 5 mg tablet, take 1 tablet  "by mouth twice a day, Disp: 180 tablet, Rfl: 1    Probiotic Product (PROBIOTIC-10 PO), Take by mouth daily, Disp: , Rfl:     pyridoxine (VITAMIN B6) 100 mg tablet, Take 100 mg by mouth daily, Disp: , Rfl:     traMADol (ULTRAM) 50 mg tablet, Take 50 mg by mouth every 6 (six) hours as needed for moderate pain, Disp: , Rfl:     traZODone (DESYREL) 100 mg tablet, Take 2 tablets (200 mg total) by mouth daily at bedtime, Disp: 60 tablet, Rfl: 3    Allergies   Allergen Reactions    Codeine Confusion    Codeine Sulfate        Physical Exam:    There were no vitals taken for this visit.    Constitutional: {General Appearance:97699::\"normal, well developed, well nourished, alert, in no distress and non-toxic and no overt pain behavior.\"}  Eyes: {Sclera:02795::\"anicteric\"}  HEENT: {Hearin::\"grossly intact\"}  Neck: {Neck:97414::\"supple, symmetric, trachea midline and no masses \"}  Pulmonary:{Respiratory effort:84565::\"even and unlabored\"}  Cardiovascular:{Examination of Extremities:88607::\"No edema or pitting edema present\"}  Skin:{Skin and Subcutaneous tissues:15172::\"Normal without rashes or lesions and well hydrated\"}  Psychiatric:{Mood and Affect:40756::\"Mood and affect appropriate\"}  Neurologic:{Cranial Nerves:92061::\"Cranial Nerves II-XII grossly intact\"}  Musculoskeletal:{Gait and Station:15242::\"normal\"}    Imaging    MRI LUMBAR SPINE WITHOUT CONTRAST          24     INDICATION: M51.16: Intervertebral disc disorders with radiculopathy, lumbar region.     COMPARISON: 2019     TECHNIQUE:  Multiplanar, multisequence imaging of the lumbar spine was performed. .        IMAGE QUALITY:  Diagnostic     FINDINGS:     VERTEBRAL BODIES:  There are 5 lumbar type vertebral bodies. Mild levoscoliosis mid lumbar spine. Trace grade 1 anterolisthesis of L4 on L5. Scattered degenerative endplate changes.  No focally suspicious marrow lesions.  No bone marrow edema or   compression abnormality.     SACRUM:  Normal signal " within the sacrum. No evidence of insufficiency or stress fracture.     DISTAL CORD AND CONUS:  Normal size and signal within the distal cord and conus. The conus medullaris terminates at the superior endplate of L2.     PARASPINAL SOFT TISSUES:  Paraspinal soft tissues are unremarkable.     LOWER THORACIC DISC SPACES: Mild noncompressive lower thoracic degenerative change.     LUMBAR DISC SPACES:     L1-L2: There is no focal disk herniation, central canal or neural foraminal narrowing.  Bilateral facet hypertrophy noted. This level is similar to the prior study.     L2-L3: There is loss of disc height and signal. There is bilateral facet hypertrophy. There is a right neural foraminal disc protrusion. Moderate right neural foraminal narrowing. Mild to moderate central canal narrowing. Moderate left neural foraminal   narrowing. Spondylotic narrowing is slightly progressed from the prior study.     L3-L4: There is bilateral facet hypertrophy. There is loss of disc height and signal. There is a right neural foraminal disc protrusion. Moderate right neural foraminal narrowing. Moderate central canal narrowing. Mild left neural foraminal narrowing.   This level is similar to the prior study.     L4-L5: There is loss of disc height and signal. There is bilateral facet hypertrophy. There is a diffuse disc bulge. There is mild uncovering the intervertebral disc space. Moderate tricompartmental narrowing. This level is similar to the prior study.     L5-S1: There is loss of disc height and signal. There is a right neural foraminal disc protrusion. There is bilateral facet hypertrophy. There is severe right neural foraminal narrowing. Moderate central canal narrowing. Moderate left neural foraminal   narrowing. Severe tricompartmental narrowing. This level is similar to the prior study.     OTHER FINDINGS:  None.     IMPRESSION:     Moderate to advanced multilevel spondylosis most pronounced at L5-S1 and L4-5 is similar to  the prior study.     No orders to display       No orders of the defined types were placed in this encounter.

## 2024-05-13 ENCOUNTER — CONSULT (OUTPATIENT)
Dept: PAIN MEDICINE | Facility: CLINIC | Age: 89
End: 2024-05-13
Payer: COMMERCIAL

## 2024-05-13 VITALS
WEIGHT: 164 LBS | DIASTOLIC BLOOD PRESSURE: 68 MMHG | HEART RATE: 66 BPM | BODY MASS INDEX: 25.74 KG/M2 | HEIGHT: 67 IN | SYSTOLIC BLOOD PRESSURE: 106 MMHG

## 2024-05-13 DIAGNOSIS — M48.062 SPINAL STENOSIS OF LUMBAR REGION WITH NEUROGENIC CLAUDICATION: ICD-10-CM

## 2024-05-13 DIAGNOSIS — G89.4 CHRONIC PAIN SYNDROME: Primary | ICD-10-CM

## 2024-05-13 DIAGNOSIS — M47.816 LUMBAR SPONDYLOSIS: ICD-10-CM

## 2024-05-13 PROCEDURE — 99214 OFFICE O/P EST MOD 30 MIN: CPT | Performed by: STUDENT IN AN ORGANIZED HEALTH CARE EDUCATION/TRAINING PROGRAM

## 2024-05-13 NOTE — PROGRESS NOTES
Assessment:  1. Chronic pain syndrome    2. Spinal stenosis of lumbar region with neurogenic claudication    3. Lumbar spondylosis        Plan:  Orders Placed This Encounter   Procedures    [place] spine and pain procedure     Standing Status:   Future     Standing Expiration Date:   5/13/2028     Order Specific Question:   Reason for Exam:     Answer:   L5-S1 LESI     Order Specific Question:   Anticoagulant hold needed?     Answer:   No       No orders of the defined types were placed in this encounter.      My impressions and treatment recommendations were discussed in detail with the patient, who verbalized understanding and had no further questions.    88-year-old female returns her office.  Last seen in 2020 with Dr. Brock mclean.  She is here with complaints of back and bilateral buttock pain that is worsened with ambulation and associated with weakness that is relieved with sitting or flexion.  Symptoms are strongly suggestive of spinal stenosis with neurogenic claudication.  Has corroborating MRI findings with advanced stenosis at the L4-5 and L5-S1 levels.  L4-5 level has considerable ligamentum flavum thickening.     We discussed starting conservatively with course of aquatic therapy which she declines for now.  She would like to start with epidural injection for symptom management.  Will order L5-S1 LESI.  Explained the need for more than 1 injection to get the maximum level of relief.  This may include altering the approach or level.    Ultimately, if she is still having notable claudication symptoms despite multiple epidural injection treatments, then consideration may be given to MILD procedure.    [place] Prescription Drug Monitoring Program report was reviewed and was appropriate     Complete risks and benefits including bleeding, infection, tissue reaction, nerve injury and allergic reaction were discussed. The approach was demonstrated using models and literature was provided. Verbal and written  consent was obtained.    Discharge instructions were provided. I personally saw and examined the patient and I agree with the above discussed plan of care.    History of Present Illness:    Leyla Garnett is a 88 y.o. female who presents to [facility] for initial evaluation of the above stated pain complaints. The patient has a past medical and chronic pain history as outlined in the assessment section. She was referred by Anatoliy Hernández PA-C  [address]  [place] [address] .    Patient is here with chief complaint of lower back pain, mostly left-sided.  Moderate to severe the past 1.  9 out of 10 in the morning.  Nearly constant.  Sharp in nature.  There is subjective weakness in lower extremities with ambulation. Significant pain in the back and buttocks that is relieved with sitting, though prolonged sitting is also painful.     Increased with lying down, bending, walking, exercise.    Moderate relief with heat/ice therapy.    No tobacco or marijuana use.  Not allergic to latex or contrast dye.    She was last seen in March 2020 bilateral L3-5 lumbar radiofrequency ablation.    Review of Systems:    Review of Systems   HENT:  Positive for hearing loss.    Musculoskeletal:  Positive for arthralgias.   Neurological:  Positive for dizziness.           Past Medical History:   Diagnosis Date    BCC (basal cell carcinoma)     Benign uterine neoplasm     C. difficile colitis     Disease of thyroid gland     Hyperlipidemia     Hypertension        Past Surgical History:   Procedure Laterality Date    APPENDECTOMY      CATARACT EXTRACTION      Last assessed - 1/14/16    MALIGNANT SKIN LESION EXCISION      Face    PARATHYROIDECTOMY      OR NDSC WRST SURG W/RLS TRANSVRS CARPL LIGM Left 3/23/2021    Procedure: RELEASE LEFT CARPAL TUNNEL ENDOSCOPIC;  Surgeon: Finesse Read MD;  Location: [facility];  Service: Orthopedics    REPLACEMENT TOTAL KNEE Bilateral     ROTATOR CUFF REPAIR Left     Last  assessed - 16    TOTAL ABDOMINAL HYSTERECTOMY      with derrell oopherectomy, Last assessed - 16       Family History   Problem Relation Age of Onset    Heart attack Mother     Substance Abuse Mother         denied    Mental illness Mother         denied    Other Mother         Cardiac Disorder     Cancer Mother         Malignant Neoplasm     Cancer Father         Malignant Neoplasm     Substance Abuse Father         denied    Mental illness Father         denied    Lung cancer Father     Spina bifida Child        Social History     Occupational History    Occupation:      Comment: Retired    Tobacco Use    Smoking status: Former     Current packs/day: 0.00     Types: Cigarettes     Quit date:      Years since quittin.4    Smokeless tobacco: Never   Vaping Use    Vaping status: Never Used   Substance and Sexual Activity    Alcohol use: Never    Drug use: No    Sexual activity: Not on file         Current Outpatient Medications:     aspirin 81 MG tablet, Take by mouth, Disp: , Rfl:     atorvastatin (LIPITOR) 20 mg tablet, take 1 tablet by mouth once daily, Disp: 90 tablet, Rfl: 1    cholecalciferol (VITAMIN D3) 1,000 units tablet, Take 1,000 Units by mouth daily, Disp: , Rfl:     cyanocobalamin (VITAMIN B-12) 1,000 mcg tablet, Take by mouth, Disp: , Rfl:     levothyroxine 50 mcg tablet, Take 1 tablet (50 mcg total) by mouth daily, Disp: 90 tablet, Rfl: 1    meclizine (ANTIVERT) 25 mg tablet, take 1 tablet by mouth every 8 hours if needed for dizziness, Disp: 30 tablet, Rfl: 2    Multiple Vitamin (MULTIVITAMIN) tablet, Take 1 tablet by mouth daily, Disp: , Rfl:     naproxen (Naprosyn) 500 mg tablet, Take 1 tablet (500 mg total) by mouth 2 (two) times a day as needed for mild pain or moderate pain, Disp: 60 tablet, Rfl: 1    omega-3-acid ethyl esters (LOVAZA) 1 g capsule, Take 2 capsules by mouth daily  , Disp: , Rfl:     oxybutynin (DITROPAN) 5 mg tablet, take 1 tablet by mouth twice a day,  "Disp: 180 tablet, Rfl: 1    Probiotic Product (PROBIOTIC-10 PO), Take by mouth daily, Disp: , Rfl:     pyridoxine (VITAMIN B6) 100 mg tablet, Take 100 mg by mouth daily, Disp: , Rfl:     traMADol (ULTRAM) 50 mg tablet, Take 50 mg by mouth every 6 (six) hours as needed for moderate pain, Disp: , Rfl:     traZODone (DESYREL) 100 mg tablet, Take 2 tablets (200 mg total) by mouth daily at bedtime, Disp: 60 tablet, Rfl: 3    Elastic Bandages & Supports (B & B Carpal Tunnel Brace) MISC, Use daily at bedtime, Disp: 2 each, Rfl: 1    lisinopril (ZESTRIL) 10 mg tablet, take 1 tablet by mouth once daily, Disp: 90 tablet, Rfl: 1    Allergies   Allergen Reactions    Codeine Confusion    Codeine Sulfate        Physical Exam:    /68   Pulse 66   Ht 5' 7\" (1.702 m)   Wt 74.4 kg (164 lb)   BMI 25.69 kg/m²     Constitutional: normal, well developed, well nourished, alert, in no distress and non-toxic and no overt pain behavior.  Eyes: anicteric  HEENT: grossly intact  Neck: supple, symmetric, trachea midline and no masses   Pulmonary:even and unlabored  Cardiovascular:No edema or pitting edema present  Skin:Normal without rashes or lesions and well hydrated  Psychiatric:Mood and affect appropriate  Neurologic:Cranial Nerves II-XII grossly intact  Musculoskeletal:normal    Lumbar Spine Exam    Appearance:  Normal lordosis  Palpation/Tenderness:  left lumbar paraspinal tenderness  right lumbar paraspinal tenderness  Sensory:  no sensory deficits noted    Motor Strength:  Left hip flexion:  5/5  Left hip extension:  5/5  Right hip flexion:  5/5  Right hip extension:  5/5  Left knee flexion:  5/5  Left knee extension:  5/5  Right knee flexion:  5/5  Right knee extension:  5/5  Left foot dorsiflexion:  5/5  Left foot plantar flexion:  5/5  Right foot dorsiflexion:  5/5  Right foot plantar flexion:  5/5  Reflexes:  Left Patellar:  2+   Right Patellar:  2+   Left Achilles:  2+   Right Achilles:  2+   Special Tests:  Left Straight " Leg Test:  positive  Right Straight Leg Test:  positive      Imaging    MRI LUMBAR SPINE WITHOUT CONTRAST  4/16/24     INDICATION: M51.16: Intervertebral disc disorders with radiculopathy, lumbar region.     COMPARISON: 9/14/2019     TECHNIQUE:  Multiplanar, multisequence imaging of the lumbar spine was performed. .        IMAGE QUALITY:  Diagnostic     FINDINGS:     VERTEBRAL BODIES:  There are 5 lumbar type vertebral bodies. Mild levoscoliosis mid lumbar spine. Trace grade 1 anterolisthesis of L4 on L5. Scattered degenerative endplate changes.  No focally suspicious marrow lesions.  No bone marrow edema or   compression abnormality.     SACRUM:  Normal signal within the sacrum. No evidence of insufficiency or stress fracture.     DISTAL CORD AND CONUS:  Normal size and signal within the distal cord and conus. The conus medullaris terminates at the superior endplate of L2.     PARASPINAL SOFT TISSUES:  Paraspinal soft tissues are unremarkable.     LOWER THORACIC DISC SPACES: Mild noncompressive lower thoracic degenerative change.     LUMBAR DISC SPACES:     L1-L2: There is no focal disk herniation, central canal or neural foraminal narrowing.  Bilateral facet hypertrophy noted. This level is similar to the prior study.     L2-L3: There is loss of disc height and signal. There is bilateral facet hypertrophy. There is a right neural foraminal disc protrusion. Moderate right neural foraminal narrowing. Mild to moderate central canal narrowing. Moderate left neural foraminal   narrowing. Spondylotic narrowing is slightly progressed from the prior study.     L3-L4: There is bilateral facet hypertrophy. There is loss of disc height and signal. There is a right neural foraminal disc protrusion. Moderate right neural foraminal narrowing. Moderate central canal narrowing. Mild left neural foraminal narrowing.   This level is similar to the prior study.     L4-L5: There is loss of disc height and signal. There is bilateral  facet hypertrophy. There is a diffuse disc bulge. There is mild uncovering the intervertebral disc space. Moderate tricompartmental narrowing. This level is similar to the prior study.     L5-S1: There is loss of disc height and signal. There is a right neural foraminal disc protrusion. There is bilateral facet hypertrophy. There is severe right neural foraminal narrowing. Moderate central canal narrowing. Moderate left neural foraminal   narrowing. Severe tricompartmental narrowing. This level is similar to the prior study.     OTHER FINDINGS:  None.     IMPRESSION:     Moderate to advanced multilevel spondylosis most pronounced at L5-S1 and L4-5 is similar to the prior study.    [place] spine and pain procedure    (Results Pending)       Orders Placed This Encounter   Procedures    [place] spine and pain procedure

## 2024-05-13 NOTE — PATIENT INSTRUCTIONS
Epidural Steroid Injection   AMBULATORY CARE:   What you need to know about an epidural steroid injection (MICHAEL):  An MICHAEL is a procedure to inject steroid medicine into the epidural space. The epidural space is between your spinal cord and vertebrae. Steroids reduce inflammation and fluid buildup in your spine that may be causing pain. You may be given pain medicine along with the steroids.        How to prepare for an MICHAEL:  Your provider will talk to you about how to prepare for your procedure. He or she will tell you what medicines to take or not take on the day of your procedure. You may need to stop taking blood thinners or other medicines several days before your procedure. You may need to adjust any diabetes medicine you take on the day of your procedure. Steroid medicine can increase your blood sugar level. Arrange for someone to drive you home when you are discharged.  What will happen during an MICHAEL:   You will be given medicine to numb the procedure area. You will be awake for the procedure, but you will not feel pain. You may also be given medicine to help you relax. Contrast liquid will be used to help your provider see the area better. Tell the provider if you have ever had an allergic reaction to contrast liquid.    Your provider may place the needle into your neck area, middle of your back, or tailbone area. He or she may inject the medicine next to the nerves that are causing your pain. He or she may instead inject the medicine into a larger area of the epidural space. This helps the medicine spread to more nerves. Your provider will use a fluoroscope to help guide the needle to the right place. A fluoroscope is a type of x-ray. After the procedure, a bandage will be placed over the injection site to prevent infection.    What will happen after an MICHAEL:  You will have a bandage over the injection site to prevent infection. Your provider will tell you when you can bathe and any activity guidelines. You  will be able to go home.  Risks of an MICHAEL:  You may have temporary or permanent nerve damage or paralysis. You may have bleeding or develop a serious infection, such as meningitis (swelling of the brain coverings). An abscess may also develop. An abscess is a pus-filled area under the skin. You may need surgery to fix the abscess. You may have a seizure, anxiety, or trouble sleeping. If you are a man, you may have temporary erectile dysfunction (not able to have an erection).   Call your local emergency number (911 in the [place]) if:   You have a seizure.    You have trouble moving your legs.    Seek care immediately if:   Blood soaks through your bandage.    You have a fever or chills, severe back pain, and the procedure area is sensitive to the touch.    You cannot control when you urinate or have a bowel movement.    Call your doctor if:   You have weakness or numbness in your legs.    Your wound is red, swollen, or draining pus.    You have nausea or are vomiting.    Your face or neck is red and you feel warm.    You have more pain than you had before the procedure.    You have swelling in your hands or feet.    You have questions or concerns about your condition or care.    Care for your wound as directed:  You may remove the bandage before you go to bed the day of your procedure. You may take a shower, but do not take a bath for at least 24 hours.   Self-care:   Do not drive,  use machines, or do strenuous activity for 24 hours after your procedure or as directed.     Continue other treatments  as directed. Steroid injections alone will not control your pain. The injections are meant to be used with other treatments, such as physical therapy.    Follow up with your doctor as directed:  Write down your questions so you remember to ask them during your visits.   © Copyright [employer] 2023 Information is for End User's use only and may not be sold, redistributed or otherwise used for commercial purposes.  The above  information is an  only. It is not intended as medical advice for individual conditions or treatments. Talk to your doctor, nurse or pharmacist before following any medical regimen to see if it is safe and effective for you.

## 2024-05-14 ENCOUNTER — PATIENT MESSAGE (OUTPATIENT)
Dept: RADIOLOGY | Facility: CLINIC | Age: 89
End: 2024-05-14

## 2024-05-14 NOTE — PATIENT COMMUNICATION
Pt scheduled for LESI with Dr Valvered on 6/13/24    Pt is not diabetic.  Pt takes 81mg aspirin, which does not require hold for LESI    Pt given instructions in office and via myc message    Have you completed PT/HEP/Chiro in the past 6 months for dedicated area? no  If yes, how long did you complete?  What was the frequency?  Did it provide relief?  If no, reason therapy was not completed? Pt has history of previous injections

## 2024-06-03 ENCOUNTER — HOSPITAL ENCOUNTER (OUTPATIENT)
Facility: HOSPITAL | Age: 89
Setting detail: OBSERVATION
Discharge: HOME | End: 2024-06-04
Attending: EMERGENCY MEDICINE | Admitting: STUDENT IN AN ORGANIZED HEALTH CARE EDUCATION/TRAINING PROGRAM
Payer: COMMERCIAL

## 2024-06-03 ENCOUNTER — APPOINTMENT (EMERGENCY)
Dept: RADIOLOGY | Facility: HOSPITAL | Age: 89
End: 2024-06-03
Payer: COMMERCIAL

## 2024-06-03 ENCOUNTER — APPOINTMENT (EMERGENCY)
Dept: CT IMAGING | Facility: HOSPITAL | Age: 89
End: 2024-06-03
Payer: COMMERCIAL

## 2024-06-03 DIAGNOSIS — E86.0 DEHYDRATION: ICD-10-CM

## 2024-06-03 DIAGNOSIS — R11.2 NAUSEA VOMITING AND DIARRHEA: ICD-10-CM

## 2024-06-03 DIAGNOSIS — R31.9 HEMATURIA: ICD-10-CM

## 2024-06-03 DIAGNOSIS — R19.7 NAUSEA VOMITING AND DIARRHEA: ICD-10-CM

## 2024-06-03 DIAGNOSIS — N17.9 AKI (ACUTE KIDNEY INJURY) (HCC): Primary | ICD-10-CM

## 2024-06-03 DIAGNOSIS — R10.84 GENERALIZED ABDOMINAL PAIN: ICD-10-CM

## 2024-06-03 PROBLEM — R19.8 GASTROINTESTINAL SYMPTOMS: Status: ACTIVE | Noted: 2024-06-03

## 2024-06-03 LAB
ALBUMIN SERPL BCP-MCNC: 4.2 G/DL (ref 3.5–5)
ALP SERPL-CCNC: 146 U/L (ref 34–104)
ALT SERPL W P-5'-P-CCNC: 20 U/L (ref 7–52)
ANION GAP SERPL CALCULATED.3IONS-SCNC: 10 MMOL/L (ref 4–13)
AST SERPL W P-5'-P-CCNC: 26 U/L (ref 13–39)
BACTERIA UR QL AUTO: ABNORMAL /HPF
BASOPHILS # BLD AUTO: 0.07 THOUSANDS/ÂΜL (ref 0–0.1)
BASOPHILS NFR BLD AUTO: 0 % (ref 0–1)
BILIRUB SERPL-MCNC: 0.9 MG/DL (ref 0.2–1)
BILIRUB UR QL STRIP: NEGATIVE
BUN SERPL-MCNC: 54 MG/DL (ref 5–25)
CALCIUM SERPL-MCNC: 10.2 MG/DL (ref 8.4–10.2)
CHLORIDE SERPL-SCNC: 104 MMOL/L (ref 96–108)
CLARITY UR: ABNORMAL
CO2 SERPL-SCNC: 21 MMOL/L (ref 21–32)
COLOR UR: YELLOW
CREAT SERPL-MCNC: 2.05 MG/DL (ref 0.6–1.3)
EOSINOPHIL # BLD AUTO: 0.18 THOUSAND/ÂΜL (ref 0–0.61)
EOSINOPHIL NFR BLD AUTO: 1 % (ref 0–6)
ERYTHROCYTE [DISTWIDTH] IN BLOOD BY AUTOMATED COUNT: 13.3 % (ref 11.6–15.1)
GFR SERPL CREATININE-BSD FRML MDRD: 21 ML/MIN/1.73SQ M
GLUCOSE SERPL-MCNC: 116 MG/DL (ref 65–140)
GLUCOSE UR STRIP-MCNC: NEGATIVE MG/DL
HCT VFR BLD AUTO: 41.4 % (ref 34.8–46.1)
HGB BLD-MCNC: 14.4 G/DL (ref 11.5–15.4)
HGB UR QL STRIP.AUTO: ABNORMAL
HYALINE CASTS #/AREA URNS LPF: ABNORMAL /LPF
IMM GRANULOCYTES # BLD AUTO: 0.07 THOUSAND/UL (ref 0–0.2)
IMM GRANULOCYTES NFR BLD AUTO: 0 % (ref 0–2)
KETONES UR STRIP-MCNC: NEGATIVE MG/DL
LACTATE SERPL-SCNC: 1.4 MMOL/L (ref 0.5–2)
LACTATE SERPL-SCNC: 2.1 MMOL/L (ref 0.5–2)
LEUKOCYTE ESTERASE UR QL STRIP: ABNORMAL
LIPASE SERPL-CCNC: 11 U/L (ref 11–82)
LYMPHOCYTES # BLD AUTO: 2.45 THOUSANDS/ÂΜL (ref 0.6–4.47)
LYMPHOCYTES NFR BLD AUTO: 15 % (ref 14–44)
MCH RBC QN AUTO: 32.3 PG (ref 26.8–34.3)
MCHC RBC AUTO-ENTMCNC: 34.8 G/DL (ref 31.4–37.4)
MCV RBC AUTO: 93 FL (ref 82–98)
MONOCYTES # BLD AUTO: 1.31 THOUSAND/ÂΜL (ref 0.17–1.22)
MONOCYTES NFR BLD AUTO: 8 % (ref 4–12)
NEUTROPHILS # BLD AUTO: 12.33 THOUSANDS/ÂΜL (ref 1.85–7.62)
NEUTS SEG NFR BLD AUTO: 76 % (ref 43–75)
NITRITE UR QL STRIP: NEGATIVE
NON-SQ EPI CELLS URNS QL MICRO: ABNORMAL /HPF
NRBC BLD AUTO-RTO: 0 /100 WBCS
PH UR STRIP.AUTO: 5 [PH]
PLATELET # BLD AUTO: 233 THOUSANDS/UL (ref 149–390)
PMV BLD AUTO: 9.1 FL (ref 8.9–12.7)
POTASSIUM SERPL-SCNC: 4.7 MMOL/L (ref 3.5–5.3)
PROCALCITONIN SERPL-MCNC: 21.95 NG/ML
PROT SERPL-MCNC: 7.1 G/DL (ref 6.4–8.4)
PROT UR STRIP-MCNC: ABNORMAL MG/DL
RBC # BLD AUTO: 4.46 MILLION/UL (ref 3.81–5.12)
RBC #/AREA URNS AUTO: ABNORMAL /HPF
SODIUM SERPL-SCNC: 135 MMOL/L (ref 135–147)
SP GR UR STRIP.AUTO: 1.01 (ref 1–1.03)
UROBILINOGEN UR STRIP-ACNC: <2 MG/DL
WBC # BLD AUTO: 16.41 THOUSAND/UL (ref 4.31–10.16)
WBC #/AREA URNS AUTO: ABNORMAL /HPF

## 2024-06-03 PROCEDURE — 99285 EMERGENCY DEPT VISIT HI MDM: CPT | Performed by: EMERGENCY MEDICINE

## 2024-06-03 PROCEDURE — 36415 COLL VENOUS BLD VENIPUNCTURE: CPT | Performed by: EMERGENCY MEDICINE

## 2024-06-03 PROCEDURE — 99223 1ST HOSP IP/OBS HIGH 75: CPT | Performed by: STUDENT IN AN ORGANIZED HEALTH CARE EDUCATION/TRAINING PROGRAM

## 2024-06-03 PROCEDURE — 99284 EMERGENCY DEPT VISIT MOD MDM: CPT

## 2024-06-03 PROCEDURE — 96360 HYDRATION IV INFUSION INIT: CPT

## 2024-06-03 PROCEDURE — 84145 PROCALCITONIN (PCT): CPT | Performed by: EMERGENCY MEDICINE

## 2024-06-03 PROCEDURE — 80053 COMPREHEN METABOLIC PANEL: CPT | Performed by: EMERGENCY MEDICINE

## 2024-06-03 PROCEDURE — 87086 URINE CULTURE/COLONY COUNT: CPT | Performed by: EMERGENCY MEDICINE

## 2024-06-03 PROCEDURE — 87505 NFCT AGENT DETECTION GI: CPT | Performed by: STUDENT IN AN ORGANIZED HEALTH CARE EDUCATION/TRAINING PROGRAM

## 2024-06-03 PROCEDURE — 83605 ASSAY OF LACTIC ACID: CPT | Performed by: EMERGENCY MEDICINE

## 2024-06-03 PROCEDURE — 87209 SMEAR COMPLEX STAIN: CPT | Performed by: STUDENT IN AN ORGANIZED HEALTH CARE EDUCATION/TRAINING PROGRAM

## 2024-06-03 PROCEDURE — 85025 COMPLETE CBC W/AUTO DIFF WBC: CPT | Performed by: EMERGENCY MEDICINE

## 2024-06-03 PROCEDURE — 87177 OVA AND PARASITES SMEARS: CPT | Performed by: STUDENT IN AN ORGANIZED HEALTH CARE EDUCATION/TRAINING PROGRAM

## 2024-06-03 PROCEDURE — 74176 CT ABD & PELVIS W/O CONTRAST: CPT

## 2024-06-03 PROCEDURE — 87493 C DIFF AMPLIFIED PROBE: CPT | Performed by: STUDENT IN AN ORGANIZED HEALTH CARE EDUCATION/TRAINING PROGRAM

## 2024-06-03 PROCEDURE — 83690 ASSAY OF LIPASE: CPT | Performed by: EMERGENCY MEDICINE

## 2024-06-03 PROCEDURE — 81001 URINALYSIS AUTO W/SCOPE: CPT | Performed by: EMERGENCY MEDICINE

## 2024-06-03 PROCEDURE — 73564 X-RAY EXAM KNEE 4 OR MORE: CPT

## 2024-06-03 PROCEDURE — 87329 GIARDIA AG IA: CPT | Performed by: STUDENT IN AN ORGANIZED HEALTH CARE EDUCATION/TRAINING PROGRAM

## 2024-06-03 PROCEDURE — 87040 BLOOD CULTURE FOR BACTERIA: CPT | Performed by: EMERGENCY MEDICINE

## 2024-06-03 RX ORDER — DICYCLOMINE HCL 20 MG
20 TABLET ORAL ONCE
Status: COMPLETED | OUTPATIENT
Start: 2024-06-03 | End: 2024-06-03

## 2024-06-03 RX ORDER — HEPARIN SODIUM 5000 [USP'U]/ML
5000 INJECTION, SOLUTION INTRAVENOUS; SUBCUTANEOUS EVERY 8 HOURS SCHEDULED
Status: DISCONTINUED | OUTPATIENT
Start: 2024-06-03 | End: 2024-06-04 | Stop reason: HOSPADM

## 2024-06-03 RX ORDER — ASPIRIN 81 MG/1
81 TABLET, CHEWABLE ORAL DAILY
Status: DISCONTINUED | OUTPATIENT
Start: 2024-06-04 | End: 2024-06-04 | Stop reason: HOSPADM

## 2024-06-03 RX ORDER — ATORVASTATIN CALCIUM 20 MG/1
20 TABLET, FILM COATED ORAL
Status: DISCONTINUED | OUTPATIENT
Start: 2024-06-03 | End: 2024-06-04 | Stop reason: HOSPADM

## 2024-06-03 RX ORDER — OXYBUTYNIN CHLORIDE 5 MG/1
5 TABLET ORAL 2 TIMES DAILY
Status: DISCONTINUED | OUTPATIENT
Start: 2024-06-03 | End: 2024-06-04 | Stop reason: HOSPADM

## 2024-06-03 RX ORDER — LEVOTHYROXINE SODIUM 0.05 MG/1
50 TABLET ORAL
Status: DISCONTINUED | OUTPATIENT
Start: 2024-06-04 | End: 2024-06-04 | Stop reason: HOSPADM

## 2024-06-03 RX ORDER — SODIUM CHLORIDE, SODIUM LACTATE, POTASSIUM CHLORIDE, CALCIUM CHLORIDE 600; 310; 30; 20 MG/100ML; MG/100ML; MG/100ML; MG/100ML
100 INJECTION, SOLUTION INTRAVENOUS CONTINUOUS
Status: DISCONTINUED | OUTPATIENT
Start: 2024-06-03 | End: 2024-06-04 | Stop reason: HOSPADM

## 2024-06-03 RX ORDER — ACETAMINOPHEN 325 MG/1
975 TABLET ORAL ONCE
Status: COMPLETED | OUTPATIENT
Start: 2024-06-03 | End: 2024-06-03

## 2024-06-03 RX ADMIN — SODIUM CHLORIDE 1000 ML: 0.9 INJECTION, SOLUTION INTRAVENOUS at 12:25

## 2024-06-03 RX ADMIN — DICYCLOMINE HYDROCHLORIDE 20 MG: 20 TABLET ORAL at 11:43

## 2024-06-03 RX ADMIN — HEPARIN SODIUM 5000 UNITS: 5000 INJECTION INTRAVENOUS; SUBCUTANEOUS at 15:59

## 2024-06-03 RX ADMIN — ATORVASTATIN CALCIUM 20 MG: 20 TABLET, FILM COATED ORAL at 16:57

## 2024-06-03 RX ADMIN — ACETAMINOPHEN 975 MG: 325 TABLET, FILM COATED ORAL at 11:43

## 2024-06-03 RX ADMIN — HEPARIN SODIUM 5000 UNITS: 5000 INJECTION INTRAVENOUS; SUBCUTANEOUS at 22:09

## 2024-06-03 RX ADMIN — OXYBUTYNIN CHLORIDE 5 MG: 5 TABLET ORAL at 17:05

## 2024-06-03 RX ADMIN — SODIUM CHLORIDE, SODIUM LACTATE, POTASSIUM CHLORIDE, AND CALCIUM CHLORIDE 100 ML/HR: .6; .31; .03; .02 INJECTION, SOLUTION INTRAVENOUS at 16:59

## 2024-06-03 RX ADMIN — SODIUM CHLORIDE 1000 ML: 0.9 INJECTION, SOLUTION INTRAVENOUS at 11:40

## 2024-06-03 NOTE — ASSESSMENT & PLAN NOTE
Cr up to 2.0 (baseline is normal at 0.8)   Receiving fluids in the ED   Cont IVF   Oral intake as tolerated

## 2024-06-03 NOTE — H&P
[facility]  H&P  Name: Leyla Garnett 88 y.o. female I MRN: [id number]  Unit/Bed#: -01 I Date of Admission: 6/3/2024   Date of Service: 6/3/2024 I Hospital Day: 0      Assessment & Plan   * Gastrointestinal symptoms  Assessment & Plan  Presenting with abdominal pain, nausea, vomiting and diarrhea.  Started about 2 days ago.  Had a few episodes of NBNB emesis. Frequent loose stools was initially almost 10x a day. Daughter in-law with similar symptoms. No recent abx exposures.   Today she has only had 1 loose stool   Hypovolemia and hypotension (down to 70s systolic but improved quickly with fluids)   CTAP with Findings in the left colon suspicious for nonspecific colitis. No abscess, pneumatosis, perforation   She does have a WBC count elevation 16 and high procal 21. However given nonspecific L sided colitis this is most likely viral in etiology and she is already improving   Will watch off abx. If any signs of worsening hemodynamic, fevers, worsening pain would recommend initiation of zosyn to cover intraabdominal pathogens   She is non-toxic appearing  Continue to monitor and offer supportive care  Meets sepsis criteria - sepsis powerplan ordered   No BM since arrival and she reported only one today but if she starts having increased loose stool we can get stool cultures     Primary insomnia  Assessment & Plan  Cont trazodone     Hypothyroidism  Assessment & Plan  Continue levothyroxine    Hypertension  Assessment & Plan  Hold lisinopril given hypotension    Hyperlipidemia  Assessment & Plan  Continue statin    RANJAN (acute kidney injury) (HCC)  Assessment & Plan  Cr up to 2.0 (baseline is normal at 0.8)   Receiving fluids in the ED   Cont IVF   Oral intake as tolerated          VTE Pharmacologic Prophylaxis: VTE Score: 4 Moderate Risk (Score 3-4) - Pharmacological DVT Prophylaxis Ordered: heparin.  Code Status: Level 3 - DNAR and DNI dw with patient and family  Discussion with family:  Updated  (son and daughter) at bedside.    Anticipated Length of Stay: Patient will be admitted on an observation basis with an anticipated length of stay of less than 2 midnights secondary to gastroenteritis.    Total Time Spent on Date of Encounter in care of patient: 55 mins. This time was spent on one or more of the following: performing physical exam; counseling and coordination of care; obtaining or reviewing history; documenting in the medical record; reviewing/ordering tests, medications or procedures; communicating with other healthcare professionals and discussing with patient's family/caregivers.    Chief Complaint: gastroenteritis    History of Present Illness:  Leyla Garnett is a 88 y.o. female with a PMH of HTN, hypothyroidisim who presents with gastroenteritis.  She reported that her symptoms started on Saturday and her daughter-in-law was also sick with similar symptoms.  She had nausea and vomiting with multiple loose stools almost 10 a day.  No fevers or chills.  Did have some abdominal pain which is now mostly resolved.  She denied any exotic food ingestions.  No previous history of this in the past.  Currently she reported that her diarrhea is improving is only had 1 bowel movement today that was loose. She denied any chest pain, SOB. Family is present at bedside.     Review of Systems:  10 pt review of systems reviewed with patient and pertinent positive/negatives as per HPI.      Past Medical and Surgical History:   Past Medical History:   Diagnosis Date    BCC (basal cell carcinoma)     Benign uterine neoplasm     C. difficile colitis     Disease of thyroid gland     Hyperlipidemia     Hypertension        Past Surgical History:   Procedure Laterality Date    APPENDECTOMY      CATARACT EXTRACTION      Last assessed - 1/14/16    MALIGNANT SKIN LESION EXCISION      Face    PARATHYROIDECTOMY      NM [facility] WRST SURG W/RLS TRANSVRS CARPL LIGM Left 3/23/2021    Procedure: RELEASE LEFT  CARPAL TUNNEL ENDOSCOPIC;  Surgeon: Finesse Read MD;  Location: [facility];  Service: Orthopedics    REPLACEMENT TOTAL KNEE Bilateral     ROTATOR CUFF REPAIR Left     Last assessed - 1/14/16    TOTAL ABDOMINAL HYSTERECTOMY      with derrell oopherectomy, Last assessed - 1/14/16       Meds/Allergies:  Prior to Admission medications    Medication Sig Start Date End Date Taking? Authorizing Provider   aspirin 81 MG tablet Take by mouth    Historical Provider, MD   atorvastatin (LIPITOR) 20 mg tablet take 1 tablet by mouth once daily 1/11/24   Olu Mullen MD   cholecalciferol (VITAMIN D3) 1,000 units tablet Take 1,000 Units by mouth daily    Historical Provider, MD   cyanocobalamin (VITAMIN B-12) 1,000 mcg tablet Take by mouth    Historical Provider, MD   Elastic Bandages & Supports (B & B Carpal Tunnel Brace) MISC Use daily at bedtime 1/22/21   Olu Mullen MD   levothyroxine 50 mcg tablet Take 1 tablet (50 mcg total) by mouth daily 3/5/24   Olu Mullen MD   lisinopril (ZESTRIL) 10 mg tablet take 1 tablet by mouth once daily 1/11/24   lOu Mullen MD   meclizine (ANTIVERT) 25 mg tablet take 1 tablet by mouth every 8 hours if needed for dizziness 10/24/23   Olu Mullen MD   Multiple Vitamin (MULTIVITAMIN) tablet Take 1 tablet by mouth daily    Historical Provider, MD   naproxen (Naprosyn) 500 mg tablet Take 1 tablet (500 mg total) by mouth 2 (two) times a day as needed for mild pain or moderate pain 3/27/24 5/26/24  Anatoliy Hernández PA-C   omega-3-acid ethyl esters (LOVAZA) 1 g capsule Take 2 capsules by mouth daily   6/3/16   Historical Provider, MD   oxybutynin (DITROPAN) 5 mg tablet take 1 tablet by mouth twice a day 1/11/24   Olu Mullen MD   Probiotic Product (PROBIOTIC-10 PO) Take by mouth daily    Historical Provider, MD   pyridoxine (VITAMIN B6) 100 mg tablet Take 100 mg by mouth daily    Historical Provider, MD   traMADol (ULTRAM) 50 mg tablet Take 50 mg by mouth every 6 (six) hours as needed for moderate pain     Historical Provider, MD   traZODone (DESYREL) 100 mg tablet Take 2 tablets (200 mg total) by mouth daily at bedtime 3/5/24   Olu Mullen MD     I have reviewed home medications with patient personally.    Allergies:   Allergies   Allergen Reactions    Codeine Confusion    Codeine Sulfate        Social History:  Marital Status: /Civil Union   Occupation: none  Patient Pre-hospital Living Situation: Home  Patient Pre-hospital Level of Mobility: walks  Patient Pre-hospital Diet Restrictions: none  Substance Use History:   Social History     Substance and Sexual Activity   Alcohol Use Yes    Alcohol/week: 1.0 standard drink of alcohol    Types: 1 Glasses of wine per week     Social History     Tobacco Use   Smoking Status Former    Current packs/day: 0.00    Types: Cigarettes    Quit date:     Years since quittin.4   Smokeless Tobacco Never     Social History     Substance and Sexual Activity   Drug Use No       Family History:  Family History   Problem Relation Age of Onset    Heart attack Mother     Substance Abuse Mother         denied    Mental illness Mother         denied    Other Mother         Cardiac Disorder     Cancer Mother         Malignant Neoplasm     Cancer Father         Malignant Neoplasm     Substance Abuse Father         denied    Mental illness Father         denied    Lung cancer Father     Spina bifida Child        Physical Exam:     Vitals:   Blood Pressure: 113/54 (24 1618)  Pulse: 76 (24 1618)  Temperature: 97.7 °F (36.5 °C) (24 1618)  Temp Source: Oral (24 1125)  Respirations: 20 (24 1530)  SpO2: 94 % (24 1618)    Physical Exam   NAD  Nonlabored resp on RA  RRR  Mild lower abd ttp, no guarding/rebound/rigidity  AAOx3    Additional Data:     Lab Results:  Results from last 7 days   Lab Units 24  1142   WBC Thousand/uL 16.41*   HEMOGLOBIN g/dL 14.4   HEMATOCRIT % 41.4   PLATELETS Thousands/uL 233   SEGS PCT % 76*   LYMPHO PCT % 15    MONO PCT % 8   EOS PCT % 1     Results from last 7 days   Lab Units 06/03/24  1142   SODIUM mmol/L 135   POTASSIUM mmol/L 4.7   CHLORIDE mmol/L 104   CO2 mmol/L 21   BUN mg/dL 54*   CREATININE mg/dL 2.05*   ANION GAP mmol/L 10   CALCIUM mg/dL 10.2   ALBUMIN g/dL 4.2   TOTAL BILIRUBIN mg/dL 0.90   ALK PHOS U/L 146*   ALT U/L 20   AST U/L 26   GLUCOSE RANDOM mg/dL 116                 Results from last 7 days   Lab Units 06/03/24  1554 06/03/24  1142   LACTIC ACID mmol/L 2.1*  --    PROCALCITONIN ng/ml  --  21.95*       Lines/Drains:  Invasive Devices       Peripheral Intravenous Line  Duration             Peripheral IV 06/03/24 Left Antecubital <1 day    Peripheral IV 06/03/24 Right Antecubital <1 day                        Imaging: Reviewed radiology reports from this admission including: abdominal/pelvic CT  CT abdomen pelvis wo contrast   Final Result by Mariam Do MD (06/03 1436)   Findings in the left colon suspicious for nonspecific colitis. No abscess, pneumatosis, perforation.   Small hiatal hernia.   Gallstones.   Incidental findings, as per the body of the report.         Workstation performed: [id number]         XR knee 4+ views left injury    (Results Pending)       EKG and Other Studies Reviewed on Admission:   EKG: No EKG obtained.    ** Please Note: This note has been constructed using a voice recognition system. **

## 2024-06-03 NOTE — ED PROVIDER NOTES
History  Chief Complaint   Patient presents with    Diarrhea     Pt c/o nausea/ vomiting, and diarrhea for the past 2 days. Pt also c/o lightheadedness and weakness. Pt has decreased appetite since symptoms started, pt endorses trying to drink water and stay hydrated     88-year-old female history of hypertension on aspirin presenting with abdominal pain nausea vomiting diarrhea.  Patient reports about 2-day history of nausea with a few episodes of nonbloody nonbilious emesis.  Patient mainly complaining of frequent liquidy nonbloody diarrhea.  Reports known sick contact with daughter-in-law who had similar symptoms.  Denies any recent antibiotic use or drinking from unfiltered water source.  Reports previous appendectomy and hysterectomy.  Denies any chest pain shortness of breath.  Does report some positional lightheadedness.  Denies any other complaints.  Chart reviewed.    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 difficile colitis  No date: Disease of thyroid gland  No date: Hyperlipidemia  No date: Hypertension  Family History: non-contributory  Social History          Prior to Admission Medications   Prescriptions Last Dose Informant Patient Reported? Taking?   Elastic Bandages & Supports (B & B Carpal Tunnel Brace) MISC  Self No No   Sig: Use daily at bedtime   Multiple Vitamin (MULTIVITAMIN) tablet  Self Yes No   Sig: Take 1 tablet by mouth daily   Probiotic Product (PROBIOTIC-10 PO)  Self Yes No   Sig: Take by mouth daily   aspirin 81 MG tablet  Self Yes No   Sig: Take by mouth   atorvastatin (LIPITOR) 20 mg tablet  Self No No   Sig: take 1 tablet by mouth once daily   cholecalciferol (VITAMIN D3) 1,000 units tablet  Self Yes No   Sig: Take 1,000 Units by mouth daily   cyanocobalamin (VITAMIN B-12) 1,000 mcg tablet  Self Yes No   Sig: Take by mouth   levothyroxine 50 mcg tablet  Self No No   Sig: Take 1 tablet (50 mcg total) by mouth daily    lisinopril (ZESTRIL) 10 mg tablet  Self No No   Sig: take 1 tablet by mouth once daily   meclizine (ANTIVERT) 25 mg tablet  Self No No   Sig: take 1 tablet by mouth every 8 hours if needed for dizziness   naproxen (Naprosyn) 500 mg tablet  Self No No   Sig: Take 1 tablet (500 mg total) by mouth 2 (two) times a day as needed for mild pain or moderate pain   omega-3-acid ethyl esters (LOVAZA) 1 g capsule  Self Yes No   Sig: Take 2 capsules by mouth daily     oxybutynin (DITROPAN) 5 mg tablet  Self No No   Sig: take 1 tablet by mouth twice a day   pyridoxine (VITAMIN B6) 100 mg tablet  Self Yes No   Sig: Take 100 mg by mouth daily   traMADol (ULTRAM) 50 mg tablet  Self Yes No   Sig: Take 50 mg by mouth every 6 (six) hours as needed for moderate pain   traZODone (DESYREL) 100 mg tablet  Self No No   Sig: Take 2 tablets (200 mg total) by mouth daily at bedtime      Facility-Administered Medications: None       Past Medical History:   Diagnosis Date    BCC (basal cell carcinoma)     Benign uterine neoplasm     C. difficile colitis     Disease of thyroid gland     Hyperlipidemia     Hypertension        Past Surgical History:   Procedure Laterality Date    APPENDECTOMY      CATARACT EXTRACTION      Last assessed - 1/14/16    MALIGNANT SKIN LESION EXCISION      Face    PARATHYROIDECTOMY      MS NDSC WRST SURG W/RLS TRANSVRS CARPL LIGM Left 3/23/2021    Procedure: RELEASE LEFT CARPAL TUNNEL ENDOSCOPIC;  Surgeon: Finesse Read MD;  Location: [facility] OR;  Service: Orthopedics    REPLACEMENT TOTAL KNEE Bilateral     ROTATOR CUFF REPAIR Left     Last assessed - 1/14/16    TOTAL ABDOMINAL HYSTERECTOMY      with derrell oopherectomy, Last assessed - 1/14/16       Family History   Problem Relation Age of Onset    Heart attack Mother     Substance Abuse Mother         denied    Mental illness Mother         denied    Other Mother         Cardiac Disorder     Cancer Mother         Malignant Neoplasm     Cancer Father         Malignant  Neoplasm     Substance Abuse Father         denied    Mental illness Father         denied    Lung cancer Father     Spina bifida Child      I have reviewed and agree with the history as documented.    E-Cigarette/Vaping    E-Cigarette Use Never User      E-Cigarette/Vaping Substances    Nicotine No     THC No     CBD No     Flavoring No     Other No     Unknown No      Social History     Tobacco Use    Smoking status: Former     Current packs/day: 0.00     Types: Cigarettes     Quit date:      Years since quittin.4    Smokeless tobacco: Never   Vaping Use    Vaping status: Never Used   Substance Use Topics    Alcohol use: Yes     Alcohol/week: 1.0 standard drink of alcohol     Types: 1 Glasses of wine per week    Drug use: No       Review of Systems   Constitutional:  Negative for appetite change, chills, diaphoresis, fever and unexpected weight change.   HENT:  Negative for congestion and rhinorrhea.    Eyes:  Negative for photophobia and visual disturbance.   Respiratory:  Negative for cough, chest tightness and shortness of breath.    Cardiovascular:  Negative for chest pain, palpitations and leg swelling.   Gastrointestinal:  Positive for abdominal pain, diarrhea, nausea and vomiting. Negative for abdominal distention, blood in stool and constipation.   Genitourinary:  Negative for dysuria and hematuria.   Musculoskeletal:  Negative for back pain, joint swelling, neck pain and neck stiffness.   Skin:  Negative for color change, pallor, rash and wound.   Neurological:  Negative for dizziness, syncope, weakness, light-headedness and headaches.   Psychiatric/Behavioral:  Negative for agitation.    All other systems reviewed and are negative.      Physical Exam  Physical Exam  Vitals and nursing note reviewed.   Constitutional:       General: She is not in acute distress.     Appearance: Normal appearance. She is well-developed. She is not ill-appearing, toxic-appearing or diaphoretic.   HENT:      Head:  Normocephalic and atraumatic.      Nose: Nose normal. No congestion or rhinorrhea.      Mouth/Throat:      Mouth: Mucous membranes are dry.      Pharynx: Oropharynx is clear. No oropharyngeal exudate or posterior oropharyngeal erythema.   Eyes:      General: No scleral icterus.        Right eye: No discharge.         Left eye: No discharge.      Extraocular Movements: Extraocular movements intact.      Conjunctiva/sclera: Conjunctivae normal.      Pupils: Pupils are equal, round, and reactive to light.   Neck:      Vascular: No JVD.      Trachea: No tracheal deviation.      Comments: Supple. Normal range of motion.   Cardiovascular:      Rate and Rhythm: Normal rate and regular rhythm.      Heart sounds: Normal heart sounds. No murmur heard.     No friction rub. No gallop.      Comments: Normal rate and regular rhythm  Pulmonary:      Effort: Pulmonary effort is normal. No respiratory distress.      Breath sounds: Normal breath sounds. No stridor. No wheezing or rales.      Comments: Clear to auscultation bilaterally  Chest:      Chest wall: No tenderness.   Abdominal:      General: Bowel sounds are normal. There is no distension.      Palpations: Abdomen is soft.      Tenderness: There is abdominal tenderness. There is guarding. There is no right CVA tenderness, left CVA tenderness or rebound.      Comments: Generalized abdominal tenderness primarily periumbilically with guarding.  Otherwise soft and nondistended.  Normal bowel sounds throughout   Musculoskeletal:         General: No swelling, tenderness, deformity or signs of injury. Normal range of motion.      Cervical back: Normal range of motion and neck supple. No rigidity. No muscular tenderness.      Right lower leg: No edema.      Left lower leg: No edema.   Lymphadenopathy:      Cervical: No cervical adenopathy.   Skin:     General: Skin is warm and dry.      Coloration: Skin is not pale.      Findings: No erythema or rash.   Neurological:      General:  No focal deficit present.      Mental Status: She is alert. Mental status is at baseline.      Sensory: No sensory deficit.      Motor: No weakness or abnormal muscle tone.      Coordination: Coordination normal.      Gait: Gait normal.      Comments: Alert.  Strength and sensation grossly intact.  Ambulatory without difficulty at baseline.    Psychiatric:         Behavior: Behavior normal.         Thought Content: Thought content normal.         Vital Signs  ED Triage Vitals   Temperature Pulse Respirations Blood Pressure SpO2   06/03/24 1125 06/03/24 1125 06/03/24 1125 06/03/24 1129 06/03/24 1125   98.5 °F (36.9 °C) (!) 50 18 (!) 74/41 94 %      Temp Source Heart Rate Source Patient Position - Orthostatic VS BP Location FiO2 (%)   06/03/24 1125 06/03/24 1125 06/03/24 1125 06/03/24 1125 --   Oral Monitor Sitting Left arm       Pain Score       06/03/24 1139       5           Vitals:    06/03/24 1200 06/03/24 1245 06/03/24 1300 06/03/24 1330   BP: 112/55 109/56 108/67 107/51   Pulse: 77 76 83 76   Patient Position - Orthostatic VS:             Visual Acuity      ED Medications  Medications   sodium chloride 0.9 % bolus 1,000 mL (0 mL Intravenous Stopped 6/3/24 1240)   acetaminophen (TYLENOL) tablet 975 mg (975 mg Oral Given 6/3/24 1143)   dicyclomine (BENTYL) tablet 20 mg (20 mg Oral Given 6/3/24 1143)   sodium chloride 0.9 % bolus 1,000 mL (1,000 mL Intravenous New Bag 6/3/24 1225)       Diagnostic Studies  Results Reviewed       Procedure Component Value Units Date/Time    UA w Reflex to Microscopic w Reflex to Culture [[id number]]     Lab Status: No result Specimen: Urine     Lipase [[id number]]  (Normal) Collected: 06/03/24 1142    Lab Status: Final result Specimen: Blood from Arm, Left Updated: 06/03/24 1211     Lipase 11 u/L     Comprehensive metabolic panel [[id number]]  (Abnormal) Collected: 06/03/24 1142    Lab Status: Final result Specimen: Blood from Arm, Left Updated: 06/03/24 1211     Sodium 135 mmol/L       Potassium 4.7 mmol/L      Chloride 104 mmol/L      CO2 21 mmol/L      ANION GAP 10 mmol/L      BUN 54 mg/dL      Creatinine 2.05 mg/dL      Glucose 116 mg/dL      Calcium 10.2 mg/dL      AST 26 U/L      ALT 20 U/L      Alkaline Phosphatase 146 U/L      Total Protein 7.1 g/dL      Albumin 4.2 g/dL      Total Bilirubin 0.90 mg/dL      eGFR 21 ml/min/1.73sq m     Narrative:      National Kidney Disease Foundation guidelines for Chronic Kidney Disease (CKD):     Stage 1 with normal or high GFR (GFR > 90 mL/min/1.73 square meters)    Stage 2 Mild CKD (GFR = 60-89 mL/min/1.73 square meters)    Stage 3A Moderate CKD (GFR = 45-59 mL/min/1.73 square meters)    Stage 3B Moderate CKD (GFR = 30-44 mL/min/1.73 square meters)    Stage 4 Severe CKD (GFR = 15-29 mL/min/1.73 square meters)    Stage 5 End Stage CKD (GFR <15 mL/min/1.73 square meters)  Note: GFR calculation is accurate only with a steady state creatinine    CBC and differential [[id number]]  (Abnormal) Collected: 06/03/24 1142    Lab Status: Final result Specimen: Blood from Arm, Left Updated: 06/03/24 1149     WBC 16.41 Thousand/uL      RBC 4.46 Million/uL      Hemoglobin 14.4 g/dL      Hematocrit 41.4 %      MCV 93 fL      MCH 32.3 pg      MCHC 34.8 g/dL      RDW 13.3 %      MPV 9.1 fL      Platelets 233 Thousands/uL      nRBC 0 /100 WBCs      Segmented % 76 %      Immature Grans % 0 %      Lymphocytes % 15 %      Monocytes % 8 %      Eosinophils Relative 1 %      Basophils Relative 0 %      Absolute Neutrophils 12.33 Thousands/µL      Absolute Immature Grans 0.07 Thousand/uL      Absolute Lymphocytes 2.45 Thousands/µL      Absolute Monocytes 1.31 Thousand/µL      Eosinophils Absolute 0.18 Thousand/µL      Basophils Absolute 0.07 Thousands/µL                    CT abdomen pelvis wo contrast   Final Result by Mariam Do MD (06/03 6616)   Findings in the left colon suspicious for nonspecific colitis. No abscess, pneumatosis, perforation.   Small hiatal hernia.    Gallstones.   Incidental findings, as per the body of the report.         Workstation performed: [id number]         XR knee 4+ views left injury    (Results Pending)              Procedures  Procedures         ED Course                               SBIRT 22yo+      Flowsheet Row Most Recent Value   Initial Alcohol Screen: US AUDIT-C     1. How often do you have a drink containing alcohol? 3 Filed at: 06/03/2024 1129   2. How many drinks containing alcohol do you have on a typical day you are drinking?  1 Filed at: 06/03/2024 1129   3a. Male UNDER 65: How often do you have five or more drinks on one occasion? 0 Filed at: 06/03/2024 1129   3b. FEMALE Any Age, or MALE 65+: How often do you have 4 or more drinks on one occassion? 0 Filed at: 06/03/2024 1129   Audit-C Score 4 Filed at: 06/03/2024 1129   ELY: How many times in the past year have you...    Used an illegal drug or used a prescription medication for non-medical reasons? Never Filed at: 06/03/2024 1129                      Medical Decision Making  88-year-old female history of hypertension on aspirin presenting with abdominal pain nausea vomiting diarrhea.  Nausea vomiting diarrhea in setting of recent known contact, suspect likely viral etiology.  Patient with tenderness and guarding on exam and given age and risk factors, plan for CT imaging.  Labs including abdominal labs.  IV fluids.  Reassess.    Labs interpreted me notable for white count of 16.  Likely reactive.  Also notable for creatinine of 2 up from baseline around 0.8 per chart review.  Consistent with RANJAN.  Given additional fluids with improvement in symptoms.  CT imaging no significant acute process.  Plan for further evaluation and management inpatient.  Admitted.    Amount and/or Complexity of Data Reviewed  Labs: ordered.  Radiology: ordered.    Risk  OTC drugs.  Prescription drug management.  Decision regarding hospitalization.             Disposition  Final diagnoses:   Generalized  abdominal pain   Nausea vomiting and diarrhea   Dehydration   RANJAN (acute kidney injury) (HCC)     Time reflects when diagnosis was documented in both MDM as applicable and the Disposition within this note       Time User Action Codes Description Comment    6/3/2024 11:45 AM Erne, James Add [R10.84] Generalized abdominal pain     6/3/2024 11:45 AM Erne, James Add [R11.2,  R19.7] Nausea vomiting and diarrhea     6/3/2024 11:45 AM Erne, James Add [E86.0] Dehydration     6/3/2024  2:41 PM Erne, James Add [N17.9] RANJAN (acute kidney injury) (HCC)     6/3/2024  2:41 PM Erne, James Modify [R10.84] Generalized abdominal pain     6/3/2024  2:41 PM Erne, Jmaes Modify [N17.9] RANJAN (acute kidney injury) (HCC)           ED Disposition       ED Disposition   Admit    Condition   Stable    Date/Time   Mon Shakeel 3, 2024 6921    Comment   Case was discussed with SAURABH and the patient's admission status was agreed to be Admission Status: observation status to the service of Dr. Brewer .               Follow-up Information    None         Patient's Medications   Discharge Prescriptions    No medications on file       No discharge procedures on file.    PDMP Review         Value Time User    PDMP Reviewed  Yes 3/5/2024  4:07 PM Olu Mullen MD            ED Provider  Electronically Signed by             James Zimmerman MD  06/03/24 8858

## 2024-06-03 NOTE — PLAN OF CARE
Problem: PAIN - ADULT  Goal: Verbalizes/displays adequate comfort level or baseline comfort level  Description: Interventions:  - Encourage patient to monitor pain and request assistance  - Assess pain using appropriate pain scale  - Administer analgesics based on type and severity of pain and evaluate response  - Implement non-pharmacological measures as appropriate and evaluate response  - Consider cultural and social influences on pain and pain management  - Notify physician/advanced practitioner if interventions unsuccessful or patient reports new pain  Outcome: Progressing     Problem: INFECTION - ADULT  Goal: Absence or prevention of progression during hospitalization  Description: INTERVENTIONS:  - Assess and monitor for signs and symptoms of infection  - Monitor lab/diagnostic results  - Monitor all insertion sites, i.e. indwelling lines, tubes, and drains  -- [place] appropriate cooling/warming therapies per order  - Administer medications as ordered  - Instruct and encourage patient and family to use good hand hygiene technique  - Identify and instruct in appropriate isolation precautions for identified infection/condition  Outcome: Progressing     Problem: INFECTION - ADULT  Goal: Absence of fever/infection during neutropenic period  Description: INTERVENTIONS:  - Monitor WBC    Outcome: Progressing     Problem: SAFETY ADULT  Goal: Patient will remain free of falls  Description: INTERVENTIONS:  - Educate patient/family on patient safety including physical limitations  - Instruct patient to call for assistance with activity   - Consult OT/PT to assist with strengthening/mobility   - Keep Call bell within reach  - Keep bed low and locked with side rails adjusted as appropriate  - Keep care items and personal belongings within reach  - Initiate and maintain comfort rounds  - Make Fall Risk Sign visible to staff  - Offer Toileting every 4 Hours, in advance of need  - Initiate/Maintain bed alarm  - Obtain  necessary fall risk management equipment:   - Apply yellow socks and bracelet for high fall risk patients  - Consider moving patient to room near nurses station  Outcome: Progressing     Problem: DISCHARGE PLANNING  Goal: Discharge to home or other facility with appropriate resources  Description: INTERVENTIONS:  - Identify barriers to discharge w/patient and caregiver  - Arrange for needed discharge resources and transportation as appropriate  - Identify discharge learning needs (meds, wound care, etc.)  - Arrange for interpretive services to assist at discharge as needed  - Refer to Case Management Department for coordinating discharge planning if the patient needs post-hospital services based on physician/advanced practitioner order or complex needs related to functional status, cognitive ability, or social support system  Outcome: Progressing     Problem: Knowledge Deficit  Goal: Patient/family/caregiver demonstrates understanding of disease process, treatment plan, medications, and discharge instructions  Description: Complete learning assessment and assess knowledge base.  Interventions:  - Provide teaching at level of understanding  - Provide teaching via preferred learning methods  Outcome: Progressing

## 2024-06-03 NOTE — QUICK NOTE
UA was reviewed with patient and family. It shows  innumerable WBC, occasional bacteria. She is not having any urinary symptoms and urinary system was unremarkable on CTAP. She did have moderate blood in UA. Repeat UA to check for blood ordered for tomorrow. If still positive, discussed that she will need outpatient follow up for this with Urology.

## 2024-06-03 NOTE — ASSESSMENT & PLAN NOTE
Presenting with abdominal pain, nausea, vomiting and diarrhea.  Started about 2 days ago.  Had a few episodes of NBNB emesis. Frequent loose stools was initially almost 10x a day. Daughter in-law with similar symptoms. No recent abx exposures.   Today she has only had 1 loose stool   Hypovolemia and hypotension (down to 70s systolic but improved quickly with fluids)   CTAP with Findings in the left colon suspicious for nonspecific colitis. No abscess, pneumatosis, perforation   She does have a WBC count elevation 16 and high procal 21. However given nonspecific L sided colitis this is most likely viral in etiology and she is already improving   Will watch off abx. If any signs of worsening hemodynamic, fevers, worsening pain would recommend initiation of zosyn to cover intraabdominal pathogens   She is non-toxic appearing  Continue to monitor and offer supportive care  Meets sepsis criteria - sepsis powerplan ordered   No BM since arrival and she reported only one today but if she starts having increased loose stool we can get stool cultures

## 2024-06-04 VITALS
OXYGEN SATURATION: 94 % | HEART RATE: 76 BPM | SYSTOLIC BLOOD PRESSURE: 99 MMHG | TEMPERATURE: 99.4 F | HEIGHT: 67 IN | WEIGHT: 164.02 LBS | BODY MASS INDEX: 25.74 KG/M2 | RESPIRATION RATE: 18 BRPM | DIASTOLIC BLOOD PRESSURE: 54 MMHG

## 2024-06-04 LAB
ALBUMIN SERPL BCP-MCNC: 3.1 G/DL (ref 3.5–5)
ALP SERPL-CCNC: 83 U/L (ref 34–104)
ALT SERPL W P-5'-P-CCNC: 14 U/L (ref 7–52)
ANION GAP SERPL CALCULATED.3IONS-SCNC: 7 MMOL/L (ref 4–13)
AST SERPL W P-5'-P-CCNC: 20 U/L (ref 13–39)
BACTERIA UR CULT: NORMAL
BACTERIA UR QL AUTO: ABNORMAL /HPF
BASOPHILS # BLD MANUAL: 0 THOUSAND/UL (ref 0–0.1)
BASOPHILS NFR MAR MANUAL: 0 % (ref 0–1)
BILIRUB SERPL-MCNC: 0.86 MG/DL (ref 0.2–1)
BILIRUB UR QL STRIP: NEGATIVE
BUN SERPL-MCNC: 33 MG/DL (ref 5–25)
C COLI+JEJUNI TUF STL QL NAA+PROBE: NEGATIVE
C DIFF TOX GENS STL QL NAA+PROBE: NEGATIVE
CALCIUM ALBUM COR SERPL-MCNC: 9.4 MG/DL (ref 8.3–10.1)
CALCIUM SERPL-MCNC: 8.7 MG/DL (ref 8.4–10.2)
CHLORIDE SERPL-SCNC: 110 MMOL/L (ref 96–108)
CLARITY UR: CLEAR
CO2 SERPL-SCNC: 19 MMOL/L (ref 21–32)
COLOR UR: ABNORMAL
CREAT SERPL-MCNC: 1.04 MG/DL (ref 0.6–1.3)
EC STX1+STX2 GENES STL QL NAA+PROBE: NEGATIVE
EOSINOPHIL # BLD MANUAL: 0.09 THOUSAND/UL (ref 0–0.4)
EOSINOPHIL NFR BLD MANUAL: 1 % (ref 0–6)
ERYTHROCYTE [DISTWIDTH] IN BLOOD BY AUTOMATED COUNT: 13.2 % (ref 11.6–15.1)
GFR SERPL CREATININE-BSD FRML MDRD: 48 ML/MIN/1.73SQ M
GLUCOSE P FAST SERPL-MCNC: 89 MG/DL (ref 65–99)
GLUCOSE SERPL-MCNC: 89 MG/DL (ref 65–140)
GLUCOSE UR STRIP-MCNC: NEGATIVE MG/DL
HCT VFR BLD AUTO: 32.9 % (ref 34.8–46.1)
HGB BLD-MCNC: 11.2 G/DL (ref 11.5–15.4)
HGB UR QL STRIP.AUTO: ABNORMAL
KETONES UR STRIP-MCNC: NEGATIVE MG/DL
LEUKOCYTE ESTERASE UR QL STRIP: ABNORMAL
LYMPHOCYTES # BLD AUTO: 3.92 THOUSAND/UL (ref 0.6–4.47)
LYMPHOCYTES # BLD AUTO: 43 % (ref 14–44)
MAGNESIUM SERPL-MCNC: 1.7 MG/DL (ref 1.9–2.7)
MCH RBC QN AUTO: 32 PG (ref 26.8–34.3)
MCHC RBC AUTO-ENTMCNC: 34 G/DL (ref 31.4–37.4)
MCV RBC AUTO: 94 FL (ref 82–98)
METAMYELOCYTE ABSOLUTE CT: 0.09 THOUSAND/UL (ref 0–0.1)
METAMYELOCYTES NFR BLD MANUAL: 1 % (ref 0–1)
MONOCYTES # BLD AUTO: 0.17 THOUSAND/UL (ref 0–1.22)
MONOCYTES NFR BLD: 2 % (ref 4–12)
NEUTROPHILS # BLD MANUAL: 4.26 THOUSAND/UL (ref 1.85–7.62)
NEUTS SEG NFR BLD AUTO: 50 % (ref 43–75)
NITRITE UR QL STRIP: NEGATIVE
NON-SQ EPI CELLS URNS QL MICRO: ABNORMAL /HPF
PH UR STRIP.AUTO: 5.5 [PH]
PLATELET # BLD AUTO: 169 THOUSANDS/UL (ref 149–390)
PLATELET BLD QL SMEAR: ADEQUATE
PMV BLD AUTO: 9 FL (ref 8.9–12.7)
POTASSIUM SERPL-SCNC: 3.9 MMOL/L (ref 3.5–5.3)
PROCALCITONIN SERPL-MCNC: 9.77 NG/ML
PROT SERPL-MCNC: 5.5 G/DL (ref 6.4–8.4)
PROT UR STRIP-MCNC: ABNORMAL MG/DL
RBC # BLD AUTO: 3.5 MILLION/UL (ref 3.81–5.12)
RBC #/AREA URNS AUTO: ABNORMAL /HPF
RBC MORPH BLD: NORMAL
SALMONELLA SP SPAO STL QL NAA+PROBE: NEGATIVE
SHIGELLA SP+EIEC IPAH STL QL NAA+PROBE: NEGATIVE
SODIUM SERPL-SCNC: 136 MMOL/L (ref 135–147)
SP GR UR STRIP.AUTO: 1.01 (ref 1–1.03)
TSH SERPL DL<=0.05 MIU/L-ACNC: 1.4 UIU/ML (ref 0.45–4.5)
UROBILINOGEN UR STRIP-ACNC: <2 MG/DL
VARIANT LYMPHS # BLD AUTO: 3 %
WBC # BLD AUTO: 8.52 THOUSAND/UL (ref 4.31–10.16)
WBC #/AREA URNS AUTO: ABNORMAL /HPF

## 2024-06-04 PROCEDURE — 84443 ASSAY THYROID STIM HORMONE: CPT | Performed by: STUDENT IN AN ORGANIZED HEALTH CARE EDUCATION/TRAINING PROGRAM

## 2024-06-04 PROCEDURE — 85007 BL SMEAR W/DIFF WBC COUNT: CPT | Performed by: STUDENT IN AN ORGANIZED HEALTH CARE EDUCATION/TRAINING PROGRAM

## 2024-06-04 PROCEDURE — 99239 HOSP IP/OBS DSCHRG MGMT >30: CPT

## 2024-06-04 PROCEDURE — 97166 OT EVAL MOD COMPLEX 45 MIN: CPT

## 2024-06-04 PROCEDURE — 84145 PROCALCITONIN (PCT): CPT | Performed by: STUDENT IN AN ORGANIZED HEALTH CARE EDUCATION/TRAINING PROGRAM

## 2024-06-04 PROCEDURE — 97163 PT EVAL HIGH COMPLEX 45 MIN: CPT

## 2024-06-04 PROCEDURE — 83735 ASSAY OF MAGNESIUM: CPT | Performed by: STUDENT IN AN ORGANIZED HEALTH CARE EDUCATION/TRAINING PROGRAM

## 2024-06-04 PROCEDURE — 85027 COMPLETE CBC AUTOMATED: CPT | Performed by: STUDENT IN AN ORGANIZED HEALTH CARE EDUCATION/TRAINING PROGRAM

## 2024-06-04 PROCEDURE — 80053 COMPREHEN METABOLIC PANEL: CPT | Performed by: STUDENT IN AN ORGANIZED HEALTH CARE EDUCATION/TRAINING PROGRAM

## 2024-06-04 PROCEDURE — 81001 URINALYSIS AUTO W/SCOPE: CPT | Performed by: STUDENT IN AN ORGANIZED HEALTH CARE EDUCATION/TRAINING PROGRAM

## 2024-06-04 RX ADMIN — HEPARIN SODIUM 5000 UNITS: 5000 INJECTION INTRAVENOUS; SUBCUTANEOUS at 05:06

## 2024-06-04 RX ADMIN — LEVOTHYROXINE SODIUM 50 MCG: 0.05 TABLET ORAL at 05:06

## 2024-06-04 RX ADMIN — OXYBUTYNIN CHLORIDE 5 MG: 5 TABLET ORAL at 08:53

## 2024-06-04 RX ADMIN — ASPIRIN 81 MG: 81 TABLET, CHEWABLE ORAL at 08:53

## 2024-06-04 RX ADMIN — SODIUM CHLORIDE, SODIUM LACTATE, POTASSIUM CHLORIDE, AND CALCIUM CHLORIDE 100 ML/HR: .6; .31; .03; .02 INJECTION, SOLUTION INTRAVENOUS at 02:43

## 2024-06-04 RX ADMIN — HEPARIN SODIUM 5000 UNITS: 5000 INJECTION INTRAVENOUS; SUBCUTANEOUS at 13:36

## 2024-06-04 NOTE — PLAN OF CARE
Problem: PAIN - ADULT  Goal: Verbalizes/displays adequate comfort level or baseline comfort level  Description: Interventions:  - Encourage patient to monitor pain and request assistance  - Assess pain using appropriate pain scale  - Administer analgesics based on type and severity of pain and evaluate response  - Implement non-pharmacological measures as appropriate and evaluate response  - Consider cultural and social influences on pain and pain management  - Notify physician/advanced practitioner if interventions unsuccessful or patient reports new pain  Outcome: Progressing     Problem: INFECTION - ADULT  Goal: Absence or prevention of progression during hospitalization  Description: INTERVENTIONS:  - Assess and monitor for signs and symptoms of infection  - Monitor lab/diagnostic results  - Monitor all insertion sites, i.e. indwelling lines, tubes, and drains  -- [place] appropriate cooling/warming therapies per order  - Administer medications as ordered  - Instruct and encourage patient and family to use good hand hygiene technique  - Identify and instruct in appropriate isolation precautions for identified infection/condition  Outcome: Progressing     Problem: SAFETY ADULT  Goal: Patient will remain free of falls  Description: INTERVENTIONS:  - Educate patient/family on patient safety including physical limitations  - Instruct patient to call for assistance with activity   - Consult OT/PT to assist with strengthening/mobility   - Keep Call bell within reach  - Keep bed low and locked with side rails adjusted as appropriate  - Keep care items and personal belongings within reach  - Initiate and maintain comfort rounds  - Make Fall Risk Sign visible to staff  - Offer Toileting every 4 Hours, in advance of need  - Initiate/Maintain bed alarm  - Obtain necessary fall risk management equipment:   - Apply yellow socks and bracelet for high fall risk patients  - Consider moving patient to room near nurses  station  Outcome: Progressing       Problem: GASTROINTESTINAL - ADULT  Goal: Minimal or absence of nausea and/or vomiting  Description: INTERVENTIONS:  - Administer IV fluids if ordered to ensure adequate hydration  - Maintain NPO status until nausea and vomiting are resolved  - Nasogastric tube if ordered  - Administer ordered antiemetic medications as needed  - Provide nonpharmacologic comfort measures as appropriate  - Advance diet as tolerated, if ordered  - Consider nutrition services referral to assist patient with adequate nutrition and appropriate food choices  Outcome: Progressing     Problem: GASTROINTESTINAL - ADULT  Goal: Maintains or returns to baseline bowel function  Description: INTERVENTIONS:  - Assess bowel function  - Encourage oral fluids to ensure adequate hydration  - Administer IV fluids if ordered to ensure adequate hydration  - Administer ordered medications as needed  - Encourage mobilization and activity  - Consider nutritional services referral to assist patient with adequate nutrition and appropriate food choices  Outcome: Progressing     Problem: GASTROINTESTINAL - ADULT  Goal: Maintains adequate nutritional intake  Description: INTERVENTIONS:  - Monitor percentage of each meal consumed  - Identify factors contributing to decreased intake, treat as appropriate  - Assist with meals as needed  - Monitor I&O, weight, and lab values if indicated  - Obtain nutrition services referral as needed  Outcome: Progressing

## 2024-06-04 NOTE — ASSESSMENT & PLAN NOTE
Cr up to 2.0 on admission, down trended to 1.04 (baseline is normal at 0.8)  Receiving fluids in the ED   Cont IVF   As oral intake now adequate, recommending to continue oral intake upon DC

## 2024-06-04 NOTE — UTILIZATION REVIEW
Initial Clinical Review    Admission: Date/Time/Statement:   Admission Orders (From admission, onward)       Ordered        06/03/24 1451  Place in Observation  Once                          Orders Placed This Encounter   Procedures    Place in Observation     Standing Status:   Standing     Number of Occurrences:   1     Order Specific Question:   Level of Care     Answer:   Med Surg [16]     ED Arrival Information       Expected   -    Arrival   6/3/2024 11:17    Acuity   Emergent              Means of arrival   Walk-In    Escorted by   Family Member    Service   Hospitalist    Admission type   Emergency              Arrival complaint   diarrhea & weakness             Chief Complaint   Patient presents with    Diarrhea     Pt c/o nausea/ vomiting, and diarrhea for the past 2 days. Pt also c/o lightheadedness and weakness. Pt has decreased appetite since symptoms started, pt endorses trying to drink water and stay hydrated       Initial Presentation: 88 y.o. female to the ED from home with complaints of nausea, vomiting, diarrhea for 2 days prior to arrival. Admitted under observation for GI symptoms.  H/O HTN, hypothyroidisim.  Arrives with bradycardia, hypotensive, abdominal tenderness with guarding.  In the ED, given 2 liter iv fluids. WBcs 16.41. CT a/p shows:  colitis. Continue with IV fluids, creat 2 from baseline around 0.8.  BP improved with IV fluids.  PRocal elevated.  Watch off abx.  Continue to monitor.         Date:     Day 2:      ED Triage Vitals   Temperature Pulse Respirations Blood Pressure SpO2   06/03/24 1125 06/03/24 1125 06/03/24 1125 06/03/24 1129 06/03/24 1125   98.5 °F (36.9 °C) (!) 50 18 (!) 74/41 94 %      Temp Source Heart Rate Source Patient Position - Orthostatic VS BP Location FiO2 (%)   06/03/24 1125 06/03/24 1125 06/03/24 1125 06/03/24 1125 --   Oral Monitor Sitting Left arm       Pain Score       06/03/24 1139       5          Wt Readings from Last 1 Encounters:   05/13/24 74.4 kg  (164 lb)     Additional Vital Signs: ital Signs (last 2 days)    Date/Time Temp Pulse Resp BP MAP (mmHg) SpO2 O2 Device Patient Position - Orthostatic VS   06/04/24 0748 -- -- -- -- -- -- None (Room air) --   06/04/24 0700 99.4 °F (37.4 °C) -- -- 99/54 69 -- -- Sitting   06/03/24 2219 97.9 °F (36.6 °C) -- 18 114/56 74 -- None (Room air) Lying   06/03/24 1630 -- -- -- -- -- -- None (Room air) --   06/03/24 16:18:03 97.7 °F (36.5 °C) 76 18 113/54 74 94 % None (Room air) Lying   06/03/24 1530 -- 79 20 124/53 77 95 % None (Room air) Lying   06/03/24 1330 -- 76 18 107/51 74 95 % -- --   06/03/24 1300 -- 83 18 108/67 82 97 % -- --   06/03/24 1245 -- 76 18 109/56 74 98 % -- --   06/03/24 1200 -- 77 18 112/55 79 97 % -- --   06/03/24 1145 -- 83 18 104/52 74 97 % -- --   06/03/24 1138 -- 89 18 100/55 -- 98 % -- --   06/03/24 1129 -- 100 -- 74/41 Abnormal  -- -- -- Sitting   06/03/24 1125 98.5 °F (36.9 °C) 50 Abnormal  18 -- -- 94 % None (Room air) Sitting     Pertinent Labs/Diagnostic Test Results:   XR knee 4+ views left injury   Final Result by Leo Osman MD (06/04 0559)      No acute osseous abnormality. Unremarkable total left knee arthroplasty.            Workstation performed: [id number]         CT abdomen pelvis wo contrast   Final Result by Mariam Do MD (06/03 1436)   Findings in the left colon suspicious for nonspecific colitis. No abscess, pneumatosis, perforation.   Small hiatal hernia.   Gallstones.   Incidental findings, as per the body of the report.         Workstation performed: [id number]               Results from last 7 days   Lab Units 06/04/24  0457 06/03/24  1142   WBC Thousand/uL 8.52 16.41*   HEMOGLOBIN g/dL 11.2* 14.4   HEMATOCRIT % 32.9* 41.4   PLATELETS Thousands/uL 169 233   TOTAL NEUT ABS Thousands/µL  --  12.33*         Results from last 7 days   Lab Units 06/04/24  0457 06/03/24  1142   SODIUM mmol/L 136 135   POTASSIUM mmol/L 3.9 4.7   CHLORIDE mmol/L 110* 104   CO2 mmol/L 19*  "21   ANION GAP mmol/L 7 10   BUN mg/dL 33* 54*   CREATININE mg/dL 1.04 2.05*   EGFR ml/min/1.73sq m 48 21   CALCIUM mg/dL 8.7 10.2   MAGNESIUM mg/dL 1.7*  --      Results from last 7 days   Lab Units 06/04/24  0457 06/03/24  1142   AST U/L 20 26   ALT U/L 14 20   ALK PHOS U/L 83 146*   TOTAL PROTEIN g/dL 5.5* 7.1   ALBUMIN g/dL 3.1* 4.2   TOTAL BILIRUBIN mg/dL 0.86 0.90         Results from last 7 days   Lab Units 06/04/24  0457 06/03/24  1142   GLUCOSE RANDOM mg/dL 89 116             No results found for: \"BETA-HYDROXYBUTYRATE\"                               Results from last 7 days   Lab Units 06/04/24  0457   TSH 3RD GENERATON uIU/mL 1.402     Results from last 7 days   Lab Units 06/04/24  0457 06/03/24  1142   PROCALCITONIN ng/ml 9.77* 21.95*     Results from last 7 days   Lab Units 06/03/24  1828 06/03/24  1554   LACTIC ACID mmol/L 1.4 2.1*                                 Results from last 7 days   Lab Units 06/03/24  1142   LIPASE u/L 11                 Results from last 7 days   Lab Units 06/03/24  1508 06/03/24  1507   CLARITY UA   --  Turbid   COLOR UA   --  Yellow   SPEC GRAV UA   --  1.015   PH UA   --  5.0   GLUCOSE UA mg/dl  --  Negative   KETONES UA mg/dl  --  Negative   BLOOD UA   --  Moderate*   PROTEIN UA mg/dl  --  Trace*   NITRITE UA   --  Negative   BILIRUBIN UA   --  Negative   UROBILINOGEN UA (BE) mg/dl  --  <2.0   LEUKOCYTES UA   --  Large*   WBC UA /hpf Innumerable*  --    RBC UA /hpf 10-20*  --    BACTERIA UA /hpf Occasional  --    EPITHELIAL CELLS WET PREP /hpf Moderate*  --                                  Results from last 7 days   Lab Units 06/03/24  1554   BLOOD CULTURE  Received in Microbiology Lab. Culture in Progress.  Received in Microbiology Lab. Culture in Progress.                   ED Treatment:   Medication Administration from 06/03/2024 1117 to 06/03/2024 1611         Date/Time Order Dose Route Action Action by Comments     06/03/2024 1240 EDT sodium chloride 0.9 % bolus 1,000 " mL 0 mL Intravenous Stopped Teodora Silverio, RN --     06/03/2024 1140 EDT sodium chloride 0.9 % bolus 1,000 mL 1,000 mL Intravenous New Bag Teodora Silverio, RN --     06/03/2024 1143 EDT acetaminophen (TYLENOL) tablet 975 mg 975 mg Oral Given Teodora Silverio, RN --     06/03/2024 1143 EDT dicyclomine (BENTYL) tablet 20 mg 20 mg Oral Given Teodora Ala, RN --     06/03/2024 1325 EDT sodium chloride 0.9 % bolus 1,000 mL 0 mL Intravenous Stopped Teodora Silverio, RN --     06/03/2024 1225 EDT sodium chloride 0.9 % bolus 1,000 mL 1,000 mL Intravenous New Bag Teodora Silverio, RN --     06/03/2024 1559 EDT heparin (porcine) subcutaneous injection 5,000 Units 5,000 Units Subcutaneous Given Bhavna Wagner RN --          Past Medical History:   Diagnosis Date    BCC (basal cell carcinoma)     Benign uterine neoplasm     C. difficile colitis     Disease of thyroid gland     Hyperlipidemia     Hypertension      Admitting Diagnosis: Diarrhea [R19.7]  Dehydration [E86.0]  Generalized abdominal pain [R10.84]  RANJAN (acute kidney injury) (HCC) [N17.9]  Nausea vomiting and diarrhea [R11.2, R19.7]  Age/Sex: 88 y.o. female  Admission Orders:  Scheduled Medications:  aspirin, 81 mg, Oral, Daily  atorvastatin, 20 mg, Oral, Daily With Dinner  heparin (porcine), 5,000 Units, Subcutaneous, Q8H SAMM  levothyroxine, 50 mcg, Oral, Early Morning  oxybutynin, 5 mg, Oral, BID      Continuous IV Infusions:  lactated ringers, 100 mL/hr, Intravenous, Continuous      PRN Meds:       None    Network Utilization Review Department  ATTENTION: Please call with any questions or concerns to [phone] and carefully listen to the prompts so that you are directed to the right person. All voicemails are confidential.   For Discharge needs, contact Care Management DC Support Team at [phone] opt. 2  Send all requests for admission clinical reviews, approved or denied determinations and any other requests to dedicated fax number below belonging to the  [facility] where the patient is receiving treatment. List of dedicated fax numbers for the Facilities:  FACILITY NAME UR FAX NUMBER   ADMISSION DENIALS (Administrative/Medical Necessity) [phone]   DISCHARGE SUPPORT TEAM (NETWORK) [phone]   PARENT CHILD HEALTH (Maternity/NICU/Pediatrics) [phone]   [facility] [phone]   [facility] [phone]   formerly [facility] [phone]   [facility] [phone]   [facility] [phone]   [facility] [phone]   [facility] [phone]   [facility] [phone]   [facility] [phone]   [facility] [phone]   [facility] [phone]   [facility] [phone]

## 2024-06-04 NOTE — OCCUPATIONAL THERAPY NOTE
"          Occupational Therapy Evaluation        Patient Name: Leyla Garnett  Today's Date: 6/4/2024 06/04/24 0826   OT Last Visit   OT Visit Date 06/04/24   Note Type   Note type Evaluation   Pain Assessment   Pain Assessment Tool 0-10   Pain Score No Pain   Restrictions/Precautions   Weight Bearing Precautions Per Order No   Braces or Orthoses   (none per pt)   Other Precautions Fall Risk;Multiple lines;Hard of hearing;Contact/isolation   Home Living   Type of Home House   Home Layout One level;Performs ADLs on one level;Able to live on main level with bedroom/bathroom;Stairs to enter with rails  (1 ALYCE)   Bathroom Shower/Tub Tub/shower unit   Bathroom Toilet Standard   Bathroom Equipment Shower chair   Bathroom Accessibility Accessible   Home Equipment Walker;Cane   Additional Comments no AD used PTA   Prior Function   Level of Andrew Independent with ADLs;Independent with functional mobility;Independent with IADLS   Lives With Alone   Receives Help From Family  (family lives nearby)   IADLs Independent with driving;Independent with meal prep;Independent with medication management   Falls in the last 6 months 1 to 4  (1 \"trip\")   Vocational Retired   Lifestyle   Autonomy Patient was independent with ADLs/ IADLs. Patient lives alone in a one story house, 1 ALYCE. Patient ambulates without AD.   Reciprocal Relationships Supportive Family   Intrinsic Gratification Pt enjoys [place] embroidery   General   Family/Caregiver Present No   ADL   Where Assessed Edge of bed   Eating Assistance 6  Modified independent   Grooming Assistance 5  Supervision/Setup   UB Bathing Assistance 4  Minimal Assistance   LB Bathing Assistance 3  Moderate Assistance   UB Dressing Assistance 4  Minimal Assistance   LB Dressing Assistance 3  Moderate Assistance   Toileting Assistance  4  Minimal Assistance   Functional Assistance 4  Minimal Assistance   Bed Mobility   Additional Comments received patient OOB to Recliner/ Nursing " "srtaff reported - transfers with assist of 1   Transfers   Sit to Stand 4  Minimal assistance   Additional items Assist x 1;Armrests;Increased time required  (\"my knees are stiff\")   Stand to Sit 4  Minimal assistance   Additional items Assist x 1;Increased time required;Armrests   Functional Mobility   Functional Mobility 4  Minimal assistance   Additional Comments assist of 1 with hand hold assist/ CG assist with RW   Additional items Rolling walker   Balance   Static Sitting Good   Dynamic Sitting Fair +   Static Standing Fair -   Dynamic Standing Fair -   Activity Tolerance   Activity Tolerance Patient tolerated treatment well   Medical Staff Made Aware Pt seen as a co-eval with PT due to the patient's co-morbidities, clinically unstable presentation, and present impairments which are a regression from the patient's baseline.   RUE Assessment   RUE Assessment WFL   LUE Assessment   LUE Assessment WFL   Hand Function   Gross Motor Coordination Functional   Fine Motor Coordination Functional   Sensation   Light Touch No apparent deficits  (BUEs)   Vision-Basic Assessment   Current Vision Other (Comment)  (glassess for reading and driving)   Psychosocial   Psychosocial (WDL) WDL   Cognition   Overall Cognitive Status WFL   Arousal/Participation Alert;Responsive;Cooperative   Attention Within functional limits   Orientation Level Oriented X4   Memory Within functional limits   Following Commands Follows all commands and directions without difficulty   Assessment   Limitation Decreased ADL status;Decreased endurance;Decreased self-care trans;Decreased high-level ADLs   Prognosis Good   Assessment Patient is a 88 y.o. female seen for OT evaluation s/p admit to [facility] on 6/3/2024 w/Gastrointestinal symptoms. Commorbidities affecting patient's functional performance at time of assessment include: gastrointestinal symptoms, RANJAN, HTN.   Orders placed for OT evaluation and treatment.  Performed at least " two patient identifiers during session including name and wristband.  Prior to admission,  Patient was independent with ADLs/ IADLs. Patient lives alone in a one story house, 1 ALYCE. Patient ambulates without AD.  Personal factors affecting patient at time of initial evaluation include: difficulty performing ADLs and difficulty performing IADLs. Upon evaluation, patient requires supervision and set up assist for UB ADLs, minimal  assist for LB ADLs, transfers and functional ambulation in room and bathroom with minimal  assist, with the use of Rolling Walker.  Occupational performance is affected by the following deficits: dynamic sit/ stand balance deficit with poor standing tolerance time for self care and functional mobility, decreased activity tolerance, decreased safety awareness, and postural control and postural alignment deficit, requiring external assistance to complete transitional movements.  Patient to benefit from continued Occupational Therapy treatment while in the hospital to address deficits as defined above and maximize level of functional independence with ADLs and functional mobility. Occupational Performance areas to address include: bathing/ shower, dressing, toilet hygiene, transfer to all surfaces, functional mobility, IADLs: safety procedures, and Leisure Participation. From OT standpoint, recommendation at time of d/c would be Level 2.   Plan   Treatment Interventions ADL retraining;Functional transfer training;Endurance training;Patient/family training;Equipment evaluation/education;Compensatory technique education;Continued evaluation   Discharge Recommendation   Rehab Resource Intensity Level, OT II (Moderate Resource Intensity)   AM-PAC Daily Activity Inpatient   Lower Body Dressing 2   Bathing 3   Toileting 3   Upper Body Dressing 3   Grooming 3   Eating 4   Daily Activity Raw Score 18   Daily Activity Standardized Score (Calc for Raw Score >=11) 38.66   AM-PAC Applied Cognition  Inpatient   Following a Speech/Presentation 4   Understanding Ordinary Conversation 4   Taking Medications 4   Remembering Where Things Are Placed or Put Away 4   Remembering List of 4-5 Errands 4   Taking Care of Complicated Tasks 4   Applied Cognition Raw Score 24   Applied Cognition Standardized Score 62.21           1 - Patient will verbalize and demonstrate use of energy conservation/ deep breathing technique and work simplification skills during functional activity with no verbal cues.    2 - Patient will verbalize and demonstrate good body mechanics and joint protection techniques during  ADLs/ IADLs with no verbal cues.    3 - Patient will increase OOB/ sitting tolerance to 2-4 hours per day for increased participation in self care and leisure tasks with no s/s of exertion.    4 - Patient will increase standing tolerance time to 5  minutes with unilateral UE support to complete sink level ADLs@ mod I level.    5 - Patient will increase sitting tolerance at edge of bed to 20 minutes to complete UB ADLs @ set up assist level.    6 - Patient will transfer bed to Chair / toilet at Set up assist level with AD as indicated.     7 - Patient will complete UB ADLs with set up assist.     8 - Patient will complete LB ADLs with min assist with the use of adaptive equipment.     9 - Patient will complete toileting hygiene with set up assist/ supervision for thoroughness    10 - Patient/ Family  will demonstrate competency with UE Home Exercise Program.

## 2024-06-04 NOTE — DISCHARGE SUMMARY
[facility]  Discharge- Leyla Garnett 1935, 88 y.o. female MRN: [id number]  Unit/Bed#: MS [facility] Encounter: [id number]  Primary Care Provider: Olu Mullen MD   Date and time admitted to hospital: 6/3/2024 11:30 AM    * Gastrointestinal symptoms  Assessment & Plan  Presenting with abdominal pain, nausea, vomiting and diarrhea.  Started about 2 days ago.  Had a few episodes of NBNB emesis. Frequent loose stools was initially almost 10x a day. Daughter in-law with similar symptoms. No recent abx exposures.   Today she has only had 1 loose stool   Hypovolemia and hypotension (down to 70s systolic but improved quickly with fluids)   CTAP with Findings in the left colon suspicious for nonspecific colitis. No abscess, pneumatosis, perforation   She did have a WBC count elevation 16 and high procal 21. However given nonspecific L sided colitis this is most likely viral in etiology and she is already improving, current WBC 8.5, procal improved to 9.77  Will watch off abx. If any signs of worsening hemodynamic, fevers, worsening pain would recommend initiation of zosyn to cover intraabdominal pathogens   She is non-toxic appearing  Continue to monitor and offer supportive care  Meets sepsis criteria - sepsis powerplan ordered   No nausea or vomiting or diarrhea overnight or today  Stool bacterial enteric panel negative, C. difficile negative    Primary insomnia  Assessment & Plan  Cont trazodone     Hypothyroidism  Assessment & Plan  Continue levothyroxine    Hypertension  Assessment & Plan  Hold lisinopril given hypotension    Hyperlipidemia  Assessment & Plan  Continue statin    RANJAN (acute kidney injury) (HCC)  Assessment & Plan  Cr up to 2.0 on admission, down trended to 1.04 (baseline is normal at 0.8)  Receiving fluids in the ED   Cont IVF   As oral intake now adequate, recommending to continue oral intake upon DC      Medical Problems       Resolved Problems  Date Reviewed: 3/27/2024   None        Discharging Physician / Practitioner: Rafa Ramon PA-C  PCP: Olu Mullen MD  Admission Date:   Admission Orders (From admission, onward)       Ordered        06/03/24 1451  Place in Observation  Once                          Discharge Date: 06/04/24    Consultations During Hospital Stay:  None    Procedures Performed:   XR knee 4+ views left injury  Impression: No acute osseous abnormality. Unremarkable total left knee arthroplasty    CT abdomen pelvis wo contrast  Impression: Findings in the left colon suspicious for nonspecific colitis. No abscess, pneumatosis, perforation. Small hiatal hernia. Gallstones. Incidental findings, as per the body of the report.       Significant Findings / Test Results:   UA continues to show RBC  TSH 1.402  WBC on admission 16.4, down trended to 8.32  Initial procalcitonin 21.95, down trended to 9.77  Lactic acid on admission 2.1, 2-hour 1.4  Stool enteric bacterial panel negative, C. difficile negative      Incidental Findings:   None    Test Results Pending at Discharge (will require follow up):   Giardia/ova and parasite     Outpatient Tests Requested:  None    Complications: None    Reason for Admission: Intractable nausea and vomiting    Hospital Course:   Leyla Garnett is a 88 y.o. female patient who originally presented to the hospital on 6/3/2024 due to intractable nausea/vomiting/diarrhea.  Started approximate 2 days ago.  Family had similar symptomology prior to patient.  As she was evaluated the ED the patient was hypotensive and hypovolemic requiring fluids.  Hypotensive episode quickly resolved after receiving IVF.  The patient was also found an RANJAN which was improved after 24 hours of IVF.  A CT abdomen pelvis confirmed nonspecific colitis.  Patient's symptomology resolved within 24 hours and was able to be discharged on 6/4.  Is recommended to discontinue the patient's hypertensive medications as she remains normotensive without medications.  Is also  recommended to continue adequate oral intake and to follow-up with urology team in the outpatient setting given mild hematuria in UA.  For further information regards to course hospitalization, please see notes attached.      Please see above list of diagnoses and related plan for additional information.     Condition at Discharge: good    Discharge Day Visit / Exam:   Subjective: Patient reports to be feeling well.  Currently denies any chest pain/pressure, palpitations, numbness, nausea, shortness of breath, or chills.  No longer having diarrhea or vomiting today.  Vitals: Blood Pressure: 99/54 (06/04/24 0700)  Pulse: 76 (06/03/24 1618)  Temperature: 99.4 °F (37.4 °C) (06/04/24 0700)  Temp Source: Oral (06/04/24 0700)  Respirations: 18 (06/03/24 2219)  SpO2: 94 % (06/03/24 1618)  Exam:   Physical Exam  Vitals and nursing note reviewed.   Constitutional:       Appearance: She is normal weight.   HENT:      Head: Normocephalic.      Nose: Nose normal.      Mouth/Throat:      Mouth: Mucous membranes are moist.      Pharynx: Oropharynx is clear.   Eyes:      General: No scleral icterus.     Conjunctiva/sclera: Conjunctivae normal.      Pupils: Pupils are equal, round, and reactive to light.   Cardiovascular:      Rate and Rhythm: Normal rate and regular rhythm.      Heart sounds: No murmur heard.     No friction rub. No gallop.   Pulmonary:      Effort: Pulmonary effort is normal. No respiratory distress.      Breath sounds: Normal breath sounds. No stridor. No wheezing, rhonchi or rales.   Abdominal:      General: Abdomen is flat. There is no distension.      Palpations: Abdomen is soft.      Tenderness: There is no abdominal tenderness.   Musculoskeletal:         General: Normal range of motion.      Cervical back: Normal range of motion and neck supple.      Right lower leg: No edema.      Left lower leg: No edema.   Lymphadenopathy:      Cervical: No cervical adenopathy.   Skin:     General: Skin is warm.       Coloration: Skin is not jaundiced or pale.      Findings: No bruising, erythema or lesion.   Neurological:      General: No focal deficit present.      Mental Status: She is alert and oriented to person, place, and time. Mental status is at baseline.      Cranial Nerves: No cranial nerve deficit.      Motor: No weakness.   Psychiatric:         Mood and Affect: Mood normal.         Behavior: Behavior normal.         Thought Content: Thought content normal.          Discussion with Family: Updated  (son and daughter) at bedside.    Discharge instructions/Information to patient and family:   See after visit summary for information provided to patient and family.      Provisions for Follow-Up Care:  See after visit summary for information related to follow-up care and any pertinent home health orders.      Mobility at time of Discharge:   Basic Mobility Inpatient Raw Score: 19  JH-HLM Goal: 6: Walk 10 steps or more  JH-HLM Achieved: 6: Walk 10 steps or more  HLM Goal achieved. Continue to encourage appropriate mobility.     Disposition:   Home    Planned Readmission: No     Discharge Statement:  I spent 60 minutes discharging the patient. This time was spent on the day of discharge. I had direct contact with the patient on the day of discharge. Greater than 50% of the total time was spent examining patient, answering all patient questions, arranging and discussing plan of care with patient as well as directly providing post-discharge instructions.  Additional time then spent on discharge activities.    Discharge Medications:  See after visit summary for reconciled discharge medications provided to patient and/or family.      **Please Note: This note may have been constructed using a voice recognition system**

## 2024-06-04 NOTE — PLAN OF CARE
Problem: PAIN - ADULT  Goal: Verbalizes/displays adequate comfort level or baseline comfort level  Description: Interventions:  - Encourage patient to monitor pain and request assistance  - Assess pain using appropriate pain scale  - Administer analgesics based on type and severity of pain and evaluate response  - Implement non-pharmacological measures as appropriate and evaluate response  - Consider cultural and social influences on pain and pain management  - Notify physician/advanced practitioner if interventions unsuccessful or patient reports new pain  Outcome: Progressing     Problem: SAFETY ADULT  Goal: Patient will remain free of falls  Description: INTERVENTIONS:  - Educate patient/family on patient safety including physical limitations  - Instruct patient to call for assistance with activity   - Consult OT/PT to assist with strengthening/mobility   - Keep Call bell within reach  - Keep bed low and locked with side rails adjusted as appropriate  - Keep care items and personal belongings within reach  - Initiate and maintain comfort rounds  - Make Fall Risk Sign visible to staff  - Offer Toileting every 4 Hours, in advance of need  - Initiate/Maintain bed alarm  - Obtain necessary fall risk management equipment:   - Apply yellow socks and bracelet for high fall risk patients  - Consider moving patient to room near nurses station  Outcome: Progressing     Problem: Knowledge Deficit  Goal: Patient/family/caregiver demonstrates understanding of disease process, treatment plan, medications, and discharge instructions  Description: Complete learning assessment and assess knowledge base.  Interventions:  - Provide teaching at level of understanding  - Provide teaching via preferred learning methods  Outcome: Progressing     Problem: GASTROINTESTINAL - ADULT  Goal: Minimal or absence of nausea and/or vomiting  Description: INTERVENTIONS:  - Administer IV fluids if ordered to ensure adequate hydration  - Maintain  NPO status until nausea and vomiting are resolved  - Nasogastric tube if ordered  - Administer ordered antiemetic medications as needed  - Provide nonpharmacologic comfort measures as appropriate  - Advance diet as tolerated, if ordered  - Consider nutrition services referral to assist patient with adequate nutrition and appropriate food choices  Outcome: Progressing

## 2024-06-04 NOTE — PLAN OF CARE
Problem: OCCUPATIONAL THERAPY ADULT  Goal: Performs self-care activities at highest level of function for planned discharge setting.  See evaluation for individualized goals.  Description: Treatment Interventions: ADL retraining, Functional transfer training, Endurance training, Patient/family training, Equipment evaluation/education, Compensatory technique education, Continued evaluation          See flowsheet documentation for full assessment, interventions and recommendations.   Note: Limitation: Decreased ADL status, Decreased endurance, Decreased self-care trans, Decreased high-level ADLs  Prognosis: Good  Assessment: Patient is a 88 y.o. female seen for OT evaluation s/p admit to [facility] on 6/3/2024 w/Gastrointestinal symptoms. Commorbidities affecting patient's functional performance at time of assessment include: gastrointestinal symptoms, RANJAN, HTN.   Orders placed for OT evaluation and treatment.  Performed at least two patient identifiers during session including name and wristband.  Prior to admission,  Patient was independent with ADLs/ IADLs. Patient lives alone in a one story house, 1 ALYCE. Patient ambulates without AD.  Personal factors affecting patient at time of initial evaluation include: difficulty performing ADLs and difficulty performing IADLs. Upon evaluation, patient requires supervision and set up assist for UB ADLs, minimal  assist for LB ADLs, transfers and functional ambulation in room and bathroom with minimal  assist, with the use of Rolling Walker.  Occupational performance is affected by the following deficits: dynamic sit/ stand balance deficit with poor standing tolerance time for self care and functional mobility, decreased activity tolerance, decreased safety awareness, and postural control and postural alignment deficit, requiring external assistance to complete transitional movements.  Patient to benefit from continued Occupational Therapy treatment while in the  hospital to address deficits as defined above and maximize level of functional independence with ADLs and functional mobility. Occupational Performance areas to address include: bathing/ shower, dressing, toilet hygiene, transfer to all surfaces, functional mobility, IADLs: safety procedures, and Leisure Participation. From OT standpoint, recommendation at time of d/c would be Level 2.     Rehab Resource Intensity Level, OT: II (Moderate Resource Intensity)

## 2024-06-04 NOTE — PLAN OF CARE
Problem: PHYSICAL THERAPY ADULT  Goal: Performs mobility at highest level of function for planned discharge setting.  See evaluation for individualized goals.  Description: Treatment/Interventions: Functional transfer training, Elevations, Therapeutic exercise, Endurance training, Bed mobility, Gait training, Spoke to nursing, OT, Patient/family training  Equipment Recommended: Walker       See flowsheet documentation for full assessment, interventions and recommendations.  Note: Prognosis: Good  Problem List: Decreased strength, Impaired balance, Decreased mobility, Impaired hearing  Assessment: Pt is 88 y.o. female seen for high-complexity PT evaluation on 6/4/2024 s/p admit to [facility] on 6/3/2024 w/ Gastrointestinal symptoms. PT was consulted to assess pt's functional mobility and d/c needs. Order placed for PT eval and tx, w/ up and OOB as tolerated order. PTA, pt was living alone in a one story home with one ALYCE and reports independence with ADLs, IADLs, and functional mobility without AD. At time of eval, patient completed STS Anitra and ambulated 25' without AD Anitra and 25' CGA with RW. PT observed two instance of left foot drag during ambulation. Upon evaluation, pt presenting with impaired functional mobility d/t impaired balance, decreased mobility, and activity intolerance. Pertinent PMHx and current co-morbidities affecting pt's physical performance at time of assessment include: gastrointestinal symptoms, RANJAN, HTN. Personal factors affecting pt at time of eval include: stairs to enter home, limited home support, and positive fall history. The following objective measures performed on IE also reveal limitations: AM-PAC 6-Clicks: 18/24. Pt's clinical presentation is currently evolving seen in pt's presentation of advanced age, abnormal lab value(s), need for minimal assist w/ all phases of mobility when usually mobilizing independently, tolerance to only 50 feet of ambulation, and ongoing  medical assessment. Overall, pt's rehab potential and prognosis to return to PLOF is good as impacted by objective findings, warranting pt to receive further skilled PT interventions to address impairments, activity limitations, and participation restrictions. Pt to benefit from continued PT services to address deficits as defined above and maximize level of functional independent mobility and consistency. From PT/mobility standpoint, recommendation at time of d/c would be level 2, moderate resource intensity in order to facilitate return to PLOF.        Rehab Resource Intensity Level, PT: II (Moderate Resource Intensity)    See flowsheet documentation for full assessment.      Mireya Perera; PT, DPT

## 2024-06-04 NOTE — ASSESSMENT & PLAN NOTE
Presenting with abdominal pain, nausea, vomiting and diarrhea.  Started about 2 days ago.  Had a few episodes of NBNB emesis. Frequent loose stools was initially almost 10x a day. Daughter in-law with similar symptoms. No recent abx exposures.   Today she has only had 1 loose stool   Hypovolemia and hypotension (down to 70s systolic but improved quickly with fluids)   CTAP with Findings in the left colon suspicious for nonspecific colitis. No abscess, pneumatosis, perforation   She did have a WBC count elevation 16 and high procal 21. However given nonspecific L sided colitis this is most likely viral in etiology and she is already improving, current WBC 8.5, procal improved to 9.77  Will watch off abx. If any signs of worsening hemodynamic, fevers, worsening pain would recommend initiation of zosyn to cover intraabdominal pathogens   She is non-toxic appearing  Continue to monitor and offer supportive care  Meets sepsis criteria - sepsis powerplan ordered   No nausea or vomiting or diarrhea overnight or today  Stool bacterial enteric panel negative, C. difficile negative

## 2024-06-04 NOTE — DISCHARGE INSTR - AVS FIRST PAGE
Please be aware it is recommended to no longer take your lisinopril as you have had normal blood pressure without this medication.  It is also recommended that you follow-up with the urologist as we have found small amounts of blood in your urine.  Please follow-up with your PCP within 1 week.      You are highly encouraged to continue to drink plenty of fluids over the next 48 hours.

## 2024-06-04 NOTE — PHYSICAL THERAPY NOTE
PHYSICAL THERAPY EVALUATION  NAME:  Leyla Garnett  DATE: 06/04/24    AGE:   88 y.o.  Mrn:   [id number]  ADMIT DX:  Diarrhea [R19.7]  Dehydration [E86.0]  Generalized abdominal pain [R10.84]  RANJAN (acute kidney injury) (HCC) [N17.9]  Nausea vomiting and diarrhea [R11.2, R19.7]  Problem List:   Patient Active Problem List   Diagnosis    Urinary incontinence    Other insomnia    RANJAN (acute kidney injury) (HCC)    Leukocytosis    Lung nodule < 6cm on CT    Elevated rheumatoid factor    Hyperlipidemia    Hyperparathyroidism (HCC)    Hypertension    Hypothyroidism    Overactive bladder    Lumbar disc disease with radiculopathy    Ischial bursitis of right side    Lumbar spondylosis    Trochanteric bursitis of left hip    Trochanteric bursitis of right hip    Spinal stenosis of lumbar region without neurogenic claudication    Primary insomnia    Vestibulopathy of both ears    Stage 3 chronic kidney disease, unspecified whether stage 3a or 3b CKD (HCC)    Hoarseness of voice    Hypertriglyceridemia    Grief reaction    Muscle cramps    Sacroiliitis (HCC)    Hypertensive CKD (chronic kidney disease)    Gastrointestinal symptoms       Past Medical History  Past Medical History:   Diagnosis Date    BCC (basal cell carcinoma)     Benign uterine neoplasm     C. difficile colitis     Disease of thyroid gland     Hyperlipidemia     Hypertension        Past Surgical History  Past Surgical History:   Procedure Laterality Date    APPENDECTOMY      CATARACT EXTRACTION      Last assessed - 1/14/16    MALIGNANT SKIN LESION EXCISION      Face    PARATHYROIDECTOMY      [facility] WRST SURG W/RLS TRANSVRS CARPL LIGM Left 3/23/2021    Procedure: RELEASE LEFT CARPAL TUNNEL ENDOSCOPIC;  Surgeon: Finesse Read MD;  Location: [facility];  Service: Orthopedics    REPLACEMENT TOTAL KNEE Bilateral     ROTATOR CUFF REPAIR Left     Last assessed - 1/14/16    TOTAL ABDOMINAL HYSTERECTOMY      with derrell oopherectomy, Last assessed - 1/14/16       Length Of  "Stay: 0  Performed at least 2 patient identifiers during session: Name and Birthday     06/04/24 0812   PT Last Visit   PT Visit Date 06/04/24   Note Type   Note type Evaluation   Pain Assessment   Pain Assessment Tool 0-10   Pain Score No Pain   Restrictions/Precautions   Weight Bearing Precautions Per Order No   Braces or Orthoses   (none per pt)   Other Precautions Fall Risk;Multiple lines;Hard of hearing;Contact/isolation   Home Living   Type of Home House   Home Layout One level;Performs ADLs on one level;Able to live on main level with bedroom/bathroom;Stairs to enter with rails  (1 ALYCE)   Bathroom Shower/Tub Tub/shower unit   Bathroom Toilet Standard   Bathroom Equipment Shower chair   Bathroom Accessibility Accessible   Home Equipment Walker;Cane   Additional Comments no AD used PTA   Prior Function   Level of [place] Independent with ADLs;Independent with functional mobility;Independent with IADLS   Lives With Alone   Receives Help From Family  (family lives nearby)   IADLs Independent with driving;Independent with meal prep;Independent with medication management   Falls in the last 6 months 1 to 4  (1 \"trip\")   Vocational Retired   Comments Pt enjoys [place] embroidery   General   Family/Caregiver Present No   Cognition   Overall Cognitive Status WFL   Arousal/Participation Alert   Orientation Level Oriented X4   Memory Within functional limits   Following Commands Follows all commands and directions without difficulty   Comments Pt agreeable to PT evaluation   Subjective   Subjective \"My left foot sometimes catches\"   RLE Assessment   RLE Assessment X   Strength RLE   R Hip ABduction 4+/5   R Knee Extension 4+/5   R Ankle Dorsiflexion 4+/5   LLE Assessment   LLE Assessment X   Strength LLE   L Hip Flexion   (2 instances L foot drag observed during ambulation)   L Hip ABduction 4+/5   L Knee Extension 4+/5   L Ankle Dorsiflexion 4+/5   Vision-Basic Assessment   Current Vision   (glassess for reading " and driving)   Coordination   Sensation X  (occ numbness/tingling to LLE)   Light Touch   RLE Light Touch Grossly intact   LLE Light Touch Grossly intact   Bed Mobility   Additional Comments bed mobility not tested as pt seated in bedside chair at start/end of session   Transfers   Sit to Stand 4  Minimal assistance   Additional items Assist x 1;Increased time required;Armrests;Verbal cues   Stand to Sit 4  Minimal assistance   Additional items Assist x 1;Increased time required;Armrests;Verbal cues   Additional Comments performed with and without RW   Ambulation/Elevation   Gait pattern L Foot drag;Improper Weight shift;Narrow CIRILO;Decreased foot clearance;Foward flexed;Short stride   Gait Assistance 4  Minimal assist   Additional items Assist x 1;Verbal cues   Assistive Device Rolling walker;None   Distance 25'x2   Stair Management Assistance Not tested   Ambulation/Elevation Additional Comments 25' without AD Anitra. Pt educated on use of RW and ambualted 25' with RW CGA; however, pt with two instances of L foot drag without overt LOB   Balance   Static Sitting Good   Dynamic Sitting Fair +   Static Standing Fair -   Dynamic Standing Fair -   Ambulatory Poor +   Activity Tolerance   Activity Tolerance Patient tolerated treatment well   Medical Staff Made Aware Pt seen as a co-eval with CASS Singer due to the patient's co-morbidities, clinical presentation, and present impairments which are a regression from the patient's baseline.   Nurse Made Aware RN aware   Assessment   Prognosis Good   Problem List Decreased strength;Impaired balance;Decreased mobility;Impaired hearing   Assessment Pt is 88 y.o. female seen for high-complexity PT evaluation on 6/4/2024 s/p admit to [facility] on 6/3/2024 w/ Gastrointestinal symptoms. PT was consulted to assess pt's functional mobility and d/c needs. Order placed for PT eval and tx, w/ up and OOB as tolerated order. PTA, pt was living alone in a one story home with one  ALYCE and reports independence with ADLs, IADLs, and functional mobility without AD. At time of eval, patient completed STS Anitra and ambulated 25' without AD Anitra and 25' CGA with RW. PT observed two instance of left foot drag during ambulation. Upon evaluation, pt presenting with impaired functional mobility d/t impaired balance, decreased mobility, and activity intolerance. Pertinent PMHx and current co-morbidities affecting pt's physical performance at time of assessment include: gastrointestinal symptoms, RANJAN, HTN. Personal factors affecting pt at time of eval include: stairs to enter home, limited home support, and positive fall history. The following objective measures performed on IE also reveal limitations: AM-PAC 6-Clicks: 18/24. Pt's clinical presentation is currently evolving seen in pt's presentation of advanced age, abnormal lab value(s), need for minimal assist w/ all phases of mobility when usually mobilizing independently, tolerance to only 50 feet of ambulation, and ongoing medical assessment. Overall, pt's rehab potential and prognosis to return to PLOF is good as impacted by objective findings, warranting pt to receive further skilled PT interventions to address impairments, activity limitations, and participation restrictions. Pt to benefit from continued PT services to address deficits as defined above and maximize level of functional independent mobility and consistency. From PT/mobility standpoint, recommendation at time of d/c would be level 2, moderate resource intensity in order to facilitate return to PLOF.   Goals   [facility] Expiration Date 06/14/24   Short Term Goal #1 In 10 days: Increase bilateral LE strength 1/2 grade to facilitate independent mobility, Perform all bed mobility tasks modified independent to decrease caregiver burden, Perform all transfers modified independent to improve independence, Ambulate > 150 ft. with least restrictive assistive device modified independent w/o LOB and  w/ normalized gait pattern 100% of the time, Navigate 1 stairs modified independent without handrail to facilitate return to previous living environment, and Increase all balance 1/2 grade to decrease risk for falls   PT Treatment Day 0   Plan   Treatment/Interventions Functional transfer training;Elevations;Therapeutic exercise;Endurance training;Bed mobility;Gait training;Spoke to nursing;OT;Patient/family training   PT Frequency 3-5x/wk   Discharge Recommendation   Rehab Resource Intensity Level, PT II (Moderate Resource Intensity)   Equipment Recommended Walker   Walker Package Recommended Wheeled walker   AM-PAC Basic Mobility Inpatient   Turning in Flat Bed Without Bedrails 3   Lying on Back to Sitting on Edge of Flat Bed Without Bedrails 3   Moving Bed to Chair 3   Standing Up From Chair Using Arms 3   Walk in Room 3   Climb 3-5 Stairs With Railing 3   Basic Mobility Inpatient Raw Score 18   Basic Mobility Standardized Score 41.05   [facility] Highest Level Of Mobility   JH-HLM Goal 6: Walk 10 steps or more   JH-HLM Achieved 7: Walk 25 feet or more   End of Consult   Patient Position at End of Consult All needs within reach;Bedside chair     Time In: 0812  Time Out: 0826  Total Evaluation Minutes: 14    Mireya Perera, PT

## 2024-06-05 ENCOUNTER — TRANSITIONAL CARE MANAGEMENT (OUTPATIENT)
Dept: FAMILY MEDICINE CLINIC | Facility: CLINIC | Age: 89
End: 2024-06-05

## 2024-06-05 LAB — G LAMBLIA AG STL QL IA: NEGATIVE

## 2024-06-09 LAB
BACTERIA BLD CULT: NORMAL
BACTERIA BLD CULT: NORMAL

## 2024-06-13 ENCOUNTER — HOSPITAL ENCOUNTER (OUTPATIENT)
Dept: RADIOLOGY | Facility: CLINIC | Age: 89
End: 2024-06-13
Payer: COMMERCIAL

## 2024-06-13 VITALS
OXYGEN SATURATION: 95 % | HEART RATE: 72 BPM | DIASTOLIC BLOOD PRESSURE: 63 MMHG | SYSTOLIC BLOOD PRESSURE: 115 MMHG | RESPIRATION RATE: 18 BRPM | TEMPERATURE: 97.6 F

## 2024-06-13 DIAGNOSIS — M48.062 SPINAL STENOSIS OF LUMBAR REGION WITH NEUROGENIC CLAUDICATION: ICD-10-CM

## 2024-06-13 PROCEDURE — 62323 NJX INTERLAMINAR LMBR/SAC: CPT | Performed by: STUDENT IN AN ORGANIZED HEALTH CARE EDUCATION/TRAINING PROGRAM

## 2024-06-13 RX ORDER — METHYLPREDNISOLONE ACETATE 80 MG/ML
80 INJECTION, SUSPENSION INTRA-ARTICULAR; INTRALESIONAL; INTRAMUSCULAR; PARENTERAL; SOFT TISSUE ONCE
Status: COMPLETED | OUTPATIENT
Start: 2024-06-13 | End: 2024-06-13

## 2024-06-13 RX ADMIN — IOHEXOL 1 ML: 300 INJECTION, SOLUTION INTRAVENOUS at 09:59

## 2024-06-13 RX ADMIN — METHYLPREDNISOLONE ACETATE 80 MG: 80 INJECTION, SUSPENSION INTRA-ARTICULAR; INTRALESIONAL; INTRAMUSCULAR; PARENTERAL; SOFT TISSUE at 09:59

## 2024-06-13 NOTE — H&P
History of Present Illness: The patient is a 88 y.o. female who presents with complaints of back and buttock pain    Past Medical History:   Diagnosis Date    BCC (basal cell carcinoma)     Benign uterine neoplasm     C. difficile colitis     Disease of thyroid gland     Hyperlipidemia     Hypertension        Past Surgical History:   Procedure Laterality Date    APPENDECTOMY      CATARACT EXTRACTION      Last assessed - 1/14/16    MALIGNANT SKIN LESION EXCISION      Face    PARATHYROIDECTOMY      [facility] WRST SURG W/RLS TRANSVRS CARPL LIGM Left 3/23/2021    Procedure: RELEASE LEFT CARPAL TUNNEL ENDOSCOPIC;  Surgeon: Finesse Read MD;  Location: [facility];  Service: Orthopedics    REPLACEMENT TOTAL KNEE Bilateral     ROTATOR CUFF REPAIR Left     Last assessed - 1/14/16    TOTAL ABDOMINAL HYSTERECTOMY      with derrell oopherectomy, Last assessed - 1/14/16         Current Outpatient Medications:     aspirin 81 MG tablet, Take by mouth, Disp: , Rfl:     atorvastatin (LIPITOR) 20 mg tablet, take 1 tablet by mouth once daily, Disp: 90 tablet, Rfl: 1    cholecalciferol (VITAMIN D3) 1,000 units tablet, Take 1,000 Units by mouth daily, Disp: , Rfl:     cyanocobalamin (VITAMIN B-12) 1,000 mcg tablet, Take by mouth, Disp: , Rfl:     Elastic Bandages & Supports (B & B Carpal Tunnel Brace) MISC, Use daily at bedtime, Disp: 2 each, Rfl: 1    levothyroxine 50 mcg tablet, Take 1 tablet (50 mcg total) by mouth daily, Disp: 90 tablet, Rfl: 1    meclizine (ANTIVERT) 25 mg tablet, take 1 tablet by mouth every 8 hours if needed for dizziness, Disp: 30 tablet, Rfl: 2    Multiple Vitamin (MULTIVITAMIN) tablet, Take 1 tablet by mouth daily, Disp: , Rfl:     naproxen (Naprosyn) 500 mg tablet, Take 1 tablet (500 mg total) by mouth 2 (two) times a day as needed for mild pain or moderate pain, Disp: 60 tablet, Rfl: 1    omega-3-acid ethyl esters (LOVAZA) 1 g capsule, Take 2 capsules by mouth daily  , Disp: , Rfl:     oxybutynin (DITROPAN) 5 mg  tablet, take 1 tablet by mouth twice a day, Disp: 180 tablet, Rfl: 1    Probiotic Product (PROBIOTIC-10 PO), Take by mouth daily, Disp: , Rfl:     pyridoxine (VITAMIN B6) 100 mg tablet, Take 100 mg by mouth daily, Disp: , Rfl:     traMADol (ULTRAM) 50 mg tablet, Take 50 mg by mouth every 6 (six) hours as needed for moderate pain, Disp: , Rfl:     traZODone (DESYREL) 100 mg tablet, Take 2 tablets (200 mg total) by mouth daily at bedtime, Disp: 60 tablet, Rfl: 3    Allergies   Allergen Reactions    Codeine Confusion    Codeine Sulfate        Physical Exam:   Vitals:    06/13/24 0928   BP: 114/63   Pulse: 58   Resp: 18   Temp: 97.6 °F (36.4 °C)   SpO2: 95%     General: Awake, Alert, Oriented x 3, Mood and affect appropriate  Respiratory: Respirations even and unlabored  Cardiovascular: Peripheral pulses intact; no edema  Musculoskeletal Exam: back non erythematous no lesions    ASA Score: 3    Patient/Chart Verification  Patient ID Verified: Verbal  ID Band Applied: No  Consents Confirmed: Procedural, To be obtained in the Pre-Procedure area  H&P( within 30 days) Verified: To be obtained in the Pre-Procedure area  Interval H&P(within 24 hr) Complete (required for Outpatients and Surgery Admit only): To be obtained in the Pre-Procedure area  Allergies Reviewed: Yes  Anticoag/NSAID held?: NA  Currently on antibiotics?: No    Assessment:   1. Spinal stenosis of lumbar region with neurogenic claudication        Plan: L5-S1 LESI

## 2024-06-13 NOTE — DISCHARGE INSTR - LAB
Epidural Steroid Injection   WHAT YOU NEED TO KNOW:   An epidural steroid injection (MICHAEL) is a procedure to inject steroid medicine into the epidural space. The epidural space is between your spinal cord and vertebrae. Steroids reduce inflammation and fluid buildup in your spine that may be causing pain. You may be given pain medicine along with the steroids.          ACTIVITY  Do not drive or operate machinery today.  No strenuous activity today - bending, lifting, etc.  You may resume normal activites starting tomorrow - start slowly and as tolerated.  You may shower today, but no tub baths or hot tubs.  You may have numbness for several hours from the local anesthetic. Please use caution and common sense, especially with weight-bearing activities.    CARE OF THE INJECTION SITE  If you have soreness or pain, apply ice to the area today (20 minutes on/20 minutes off).  Starting tomorrow, you may use warm, moist heat or ice if needed.  You may have an increase or change in your discomfort for 36-48 hours after your treatment.  Apply ice and continue with any pain medication you have been prescribed.  Notify the [facility] if you have any of the following: redness, drainage, swelling, headache, stiff neck or fever above 100°F.    SPECIAL INSTRUCTIONS  Our office will contact you in approximately 14 days for a progress report.    MEDICATIONS  Continue to take all routine medications.  Our office may have instructed you to hold some medications.    As no general anesthesia was used in today's procedure, you should not experience any side effects related to anesthesia.     If you are diabetic, the steroids used in today's injection may temporarily increase your blood sugar levels after the first few days after your injection. Please keep a close eye on your sugars and alert the doctor who manages your diabetes if your sugars are significantly high from your baseline or you are symptomatic.     If you have a  problem specifically related to your procedure, please call our office at [phone].  Problems not related to your procedure should be directed to your primary care physician.

## 2024-07-07 DIAGNOSIS — R30.0 DYSURIA: Primary | ICD-10-CM

## 2024-07-08 ENCOUNTER — APPOINTMENT (OUTPATIENT)
Dept: LAB | Facility: CLINIC | Age: 89
End: 2024-07-08
Payer: COMMERCIAL

## 2024-07-08 DIAGNOSIS — R30.0 DYSURIA: ICD-10-CM

## 2024-07-08 PROCEDURE — 87086 URINE CULTURE/COLONY COUNT: CPT

## 2024-07-08 PROCEDURE — 87077 CULTURE AEROBIC IDENTIFY: CPT

## 2024-07-08 PROCEDURE — 87186 SC STD MICRODIL/AGAR DIL: CPT

## 2024-07-08 PROCEDURE — 81001 URINALYSIS AUTO W/SCOPE: CPT

## 2024-07-09 DIAGNOSIS — R31.9 HEMATURIA, UNSPECIFIED TYPE: Primary | ICD-10-CM

## 2024-07-09 LAB
BACTERIA UR QL AUTO: ABNORMAL /HPF
BILIRUB UR QL STRIP: NEGATIVE
CLARITY UR: ABNORMAL
COLOR UR: YELLOW
GLUCOSE UR STRIP-MCNC: NEGATIVE MG/DL
HGB UR QL STRIP.AUTO: ABNORMAL
KETONES UR STRIP-MCNC: NEGATIVE MG/DL
LEUKOCYTE ESTERASE UR QL STRIP: ABNORMAL
NITRITE UR QL STRIP: POSITIVE
NON-SQ EPI CELLS URNS QL MICRO: ABNORMAL /HPF
PH UR STRIP.AUTO: 6.5 [PH]
PROT UR STRIP-MCNC: ABNORMAL MG/DL
RBC #/AREA URNS AUTO: ABNORMAL /HPF
SP GR UR STRIP.AUTO: 1.02 (ref 1–1.03)
UROBILINOGEN UR STRIP-ACNC: <2 MG/DL
WBC #/AREA URNS AUTO: ABNORMAL /HPF
WBC CLUMPS # UR AUTO: PRESENT /UL

## 2024-07-09 RX ORDER — SULFAMETHOXAZOLE AND TRIMETHOPRIM 800; 160 MG/1; MG/1
1 TABLET ORAL EVERY 12 HOURS SCHEDULED
Qty: 10 TABLET | Refills: 0 | Status: SHIPPED | OUTPATIENT
Start: 2024-07-09 | End: 2024-07-15

## 2024-07-10 ENCOUNTER — TELEPHONE (OUTPATIENT)
Dept: FAMILY MEDICINE CLINIC | Facility: CLINIC | Age: 89
End: 2024-07-10

## 2024-07-10 ENCOUNTER — OFFICE VISIT (OUTPATIENT)
Dept: URGENT CARE | Facility: CLINIC | Age: 89
End: 2024-07-10
Payer: COMMERCIAL

## 2024-07-10 VITALS
OXYGEN SATURATION: 94 % | HEART RATE: 73 BPM | TEMPERATURE: 98.3 F | DIASTOLIC BLOOD PRESSURE: 63 MMHG | BODY MASS INDEX: 25.58 KG/M2 | HEIGHT: 67 IN | WEIGHT: 163 LBS | RESPIRATION RATE: 18 BRPM | SYSTOLIC BLOOD PRESSURE: 132 MMHG

## 2024-07-10 DIAGNOSIS — N39.0 URINARY TRACT INFECTION WITH HEMATURIA, SITE UNSPECIFIED: Primary | ICD-10-CM

## 2024-07-10 DIAGNOSIS — R31.9 URINARY TRACT INFECTION WITH HEMATURIA, SITE UNSPECIFIED: Primary | ICD-10-CM

## 2024-07-10 DIAGNOSIS — R35.0 FREQUENT URINATION: ICD-10-CM

## 2024-07-10 LAB
SL AMB  POCT GLUCOSE, UA: ABNORMAL
SL AMB LEUKOCYTE ESTERASE,UA: ABNORMAL
SL AMB POCT BILIRUBIN,UA: ABNORMAL
SL AMB POCT BLOOD,UA: ABNORMAL
SL AMB POCT CLARITY,UA: ABNORMAL
SL AMB POCT COLOR,UA: ABNORMAL
SL AMB POCT KETONES,UA: ABNORMAL
SL AMB POCT NITRITE,UA: ABNORMAL
SL AMB POCT PH,UA: 5
SL AMB POCT SPECIFIC GRAVITY,UA: 1030
SL AMB POCT URINE PROTEIN: 100
SL AMB POCT UROBILINOGEN: 0.2

## 2024-07-10 PROCEDURE — 81002 URINALYSIS NONAUTO W/O SCOPE: CPT

## 2024-07-10 PROCEDURE — 99213 OFFICE O/P EST LOW 20 MIN: CPT

## 2024-07-10 NOTE — PROGRESS NOTES
[facility]        NAME: Leyla Garnett is a 88 y.o. female  : 1935    MRN: [id number]  DATE: July 10, 2024  TIME: 6:51 PM    Assessment and Plan   Urinary tract infection with hematuria, site unspecified [N39.0, R31.9]  1. Urinary tract infection with hematuria, site unspecified        2. Frequent urination  POCT urine dip    CANCELED: Urine culture            Patient Instructions     Start antibiotics ordered by your PCP.  Follow up with urology as scheduled.  Await sensitivity results on urine culture.  Proceed to the ER with worsening symptoms as discussed.     Chief Complaint     Chief Complaint   Patient presents with    Possible UTI     Symptoms started 3 weeks ago. Painful and Urinating frequently          History of Present Illness       The patient presents today with complaints of ongoing urinary frequency and dysuria x 3 weeks. Today she noticed blood on her toilet paper when she wiped. Unsure if the blood is coming from her vagina or her urethra. Denies fever/chills, suprapubic/flank/back pain, n/v/d. She had a urine culture done by her PCP on 24 which showed >100,000 e coli. Bactrim was called to the pharmacy today which she picked up but didn't start yet. PCP also ordered repeat UA after antibiotics were finished and mentioned in his note if the hematuria did not resolve with antibiotics then he would order a kidney ultrasound. She is also seeing a urologist on Monday.         Review of Systems   Review of Systems   Constitutional:  Negative for chills and fever.   Gastrointestinal:  Negative for abdominal pain, diarrhea, nausea and vomiting.   Genitourinary:  Positive for dysuria, frequency, hematuria and urgency. Negative for difficulty urinating and flank pain.   Musculoskeletal:  Negative for back pain and myalgias.   Skin:  Negative for rash.         Current Medications       Current Outpatient Medications:     aspirin 81 MG tablet, Take by mouth, Disp: , Rfl:     atorvastatin  (LIPITOR) 20 mg tablet, take 1 tablet by mouth once daily, Disp: 90 tablet, Rfl: 1    cholecalciferol (VITAMIN D3) 1,000 units tablet, Take 1,000 Units by mouth daily, Disp: , Rfl:     cyanocobalamin (VITAMIN B-12) 1,000 mcg tablet, Take by mouth, Disp: , Rfl:     levothyroxine 50 mcg tablet, Take 1 tablet (50 mcg total) by mouth daily, Disp: 90 tablet, Rfl: 1    meclizine (ANTIVERT) 25 mg tablet, take 1 tablet by mouth every 8 hours if needed for dizziness, Disp: 30 tablet, Rfl: 2    Multiple Vitamin (MULTIVITAMIN) tablet, Take 1 tablet by mouth daily, Disp: , Rfl:     omega-3-acid ethyl esters (LOVAZA) 1 g capsule, Take 2 capsules by mouth daily  , Disp: , Rfl:     oxybutynin (DITROPAN) 5 mg tablet, take 1 tablet by mouth twice a day, Disp: 180 tablet, Rfl: 1    Probiotic Product (PROBIOTIC-10 PO), Take by mouth daily, Disp: , Rfl:     pyridoxine (VITAMIN B6) 100 mg tablet, Take 100 mg by mouth daily, Disp: , Rfl:     sulfamethoxazole-trimethoprim (BACTRIM DS) 800-160 mg per tablet, Take 1 tablet by mouth every 12 (twelve) hours for 5 days, Disp: 10 tablet, Rfl: 0    traMADol (ULTRAM) 50 mg tablet, Take 50 mg by mouth every 6 (six) hours as needed for moderate pain, Disp: , Rfl:     traZODone (DESYREL) 100 mg tablet, Take 2 tablets (200 mg total) by mouth daily at bedtime, Disp: 60 tablet, Rfl: 3    Elastic Bandages & Supports (B & B Carpal Tunnel Brace) MISC, Use daily at bedtime, Disp: 2 each, Rfl: 1    naproxen (Naprosyn) 500 mg tablet, Take 1 tablet (500 mg total) by mouth 2 (two) times a day as needed for mild pain or moderate pain, Disp: 60 tablet, Rfl: 1    Current Allergies     Allergies as of 07/10/2024 - Reviewed 07/10/2024   Allergen Reaction Noted    Codeine Confusion 06/29/2022    Codeine sulfate  01/04/2016            The following portions of the patient's history were reviewed and updated as appropriate: allergies, current medications, past family history, past medical history, past social history,  "past surgical history and problem list.     Past Medical History:   Diagnosis Date    BCC (basal cell carcinoma)     Benign uterine neoplasm     C. difficile colitis     Disease of thyroid gland     Hyperlipidemia     Hypertension        Past Surgical History:   Procedure Laterality Date    APPENDECTOMY      CATARACT EXTRACTION      Last assessed - 1/14/16    MALIGNANT SKIN LESION EXCISION      Face    PARATHYROIDECTOMY      [place] [facility]SC WRST SURG W/RLS TRANSVRS CARPL LIGM Left 3/23/2021    Procedure: RELEASE LEFT CARPAL TUNNEL ENDOSCOPIC;  Surgeon: Finesse Read MD;  Location: [facility];  Service: Orthopedics    REPLACEMENT TOTAL KNEE Bilateral     ROTATOR CUFF REPAIR Left     Last assessed - 1/14/16    TOTAL ABDOMINAL HYSTERECTOMY      with derrell oopherectomy, Last assessed - 1/14/16       Family History   Problem Relation Age of Onset    Heart attack Mother     Substance Abuse Mother         denied    Mental illness Mother         denied    Other Mother         Cardiac Disorder     Cancer Mother         Malignant Neoplasm     Cancer Father         Malignant Neoplasm     Substance Abuse Father         denied    Mental illness Father         denied    Lung cancer Father     Spina bifida Child          Medications have been verified.        Objective   /63   Pulse 73   Temp 98.3 °F (36.8 °C)   Resp 18   Ht 5' 7\" (1.702 m)   Wt 73.9 kg (163 lb)   SpO2 94%   BMI 25.53 kg/m²        Physical Exam     Physical Exam  Vitals and nursing note reviewed.   Constitutional:       General: She is not in acute distress.     Appearance: Normal appearance.   HENT:      Head: Normocephalic and atraumatic.      Right Ear: External ear normal.      Left Ear: External ear normal.      Mouth/Throat:      Mouth: Mucous membranes are moist.   Eyes:      Pupils: Pupils are equal, round, and reactive to light.   Cardiovascular:      Rate and Rhythm: Normal rate.   Pulmonary:      Effort: Pulmonary effort is normal.   Skin:     " General: Skin is warm and dry.   Neurological:      Mental Status: She is alert and oriented to person, place, and time. Mental status is at baseline.   Psychiatric:         Mood and Affect: Mood normal.         Behavior: Behavior normal.

## 2024-07-10 NOTE — PATIENT INSTRUCTIONS
Start antibiotics ordered by your PCP.  Follow up with urology as scheduled.  Await sensitivity results on urine culture.  Proceed to the ER with worsening symptoms as discussed.

## 2024-07-10 NOTE — TELEPHONE ENCOUNTER
Olu Mullen MD  7/9/2024  8:37 PM EDT Back to Top      Her UA shows evidence of UTI, abx sent to her pharmacy, urine culture pending  Repeat UA ordered to be done in 1 week if it shows blood again recommend kidney US

## 2024-07-11 ENCOUNTER — TELEPHONE (OUTPATIENT)
Dept: FAMILY MEDICINE CLINIC | Facility: CLINIC | Age: 89
End: 2024-07-11

## 2024-07-11 LAB — BACTERIA UR CULT: ABNORMAL

## 2024-07-11 NOTE — TELEPHONE ENCOUNTER
----- Message from Olu Mullen MD sent at 7/10/2024  7:26 PM EDT -----  Her urine culture shows evidence of UTI with e Coli, recommend to continue bactrim abx thanks.

## 2024-07-11 NOTE — TELEPHONE ENCOUNTER
Patient returned call to office. Patient requested to speak with clinical team. Warm transferred to Myla

## 2024-07-14 DIAGNOSIS — R32 URINARY INCONTINENCE, UNSPECIFIED TYPE: ICD-10-CM

## 2024-07-14 DIAGNOSIS — E78.00 PURE HYPERCHOLESTEROLEMIA: ICD-10-CM

## 2024-07-14 RX ORDER — OXYBUTYNIN CHLORIDE 5 MG/1
TABLET ORAL
Qty: 180 TABLET | Refills: 1 | Status: SHIPPED | OUTPATIENT
Start: 2024-07-14

## 2024-07-14 RX ORDER — ATORVASTATIN CALCIUM 20 MG/1
TABLET, FILM COATED ORAL
Qty: 100 TABLET | Refills: 1 | Status: SHIPPED | OUTPATIENT
Start: 2024-07-14

## 2024-07-15 ENCOUNTER — OFFICE VISIT (OUTPATIENT)
Dept: UROLOGY | Facility: CLINIC | Age: 89
End: 2024-07-15
Payer: COMMERCIAL

## 2024-07-15 VITALS
SYSTOLIC BLOOD PRESSURE: 118 MMHG | HEART RATE: 72 BPM | OXYGEN SATURATION: 94 % | HEIGHT: 67 IN | DIASTOLIC BLOOD PRESSURE: 78 MMHG | BODY MASS INDEX: 24.83 KG/M2 | TEMPERATURE: 98.3 F | RESPIRATION RATE: 18 BRPM | WEIGHT: 158.2 LBS

## 2024-07-15 DIAGNOSIS — R31.29 MICROHEMATURIA: Primary | ICD-10-CM

## 2024-07-15 DIAGNOSIS — R31.9 HEMATURIA: ICD-10-CM

## 2024-07-15 LAB — POST-VOID RESIDUAL VOLUME, ML POC: 0 ML

## 2024-07-15 PROCEDURE — 99203 OFFICE O/P NEW LOW 30 MIN: CPT | Performed by: PHYSICIAN ASSISTANT

## 2024-07-15 PROCEDURE — 51798 US URINE CAPACITY MEASURE: CPT | Performed by: PHYSICIAN ASSISTANT

## 2024-07-15 NOTE — PROGRESS NOTES
7/15/2024      Chief Complaint   Patient presents with    Follow-up     Microhematuria and Recent UTI      Assessment and Plan    88 y.o. female new patient     Microhematuria   - UA micro (6/3/24) - 10-20 RBCs  - UA micro (6/4/24) - 2-4 RBCs  - Urine culture from 6/4/24 negative  - CTU ordered. Obtain updated BMP prior   - Return for cystoscopy for complete work-up     2.  Recent UTI  - Urine culture from 7/8/24 positive for E coli. Symptoms resolved since finishing Bactrim today  - No other positive cultures on file  - Denies any history of recurrent UTIs  - Recommend adequate hydration, avoiding constipation, cranberry supplementation and probiotics for UTI prevention  - Unable to provide urine sample. Random bladder scan 0 mL      3. OAB  - Well managed on Oxybutynin by PCP    History of Present Illness  Leyla Garnett is a 88 y.o. female here for  new patient evaluation of microhematuria. Was seen in the ED last month with intractable nausea, vomiting, diarrhea and RANJAN secondary to hypovolemia which resolved with IVF. Was noted to have microhematuria on urine testing performed in ED. Referred to urology for further evaluation.  Last week she began with UTI symptoms/gross hematuria and urine culture positive for E Coli. She is currently on Bactrim course which she is finishing today. She reports symptoms have improved. Denies any dysuria or gross hematuria currently. No flank pain or fevers. Denies any history of recurrent UTIs. No prior  surgical manipulation.  She is managed on Oxybutynin by PCP for urinary incontinence as well which is beneficial.     CT A/P w/o contrast from 6/3/24 - No urologic findings.     Denies family history of  malignancy. She is a former smoker.     Review of Systems   Constitutional:  Negative for chills and fever.   Respiratory:  Negative for shortness of breath.    Cardiovascular:  Negative for chest pain.   Gastrointestinal:  Negative for abdominal pain and vomiting.    Genitourinary:  Negative for difficulty urinating, dysuria, flank pain, frequency, hematuria, pelvic pain and urgency.   Neurological:  Negative for dizziness.       Past Medical History  Past Medical History:   Diagnosis Date    BCC (basal cell carcinoma)     Benign uterine neoplasm     C. difficile colitis     Disease of thyroid gland     Hyperlipidemia     Hypertension        Past Social History  Past Surgical History:   Procedure Laterality Date    APPENDECTOMY      CATARACT EXTRACTION Bilateral     Last assessed - 16    COLONOSCOPY      MALIGNANT SKIN LESION EXCISION      Face    PARATHYROIDECTOMY      [place] [facility]SC WRST SURG W/RLS TRANSVRS CARPL LIGM Left 2021    Procedure: RELEASE LEFT CARPAL TUNNEL ENDOSCOPIC;  Surgeon: Finesse Read MD;  Location: [facility];  Service: Orthopedics    REPLACEMENT TOTAL KNEE Bilateral     ROTATOR CUFF REPAIR Left     Last assessed - 16    TONSILECTOMY AND ADNOIDECTOMY      TOTAL ABDOMINAL HYSTERECTOMY      with derrell oopherectomy, Last assessed - 16    WISDOM TOOTH EXTRACTION       Social History     Tobacco Use   Smoking Status Former    Current packs/day: 0.00    Types: Cigarettes    Quit date:     Years since quittin.5    Passive exposure: Past   Smokeless Tobacco Never       Past Family History  Family History   Problem Relation Age of Onset    Heart attack Mother     Substance Abuse Mother         denied    Mental illness Mother         denied    Other Mother         Cardiac Disorder     Cancer Mother         Malignant Neoplasm     Cancer Father         Malignant Neoplasm     Substance Abuse Father         denied    Mental illness Father         denied    Lung cancer Father     Spina bifida Child        Past Social history  Social History     Socioeconomic History    Marital status: /Civil Union     Spouse name: Not on file    Number of children: 3    Years of education: Not on file    Highest education level: Not on file   Occupational  History    Occupation:      Comment: Retired    Tobacco Use    Smoking status: Former     Current packs/day: 0.00     Types: Cigarettes     Quit date:      Years since quittin.5     Passive exposure: Past    Smokeless tobacco: Never   Vaping Use    Vaping status: Never Used   Substance and Sexual Activity    Alcohol use: Yes     Alcohol/week: 1.0 standard drink of alcohol     Types: 1 Glasses of wine per week    Drug use: No    Sexual activity: Not on file   Other Topics Concern    Not on file   Social History Narrative    Lives with spouse     Social Determinants of Health     Financial Resource Strain: Low Risk  (2023)    Overall Financial Resource Strain (CARDIA)     Difficulty of Paying Living Expenses: Not hard at all   Food Insecurity: No Food Insecurity (2024)    Hunger Vital Sign     Worried About Running Out of Food in the Last Year: Never true     Ran Out of Food in the Last Year: Never true   Transportation Needs: No Transportation Needs (2024)    PRAPARE - Transportation     Lack of Transportation (Medical): No     Lack of Transportation (Non-Medical): No   Physical Activity: Not on file   Stress: Not on file   Social Connections: Not on file   Intimate Partner Violence: Not on file   Housing Stability: Low Risk  (2024)    Housing Stability Vital Sign     Unable to Pay for Housing in the Last Year: No     Number of Times Moved in the Last Year: 0     Homeless in the Last Year: No       Current Medications  Current Outpatient Medications   Medication Sig Dispense Refill    aspirin 81 MG tablet Take by mouth      atorvastatin (LIPITOR) 20 mg tablet take 1 tablet by mouth once daily 100 tablet 1    cholecalciferol (VITAMIN D3) 1,000 units tablet Take 1,000 Units by mouth daily      cyanocobalamin (VITAMIN B-12) 1,000 mcg tablet Take by mouth      levothyroxine 50 mcg tablet Take 1 tablet (50 mcg total) by mouth daily 90 tablet 1    meclizine (ANTIVERT) 25 mg tablet take  "1 tablet by mouth every 8 hours if needed for dizziness 30 tablet 2    Multiple Vitamin (MULTIVITAMIN) tablet Take 1 tablet by mouth daily      omega-3-acid ethyl esters (LOVAZA) 1 g capsule Take 2 capsules by mouth daily        oxybutynin (DITROPAN) 5 mg tablet take 1 tablet by mouth twice a day 180 tablet 1    Probiotic Product (PROBIOTIC-10 PO) Take by mouth daily      pyridoxine (VITAMIN B6) 100 mg tablet Take 100 mg by mouth daily      sulfamethoxazole-trimethoprim (BACTRIM DS) 800-160 mg per tablet Take 1 tablet by mouth every 12 (twelve) hours for 5 days 10 tablet 0    traMADol (ULTRAM) 50 mg tablet Take 50 mg by mouth every 6 (six) hours as needed for moderate pain      traZODone (DESYREL) 100 mg tablet Take 2 tablets (200 mg total) by mouth daily at bedtime 60 tablet 3    naproxen (Naprosyn) 500 mg tablet Take 1 tablet (500 mg total) by mouth 2 (two) times a day as needed for mild pain or moderate pain 60 tablet 1     No current facility-administered medications for this visit.       Allergies  Allergies   Allergen Reactions    Codeine Confusion    Codeine Sulfate          The following portions of the patient's history were reviewed and updated as appropriate: allergies, current medications, past medical history, past social history, past surgical history and problem list.      Vitals  Vitals:    07/15/24 1445   BP: 118/78   BP Location: Right arm   Patient Position: Sitting   Cuff Size: Standard   Pulse: 72   Resp: 18   Temp: 98.3 °F (36.8 °C)   SpO2: 94%   Weight: 71.8 kg (158 lb 3.2 oz)   Height: 5' 7\" (1.702 m)           Physical Exam  Physical Exam  Constitutional:       Appearance: Normal appearance.   HENT:      Head: Normocephalic and atraumatic.      Right Ear: External ear normal.      Left Ear: External ear normal.      Nose: Nose normal.   Eyes:      General: No scleral icterus.     Conjunctiva/sclera: Conjunctivae normal.   Cardiovascular:      Pulses: Normal pulses.   Pulmonary:      Effort: " "Pulmonary effort is normal.   Musculoskeletal:         General: Normal range of motion.      Cervical back: Normal range of motion.   Neurological:      General: No focal deficit present.      Mental Status: She is alert and oriented to person, place, and time.   Psychiatric:         Mood and Affect: Mood normal.         Behavior: Behavior normal.         Thought Content: Thought content normal.         Judgment: Judgment normal.           Results  Recent Results (from the past 1 hour(s))   POCT Measure PVR    Collection Time: 07/15/24  2:47 PM   Result Value Ref Range    POST-VOID RESIDUAL VOLUME, ML POC 0 mL   ]  No results found for: \"PSA\"  Lab Results   Component Value Date    CALCIUM 8.7 06/04/2024    K 3.9 06/04/2024    CO2 19 (L) 06/04/2024     (H) 06/04/2024    BUN 33 (H) 06/04/2024    CREATININE 1.04 06/04/2024     Lab Results   Component Value Date    WBC 8.52 06/04/2024    HGB 11.2 (L) 06/04/2024    HCT 32.9 (L) 06/04/2024    MCV 94 06/04/2024     06/04/2024           Orders  Orders Placed This Encounter   Procedures    POCT Measure PVR       Kathleen Allen      "

## 2024-07-16 ENCOUNTER — APPOINTMENT (OUTPATIENT)
Dept: LAB | Facility: CLINIC | Age: 89
End: 2024-07-16
Payer: COMMERCIAL

## 2024-07-16 DIAGNOSIS — R31.29 MICROHEMATURIA: ICD-10-CM

## 2024-07-16 DIAGNOSIS — D72.829 LEUKOCYTOSIS, UNSPECIFIED TYPE: ICD-10-CM

## 2024-07-16 DIAGNOSIS — R31.9 HEMATURIA, UNSPECIFIED TYPE: ICD-10-CM

## 2024-07-16 LAB
ANION GAP SERPL CALCULATED.3IONS-SCNC: 11 MMOL/L (ref 4–13)
BASOPHILS # BLD AUTO: 0.08 THOUSANDS/ÂΜL (ref 0–0.1)
BASOPHILS NFR BLD AUTO: 1 % (ref 0–1)
BUN SERPL-MCNC: 18 MG/DL (ref 5–25)
CALCIUM SERPL-MCNC: 9.8 MG/DL (ref 8.4–10.2)
CHLORIDE SERPL-SCNC: 106 MMOL/L (ref 96–108)
CO2 SERPL-SCNC: 22 MMOL/L (ref 21–32)
CREAT SERPL-MCNC: 1.18 MG/DL (ref 0.6–1.3)
EOSINOPHIL # BLD AUTO: 0.27 THOUSAND/ÂΜL (ref 0–0.61)
EOSINOPHIL NFR BLD AUTO: 2 % (ref 0–6)
ERYTHROCYTE [DISTWIDTH] IN BLOOD BY AUTOMATED COUNT: 13.8 % (ref 11.6–15.1)
GFR SERPL CREATININE-BSD FRML MDRD: 41 ML/MIN/1.73SQ M
GLUCOSE P FAST SERPL-MCNC: 85 MG/DL (ref 65–99)
HCT VFR BLD AUTO: 43.4 % (ref 34.8–46.1)
HGB BLD-MCNC: 14.1 G/DL (ref 11.5–15.4)
IMM GRANULOCYTES # BLD AUTO: 0.06 THOUSAND/UL (ref 0–0.2)
IMM GRANULOCYTES NFR BLD AUTO: 1 % (ref 0–2)
LYMPHOCYTES # BLD AUTO: 6.45 THOUSANDS/ÂΜL (ref 0.6–4.47)
LYMPHOCYTES NFR BLD AUTO: 51 % (ref 14–44)
MCH RBC QN AUTO: 31.5 PG (ref 26.8–34.3)
MCHC RBC AUTO-ENTMCNC: 32.5 G/DL (ref 31.4–37.4)
MCV RBC AUTO: 97 FL (ref 82–98)
MONOCYTES # BLD AUTO: 1.05 THOUSAND/ÂΜL (ref 0.17–1.22)
MONOCYTES NFR BLD AUTO: 8 % (ref 4–12)
NEUTROPHILS # BLD AUTO: 4.58 THOUSANDS/ÂΜL (ref 1.85–7.62)
NEUTS SEG NFR BLD AUTO: 37 % (ref 43–75)
NRBC BLD AUTO-RTO: 0 /100 WBCS
PLATELET # BLD AUTO: 328 THOUSANDS/UL (ref 149–390)
PMV BLD AUTO: 9.8 FL (ref 8.9–12.7)
POTASSIUM SERPL-SCNC: 4.8 MMOL/L (ref 3.5–5.3)
RBC # BLD AUTO: 4.48 MILLION/UL (ref 3.81–5.12)
SODIUM SERPL-SCNC: 139 MMOL/L (ref 135–147)
WBC # BLD AUTO: 12.49 THOUSAND/UL (ref 4.31–10.16)

## 2024-07-16 PROCEDURE — 36415 COLL VENOUS BLD VENIPUNCTURE: CPT

## 2024-07-16 PROCEDURE — 85025 COMPLETE CBC W/AUTO DIFF WBC: CPT

## 2024-07-16 PROCEDURE — 80048 BASIC METABOLIC PNL TOTAL CA: CPT

## 2024-07-17 DIAGNOSIS — D72.829 LEUKOCYTOSIS, UNSPECIFIED TYPE: Primary | ICD-10-CM

## 2024-07-19 ENCOUNTER — HOSPITAL ENCOUNTER (OUTPATIENT)
Dept: CT IMAGING | Facility: HOSPITAL | Age: 89
End: 2024-07-19
Payer: COMMERCIAL

## 2024-07-19 DIAGNOSIS — R31.29 MICROHEMATURIA: ICD-10-CM

## 2024-07-19 PROCEDURE — 74178 CT ABD&PLV WO CNTR FLWD CNTR: CPT

## 2024-07-19 RX ADMIN — IOHEXOL 100 ML: 350 INJECTION, SOLUTION INTRAVENOUS at 13:32

## 2024-07-22 ENCOUNTER — APPOINTMENT (OUTPATIENT)
Dept: LAB | Facility: CLINIC | Age: 89
End: 2024-07-22
Payer: COMMERCIAL

## 2024-07-22 LAB
BACTERIA UR QL AUTO: ABNORMAL /HPF
BILIRUB UR QL STRIP: NEGATIVE
CAOX CRY URNS QL MICRO: ABNORMAL /HPF
CLARITY UR: CLEAR
COLOR UR: ABNORMAL
GLUCOSE UR STRIP-MCNC: NEGATIVE MG/DL
HGB UR QL STRIP.AUTO: NEGATIVE
KETONES UR STRIP-MCNC: NEGATIVE MG/DL
LEUKOCYTE ESTERASE UR QL STRIP: ABNORMAL
NITRITE UR QL STRIP: NEGATIVE
NON-SQ EPI CELLS URNS QL MICRO: ABNORMAL /HPF
PH UR STRIP.AUTO: 6 [PH]
PROT UR STRIP-MCNC: NEGATIVE MG/DL
RBC #/AREA URNS AUTO: ABNORMAL /HPF
SP GR UR STRIP.AUTO: 1.01 (ref 1–1.03)
TRANS CELLS #/AREA URNS HPF: PRESENT /[HPF]
UROBILINOGEN UR STRIP-ACNC: <2 MG/DL
WBC #/AREA URNS AUTO: ABNORMAL /HPF

## 2024-07-22 PROCEDURE — 81001 URINALYSIS AUTO W/SCOPE: CPT

## 2024-07-24 ENCOUNTER — TELEPHONE (OUTPATIENT)
Dept: FAMILY MEDICINE CLINIC | Facility: CLINIC | Age: 89
End: 2024-07-24

## 2024-08-14 ENCOUNTER — TELEPHONE (OUTPATIENT)
Age: 89
End: 2024-08-14

## 2024-08-14 NOTE — TELEPHONE ENCOUNTER
Pt calling in regards to CT scan results. Pt does not want to wait to review results until appt in November and is requesting a call back from clinical to review results.     Pt call back- [phone]

## 2024-08-14 NOTE — TELEPHONE ENCOUNTER
CT appears negative for any concerning urologic findings. Very small nonobstructing right kidney stone. Several incidental non-urologic chronic findings on imaging which she can f/u with her PCP for including stable right lung nodule, diverticulosis, and small simple cyst of pancreas.

## 2024-08-20 NOTE — TELEPHONE ENCOUNTER
Pt returned call to the office and was informed of the message from the AP. Pt confirmed her understanding and will reach out to her PCP for follow up.

## 2024-08-20 NOTE — TELEPHONE ENCOUNTER
LVM providing office number to call our office back.    If pt calls back please relay Kathleen ROBERTO message     CT appears negative for any concerning urologic findings. Very small nonobstructing right kidney stone. Several incidental non-urologic chronic findings on imaging which she can f/u with her PCP for including stable right lung nodule, diverticulosis, and small simple cyst of pancreas.

## 2024-09-02 DIAGNOSIS — E03.9 HYPOTHYROIDISM, UNSPECIFIED TYPE: ICD-10-CM

## 2024-09-02 RX ORDER — LEVOTHYROXINE SODIUM 50 UG/1
50 TABLET ORAL DAILY
Qty: 90 TABLET | Refills: 1 | Status: SHIPPED | OUTPATIENT
Start: 2024-09-02

## 2024-09-15 NOTE — DISCHARGE INSTR - LAB

## 2024-09-29 DIAGNOSIS — G47.09 OTHER INSOMNIA: ICD-10-CM

## 2024-09-30 RX ORDER — TRAZODONE HYDROCHLORIDE 100 MG/1
200 TABLET ORAL
Qty: 60 TABLET | Refills: 3 | Status: SHIPPED | OUTPATIENT
Start: 2024-09-30

## 2024-10-01 ENCOUNTER — TELEPHONE (OUTPATIENT)
Dept: FAMILY MEDICINE CLINIC | Facility: CLINIC | Age: 89
End: 2024-10-01

## 2025-01-07 DIAGNOSIS — R32 URINARY INCONTINENCE, UNSPECIFIED TYPE: ICD-10-CM

## 2025-01-08 RX ORDER — OXYBUTYNIN CHLORIDE 5 MG/1
5 TABLET ORAL 2 TIMES DAILY
Qty: 180 TABLET | Refills: 0 | Status: SHIPPED | OUTPATIENT
Start: 2025-01-08

## 2025-01-10 ENCOUNTER — TELEPHONE (OUTPATIENT)
Age: OVER 89
End: 2025-01-10

## 2025-01-10 ENCOUNTER — OFFICE VISIT (OUTPATIENT)
Dept: FAMILY MEDICINE CLINIC | Facility: CLINIC | Age: OVER 89
End: 2025-01-10
Payer: COMMERCIAL

## 2025-01-10 VITALS
HEART RATE: 68 BPM | HEIGHT: 67 IN | TEMPERATURE: 98.5 F | SYSTOLIC BLOOD PRESSURE: 106 MMHG | OXYGEN SATURATION: 96 % | WEIGHT: 163 LBS | DIASTOLIC BLOOD PRESSURE: 60 MMHG | BODY MASS INDEX: 25.58 KG/M2

## 2025-01-10 DIAGNOSIS — E03.9 HYPOTHYROIDISM, UNSPECIFIED TYPE: ICD-10-CM

## 2025-01-10 DIAGNOSIS — Z00.00 MEDICARE ANNUAL WELLNESS VISIT, SUBSEQUENT: ICD-10-CM

## 2025-01-10 DIAGNOSIS — M46.1 SACROILIITIS (HCC): ICD-10-CM

## 2025-01-10 DIAGNOSIS — M48.061 SPINAL STENOSIS OF LUMBAR REGION WITHOUT NEUROGENIC CLAUDICATION: ICD-10-CM

## 2025-01-10 DIAGNOSIS — I10 PRIMARY HYPERTENSION: ICD-10-CM

## 2025-01-10 DIAGNOSIS — I73.9 PAD (PERIPHERAL ARTERY DISEASE) (HCC): Primary | ICD-10-CM

## 2025-01-10 DIAGNOSIS — E78.01 FAMILIAL HYPERCHOLESTEROLEMIA: ICD-10-CM

## 2025-01-10 DIAGNOSIS — E21.3 HYPERPARATHYROIDISM (HCC): ICD-10-CM

## 2025-01-10 DIAGNOSIS — N18.31 STAGE 3A CHRONIC KIDNEY DISEASE (HCC): ICD-10-CM

## 2025-01-10 DIAGNOSIS — R32 URINARY INCONTINENCE, UNSPECIFIED TYPE: ICD-10-CM

## 2025-01-10 DIAGNOSIS — M51.16 LUMBAR DISC DISEASE WITH RADICULOPATHY: ICD-10-CM

## 2025-01-10 PROCEDURE — 99214 OFFICE O/P EST MOD 30 MIN: CPT | Performed by: PHYSICIAN ASSISTANT

## 2025-01-10 PROCEDURE — G0439 PPPS, SUBSEQ VISIT: HCPCS | Performed by: PHYSICIAN ASSISTANT

## 2025-01-10 RX ORDER — ZOLPIDEM TARTRATE 5 MG/1
1 TABLET ORAL
COMMUNITY
End: 2025-01-10

## 2025-01-10 RX ORDER — LIDOCAINE 50 MG/G
PATCH TOPICAL
COMMUNITY

## 2025-01-10 RX ORDER — CELECOXIB 200 MG/1
1 CAPSULE ORAL 2 TIMES DAILY
COMMUNITY
Start: 2024-10-11

## 2025-01-10 NOTE — ASSESSMENT & PLAN NOTE
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 Damped pulses, known diffuse disease without signifcant focal stenosis.  Continue asa, statin  Orders:  •  CBC and differential; Future

## 2025-01-10 NOTE — PROGRESS NOTES
Name: Leyla Garnett      : 1935      MRN: [id number]  Encounter Provider: Anatoliy Hernández PA-C  Encounter Date: 1/10/2025   Encounter department: [facility]    Assessment & Plan  PAD (peripheral artery disease) (HCC)  Suspect claudication more likely neurogic, recommend arterial duplex but pt declines. Damped pulses, known diffuse disease without signifcant focal stenosis.  Continue asa, statin  Orders:  •  CBC and differential; Future    Sacroiliitis (HCC)  Follow up pain management  Orders:  •  Ambulatory referral to [facility] Pain [facility]; Future    Hyperparathyroidism (HCC)  Check PTH, BMP, TSH  Orders:  •  PTH, intact; Future    Stage 3a chronic kidney disease (HCC)  Lab Results   Component Value Date    EGFR 62 10/11/2024    EGFR 60 2024    EGFR 41 2024    CREATININE 0.89 10/11/2024    CREATININE 0.92 2024    CREATININE 1.18 2024   Monitor renal function, avoid nephrotoxins    Orders:  •  Comprehensive metabolic panel; Future  •  CBC and differential; Future  •  Lipid Panel with Direct LDL reflex; Future    Primary hypertension  Not hypertensive  Orders:  •  Comprehensive metabolic panel; Future    Hypothyroidism, unspecified type  Continue levothyroxine, check TSH  Orders:  •  TSH, 3rd generation with Free T4 reflex; Future    Familial hypercholesterolemia  Continue statin, check FLP  Orders:  •  Lipid Panel with Direct LDL reflex; Future    Spinal stenosis of lumbar region without neurogenic claudication  Follow up pain management  Orders:  •  Ambulatory referral to Spine & Pain Management; Future    Lumbar disc disease with radiculopathy  As above  Orders:  •  Ambulatory referral to Spine & Pain Management; Future    Urinary incontinence, unspecified type  Continue oxybutynin        Medicare annual wellness visit, subsequent            Preventive health issues were discussed with patient, and age appropriate screening tests were ordered as noted in  patient's After Visit Summary. Personalized health advice and appropriate referrals for health education or preventive services given if needed, as noted in patient's After Visit Summary.    History of Present Illness     Pt presents for follow up    HTN: not on antihypertensives, at goal  HLD: on statin, due for FLP  Hypothyroid: due for TSH, on levothyroxine  Chronic lumbar spinal degeneration with radiculopathy and claudication, follows with pain management  Insomnia on trazodone  PAD: diffuse disease with normal pressures, claudication more likely neurogenic and pt declines any further monitoring for this. +asa, statin  HyperPTH: ?dx, no recent labs  CKD: due for bmp       Patient Care Team:  Olu Mullen MD as PCP - General (Family Medicine)  Bernardo Padilla DO as PCP - PCP-[facility] ([facility])  Olu Mullen MD as PCP - PCP-[facility] ([facility])  MD George Cisneros DO (Gastroenterology)    Review of Systems  Medical History Reviewed by provider this encounter:  Tobacco  Allergies  Meds  Problems  Med Hx  Surg Hx  Fam Hx       Annual Wellness Visit Questionnaire   Leyla is here for her Subsequent Wellness visit.     Health Risk Assessment:   Patient rates overall health as good. Patient feels that their physical health rating is same. Patient is very satisfied with their life. Eyesight was rated as same. Hearing was rated as same. Patient feels that their emotional and mental health rating is same. Patients states they are never, rarely angry. Patient states they are sometimes unusually tired/fatigued. Pain experienced in the last 7 days has been some. Patient's pain rating has been 6/10. Patient states that she has experienced no weight loss or gain in last 6 months. Hip pain    Depression Screening:   PHQ-9 Score: 0      Fall Risk Screening:   In the past year, patient has experienced: no history of falling in past year      Urinary Incontinence Screening:   Patient has leaked  urine accidently in the last six months.     Home Safety:  Patient has trouble with stairs inside or outside of their home. Patient has working smoke alarms and has working carbon monoxide detector. Home safety hazards include: none.     Nutrition:   Current diet is Regular.     Medications:   Patient is not currently taking any over-the-counter supplements. Patient is able to manage medications.     Activities of Daily Living (ADLs)/Instrumental Activities of Daily Living (IADLs):   Walk and transfer into and out of bed and chair?: Yes  Dress and groom yourself?: Yes    Bathe or shower yourself?: Yes    Feed yourself? Yes  Do your laundry/housekeeping?: Yes  Manage your money, pay your bills and track your expenses?: Yes  Make your own meals?: Yes    Do your own shopping?: Yes    Previous Hospitalizations:   Any hospitalizations or ED visits within the last 12 months?: Yes    How many hospitalizations have you had in the last year?: 1-2    Advance Care Planning:   Living will: No    Durable POA for healthcare: Yes    Advanced directive: No    Advanced directive counseling given: No    ACP document given: Yes      PREVENTIVE SCREENINGS      Cardiovascular Screening:    General: Screening Not Indicated and History Lipid Disorder      Diabetes Screening:     General: Screening Current      Colorectal Cancer Screening:     General: Screening Not Indicated      Breast Cancer Screening:     General: Screening Not Indicated      Cervical Cancer Screening:    General: Screening Not Indicated      Osteoporosis Screening:    General: Screening Not Indicated      Abdominal Aortic Aneurysm (AAA) Screening:        General: Screening Not Indicated      Lung Cancer Screening:     General: Screening Not Indicated      Hepatitis C Screening:    General: Screening Not Indicated    Screening, Brief Intervention, and Referral to Treatment (SBIRT)    Screening  Typical number of drinks in a day: 0    Single Item Drug Screening:  How  "often have you used an illegal drug (including marijuana) or a prescription medication for non-medical reasons in the past year? never    Single Item Drug Screen Score: 0  Interpretation: Negative screen for possible drug use disorder    Social Drivers of Health     Financial Resource Strain: Low Risk  (10/10/2024)    Received from [facility]    Overall Financial Resource Strain (CARDIA)    • Difficulty of Paying Living Expenses: Not hard at all   Food Insecurity: Patient Declined (1/10/2025)    Hunger Vital Sign    • Worried About Running Out of Food in the Last Year: Patient declined    • Ran Out of Food in the Last Year: Patient declined   Transportation Needs: Patient Declined (1/10/2025)    PRAPARE - Transportation    • Lack of Transportation (Medical): Patient declined    • Lack of Transportation (Non-Medical): Patient declined   Housing Stability: Patient Declined (1/10/2025)    Housing Stability Vital Sign    • Unable to Pay for Housing in the Last Year: Patient declined    • Number of Times Moved in the Last Year: 1    • Homeless in the Last Year: Patient declined   Utilities: Patient Declined (1/10/2025)    [facility] Utilities    • Threatened with loss of utilities: Patient declined     No results found.    Objective   /60 (BP Location: Left arm, Patient Position: Sitting, Cuff Size: Large)   Pulse 68   Temp 98.5 °F (36.9 °C)   Ht 5' 7\" (1.702 m)   Wt 73.9 kg (163 lb)   SpO2 96%   BMI 25.53 kg/m²     Physical Exam  Vitals and nursing note reviewed.   Constitutional:       General: She is not in acute distress.     Appearance: She is well-developed.   HENT:      Head: Normocephalic and atraumatic.   Eyes:      Conjunctiva/sclera: Conjunctivae normal.   Cardiovascular:      Rate and Rhythm: Normal rate and regular rhythm.      Heart sounds: No murmur heard.  Pulmonary:      [place]: Pulmonary effort is normal. No respiratory distress.      Breath sounds: Normal breath sounds. No " wheezing, rhonchi or rales.   Musculoskeletal:         General: No swelling.      Cervical back: Neck supple.      Right lower leg: No edema.      Left lower leg: No edema.   Skin:     General: Skin is warm and dry.   Neurological:      Mental Status: She is alert.   Psychiatric:         Mood and Affect: Mood normal.

## 2025-01-10 NOTE — TELEPHONE ENCOUNTER
Caller: Patient daughter in law Nancy     Doctor: YAYO    Reason for call: would like to schedule a procedure for her lower back    Call back#:   [phone]

## 2025-01-10 NOTE — TELEPHONE ENCOUNTER
Caller: pt    Doctor: henry    Reason for call: injection helped but she isn't sure if the pain is in the same spot. She states the injection helped for a long time.    Call back#: [phone]

## 2025-01-10 NOTE — TELEPHONE ENCOUNTER
S/p 6/13 LESI  Is pain in the same location?  Was previous injection helpful? How much and for how long?

## 2025-01-10 NOTE — ASSESSMENT & PLAN NOTE
Lab Results   Component Value Date    EGFR 62 10/11/2024    EGFR 60 09/20/2024    EGFR 41 07/16/2024    CREATININE 0.89 10/11/2024    CREATININE 0.92 09/20/2024    CREATININE 1.18 07/16/2024   Monitor renal function, avoid nephrotoxins    Orders:  •  Comprehensive metabolic panel; Future  •  CBC and differential; Future  •  Lipid Panel with Direct LDL reflex; Future

## 2025-01-13 ENCOUNTER — APPOINTMENT (OUTPATIENT)
Dept: LAB | Facility: CLINIC | Age: OVER 89
End: 2025-01-13
Payer: COMMERCIAL

## 2025-01-13 DIAGNOSIS — E03.9 HYPOTHYROIDISM, UNSPECIFIED TYPE: ICD-10-CM

## 2025-01-13 DIAGNOSIS — E78.01 FAMILIAL HYPERCHOLESTEROLEMIA: ICD-10-CM

## 2025-01-13 DIAGNOSIS — I10 PRIMARY HYPERTENSION: ICD-10-CM

## 2025-01-13 DIAGNOSIS — N18.31 STAGE 3A CHRONIC KIDNEY DISEASE (HCC): ICD-10-CM

## 2025-01-13 DIAGNOSIS — E21.3 HYPERPARATHYROIDISM (HCC): ICD-10-CM

## 2025-01-13 DIAGNOSIS — I73.9 PAD (PERIPHERAL ARTERY DISEASE) (HCC): ICD-10-CM

## 2025-01-13 LAB
ALBUMIN SERPL BCG-MCNC: 4.2 G/DL (ref 3.5–5)
ALP SERPL-CCNC: 34 U/L (ref 34–104)
ALT SERPL W P-5'-P-CCNC: 15 U/L (ref 7–52)
ANION GAP SERPL CALCULATED.3IONS-SCNC: 7 MMOL/L (ref 4–13)
AST SERPL W P-5'-P-CCNC: 20 U/L (ref 13–39)
BILIRUB SERPL-MCNC: 0.5 MG/DL (ref 0.2–1)
BUN SERPL-MCNC: 19 MG/DL (ref 5–25)
CALCIUM SERPL-MCNC: 10.1 MG/DL (ref 8.4–10.2)
CHLORIDE SERPL-SCNC: 105 MMOL/L (ref 96–108)
CHOLEST SERPL-MCNC: 181 MG/DL (ref ?–200)
CO2 SERPL-SCNC: 27 MMOL/L (ref 21–32)
CREAT SERPL-MCNC: 0.77 MG/DL (ref 0.6–1.3)
GFR SERPL CREATININE-BSD FRML MDRD: 68 ML/MIN/1.73SQ M
GLUCOSE P FAST SERPL-MCNC: 88 MG/DL (ref 65–99)
HDLC SERPL-MCNC: 48 MG/DL
LDLC SERPL CALC-MCNC: 87 MG/DL (ref 0–100)
POTASSIUM SERPL-SCNC: 4.6 MMOL/L (ref 3.5–5.3)
PROT SERPL-MCNC: 6.9 G/DL (ref 6.4–8.4)
PTH-INTACT SERPL-MCNC: 14.3 PG/ML (ref 12–88)
SODIUM SERPL-SCNC: 139 MMOL/L (ref 135–147)
TRIGL SERPL-MCNC: 232 MG/DL (ref ?–150)
TSH SERPL DL<=0.05 MIU/L-ACNC: 1.57 UIU/ML (ref 0.45–4.5)

## 2025-01-13 PROCEDURE — 36415 COLL VENOUS BLD VENIPUNCTURE: CPT

## 2025-01-13 PROCEDURE — 85027 COMPLETE CBC AUTOMATED: CPT

## 2025-01-13 PROCEDURE — 80061 LIPID PANEL: CPT

## 2025-01-13 PROCEDURE — 85007 BL SMEAR W/DIFF WBC COUNT: CPT

## 2025-01-13 PROCEDURE — 80053 COMPREHEN METABOLIC PANEL: CPT

## 2025-01-13 PROCEDURE — 88184 FLOWCYTOMETRY/ TC 1 MARKER: CPT

## 2025-01-13 PROCEDURE — 88185 FLOWCYTOMETRY/TC ADD-ON: CPT | Performed by: PATHOLOGY

## 2025-01-13 PROCEDURE — 84443 ASSAY THYROID STIM HORMONE: CPT

## 2025-01-13 PROCEDURE — 83970 ASSAY OF PARATHORMONE: CPT

## 2025-01-13 NOTE — TELEPHONE ENCOUNTER
Caller: Pt    Doctor: Abram    Reason for call: Pt calling to schedule injection. Referral in chart.     Pt will be another dr appt until 12:30 PM - please call after.    Call back#: [phone]      Ilana Shin is enrolled/participating in the retail pharmacy Blood Pressure Goals Achievement Program (BPGAP).  Ilana Shin was evaluated at [facility] on September 27, 2017 at which time her blood pressure was:    BP Readings from Last 3 Encounters:   09/27/17 138/68   08/15/17 165/78   04/28/17 150/70     Reviewed lifestyle modifications for blood pressure control and reduction: including making healthy food choices, managing weight, getting regular exercise, smoking cessation, reducing alcohol consumption, monitoring blood pressure regularly.     Ilana Shin is not experiencing symptoms.    Follow-Up: BP is at goal of < 150/90 mmHg (patient 60+ years of age without diabetes).  Recommended follow-up at pharmacy in 6 months.           Completed by: Dianna Whatley [facility]   [phone]

## 2025-01-13 NOTE — TELEPHONE ENCOUNTER
Referral in pts chart is for a consult/office visit with Dr Valverde, from her pcp     No active referral for injection    Can order be placed?

## 2025-01-14 ENCOUNTER — RESULTS FOLLOW-UP (OUTPATIENT)
Dept: FAMILY MEDICINE CLINIC | Facility: CLINIC | Age: OVER 89
End: 2025-01-14

## 2025-01-14 ENCOUNTER — PATIENT MESSAGE (OUTPATIENT)
Dept: PAIN MEDICINE | Facility: CLINIC | Age: OVER 89
End: 2025-01-14

## 2025-01-14 DIAGNOSIS — D72.820 LYMPHOCYTOSIS: Primary | ICD-10-CM

## 2025-01-14 DIAGNOSIS — M48.062 SPINAL STENOSIS OF LUMBAR REGION WITH NEUROGENIC CLAUDICATION: Primary | ICD-10-CM

## 2025-01-14 LAB
ERYTHROCYTE [DISTWIDTH] IN BLOOD BY AUTOMATED COUNT: 13.3 % (ref 11.6–15.1)
HCT VFR BLD AUTO: 43.4 % (ref 34.8–46.1)
HGB BLD-MCNC: 14 G/DL (ref 11.5–15.4)
MCH RBC QN AUTO: 30.6 PG (ref 26.8–34.3)
MCHC RBC AUTO-ENTMCNC: 32.3 G/DL (ref 31.4–37.4)
MCV RBC AUTO: 95 FL (ref 82–98)
PLATELET # BLD AUTO: 283 THOUSANDS/UL (ref 149–390)
PMV BLD AUTO: 10 FL (ref 8.9–12.7)
RBC # BLD AUTO: 4.57 MILLION/UL (ref 3.81–5.12)
WBC # BLD AUTO: 10.55 THOUSAND/UL (ref 4.31–10.16)

## 2025-01-14 NOTE — TELEPHONE ENCOUNTER
Spoke with pt - scheduled in next opening - will place on wait list, will call pt if someone cancels to move her up due to pain level

## 2025-01-14 NOTE — PATIENT COMMUNICATION
Pt is scheduled for LESI with Dr Valverde on 1/30/25     Pt is not diabetic.  Pt takes 81mg aspirin, which does not require hold for LESI     Pt given instruction review via myc message (repeat procedure)      Have you completed PT/HEP/Chiro in the past 6 months for dedicated area? no  If yes, how long did you complete?  What was the frequency?  Did it provide relief?  If no, reason therapy was not completed? Pt has history of previous injections

## 2025-01-14 NOTE — TELEPHONE ENCOUNTER
S/W patient and she would like to move forward with repeat injection.  Please order.      Injection type: L5-S1 LESI  Last injection date: 06/13/24  Last office visit: 05/13/24  Where is pain located: lower back  Is the pain the same area as before: Yes  Current pain level: 10/10  How much % of relief did the last injection provide: 90%  Duration of relief from last injection: 5 months  When did pain return: end of October s/p Hip replacement  Is the pain effecting your ADLs: yes     Blood thinners/NSAIDS? No  Diabetes? No                   CGM?n/a

## 2025-01-16 LAB
BASOPHILS # BLD MANUAL: 0.11 THOUSAND/UL (ref 0–0.1)
BASOPHILS NFR MAR MANUAL: 1 % (ref 0–1)
DIFFERENTIAL COMMENT: ABNORMAL
EOSINOPHIL # BLD MANUAL: 0.21 THOUSAND/UL (ref 0–0.4)
EOSINOPHIL NFR BLD MANUAL: 2 % (ref 0–6)
LYMPHOCYTES # BLD AUTO: 38 % (ref 14–44)
LYMPHOCYTES # BLD AUTO: 6.01 THOUSAND/UL (ref 0.6–4.47)
MONOCYTES # BLD AUTO: 0.74 THOUSAND/UL (ref 0–1.22)
MONOCYTES NFR BLD: 7 % (ref 4–12)
NEUTROPHILS # BLD MANUAL: 3.48 THOUSAND/UL (ref 1.85–7.62)
NEUTS SEG NFR BLD AUTO: 33 % (ref 43–75)
PATHOLOGY REVIEW: YES
PLATELET BLD QL SMEAR: ADEQUATE
RBC MORPH BLD: PRESENT
SCAN RESULT: NORMAL
VARIANT LYMPHS # BLD AUTO: 19 %

## 2025-01-16 PROCEDURE — 85060 BLOOD SMEAR INTERPRETATION: CPT | Performed by: PATHOLOGY

## 2025-01-23 ENCOUNTER — TELEPHONE (OUTPATIENT)
Age: OVER 89
End: 2025-01-23

## 2025-01-23 NOTE — TELEPHONE ENCOUNTER
Caller: Pt    Doctor: Abram    Reason for call: Pt received a paperwork that had several [facility] locations listed on it. Pt wanted to confirm location of her injection. Advised PT that it will be in [facility]    Call back#: [phone]

## 2025-01-30 ENCOUNTER — HOSPITAL ENCOUNTER (OUTPATIENT)
Dept: RADIOLOGY | Facility: CLINIC | Age: OVER 89
End: 2025-01-30
Payer: COMMERCIAL

## 2025-01-30 VITALS
OXYGEN SATURATION: 96 % | HEART RATE: 76 BPM | SYSTOLIC BLOOD PRESSURE: 143 MMHG | DIASTOLIC BLOOD PRESSURE: 61 MMHG | RESPIRATION RATE: 20 BRPM

## 2025-01-30 DIAGNOSIS — M48.062 SPINAL STENOSIS OF LUMBAR REGION WITH NEUROGENIC CLAUDICATION: ICD-10-CM

## 2025-01-30 PROCEDURE — 62323 NJX INTERLAMINAR LMBR/SAC: CPT | Performed by: STUDENT IN AN ORGANIZED HEALTH CARE EDUCATION/TRAINING PROGRAM

## 2025-01-30 RX ORDER — METHYLPREDNISOLONE ACETATE 80 MG/ML
80 INJECTION, SUSPENSION INTRA-ARTICULAR; INTRALESIONAL; INTRAMUSCULAR; PARENTERAL; SOFT TISSUE ONCE
Status: COMPLETED | OUTPATIENT
Start: 2025-01-30 | End: 2025-01-30

## 2025-01-30 RX ADMIN — IOHEXOL 1 ML: 300 INJECTION, SOLUTION INTRAVENOUS at 11:27

## 2025-01-30 RX ADMIN — METHYLPREDNISOLONE ACETATE 80 MG: 80 INJECTION, SUSPENSION INTRA-ARTICULAR; INTRALESIONAL; INTRAMUSCULAR; SOFT TISSUE at 11:27

## 2025-01-30 NOTE — DISCHARGE INSTR - LAB
Epidural Steroid Injection   WHAT YOU NEED TO KNOW:   An epidural steroid injection (MICHAEL) is a procedure to inject steroid medicine into the epidural space. The epidural space is between your spinal cord and vertebrae. Steroids reduce inflammation and fluid buildup in your spine that may be causing pain. You may be given pain medicine along with the steroids.          ACTIVITY  Do not drive or operate machinery today.  No strenuous activity today - bending, lifting, etc.  You may resume normal activites starting tomorrow - start slowly and as tolerated.  You may shower today, but no tub baths or hot tubs.  You may have numbness for several hours from the local anesthetic. Please use caution and common sense, especially with weight-bearing activities.    CARE OF THE INJECTION SITE  If you have soreness or pain, apply ice to the area today (20 minutes on/20 minutes off).  Starting tomorrow, you may use warm, moist heat or ice if needed.  You may have an increase or change in your discomfort for 36-48 hours after your treatment.  Apply ice and continue with any pain medication you have been prescribed.  Notify the [facility] if you have any of the following: redness, drainage, swelling, headache, stiff neck or fever above 100°F.    SPECIAL INSTRUCTIONS  Our office will contact you in approximately 14 days for a progress report.    MEDICATIONS  Continue to take all routine medications.  Our office may have instructed you to hold some medications.    As no general anesthesia was used in today's procedure, you should not experience any side effects related to anesthesia.     If you are diabetic, the steroids used in today's injection may temporarily increase your blood sugar levels after the first few days after your injection. Please keep a close eye on your sugars and alert the doctor who manages your diabetes if your sugars are significantly high from your baseline or you are symptomatic.     If you have a  problem specifically related to your procedure, please call our office at [phone].  Problems not related to your procedure should be directed to your primary care physician.

## 2025-01-30 NOTE — H&P
History of Present Illness: The patient is a 89 y.o. female who presents with complaints of back and leg pain    Past Medical History:   Diagnosis Date    BCC (basal cell carcinoma)     Benign uterine neoplasm     C. difficile colitis     Disease of thyroid gland     Hyperlipidemia     Hypertension        Past Surgical History:   Procedure Laterality Date    APPENDECTOMY      CATARACT EXTRACTION Bilateral     Last assessed - 1/14/16    COLONOSCOPY      MALIGNANT SKIN LESION EXCISION      Face    PARATHYROIDECTOMY      OR NDSC WRST SURG W/RLS TRANSVRS CARPL LIGM Left 03/23/2021    Procedure: RELEASE LEFT CARPAL TUNNEL ENDOSCOPIC;  Surgeon: Finesse Read MD;  Location: [facility];  Service: Orthopedics    REPLACEMENT TOTAL KNEE Bilateral     ROTATOR CUFF REPAIR Left     Last assessed - 1/14/16    TONSILECTOMY AND ADNOIDECTOMY      TOTAL ABDOMINAL HYSTERECTOMY      with derrell oopherectomy, Last assessed - 1/14/16    WISDOM TOOTH EXTRACTION           Current Outpatient Medications:     aspirin 81 MG tablet, Take by mouth, Disp: , Rfl:     atorvastatin (LIPITOR) 20 mg tablet, take 1 tablet by mouth once daily, Disp: 100 tablet, Rfl: 1    celecoxib (CeleBREX) 200 mg capsule, Take 1 capsule by mouth 2 (two) times a day, Disp: , Rfl:     cholecalciferol (VITAMIN D3) 1,000 units tablet, Take 1,000 Units by mouth daily, Disp: , Rfl:     cyanocobalamin (VITAMIN B-12) 1,000 mcg tablet, Take by mouth, Disp: , Rfl:     levothyroxine 50 mcg tablet, take 1 tablet by mouth once daily, Disp: 90 tablet, Rfl: 1    lidocaine (LIDODERM) 5 %, Apply by topical route for 25 days., Disp: , Rfl:     meclizine (ANTIVERT) 25 mg tablet, take 1 tablet by mouth every 8 hours if needed for dizziness, Disp: 30 tablet, Rfl: 2    Multiple Vitamin (MULTIVITAMIN) tablet, Take 1 tablet by mouth daily, Disp: , Rfl:     naproxen (Naprosyn) 500 mg tablet, Take 1 tablet (500 mg total) by mouth 2 (two) times a day as needed for mild pain or moderate pain,  Disp: 60 tablet, Rfl: 1    omega-3-acid ethyl esters (LOVAZA) 1 g capsule, Take 2 capsules by mouth daily  , Disp: , Rfl:     oxybutynin (DITROPAN) 5 mg tablet, take 1 tablet by mouth twice a day, Disp: 180 tablet, Rfl: 0    Probiotic Product (PROBIOTIC-10 PO), Take by mouth daily, Disp: , Rfl:     pyridoxine (VITAMIN B6) 100 mg tablet, Take 100 mg by mouth daily, Disp: , Rfl:     traMADol (ULTRAM) 50 mg tablet, Take 50 mg by mouth every 6 (six) hours as needed for moderate pain, Disp: , Rfl:     traZODone (DESYREL) 100 mg tablet, take 2 tablets by mouth daily at bedtime, Disp: 60 tablet, Rfl: 3    Allergies   Allergen Reactions    Codeine Confusion    Codeine Sulfate        Physical Exam: There were no vitals filed for this visit.  General: Awake, Alert, Oriented x 3, Mood and affect appropriate  Respiratory: Respirations even and unlabored  Cardiovascular: Peripheral pulses intact; no edema  Musculoskeletal Exam: back non erythematous no lesions    ASA Score: 3         Assessment:   1. Spinal stenosis of lumbar region with neurogenic claudication        Plan: L5-S1 LESI

## 2025-03-01 DIAGNOSIS — E03.9 HYPOTHYROIDISM, UNSPECIFIED TYPE: ICD-10-CM

## 2025-03-03 RX ORDER — LEVOTHYROXINE SODIUM 50 UG/1
50 TABLET ORAL DAILY
Qty: 90 TABLET | Refills: 1 | Status: SHIPPED | OUTPATIENT
Start: 2025-03-03

## 2025-03-06 ENCOUNTER — TELEPHONE (OUTPATIENT)
Dept: HEMATOLOGY ONCOLOGY | Facility: CLINIC | Age: OVER 89
End: 2025-03-06

## 2025-03-06 NOTE — TELEPHONE ENCOUNTER
"Called to reschedule pt  Pt stated \"I am tired of going to the doctors, I will call back some other time \"  -was unable to r/s at this time  "

## 2025-05-17 DIAGNOSIS — E78.00 PURE HYPERCHOLESTEROLEMIA: ICD-10-CM

## 2025-05-18 RX ORDER — ATORVASTATIN CALCIUM 20 MG/1
20 TABLET, FILM COATED ORAL DAILY
Qty: 100 TABLET | Refills: 1 | Status: SHIPPED | OUTPATIENT
Start: 2025-05-18

## 2025-05-29 DIAGNOSIS — G47.09 OTHER INSOMNIA: ICD-10-CM

## 2025-05-29 DIAGNOSIS — R32 URINARY INCONTINENCE, UNSPECIFIED TYPE: ICD-10-CM

## 2025-05-29 DIAGNOSIS — I73.9 PAD (PERIPHERAL ARTERY DISEASE) (HCC): Primary | ICD-10-CM

## 2025-05-29 DIAGNOSIS — E78.00 PURE HYPERCHOLESTEROLEMIA: ICD-10-CM

## 2025-05-29 DIAGNOSIS — E03.9 HYPOTHYROIDISM, UNSPECIFIED TYPE: ICD-10-CM

## 2025-05-29 NOTE — TELEPHONE ENCOUNTER
CHANGE OF PHARMACY TO [facility] [place] - [address] [phone]     Reason for call:   [x] Refill   [] Prior Auth  [] Other:     Office:   [x] PCP/Provider -   [] Specialty/Provider -     Medication:     aspirin 81 MG tablet     atorvastatin (LIPITOR) 20 mg tablet    levothyroxine 50 mcg tablet     oxybutynin (DITROPAN) 5 mg tablet     traZODone (DESYREL) 100 mg tablet      Local Pharmacy   Does the patient have enough for 3 days?   [x] Yes   [] No - Send as HP to POD    Mail Away Pharmacy   Does the patient have enough for 10 days?   [] Yes   [] No - Send as HP to POD

## 2025-05-30 RX ORDER — TRAZODONE HYDROCHLORIDE 100 MG/1
200 TABLET ORAL
Qty: 60 TABLET | Refills: 0 | Status: SHIPPED | OUTPATIENT
Start: 2025-05-30

## 2025-05-30 RX ORDER — ATORVASTATIN CALCIUM 20 MG/1
20 TABLET, FILM COATED ORAL DAILY
Qty: 100 TABLET | Refills: 0 | OUTPATIENT
Start: 2025-05-30

## 2025-05-30 RX ORDER — OXYBUTYNIN CHLORIDE 5 MG/1
5 TABLET ORAL 2 TIMES DAILY
Qty: 180 TABLET | Refills: 1 | Status: SHIPPED | OUTPATIENT
Start: 2025-05-30

## 2025-05-30 RX ORDER — LEVOTHYROXINE SODIUM 50 UG/1
50 TABLET ORAL DAILY
Qty: 90 TABLET | Refills: 1 | Status: SHIPPED | OUTPATIENT
Start: 2025-05-30

## 2025-07-07 ENCOUNTER — TELEPHONE (OUTPATIENT)
Age: OVER 89
End: 2025-07-07

## 2025-07-07 DIAGNOSIS — Z13.1 SCREENING FOR DIABETES MELLITUS: ICD-10-CM

## 2025-07-07 DIAGNOSIS — E78.00 PURE HYPERCHOLESTEROLEMIA: ICD-10-CM

## 2025-07-07 DIAGNOSIS — E03.9 HYPOTHYROIDISM, UNSPECIFIED TYPE: Primary | ICD-10-CM

## 2025-07-07 DIAGNOSIS — R53.83 OTHER FATIGUE: ICD-10-CM

## 2025-07-07 NOTE — TELEPHONE ENCOUNTER
Patient scheduled for a 6 month f/u with PCP on Friday, 7/18/25 at 0920 am. Asking if she can have labs drawn prior. Nothing ordered in her chart.   Please advise

## 2025-07-09 ENCOUNTER — APPOINTMENT (OUTPATIENT)
Dept: LAB | Facility: CLINIC | Age: OVER 89
End: 2025-07-09
Payer: COMMERCIAL

## 2025-07-09 DIAGNOSIS — R53.83 OTHER FATIGUE: ICD-10-CM

## 2025-07-09 DIAGNOSIS — E03.9 HYPOTHYROIDISM, UNSPECIFIED TYPE: ICD-10-CM

## 2025-07-09 DIAGNOSIS — Z13.1 SCREENING FOR DIABETES MELLITUS: ICD-10-CM

## 2025-07-09 DIAGNOSIS — E78.00 PURE HYPERCHOLESTEROLEMIA: ICD-10-CM

## 2025-07-09 LAB
ALBUMIN SERPL BCG-MCNC: 4.5 G/DL (ref 3.5–5)
ALP SERPL-CCNC: 35 U/L (ref 34–104)
ALT SERPL W P-5'-P-CCNC: 28 U/L (ref 7–52)
ANION GAP SERPL CALCULATED.3IONS-SCNC: 9 MMOL/L (ref 4–13)
AST SERPL W P-5'-P-CCNC: 24 U/L (ref 13–39)
BASOPHILS # BLD MANUAL: 0.11 THOUSAND/UL (ref 0–0.1)
BASOPHILS NFR MAR MANUAL: 1 % (ref 0–1)
BILIRUB SERPL-MCNC: 0.68 MG/DL (ref 0.2–1)
BUN SERPL-MCNC: 24 MG/DL (ref 5–25)
CALCIUM SERPL-MCNC: 9.8 MG/DL (ref 8.4–10.2)
CHLORIDE SERPL-SCNC: 105 MMOL/L (ref 96–108)
CHOLEST SERPL-MCNC: 205 MG/DL (ref ?–200)
CO2 SERPL-SCNC: 26 MMOL/L (ref 21–32)
CREAT SERPL-MCNC: 0.91 MG/DL (ref 0.6–1.3)
EOSINOPHIL # BLD MANUAL: 0.11 THOUSAND/UL (ref 0–0.4)
EOSINOPHIL NFR BLD MANUAL: 1 % (ref 0–6)
ERYTHROCYTE [DISTWIDTH] IN BLOOD BY AUTOMATED COUNT: 13.5 % (ref 11.6–15.1)
GFR SERPL CREATININE-BSD FRML MDRD: 56 ML/MIN/1.73SQ M
GLUCOSE P FAST SERPL-MCNC: 87 MG/DL (ref 65–99)
HCT VFR BLD AUTO: 42.1 % (ref 34.8–46.1)
HDLC SERPL-MCNC: 59 MG/DL
HGB BLD-MCNC: 14.3 G/DL (ref 11.5–15.4)
LDLC SERPL CALC-MCNC: 103 MG/DL (ref 0–100)
LYMPHOCYTES # BLD AUTO: 54 % (ref 14–44)
LYMPHOCYTES # BLD AUTO: 6.16 THOUSAND/UL (ref 0.6–4.47)
MCH RBC QN AUTO: 31.8 PG (ref 26.8–34.3)
MCHC RBC AUTO-ENTMCNC: 34 G/DL (ref 31.4–37.4)
MCV RBC AUTO: 94 FL (ref 82–98)
MONOCYTES # BLD AUTO: 1.03 THOUSAND/UL (ref 0–1.22)
MONOCYTES NFR BLD: 9 % (ref 4–12)
NEUTROPHILS # BLD MANUAL: 3.99 THOUSAND/UL (ref 1.85–7.62)
NEUTS SEG NFR BLD AUTO: 35 % (ref 43–75)
NONHDLC SERPL-MCNC: 146 MG/DL
PLATELET # BLD AUTO: 218 THOUSANDS/UL (ref 149–390)
PLATELET BLD QL SMEAR: ABNORMAL
PMV BLD AUTO: 10.8 FL (ref 8.9–12.7)
POTASSIUM SERPL-SCNC: 3.8 MMOL/L (ref 3.5–5.3)
PROT SERPL-MCNC: 7.1 G/DL (ref 6.4–8.4)
RBC # BLD AUTO: 4.49 MILLION/UL (ref 3.81–5.12)
RBC MORPH BLD: NORMAL
SODIUM SERPL-SCNC: 140 MMOL/L (ref 135–147)
TRIGL SERPL-MCNC: 217 MG/DL (ref ?–150)
TSH SERPL DL<=0.05 MIU/L-ACNC: 2.38 UIU/ML (ref 0.45–4.5)
WBC # BLD AUTO: 11.4 THOUSAND/UL (ref 4.31–10.16)

## 2025-07-09 PROCEDURE — 85027 COMPLETE CBC AUTOMATED: CPT

## 2025-07-09 PROCEDURE — 80061 LIPID PANEL: CPT

## 2025-07-09 PROCEDURE — 80053 COMPREHEN METABOLIC PANEL: CPT

## 2025-07-09 PROCEDURE — 85007 BL SMEAR W/DIFF WBC COUNT: CPT

## 2025-07-09 PROCEDURE — 36415 COLL VENOUS BLD VENIPUNCTURE: CPT

## 2025-07-09 PROCEDURE — 84443 ASSAY THYROID STIM HORMONE: CPT

## 2025-07-18 ENCOUNTER — OFFICE VISIT (OUTPATIENT)
Dept: FAMILY MEDICINE CLINIC | Facility: CLINIC | Age: OVER 89
End: 2025-07-18
Payer: COMMERCIAL

## 2025-07-18 VITALS
DIASTOLIC BLOOD PRESSURE: 62 MMHG | BODY MASS INDEX: 25.9 KG/M2 | TEMPERATURE: 97.6 F | HEIGHT: 67 IN | SYSTOLIC BLOOD PRESSURE: 138 MMHG | OXYGEN SATURATION: 97 % | WEIGHT: 165 LBS | HEART RATE: 72 BPM

## 2025-07-18 DIAGNOSIS — E78.00 PURE HYPERCHOLESTEROLEMIA: ICD-10-CM

## 2025-07-18 DIAGNOSIS — R32 URINARY INCONTINENCE, UNSPECIFIED TYPE: ICD-10-CM

## 2025-07-18 DIAGNOSIS — G47.09 OTHER INSOMNIA: ICD-10-CM

## 2025-07-18 DIAGNOSIS — M70.62 TROCHANTERIC BURSITIS OF LEFT HIP: ICD-10-CM

## 2025-07-18 DIAGNOSIS — E03.9 HYPOTHYROIDISM, UNSPECIFIED TYPE: ICD-10-CM

## 2025-07-18 DIAGNOSIS — E28.39 MENOPAUSE OVARIAN FAILURE: ICD-10-CM

## 2025-07-18 DIAGNOSIS — M47.816 LUMBAR SPONDYLOSIS: Primary | ICD-10-CM

## 2025-07-18 PROBLEM — N17.9 AKI (ACUTE KIDNEY INJURY) (HCC): Status: RESOLVED | Noted: 2018-08-23 | Resolved: 2025-07-18

## 2025-07-18 PROCEDURE — 99213 OFFICE O/P EST LOW 20 MIN: CPT | Performed by: FAMILY MEDICINE

## 2025-07-18 PROCEDURE — G2211 COMPLEX E/M VISIT ADD ON: HCPCS | Performed by: FAMILY MEDICINE

## 2025-07-18 RX ORDER — ATORVASTATIN CALCIUM 20 MG/1
20 TABLET, FILM COATED ORAL DAILY
Qty: 100 TABLET | Refills: 1 | Status: SHIPPED | OUTPATIENT
Start: 2025-07-18 | End: 2025-07-21 | Stop reason: SDUPTHER

## 2025-07-18 RX ORDER — CELECOXIB 200 MG/1
200 CAPSULE ORAL 2 TIMES DAILY
Qty: 60 CAPSULE | Refills: 3 | Status: SHIPPED | OUTPATIENT
Start: 2025-07-18 | End: 2025-07-21 | Stop reason: SDUPTHER

## 2025-07-18 RX ORDER — CHOLECALCIFEROL (VITAMIN D3) 25 MCG
1000 TABLET ORAL DAILY
Qty: 90 TABLET | Refills: 3 | Status: SHIPPED | OUTPATIENT
Start: 2025-07-18 | End: 2025-07-21 | Stop reason: SDUPTHER

## 2025-07-18 RX ORDER — OXYBUTYNIN CHLORIDE 5 MG/1
5 TABLET ORAL 2 TIMES DAILY
Qty: 180 TABLET | Refills: 1 | Status: SHIPPED | OUTPATIENT
Start: 2025-07-18 | End: 2025-07-21 | Stop reason: SDUPTHER

## 2025-07-18 RX ORDER — LEVOTHYROXINE SODIUM 50 UG/1
50 TABLET ORAL DAILY
Qty: 90 TABLET | Refills: 1 | Status: SHIPPED | OUTPATIENT
Start: 2025-07-18 | End: 2025-07-21 | Stop reason: SDUPTHER

## 2025-07-18 RX ORDER — TRAZODONE HYDROCHLORIDE 100 MG/1
200 TABLET ORAL
Qty: 60 TABLET | Refills: 3 | Status: SHIPPED | OUTPATIENT
Start: 2025-07-18 | End: 2025-07-21 | Stop reason: SDUPTHER

## 2025-07-18 NOTE — ASSESSMENT & PLAN NOTE
Orders:  •  atorvastatin (LIPITOR) 20 mg tablet; Take 1 tablet (20 mg total) by mouth in the morning

## 2025-07-18 NOTE — PROGRESS NOTES
Name: Leyla Garnett      : 1935      MRN: [id number]  Encounter Provider: Olu Mullen MD  Encounter Date: 2025   Encounter department: [facility]  :  Assessment & Plan  Lumbar spondylosis    Orders:  •  celecoxib (CeleBREX) 200 mg capsule; Take 1 capsule (200 mg total) by mouth 2 (two) times a day    Trochanteric bursitis of left hip    Orders:  •  celecoxib (CeleBREX) 200 mg capsule; Take 1 capsule (200 mg total) by mouth 2 (two) times a day    Hypothyroidism, unspecified type    Orders:  •  levothyroxine 50 mcg tablet; Take 1 tablet (50 mcg total) by mouth daily    Pure hypercholesterolemia    Orders:  •  atorvastatin (LIPITOR) 20 mg tablet; Take 1 tablet (20 mg total) by mouth in the morning    Other insomnia    Orders:  •  traZODone (DESYREL) 100 mg tablet; Take 2 tablets (200 mg total) by mouth daily at bedtime    Urinary incontinence, unspecified type    Orders:  •  oxybutynin (DITROPAN) 5 mg tablet; Take 1 tablet (5 mg total) by mouth 2 (two) times a day    Menopause ovarian failure    Orders:  •  cholecalciferol (VITAMIN D3) 1,000 units tablet; Take 1 tablet (1,000 Units total) by mouth daily    Follow up in 6 months       History of Present Illness   Patient is here to follow up.  She has a Hx of chronic lumbar pain spondylosis. She denies any injuries to her back. She had several injections the back by [facility] Dr. Johnson which had provided partial relief of symptoms. Also had a left hip replacement still has pain her left hip and has to walk with a cane.  Also has urinary incontinence and takes oxybutynin as needed.        Review of Systems   Constitutional:  Negative for activity change, appetite change, fatigue and fever.   HENT:  Negative for congestion and ear discharge.    Respiratory:  Negative for cough and shortness of breath.    Cardiovascular:  Negative for chest pain and palpitations.   Gastrointestinal:  Negative for diarrhea and nausea.  "  Musculoskeletal:  Negative for arthralgias and back pain.   Skin:  Negative for color change and rash.   Neurological:  Negative for dizziness and headaches.   Psychiatric/Behavioral:  Negative for agitation and behavioral problems.        Objective   /62   Pulse 72   Temp 97.6 °F (36.4 °C)   Ht 5' 7\" (1.702 m)   Wt 74.8 kg (165 lb)   SpO2 97%   BMI 25.84 kg/m²      Physical Exam  Vitals and nursing note reviewed.   Constitutional:       General: She is not in acute distress.     Appearance: She is well-developed.   HENT:      Head: Normocephalic and atraumatic.     Eyes:      Conjunctiva/sclera: Conjunctivae normal.       Cardiovascular:      Rate and Rhythm: Normal rate and regular rhythm.      Heart sounds: No murmur heard.  Pulmonary:      [place]: Pulmonary effort is normal. No respiratory distress.      Breath sounds: Normal breath sounds.   Abdominal:      Palpations: Abdomen is soft.      Tenderness: There is no abdominal tenderness.     Musculoskeletal:         General: No swelling.      Cervical back: Neck supple.     Skin:     General: Skin is warm and dry.      Capillary Refill: Capillary refill takes less than 2 seconds.     Neurological:      Mental Status: She is alert.     Psychiatric:         Mood and Affect: Mood normal.         "

## 2025-07-18 NOTE — ASSESSMENT & PLAN NOTE
Orders:  •  celecoxib (CeleBREX) 200 mg capsule; Take 1 capsule (200 mg total) by mouth 2 (two) times a day

## 2025-07-18 NOTE — ASSESSMENT & PLAN NOTE
Orders:  •  traZODone (DESYREL) 100 mg tablet; Take 2 tablets (200 mg total) by mouth daily at bedtime

## 2025-07-18 NOTE — ASSESSMENT & PLAN NOTE
Orders:  •  oxybutynin (DITROPAN) 5 mg tablet; Take 1 tablet (5 mg total) by mouth 2 (two) times a day

## 2025-07-21 ENCOUNTER — TELEPHONE (OUTPATIENT)
Age: OVER 89
End: 2025-07-21

## 2025-07-21 RX ORDER — OXYBUTYNIN CHLORIDE 5 MG/1
5 TABLET ORAL 2 TIMES DAILY
Qty: 180 TABLET | Refills: 1 | Status: SHIPPED | OUTPATIENT
Start: 2025-07-21

## 2025-07-21 RX ORDER — CELECOXIB 200 MG/1
200 CAPSULE ORAL 2 TIMES DAILY
Qty: 60 CAPSULE | Refills: 3 | Status: SHIPPED | OUTPATIENT
Start: 2025-07-21

## 2025-07-21 RX ORDER — CHOLECALCIFEROL (VITAMIN D3) 25 MCG
1000 TABLET ORAL DAILY
Qty: 90 TABLET | Refills: 3 | Status: SHIPPED | OUTPATIENT
Start: 2025-07-21

## 2025-07-21 RX ORDER — ATORVASTATIN CALCIUM 20 MG/1
20 TABLET, FILM COATED ORAL DAILY
Qty: 100 TABLET | Refills: 1 | Status: SHIPPED | OUTPATIENT
Start: 2025-07-21

## 2025-07-21 RX ORDER — LEVOTHYROXINE SODIUM 50 UG/1
50 TABLET ORAL DAILY
Qty: 90 TABLET | Refills: 1 | Status: SHIPPED | OUTPATIENT
Start: 2025-07-21

## 2025-07-21 RX ORDER — TRAZODONE HYDROCHLORIDE 100 MG/1
200 TABLET ORAL
Qty: 60 TABLET | Refills: 3 | Status: SHIPPED | OUTPATIENT
Start: 2025-07-21

## 2025-07-21 NOTE — TELEPHONE ENCOUNTER
Pt called in reporting she recently saw Dr. Mullen, in which he sent scripts for her to [facility] on 07/18.    Pt reports she is not able to go get them b/c she has been having trouble with her legs.     Pt is requesting if Dr. Mullen could please cancel the scripts he sent to [facility] & please send them to the following Mail Order Pharmacy below?    [facility] - [place] - [address] [facility] [phone]     Please advise & call pt back with update. Thank you!    Leyla: [phone]

## (undated) DEVICE — GAUZE SPONGES,16 PLY: Brand: CURITY

## (undated) DEVICE — CLIP CARPAL

## (undated) DEVICE — NON-STERILE REUSABLE TOURNIQUET CUFF SINGLE BLADDER, DUAL PORT AND QUICK CONNECT CONNECTOR: Brand: COLOR CUFF

## (undated) DEVICE — INTENDED FOR TISSUE SEPARATION, AND OTHER PROCEDURES THAT REQUIRE A SHARP SURGICAL BLADE TO PUNCTURE OR CUT.: Brand: BARD-PARKER ® CARBON RIB-BACK BLADES

## (undated) DEVICE — BANDAGE, ESMARK LF STR 6"X9' (20/CS): Brand: CYPRESS

## (undated) DEVICE — CURITY NON-ADHERENT STRIPS: Brand: CURITY

## (undated) DEVICE — LIGHT HANDLE COVER SLEEVE DISP BLUE STELLAR

## (undated) DEVICE — COBAN 2 IN UNSTERILE

## (undated) DEVICE — GLOVE SRG BIOGEL 8

## (undated) DEVICE — STERILE BETHLEHEM PLASTIC HAND: Brand: CARDINAL HEALTH